# Patient Record
Sex: MALE | Race: WHITE | Employment: OTHER | URBAN - METROPOLITAN AREA
[De-identification: names, ages, dates, MRNs, and addresses within clinical notes are randomized per-mention and may not be internally consistent; named-entity substitution may affect disease eponyms.]

---

## 2017-02-08 PROBLEM — Z95.3 H/O HEART VALVE REPLACEMENT WITH BIOPROSTHETIC VALVE: Status: ACTIVE | Noted: 2017-02-08

## 2017-02-08 PROBLEM — I35.0 AORTIC STENOSIS: Status: ACTIVE | Noted: 2017-02-08

## 2017-02-08 PROBLEM — R60.9 EDEMA: Status: ACTIVE | Noted: 2017-02-08

## 2017-08-15 PROBLEM — Z01.810 PREOP CARDIOVASCULAR EXAM: Chronic | Status: ACTIVE | Noted: 2017-08-15

## 2018-02-13 ENCOUNTER — ANESTHESIA EVENT (OUTPATIENT)
Dept: ENDOSCOPY | Age: 58
End: 2018-02-13
Payer: COMMERCIAL

## 2018-02-13 RX ORDER — SODIUM CHLORIDE, SODIUM LACTATE, POTASSIUM CHLORIDE, CALCIUM CHLORIDE 600; 310; 30; 20 MG/100ML; MG/100ML; MG/100ML; MG/100ML
100 INJECTION, SOLUTION INTRAVENOUS CONTINUOUS
Status: CANCELLED | OUTPATIENT
Start: 2018-02-13

## 2018-02-13 RX ORDER — SODIUM CHLORIDE 0.9 % (FLUSH) 0.9 %
5-10 SYRINGE (ML) INJECTION AS NEEDED
Status: CANCELLED | OUTPATIENT
Start: 2018-02-13

## 2018-02-14 ENCOUNTER — HOSPITAL ENCOUNTER (OUTPATIENT)
Age: 58
Setting detail: OUTPATIENT SURGERY
Discharge: HOME | End: 2018-02-14
Attending: INTERNAL MEDICINE | Admitting: INTERNAL MEDICINE
Payer: COMMERCIAL

## 2018-02-14 ENCOUNTER — ANESTHESIA (OUTPATIENT)
Dept: ENDOSCOPY | Age: 58
End: 2018-02-14
Payer: COMMERCIAL

## 2018-02-14 VITALS
TEMPERATURE: 98.6 F | HEART RATE: 73 BPM | HEIGHT: 69 IN | DIASTOLIC BLOOD PRESSURE: 77 MMHG | SYSTOLIC BLOOD PRESSURE: 148 MMHG | RESPIRATION RATE: 15 BRPM | WEIGHT: 215 LBS | BODY MASS INDEX: 31.84 KG/M2 | OXYGEN SATURATION: 98 %

## 2018-02-14 LAB — GLUCOSE BLD STRIP.AUTO-MCNC: 128 MG/DL (ref 65–100)

## 2018-02-14 PROCEDURE — 76040000025: Performed by: INTERNAL MEDICINE

## 2018-02-14 PROCEDURE — 77030009426 HC FCPS BIOP ENDOSC BSC -B: Performed by: INTERNAL MEDICINE

## 2018-02-14 PROCEDURE — 82962 GLUCOSE BLOOD TEST: CPT

## 2018-02-14 PROCEDURE — 74011250636 HC RX REV CODE- 250/636

## 2018-02-14 PROCEDURE — 76060000032 HC ANESTHESIA 0.5 TO 1 HR: Performed by: INTERNAL MEDICINE

## 2018-02-14 PROCEDURE — 74011250636 HC RX REV CODE- 250/636: Performed by: ANESTHESIOLOGY

## 2018-02-14 PROCEDURE — 88305 TISSUE EXAM BY PATHOLOGIST: CPT | Performed by: INTERNAL MEDICINE

## 2018-02-14 PROCEDURE — 74011000250 HC RX REV CODE- 250: Performed by: ANESTHESIOLOGY

## 2018-02-14 PROCEDURE — 74011000250 HC RX REV CODE- 250

## 2018-02-14 RX ORDER — PROPOFOL 10 MG/ML
INJECTION, EMULSION INTRAVENOUS
Status: DISCONTINUED | OUTPATIENT
Start: 2018-02-14 | End: 2018-02-14 | Stop reason: HOSPADM

## 2018-02-14 RX ORDER — PROPOFOL 10 MG/ML
INJECTION, EMULSION INTRAVENOUS AS NEEDED
Status: DISCONTINUED | OUTPATIENT
Start: 2018-02-14 | End: 2018-02-14 | Stop reason: HOSPADM

## 2018-02-14 RX ORDER — SODIUM CHLORIDE, SODIUM LACTATE, POTASSIUM CHLORIDE, CALCIUM CHLORIDE 600; 310; 30; 20 MG/100ML; MG/100ML; MG/100ML; MG/100ML
100 INJECTION, SOLUTION INTRAVENOUS CONTINUOUS
Status: DISCONTINUED | OUTPATIENT
Start: 2018-02-14 | End: 2018-02-14 | Stop reason: HOSPADM

## 2018-02-14 RX ORDER — LIDOCAINE HYDROCHLORIDE 20 MG/ML
INJECTION, SOLUTION EPIDURAL; INFILTRATION; INTRACAUDAL; PERINEURAL AS NEEDED
Status: DISCONTINUED | OUTPATIENT
Start: 2018-02-14 | End: 2018-02-14 | Stop reason: HOSPADM

## 2018-02-14 RX ORDER — EPHEDRINE SULFATE 50 MG/ML
INJECTION, SOLUTION INTRAVENOUS AS NEEDED
Status: DISCONTINUED | OUTPATIENT
Start: 2018-02-14 | End: 2018-02-14 | Stop reason: HOSPADM

## 2018-02-14 RX ORDER — FAMOTIDINE 20 MG/1
20 TABLET, FILM COATED ORAL AS NEEDED
Status: DISCONTINUED | OUTPATIENT
Start: 2018-02-14 | End: 2018-02-14 | Stop reason: HOSPADM

## 2018-02-14 RX ADMIN — PROPOFOL 180 MCG/KG/MIN: 10 INJECTION, EMULSION INTRAVENOUS at 10:19

## 2018-02-14 RX ADMIN — SODIUM CHLORIDE, SODIUM LACTATE, POTASSIUM CHLORIDE, AND CALCIUM CHLORIDE 100 ML/HR: 600; 310; 30; 20 INJECTION, SOLUTION INTRAVENOUS at 09:05

## 2018-02-14 RX ADMIN — EPHEDRINE SULFATE 10 MG: 50 INJECTION, SOLUTION INTRAVENOUS at 10:43

## 2018-02-14 RX ADMIN — LIDOCAINE HYDROCHLORIDE 25 MG: 20 INJECTION, SOLUTION EPIDURAL; INFILTRATION; INTRACAUDAL; PERINEURAL at 10:19

## 2018-02-14 RX ADMIN — PROPOFOL 100 MG: 10 INJECTION, EMULSION INTRAVENOUS at 10:19

## 2018-02-14 RX ADMIN — EPHEDRINE SULFATE 5 MG: 50 INJECTION, SOLUTION INTRAVENOUS at 10:47

## 2018-02-14 RX ADMIN — FAMOTIDINE 20 MG: 10 INJECTION, SOLUTION INTRAVENOUS at 08:00

## 2018-02-14 NOTE — ANESTHESIA POSTPROCEDURE EVALUATION
Post-Anesthesia Evaluation and Assessment    Patient: Mendy Mayberry MRN: [id number]  SSN: [id number]    YOB: 1960  Age: 56 y.o. Sex: male       Cardiovascular Function/Vital Signs  Visit Vitals    /62    Pulse 72    Temp 37 °C (98.6 °F)    Resp 16    Ht 5' 9\" (1.753 m)    Wt 97.5 kg (215 lb)    SpO2 97%    BMI 31.75 kg/m2       Patient is status post total IV anesthesia anesthesia for Procedure(s):  COLONOSCOPY  BMI 37  ENDOSCOPIC POLYPECTOMY  COLON BIOPSY. Nausea/Vomiting: None    Postoperative hydration reviewed and adequate. Pain:  Pain Scale 1: Visual (02/14/18 1112)  Pain Intensity 1: 0 (02/14/18 1112)   Managed    Neurological Status: At baseline    Mental Status and Level of Consciousness: Arousable    Pulmonary Status:   O2 Device: Room air (02/14/18 1112)   Adequate oxygenation and airway patent    Complications related to anesthesia: None    Post-anesthesia assessment completed.  No concerns    Signed By: Nadira Bazzi MD     February 14, 2018

## 2018-02-14 NOTE — DISCHARGE INSTRUCTIONS
Gastrointestinal Colonoscopy/Flexible Sigmoidoscopy - Lower Exam Discharge Instructions  1. Call Dr. Stacey Cui at [phone] for any problems or questions. 2. Contact the doctors office for follow up appointment as directed  3. Medication may cause drowsiness for several hours, therefore, do not drive or operate machinery for remainder of the day. 4. No alcohol today. 5. Ordinarily, you may resume regular diet and activity after exam unless otherwise specified by your physician. 6. Because of air put into your colon during exam, you may experience some abdominal distension, relieved by the passage of gas, for several hours. 7. Contact your physician if you have any of the following:  a. Excessive amount of bleeding - large amount when having a bowel movement. Occasional specks of blood with bowel movement would not be unusual.  b. Severe abdominal pain  c. Fever or Chills  8. Polyp Removal - follow these additional instructions  a. Clear liquid diet for the next meal (jell-o, broth, clear drinks)  b. Soft diet for 24 hours, then resume regular diet   c. Take Metamucil - 1 tablespoon in juice every morning for 3 days  d. No Aspirin, Advil, Aleve, Nuprin, Ibuprofen, or medications that contain these drugs for 2 weeks. Any additional instructions: Follow up on biopsy in 1 week, repeat colonoscopy in 5-10 years      Instructions given to Candace Sampson and other family members.   Instructions given by:
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
## 2018-02-14 NOTE — H&P
Gastrointestinal Progress Note    2/14/2018    Admit Date: 2/14/2018    Subjective:     Patient is NPO for a colonoscopy    Pain: Patient complains of no abdominal pain. Bowel Movements: Normal    Bleeding:  None    Current Facility-Administered Medications   Medication Dose Route Frequency    lactated Ringers infusion  100 mL/hr IntraVENous CONTINUOUS    famotidine (PEPCID) tablet 20 mg  20 mg Oral PRN        Objective:     Blood pressure 109/68, pulse 68, temperature 98.6 °F (37 °C), resp. rate 16, height 5' 9\" (1.753 m), weight 97.5 kg (215 lb), SpO2 95 %. EXAM:  HEART: regular rate and rhythm, S1, S2 normal, 2/6 systolic murmur, click, rub or gallop, LUNGS: chest clear, no wheezing, rales, normal symmetric air entry, Heart exam - S1, S2 NGJIMY,6/4 systolic murmur, no gallop, rate regular and ABDOMEN:  Bowel sounds are normal, liver is not enlarged, spleen is not enlarged    Data Review    Recent Results (from the past 24 hour(s))   GLUCOSE, POC    Collection Time: 02/14/18  9:04 AM   Result Value Ref Range    Glucose (POC) 128 (H) 65 - 100 mg/dL       Assessment:     Active Problems:  Colon screening  S/P CABG/AVR replacment  PPM placement  DM      Plan:     Colonoscopy--risks (bleed, perforation, infection, untoward CV or resp.event, mortality, etc.), benefits and alternatives were discussed with the patient who agrees to proceed.   Dell Nichols MD

## 2018-02-14 NOTE — ANESTHESIA PREPROCEDURE EVALUATION
Anesthetic History   No history of anesthetic complications            Review of Systems / Medical History  Patient summary reviewed and pertinent labs reviewed    Pulmonary        Sleep apnea           Neuro/Psych              Cardiovascular    Hypertension: well controlled  Valvular problems/murmurs (AVR)    CHF    Pacemaker, CAD and CABG (2011)    Exercise tolerance: >4 METS  Comments: H/O AVR (bioprosthetic) and CABG in 2011    Extensive PVD, s/p left femoral to above-knee pop bypass in 3/2015     GI/Hepatic/Renal         Renal disease: CRI       Endo/Other    Diabetes: well controlled, type 2    Obesity     Other Findings              Physical Exam    Airway  Mallampati: II  TM Distance: 4 - 6 cm  Neck ROM: normal range of motion   Mouth opening: Normal     Cardiovascular    Rhythm: regular      Murmur: Grade 3 and 1, Aortic area     Dental  No notable dental hx       Pulmonary  Breath sounds clear to auscultation               Abdominal         Other Findings            Anesthetic Plan    ASA: 3  Anesthesia type: total IV anesthesia          Induction: Intravenous  Anesthetic plan and risks discussed with: Patient

## 2018-04-24 ENCOUNTER — HOSPITAL ENCOUNTER (EMERGENCY)
Age: 58
Discharge: HOME | End: 2018-04-25
Attending: EMERGENCY MEDICINE
Payer: COMMERCIAL

## 2018-04-24 ENCOUNTER — APPOINTMENT (OUTPATIENT)
Dept: GENERAL RADIOLOGY | Age: 58
End: 2018-04-24
Attending: EMERGENCY MEDICINE
Payer: COMMERCIAL

## 2018-04-24 VITALS
HEIGHT: 68 IN | DIASTOLIC BLOOD PRESSURE: 87 MMHG | TEMPERATURE: 98.5 F | RESPIRATION RATE: 16 BRPM | OXYGEN SATURATION: 94 % | BODY MASS INDEX: 30.31 KG/M2 | SYSTOLIC BLOOD PRESSURE: 123 MMHG | WEIGHT: 200 LBS | HEART RATE: 88 BPM

## 2018-04-24 DIAGNOSIS — R45.851 SUICIDAL IDEATION: Primary | ICD-10-CM

## 2018-04-24 DIAGNOSIS — F32.A DEPRESSION, UNSPECIFIED DEPRESSION TYPE: ICD-10-CM

## 2018-04-24 LAB
AMPHET UR QL SCN: NEGATIVE
ANION GAP SERPL CALC-SCNC: 15 MMOL/L (ref 7–16)
APAP SERPL-MCNC: <2 UG/ML (ref 10–30)
ATRIAL RATE: 105 BPM
BARBITURATES UR QL SCN: NEGATIVE
BASOPHILS # BLD: 0.2 K/UL (ref 0–0.2)
BASOPHILS NFR BLD MANUAL: 2 % (ref 0–2)
BENZODIAZ UR QL: NEGATIVE
BUN SERPL-MCNC: 20 MG/DL (ref 6–23)
CALCIUM SERPL-MCNC: 8.7 MG/DL (ref 8.3–10.4)
CALCULATED P AXIS, ECG09: 50 DEGREES
CALCULATED R AXIS, ECG10: -12 DEGREES
CALCULATED T AXIS, ECG11: 36 DEGREES
CANNABINOIDS UR QL SCN: NEGATIVE
CHLORIDE SERPL-SCNC: 97 MMOL/L (ref 98–107)
CO2 SERPL-SCNC: 22 MMOL/L (ref 21–32)
COCAINE UR QL SCN: NEGATIVE
CREAT SERPL-MCNC: 1.58 MG/DL (ref 0.8–1.5)
DIAGNOSIS, 93000: NORMAL
DIFFERENTIAL METHOD BLD: NORMAL
ERYTHROCYTE [DISTWIDTH] IN BLOOD BY AUTOMATED COUNT: 14.1 % (ref 11.9–14.6)
GLUCOSE BLD STRIP.AUTO-MCNC: 281 MG/DL (ref 65–100)
GLUCOSE BLD STRIP.AUTO-MCNC: 284 MG/DL (ref 65–100)
GLUCOSE BLD STRIP.AUTO-MCNC: 318 MG/DL (ref 65–100)
GLUCOSE SERPL-MCNC: 288 MG/DL (ref 65–100)
HCT VFR BLD AUTO: 43.3 % (ref 41.1–50.3)
HGB BLD-MCNC: 15.1 G/DL (ref 13.6–17.2)
LYMPHOCYTES # BLD: 2.4 K/UL (ref 0.5–4.6)
LYMPHOCYTES NFR BLD MANUAL: 29 % (ref 16–44)
MCH RBC QN AUTO: 29.2 PG (ref 26.1–32.9)
MCHC RBC AUTO-ENTMCNC: 34.9 G/DL (ref 31.4–35)
MCV RBC AUTO: 83.8 FL (ref 79.6–97.8)
METHADONE UR QL: NEGATIVE
MONOCYTES # BLD: 0.4 K/UL (ref 0.1–1.3)
MONOCYTES NFR BLD MANUAL: 5 % (ref 3–9)
NEUTS SEG # BLD: 5.3 K/UL (ref 1.7–8.2)
NEUTS SEG NFR BLD MANUAL: 64 % (ref 47–75)
OPIATES UR QL: NEGATIVE
P-R INTERVAL, ECG05: 164 MS
PCP UR QL: NEGATIVE
PLATELET # BLD AUTO: 162 K/UL (ref 150–450)
PLATELET COMMENTS,PCOM: ADEQUATE
PMV BLD AUTO: 12.9 FL (ref 10.8–14.1)
POTASSIUM SERPL-SCNC: 4.2 MMOL/L (ref 3.5–5.1)
Q-T INTERVAL, ECG07: 350 MS
QRS DURATION, ECG06: 102 MS
QTC CALCULATION (BEZET), ECG08: 462 MS
RBC # BLD AUTO: 5.17 M/UL (ref 4.23–5.67)
RBC MORPH BLD: NORMAL
SALICYLATES SERPL-MCNC: 1.7 MG/DL (ref 2.8–20)
SODIUM SERPL-SCNC: 134 MMOL/L (ref 136–145)
TROPONIN I SERPL-MCNC: <0.02 NG/ML (ref 0.02–0.05)
VENTRICULAR RATE, ECG03: 105 BPM
WBC # BLD AUTO: 8.3 K/UL (ref 4.3–11.1)
WBC MORPH BLD: NORMAL

## 2018-04-24 PROCEDURE — 80307 DRUG TEST PRSMV CHEM ANLYZR: CPT | Performed by: EMERGENCY MEDICINE

## 2018-04-24 PROCEDURE — 96372 THER/PROPH/DIAG INJ SC/IM: CPT | Performed by: EMERGENCY MEDICINE

## 2018-04-24 PROCEDURE — 71045 X-RAY EXAM CHEST 1 VIEW: CPT

## 2018-04-24 PROCEDURE — 82962 GLUCOSE BLOOD TEST: CPT

## 2018-04-24 PROCEDURE — 80048 BASIC METABOLIC PNL TOTAL CA: CPT | Performed by: EMERGENCY MEDICINE

## 2018-04-24 PROCEDURE — 85025 COMPLETE CBC W/AUTO DIFF WBC: CPT | Performed by: EMERGENCY MEDICINE

## 2018-04-24 PROCEDURE — 74011250637 HC RX REV CODE- 250/637: Performed by: EMERGENCY MEDICINE

## 2018-04-24 PROCEDURE — 99285 EMERGENCY DEPT VISIT HI MDM: CPT | Performed by: EMERGENCY MEDICINE

## 2018-04-24 PROCEDURE — 93005 ELECTROCARDIOGRAM TRACING: CPT | Performed by: EMERGENCY MEDICINE

## 2018-04-24 PROCEDURE — 74011636637 HC RX REV CODE- 636/637: Performed by: EMERGENCY MEDICINE

## 2018-04-24 PROCEDURE — 84484 ASSAY OF TROPONIN QUANT: CPT | Performed by: EMERGENCY MEDICINE

## 2018-04-24 PROCEDURE — 81003 URINALYSIS AUTO W/O SCOPE: CPT | Performed by: EMERGENCY MEDICINE

## 2018-04-24 RX ORDER — VALSARTAN 160 MG/1
160 TABLET ORAL DAILY
Status: DISCONTINUED | OUTPATIENT
Start: 2018-04-25 | End: 2018-04-25 | Stop reason: HOSPADM

## 2018-04-24 RX ORDER — GUAIFENESIN 100 MG/5ML
81 LIQUID (ML) ORAL DAILY
Status: DISCONTINUED | OUTPATIENT
Start: 2018-04-25 | End: 2018-04-25 | Stop reason: HOSPADM

## 2018-04-24 RX ORDER — METOPROLOL TARTRATE 50 MG/1
50 TABLET ORAL
Status: COMPLETED | OUTPATIENT
Start: 2018-04-24 | End: 2018-04-24

## 2018-04-24 RX ORDER — AMLODIPINE BESYLATE 10 MG/1
10 TABLET ORAL DAILY
Status: DISCONTINUED | OUTPATIENT
Start: 2018-04-25 | End: 2018-04-25 | Stop reason: HOSPADM

## 2018-04-24 RX ORDER — INSULIN GLARGINE 100 [IU]/ML
50 INJECTION, SOLUTION SUBCUTANEOUS
Status: DISCONTINUED | OUTPATIENT
Start: 2018-04-24 | End: 2018-04-25 | Stop reason: HOSPADM

## 2018-04-24 RX ORDER — PRAVASTATIN SODIUM 20 MG/1
80 TABLET ORAL
Status: DISCONTINUED | OUTPATIENT
Start: 2018-04-24 | End: 2018-04-25 | Stop reason: HOSPADM

## 2018-04-24 RX ORDER — DULOXETIN HYDROCHLORIDE 30 MG/1
30 CAPSULE, DELAYED RELEASE ORAL DAILY
Status: DISCONTINUED | OUTPATIENT
Start: 2018-04-25 | End: 2018-04-25 | Stop reason: HOSPADM

## 2018-04-24 RX ORDER — METOPROLOL TARTRATE 50 MG/1
50 TABLET ORAL 2 TIMES DAILY
Status: DISCONTINUED | OUTPATIENT
Start: 2018-04-25 | End: 2018-04-25 | Stop reason: HOSPADM

## 2018-04-24 RX ADMIN — PRAVASTATIN SODIUM 80 MG: 20 TABLET ORAL at 22:00

## 2018-04-24 RX ADMIN — INSULIN GLARGINE 50 UNITS: 100 INJECTION, SOLUTION SUBCUTANEOUS at 22:00

## 2018-04-24 RX ADMIN — METOPROLOL TARTRATE 50 MG: 50 TABLET ORAL at 18:31

## 2018-04-24 NOTE — ED TRIAGE NOTES
Pt to ed via ems/wc with c.o suicidal  ideations and elevated bp - pt reports that he would overdose himself on insulin and bp pills - pt reports that he has been having these thoughts for awhile now - pt agrees to safety contract with this rn - pt reports history of anxiety and have some chest tightness as well

## 2018-04-24 NOTE — ED NOTES
Pt resting in bed, pt denies any additional needs at this time. Pt respirations even and unlabored. Sitter and family at bedside.

## 2018-04-24 NOTE — PROGRESS NOTES
Met with patient and his wife. Patient states that he is suicidal and depressed. Patient's wife states they cannot afford their electric bill and they have no electricity at this time. She states their financial state at this time has caused him to become more depressed and suicidal. Patient's wife states he had a plan to overdose on his insulin. The patient's wife is asking for resources that will help with their electricty bill. SW has provided information on [employer] and [employer], both community resources that assist with upaid bills in crisis events. Discussed with primary nurse.

## 2018-04-24 NOTE — ED PROVIDER NOTES
HPI Comments: Patient is a 29-year-old male with a history of hypertension, diabetes, heart disease, depression, anxiety who is sent to the emergency department today from his primary care physician's office for evaluation of medical noncompliance and suicidal thoughts. He admits to increased depression and recent months. He states for a while he has been thinking of taking his own life. He had the plan of saving his medications and overdosing on insulin. Wife finally convinced him to go to his doctor's office today where his blood pressure was markedly elevated and he expresses suicidal thoughts. He states he has not been taking his medications correctly for weeks. Patient is a 56 y.o. male presenting with mental health disorder. The history is provided by the patient and the spouse. Mental Health Problem    This is a chronic problem. The current episode started more than 1 week ago. The problem has been gradually worsening. Pertinent negatives include no hallucinations. Mental status baseline is normal.  His past medical history is significant for diabetes, hypertension and heart disease. Past Medical History:   Diagnosis Date    Acute on chronic diastolic heart failure ([facility] [address] 2/28/2011    followed by dr Bettyann Kayser 2011    pacemaker Medtr[employer]-- followed by [facility]    Atherosclerotic PVD with ulceration ([facility] [address] 2/2/2015    CAD (coronary artery disease) 02/28/2011     3 vessel CABG; aortic valve replacement/Bovine-- both at same time-- followed by dr Aide Aldrich Chronic kidney disease     chronic renal insuff. --- dos not see a nephrologist     Chronic pain     left foot    Depression 2/28/2011    Diabetes mellitus ([facility] [address] dx age 31    type2--- sqbs avg am--100--- hypo at 52's    H/O aortic valve replacement 2/2011    Bovine valve    Heart failure ([address]     Hypercholesterolemia     Hypertension     controlled with med    Murmur, cardiac 02/2011    aortic valve replaced with CABG, 2011------- 2/6 SALVADOR RUSB apex, 2/6 TR murmur-- ECHO 2/12/15 LVEF 55% --- followed by dr Cyndi Ramirez PVD (peripheral vascular disease) ([facility] [address]     Shingles 6/2015    Warfarin anticoagulation        Past Surgical History:   Procedure Laterality Date    CARDIAC SURG PROCEDURE UNLIST  2011    Dr. Kirk Merino  3 vessel cabg and aortic valve replacement    COLONOSCOPY N/A 2/14/2018    COLONOSCOPY  BMI 37 performed [facility] ENDOSCOPY    HX FREE SKIN GRAFT Left 8/11/15    thigh to foot graft    HX HEART CATHETERIZATION  03/07/2011    HX PACEMAKER  2011    medtronic per pt (phone assessment)    HX UROLOGICAL  2005    Penile Impant    VASCULAR SURGERY PROCEDURE UNLIST  2/17/15    LE arteriogram    VASCULAR SURGERY PROCEDURE UNLIST Left 3-16-15    fem-tibial by-pass    VASCULAR SURGERY PROCEDURE UNLIST Left 6/2/15    5th toe amp    VASCULAR SURGERY PROCEDURE UNLIST Left 6/5/15    wound debridement    VASCULAR SURGERY PROCEDURE UNLIST Left 8/24/15    great toe amp         Family History:   Problem Relation Age of Onset    Diabetes Mother     Heart Disease Mother     Heart Surgery Mother 54     valve replacement    Other Mother      Shrogens    Diabetes Father     Heart Disease Father     Heart Surgery Father 61     cabg    Lung Disease Father     Parkinsonism Father     Other Father      polio as a child-affected lungs    Diabetes Brother     Diabetes Paternal Uncle      x2    Thyroid Disease Brother     Cancer Paternal Grandfather        Social History     Social History    Marital status:      Spouse name: N/A    Number of children: N/A    Years of education: N/A     Occupational History    works in the lab      F     Social History Main Topics    Smoking status: Never Smoker    Smokeless tobacco: Never Used    Alcohol use No    Drug use: No    Sexual activity: Not on file     Other Topics Concern    Not on file     Social History Narrative ALLERGIES: Review of patient's allergies indicates no known allergies. Review of Systems   Constitutional: Negative. HENT: Negative. Eyes: Negative. Respiratory: Positive for cough. Cardiovascular: Negative. Gastrointestinal: Negative. Endocrine: Negative. Genitourinary: Negative. Musculoskeletal: Negative. Skin: Negative. Neurological: Negative. Psychiatric/Behavioral: Positive for dysphoric mood and suicidal ideas. Negative for hallucinations. Vitals:    04/24/18 1623   BP: (!) 170/97   Pulse: (!) 105   Resp: 20   Temp: 98.5 °F (36.9 °C)   SpO2: 97%   Weight: 90.7 kg (200 lb)   Height: 5' 8\" (1.727 m)            Physical Exam   Constitutional: He is oriented to person, place, and time. He appears well-developed and well-nourished. HENT:   Head: Normocephalic and atraumatic. Cardiovascular: Normal rate and regular rhythm. Pulmonary/Chest: Effort normal and breath sounds normal. He exhibits no tenderness. Pacemaker in left upper chest   Abdominal: Soft. There is no tenderness. There is no rebound and no guarding. Musculoskeletal: He exhibits no edema or tenderness. Neurological: He is alert and oriented to person, place, and time. He has normal strength. No cranial nerve deficit or sensory deficit. GCS eye subscore is 4. GCS verbal subscore is 5. GCS motor subscore is 6. Skin: Skin is warm and dry. No rash noted. There is pallor. Psychiatric: His speech is normal and behavior is normal. Cognition and memory are normal. He does not express inappropriate judgment. He exhibits a depressed mood. He expresses suicidal ideation. He expresses suicidal plans. Nursing note and vitals reviewed.        MDM  Number of Diagnoses or Management Options  Depression, unspecified depression type:   Suicidal ideation:   Diagnosis management comments: Differential diagnosis includes hypertension, hyperglycemia, DKA, medical noncompliance, depression, suicidal ideation    EKG shows sinus tachycardia rate of 105 with an incomplete right bundle branch block       Amount and/or Complexity of Data Reviewed  Clinical lab tests: ordered and reviewed  Tests in the radiology section of CPT®: ordered and reviewed  Review and summarize past medical records: yes  Independent visualization of images, tracings, or specimens: yes    Risk of Complications, Morbidity, and/or Mortality  Presenting problems: moderate  Diagnostic procedures: moderate  Management options: moderate    Patient Progress  Patient progress: stable        ED Course   5:53 PM  chest x-ray is negative, blood work has a slightly elevated glucose and some chronic renal insufficiency but he is not acidotic. There is no sign of DKA. There is no indication for medical admission at this time. We will obtain a telemetry psychiatry consultation. 8:26 PM  She has been seen by tele-psychiatry. They recommend commitment for psychiatric hospitalization due to his suicidal thoughts. They recommend we start Cymbalta 30 mg daily because he has not been taking his antidepressants for several months. Voice dictation software was used during the making of this note. This software is not perfect and grammatical and other typographical errors may be present. This note has been proofread, but may still contain errors.   Juancarlos Norton MD; 4/24/2018 @8:26 PM   ===================================================================    11:25 PM  Patient has been accepted at [facility] for behavioral health by Dr. Do Sampson            Procedures

## 2018-04-24 NOTE — ED TRIAGE NOTES
Arrived via ems for depression and SI thoughts. Ems states the pt has not been taking his meds in months and was going to use his insulin to harm himself.

## 2018-04-24 NOTE — ED NOTES
Report from Ajay, [facility] for continuation of care.

## 2018-04-24 NOTE — ED NOTES
Spoke with , telepsych camera in room, states they called to confirm about 5 minutes ago so should be coming on camera soon.

## 2018-04-25 NOTE — ED NOTES
Pt resting in bed, pt denies any additional needs at this time. Pt respirations even and unlabored. Sitter at bedside. Pt provided pillow and 2 blankets for comfort.

## 2018-04-25 NOTE — ED NOTES
Pt resting in bed, pt denies any additional needs at this time. Pt respirations even and unlabored. Sitter and family at bedside. Pt spoke with Tiffany Gomez states intent to commit patient.

## 2018-04-25 NOTE — ED NOTES
Reviewed pt medications, updated list given to [facility], MD. Pt has not taken meds for 2 months. Pt resting in bed, respirations even and unlabored. Sitter at bedside.

## 2018-04-25 NOTE — ED NOTES
Pt resting in bed, pt denies any additional needs at this time. Pt respirations even and unlabored. Sitter at bedside. Pt and family review rules about 15 minutes of visiting, he may have no items etc. Pt states he understands.

## 2018-08-20 PROBLEM — E66.01 CLASS 2 SEVERE OBESITY DUE TO EXCESS CALORIES WITH SERIOUS COMORBIDITY AND BODY MASS INDEX (BMI) OF 35.0 TO 35.9 IN ADULT (HCC): Status: ACTIVE | Noted: 2018-08-20

## 2018-08-29 ENCOUNTER — HOSPITAL ENCOUNTER (OUTPATIENT)
Dept: WOUND CARE | Age: 58
Discharge: HOME | End: 2018-08-29
Attending: PODIATRIST
Payer: COMMERCIAL

## 2018-08-29 PROCEDURE — 97597 DBRDMT OPN WND 1ST 20 CM/<: CPT

## 2018-08-29 PROCEDURE — 77030022298 HC DRSG ANTIMIC ACTICT S&N -A

## 2018-08-29 PROCEDURE — 99213 OFFICE O/P EST LOW 20 MIN: CPT

## 2018-08-29 NOTE — WOUND CARE
[address]  [address], [address]  Phone: [phone]  Fax: [phone]    Patient: Juan Daniel Lee MRN: [id number]  SSN: [id number]    YOB: 1960  Age: 56 y.o. Sex: male       Return Appointment: 1 week with Ngozi Jackson DPM    Instructions: Cleanse with Wound Cleanser. Apply Acticoat to Wound, Cover with Dry Gauze, Secure with Coversite or Rolled Gauze. Change Every Other Day. Follow Up Appointment in 1 Week With Dr Zaida Walters. Keep Appointment on September 10 with Dr Milka Fuentes      Should you experience increased redness, swelling, pain, foul odor, size of wound(s), or have a temperature over 101 degrees please contact the [address] [place] [address] Road at [phone] or if after hours contact your primary care physician or go to the hospital emergency department.     Signed By: Herminia Rodriguez RN     August 29, 2018

## 2018-08-29 NOTE — PROGRESS NOTES
Wound Center Progress Note    Patient: Rudolph Burch MRN: [id number]  SSN: [id number]    YOB: 1960  Age: 56 y.o. Sex: male      Subjective:     Chief Complaint:  Rudolph Burch is a 56 y.o. WHITE OR  male who presents with a wound to the left lower extremity at the sub 3rd metatarsal head. This began 6 weeks ago. The patient denies current erythema, edema, drainage and odor. Pain is rated 0/10. He has an extensive history to the left foot with resultant hallux and 5th ray resection and a fem-pop bypass. His PCP noted callusing tot he left heel and plantar forefoot and referred him here for evaluation. He is not providing any local care and is ambulating in street shoes with diabetic insoles over a year old. He was also referred to Dr. Mayra Jimenez for podiatry care and has an appointment on 9/10/18. History of Present Illness:     Nature: Painless  Location: as above  Duration: 6 weeks  Onset: Patient states it started as a callus  Course:Improving  Aggravating/Alleviating: Worsened with pressure and dependency, improved with compression and rest  Treatment: none    Past Medical History:   Diagnosis Date    Acute on chronic diastolic heart failure ([facility] [address] 2/28/2011    followed by dr Abhay Goins 2011    pacemaker Medtronic-- followed by [facility] cardio    Atherosclerotic PVD with ulceration ([facility] [address] 2/2/2015    CAD (coronary artery disease) 02/28/2011     3 vessel CABG; aortic valve replacement/Bovine-- both at same time-- followed by dr Jim Aguilar Chronic kidney disease     chronic renal insuff. --- dos not see a nephrologist     Chronic pain     left foot    Depression 2/28/2011    Diabetes mellitus ([facility] [address] dx age 31    type2--- sqbs avg am--100--- hypo at 52's    H/O aortic valve replacement 2/2011    Bovine valve    Heart failure ([facility] [address]     Hypercholesterolemia     Hypertension     controlled with med    Murmur, cardiac 02/2011    aortic valve replaced with CABG, 2011------- 2/6 SALVADOR RUSB apex, 2/6 TR murmur-- ECHO 2/12/15 LVEF 55% --- followed by dr Nabila Estrada PVD (peripheral vascular disease) ([facility] [address]     Shingles 6/2015    Warfarin anticoagulation       Past Surgical History:   Procedure Laterality Date    CARDIAC SURG PROCEDURE UNLIST  2011    Dr. Angelique Salcedo  3 vessel cabg and aortic valve replacement    COLONOSCOPY N/A 2/14/2018    COLONOSCOPY  BMI 37 performed by Raul Fagan [facility] at [facility] ENDOSCOPY    HX FREE SKIN GRAFT Left 8/11/15    thigh to foot graft    HX HEART CATHETERIZATION  03/07/2011    HX PACEMAKER  2011    medtronic per pt (phone assessment)    HX UROLOGICAL  2005    Penile Impant    VASCULAR SURGERY PROCEDURE UNLIST  2/17/15    LE arteriogram    VASCULAR SURGERY PROCEDURE UNLIST Left 3-16-15    fem-tibial by-pass    VASCULAR SURGERY PROCEDURE UNLIST Left 6/2/15    5th toe amp    VASCULAR SURGERY PROCEDURE UNLIST Left 6/5/15    wound debridement    VASCULAR SURGERY PROCEDURE UNLIST Left 8/24/15    great toe amp     Family History   Problem Relation Age of Onset    Diabetes Mother     Heart Disease Mother     Heart Surgery Mother 54     valve replacement    Other Mother      Shrogens    Diabetes Father     Heart Disease Father     Heart Surgery Father 61     cabg    Lung Disease Father     Parkinsonism Father     Other Father      polio as a child-affected lungs    Diabetes Brother     Diabetes Paternal Uncle      x2    Thyroid Disease Brother     Stroke Brother     Cancer Paternal Grandfather       Social History   Substance Use Topics    Smoking status: Never Smoker    Smokeless tobacco: Never Used    Alcohol use No       Prior to Admission medications    Medication Sig Start Date End Date Taking? Authorizing Provider   irbesartan (AVAPRO) 150 mg tablet Take 1 Tab by mouth nightly. 8/20/18   Francis Erickson NP   insulin glargine (LANTUS SOLOSTAR U-100 INSULIN) 100 unit/mL (3 mL) inpn 50 Units by SubCUTAneous route nightly. 8/20/18   Angela Suárez NP   insulin aspart U-100 (NOVOLOG FLEXPEN U-100 INSULIN) 100 unit/mL inpn 15 Units by SubCUTAneous route Before breakfast, lunch, and dinner. 8/20/18   Angela Suárez NP   pravastatin (PRAVACHOL) 80 mg tablet Take 1 Tab by mouth nightly. Indications: myocardial infarction prevention 5/18/18   Angela Suárez NP   amLODIPine (NORVASC) 10 mg tablet Take 1 Tab by mouth every morning. Indications: hypertension 5/18/18   Angela Suárez NP   metoprolol succinate (TOPROL-XL) 50 mg XL tablet Take 1 Tab by mouth every morning. 5/18/18   Angela Suárez NP   DULoxetine (CYMBALTA) 60 mg capsule Take 1 Cap by mouth daily. 5/18/18   Angela Suárez NP   eszopiclone (LUNESTA) 2 mg tablet Take 1 Tab by mouth nightly. Max Daily Amount: 2 mg. Indications: Insomnia 5/18/18   Angela Suárez NP   fluticasone (FLONASE) 50 mcg/actuation nasal spray 2 Sprays by Both Nostrils route daily as needed. 2/17/16   Historical Provider   aspirin 81 mg chewable tablet Take 81 mg by mouth daily. Historical Provider     No Known Allergies     Review of Systems:  The patient has no difficulty with chest pain or shortness of breath. No fever or chills. No Nausea, vomiting or diarrhea. Comprehensive review of systems was otherwise unremarkable except as noted above. Lab Results   Component Value Date/Time    Hemoglobin A1c 7.0 (H) 08/20/2018 03:21 PM        Immunization History   Administered Date(s) Administered    Influenza Vaccine 10/15/2014    Influenza Vaccine (Quad) 12/21/2016    Influenza Vaccine (Quad) Mdck Pf 09/05/2017    Influenza Vaccine Split 03/13/2011    Pneumococcal Vaccine (Unspecified Type) 01/17/2013    Tdap 03/08/2017       There is no height or weight on file to calculate BMI. Counseling regarding nutrition done: Yes. Recommend Joni nutritional supplement for wound healing.      Current medications:  Current Outpatient Prescriptions   Medication Sig Dispense Refill    irbesartan (AVAPRO) 150 mg tablet Take 1 Tab by mouth nightly. 30 Tab 5    insulin glargine (LANTUS SOLOSTAR U-100 INSULIN) 100 unit/mL (3 mL) inpn 50 Units by SubCUTAneous route nightly. 6 Pen 2    insulin aspart U-100 (NOVOLOG FLEXPEN U-100 INSULIN) 100 unit/mL inpn 15 Units by SubCUTAneous route Before breakfast, lunch, and dinner. 6 Pen 2    pravastatin (PRAVACHOL) 80 mg tablet Take 1 Tab by mouth nightly. Indications: myocardial infarction prevention 30 Tab 5    amLODIPine (NORVASC) 10 mg tablet Take 1 Tab by mouth every morning. Indications: hypertension 30 Tab 5    metoprolol succinate (TOPROL-XL) 50 mg XL tablet Take 1 Tab by mouth every morning. 30 Tab 5    DULoxetine (CYMBALTA) 60 mg capsule Take 1 Cap by mouth daily. 30 Cap 5    eszopiclone (LUNESTA) 2 mg tablet Take 1 Tab by mouth nightly. Max Daily Amount: 2 mg. Indications: Insomnia 30 Tab 5    fluticasone (FLONASE) 50 mcg/actuation nasal spray 2 Sprays by Both Nostrils route daily as needed. 11    aspirin 81 mg chewable tablet Take 81 mg by mouth daily. Objective:     Physical Exam:     There were no vitals taken for this visit. General: well developed, well nourished, pleasant , NAD. Hygiene good  Psych: cooperative. Pleasant. No anxiety or depression. Normal mood and affect. HEENT: Normocephalic, atraumatic. EOMI. Conjunctiva clear. No scleral icterus. Neck: Normal range of motion. No masses. Chest: Good air entry bilaterally. Respirations nonlabored  Cardio: Normal heart sounds,no rubs, murmurs or gallops  Abdomen: Soft, nontender, nondistended, normoactive bowel sounds    Vascular: DP and PT pulses are nonpalpable at 0/4 bilateral. Skin temperature is uniform from proximal to distal bilateral. Hair growth is absent bilateral. No erythema, edema, heat is appreciated bilateral. No evidence of cellulitis. Derm: Nails 1-5 right and 2,3,4 left are thickened, discolored and with subungual debris.  No paronychial infections are noted. Skin is atrophic and xerotic. No subcutaneous masses or hyperpigmented lesions are present. Neuro: Epicritic sensation is absent bilateral. Protective sensation is absent with 5.07 SWMF testing to all 10 sites bilateral. Muscle tone and bulk is symmetric bilateral.  Msk: Muscle strength is 5/5 for all prime movers of the foot bilateral. ROM of all joints is full and fluid without pain. No effusions are palpable. Foot deformities are: left foot absent hallux and 5th ray. Central digits with rigid contracture at the PIPJ, DIPJ and the MPJ level. Plantar prominent metatarsal heads. Hyperkeratosis to the plantar left heel without open wound. Ulceration present to the sub 3rd metatarsal head with hyperkeratotic rim and granular base. Maceration is present. No erythema, edema or odor.      Diabetic Foot Ulcer/Neuropathic   Is Wound Greater than 1.0 sq cm ? : No  Re-Current Wound with Skin Substitue within Last Year : No  X-Ray in Last 3 Months: No  WATSON In Last 6 Months : No  Smoking Status: Non- Smoker  Wound Free from Infection : No Culture Done  Is Wound Free of Eschar, Slough , and / or Bio-Drain: Yes  Malignant Process in Wound : No Biopsy Done  Hemoglobin A1Cin Last 3 Months: Yes (7.0)  Type of off Loading: Orthotics which off loading wound     Ulcer Description:   Wound Foot Right;Left (Active)   Number of days:1185       Wound Foot Plantar;Lateral (Active)   Number of days:1185       Wound Foot Left (Active)   Number of days:1184       Wound Foot Left (Active)   Number of days:1181       Wound Thigh Left;Upper (Active)   Number of days:1114       Wound Foot Left (Active)   Number of days:1114       Wound Other (comment) Left (Active)   Number of days:1101       Wound Foot Right (Active)   Number of days:799       Wound Foot Left;Plantar (Active)   Wound Length (cm) 0.2 cm 8/29/2018  2:58 PM   Wound Width (cm) 0.5 cm 8/29/2018  2:58 PM   Wound Depth (cm) 0.2 8/29/2018  2:58 PM   Wound Surface area (cm^2) 0.1 cm^2 8/29/2018  2:58 PM   Condition of Base [place] 8/29/2018  2:58 PM   Tissue Type Percent Pink 100 8/29/2018  2:58 PM   Drainage Amount  None 8/29/2018  2:58 PM   Wound Odor None 8/29/2018  2:58 PM   Periwound Skin Condition Calloused 8/29/2018  2:58 PM   Cleansing and Cleansing Agents  Normal saline 8/29/2018  2:58 PM   Number of days:0             Data Review:   No results found for this or any previous visit (from the past 24 hour(s)). I independently reviewed recent labs, pathology reports, microbiology reports and radiology studies as noted above. Assessment:     56 y.o. male with diabetic ulceration to the sub 3rd metatarsal head left foot top fat layer s/p amputation digits 1 and 5    Plan:     Patient was examined and evaluated today. They were educated on the barriers to healing. Acticoat to the wound bed every other day. Discuss with Dr. Ariane Aguirre at his appointment new diabetic shoes and insoles. Return in 1 week for serial debridement. Any procedures done today in the wound center are documented in a seperate note in [facility] care made part of this record by reference. Patient instructed on the following: Follow all wound dressing instructions. Do not get dressing or wound wet. May shower if wound can be effectively kept dry. Cast covers may be purchased at [facility] and [facility]. Should you experience increased redness, swelling, pain, foul odor, size of wound(s), or have a temperature over 101 degrees please contact the [address] [place] [address] Road at [phone] or if after hours contact your primary care physician or go to the hospital emergency department. Active Orders   Nursing    WOUND CARE, DRESSING CHANGE     Frequency: ONE TIME     Number of Occurrences:  1 Occurrences     Order Comments: Cleanse with Wound Cleanser. Apply Acticoat to Wound, Cover with Dry Gauze, Secure with Coversite or Rolled Gauze. Change Every Other Day.   Follow Up Appointment in 1 Week With  Ayse.   Keep Appointment on September 10 with Dr Yash De La Rosa up With Details Comments Contact Info    Eleazar RICO [address]. In 1 week  [facility] Dr Teodoro Fuller [address] [place] [address]  [phone]                 Signed By: Lucas Tripathi DPM     August 29, 2018

## 2018-08-29 NOTE — WOUND CARE
Wound Center Debridement    Patient: Rudolph Burch MRN: [id number]  SSN: [id number]    YOB: 1960  Age: 56 y.o.   Sex: male      August 29, 2018     Procedure Performed By: Mee Esposito DPM    Wound: 1 Left  Non Pressure  Foot To Fat Layer    Type of Debridement:  Selective      Time Out Taken: Yes    Pain Control: Insensate      Type of Tissue Removed: Epidermis and Slough    Frequency of Debridements: PRN    Consent in chart     Instrument: Blade     Bleeding: Minimal     Hemostasis: Pressure     Procedural Pain: Insensate    Post-Procedural Pain: Insensate    Pre-Debridement Measurements: 0.2 x 0.5 x 0.2 cm    Post-Debridement Measurements: 0.2x 0.5 x 0.2 cm    Surface Area Debrided: 0.2 sq. cm    Response to Procedure: tolerated the procedure well with no complications

## 2018-08-29 NOTE — WOUND CARE
08/29/18 1458   Wound Foot Left;Plantar   Date First Assessed: 08/29/18   POA: Yes  Wound Type: Diabetic  Location: Foot  Wound Description (Optional): #1-  Orientation: Left;Plantar   DRESSING TYPE (None)   Wound Length (cm) 0.2 cm   Wound Width (cm) 0.5 cm   Wound Depth (cm) 0.2   Wound Surface area (cm^2) 0.1 cm^2   Condition of Base Pink   Tissue Type Percent Pink 100   Drainage Amount  None   Wound Odor None   Periwound Skin Condition Calloused   Cleansing and Cleansing Agents  Normal saline

## 2018-09-05 ENCOUNTER — HOSPITAL ENCOUNTER (OUTPATIENT)
Dept: WOUND CARE | Age: 58
Discharge: HOME | End: 2018-09-05
Attending: PODIATRIST
Payer: COMMERCIAL

## 2018-09-05 VITALS
OXYGEN SATURATION: 98 % | RESPIRATION RATE: 18 BRPM | BODY MASS INDEX: 31.16 KG/M2 | WEIGHT: 210.4 LBS | HEART RATE: 80 BPM | SYSTOLIC BLOOD PRESSURE: 102 MMHG | TEMPERATURE: 98.7 F | DIASTOLIC BLOOD PRESSURE: 69 MMHG | HEIGHT: 69 IN

## 2018-09-05 PROCEDURE — 97597 DBRDMT OPN WND 1ST 20 CM/<: CPT

## 2018-09-05 NOTE — PROCEDURES
Wound Center Debridement    Patient: Alden Simon MRN: [id number]  SSN: [id number]    YOB: 1960  Age: 56 y.o.   Sex: male      September 5, 2018     Procedure Performed By: Leyla Becker DPM    Wound: 1 Left  Pressure Foot Breakdown of Skin     Type of Debridement:  Selective      Time Out Taken: Yes    Pain Control: N/A      Type of Tissue Removed: Callus, Dermis and Epidermis    Frequency of Debridements: PRN    Consent in chart     Instrument: Blade     Bleeding: Minimal     Hemostasis: Pressure     Procedural Pain: 0    Post-Procedural Pain: 0    Pre-Debridement Measurements: 0.1 x 0.1 x 0.1 cm    Post-Debridement Measurements: 0.1 x 0.3 x 0.1 cm    Surface Area Debrided: 0.01 sq. cm    Response to Procedure: tolerated the procedure well with no complications

## 2018-09-05 NOTE — WOUND CARE
09/05/18 1446 Wound Foot Left;Plantar Date First Assessed: 08/29/18   POA: Yes  Wound Type: Diabetic  Location: Foot  Wound Description (Optional): #1-  Orientation: Left;Plantar DRESSING STATUS Clean, dry, and intact DRESSING TYPE (acticoat, gauze, coban) Wound Length (cm) 0.1 cm Wound Width (cm) 0.1 cm Wound Depth (cm) 0.1 Wound Surface area (cm^2) 0.01 cm^2 Epithelialization (%) 100 Wound Odor None Periwound Skin Condition Calloused Cleansing and Cleansing Agents  Normal saline [place]illy Grading Scale Oreilly Grading Scale 0-5  Grade 2

## 2018-09-05 NOTE — WOUND CARE
[address] [place] [address], [address] [address] Phone: [phone] Fax: [phone] Patient: Loree Robin MRN: [id number]  SSN: [id number] YOB: 1960  Age: 56 y.o. Sex: male Return Appointment: 1 week with Martha Gaona DPM 
 
Instructions: Cleanse wound and periwound with wound cleanser or normal saline. Acticoat Flex 3: cut top approximate size of wound, apply to wound bed. Secure with gauze and Covrsite. Change every other day. Offload with orthotics. Should you experience increased redness, swelling, pain, foul odor, size of wound(s), or have a temperature over 101 degrees please contact the [address] [place] [address] Road at [phone] or if after hours contact your primary care physician or go to the hospital emergency department. Signed By: Lee Forbes September 5, 2018

## 2018-09-05 NOTE — PROGRESS NOTES
Wound Center Progress Note Patient: Lova Dancer MRN: [id number]  SSN: [id number] YOB: 1960  Age: 56 y.o. Sex: male Subjective: Chief Complaint: 
Lova Dancer is a 56 y.o. WHITE OR  male who presents with a wound to the left lower extremity at the sub 3rd metatarsal head. The patient denies current erythema, edema, drainage and odor. Pain is rated 0/10. He has an extensive history to the left foot with resultant hallux and 5th ray resection and a fem-pop bypass. His appointment with Dr. Alexei Miller for podiatry care and has an appointment on 9/10/18. History of Present Illness:    
Nature: Painless Location: as above Duration: 6 weeks Onset: Patient states it started as a callus Course:Improving Aggravating/Alleviating: Worsened with pressure and dependency, improved with compression and rest 
Treatment: acticoat Past Medical History:  
Diagnosis Date  Acute on chronic diastolic heart failure ([facility] 2/28/2011  
 followed by dr Dar Ordonez  Arrhythmia 2011  
 pacemaker Medtronic-- [facility]  Atherosclerotic PVD with ulceration ([facility] 2/2/2015  CAD (coronary artery disease) 02/28/2011  
  3 vessel CABG; aortic valve replacement/Bovine-- both at same time-- followed by dr Dar Ordonez  Chronic kidney disease   
 chronic renal insuff. --- dos not see a nephrologist   
 Chronic pain   
 left foot  Depression 2/28/2011  Diabetes mellitus ([facility] dx age 31  
 type2--- sqbs avg am--100--- hypo at 52's  H/O aortic valve replacement 2/2011 Bovine valve  Heart failure ([facility]  Hypercholesterolemia  Hypertension   
 controlled with med  Murmur, cardiac 02/2011  
 aortic valve replaced with CABG, 2011------- 2/6 SALVADOR RUSB apex, 2/6 TR murmur-- ECHO 2/12/15 LVEF 55% --- followed by dr Dar Ordonez  PVD (peripheral vascular disease) ([facility]  Shingles 6/2015  Warfarin anticoagulation Past Surgical History: Procedure Laterality Date  CARDIAC SURG PROCEDURE UNLIST  2011 Dr. Catina Miranda  3 vessel cabg and aortic valve replacement  COLONOSCOPY N/A 2/14/2018 COLONOSCOPY  BMI 37 performed by Jacque Ferris MD at [facility] ENDOSCOPY  
 HX FREE SKIN GRAFT Left 8/11/15  
 thigh to foot graft  HX HEART CATHETERIZATION  03/07/2011  HX PACEMAKER  2011  
 medtronic per pt (phone assessment)  HX UROLOGICAL  2005 Penile Impant  VASCULAR SURGERY PROCEDURE UNLIST  2/17/15 LE arteriogram  
 VASCULAR SURGERY PROCEDURE UNLIST Left 3-16-15  
 fem-tibial by-pass  VASCULAR SURGERY PROCEDURE UNLIST Left 6/2/15  
 5th toe amp  VASCULAR SURGERY PROCEDURE UNLIST Left 6/5/15  
 wound debridement  VASCULAR SURGERY PROCEDURE UNLIST Left 8/24/15  
 great toe amp Family History Problem Relation Age of Onset  Diabetes Mother  Heart Disease Mother  Heart Surgery Mother 54  
  valve replacement [facility] Other Mother Shrogens  Diabetes Father  Heart Disease Father  Heart Surgery Father 61  
  cabg  Lung Disease Father  Parkinsonism Father  Other Father   
  polio as a child-affected lungs  Diabetes Brother  Diabetes Paternal Uncle x2  Thyroid Disease Brother  Stroke Brother  Cancer Paternal Grandfather Social History Substance Use Topics  Smoking status: Never Smoker  Smokeless tobacco: Never Used  Alcohol use No  
   
Prior to Admission medications Medication Sig Start Date End Date Taking? Authorizing Provider  
irbesartan (AVAPRO) 150 mg tablet Take 1 Tab by mouth nightly. 8/20/18   Shikha Corley NP  
insulin glargine (LANTUS SOLOSTAR U-100 INSULIN) 100 unit/mL (3 mL) inpn 50 Units by SubCUTAneous route nightly. 8/20/18   Shikha Corley NP  
insulin aspart U-100 (NOVOLOG FLEXPEN U-100 INSULIN) 100 unit/mL inpn 15 Units by SubCUTAneous route Before breakfast, lunch, and dinner.  8/20/18   Shikha Corley NP  
 pravastatin (PRAVACHOL) 80 mg tablet Take 1 Tab by mouth nightly. Indications: myocardial infarction prevention 5/18/18   Sharon Collado NP  
amLODIPine (NORVASC) 10 mg tablet Take 1 Tab by mouth every morning. Indications: hypertension 5/18/18   Sharon Collado NP  
metoprolol succinate (TOPROL-XL) 50 mg XL tablet Take 1 Tab by mouth every morning. 5/18/18   Sharon Collado NP  
DULoxetine (CYMBALTA) 60 mg capsule Take 1 Cap by mouth daily. 5/18/18   Sharon Collado NP  
eszopiclone (LUNESTA) 2 mg tablet Take 1 Tab by mouth nightly. Max Daily Amount: 2 mg. Indications: Insomnia 5/18/18   Sharon Collado NP  
fluticasone (FLONASE) 50 mcg/actuation nasal spray 2 Sprays by Both Nostrils route daily as needed. 2/17/16   Historical Provider  
aspirin 81 mg chewable tablet Take 81 mg by mouth daily. Historical Provider No Known Allergies Review of Systems: 
The patient has no difficulty with chest pain or shortness of breath. No fever or chills. No Nausea, vomiting or diarrhea. Comprehensive review of systems was otherwise unremarkable except as noted above. Lab Results Component Value Date/Time Hemoglobin A1c 7.0 (H) 08/20/2018 03:21 PM  
  
 
Immunization History Administered Date(s) Administered  Influenza Vaccine 10/15/2014  Influenza Vaccine Cindy Kwon) 12/21/2016  Influenza Vaccine (Quad) Mdck Pf 09/05/2017  Influenza Vaccine Split 03/13/2011  Pneumococcal Vaccine (Unspecified Type) 01/17/2013  Tdap 03/08/2017 There is no height or weight on file to calculate BMI. Counseling regarding nutrition done: Yes. Recommend Joni nutritional supplement for wound healing. Current medications: 
Current Outpatient Prescriptions Medication Sig Dispense Refill  irbesartan (AVAPRO) 150 mg tablet Take 1 Tab by mouth nightly. 30 Tab 5  
 insulin glargine (LANTUS SOLOSTAR U-100 INSULIN) 100 unit/mL (3 mL) inpn 50 Units by SubCUTAneous route nightly.  6 Pen 2  
  insulin aspart U-100 (NOVOLOG FLEXPEN U-100 INSULIN) 100 unit/mL inpn 15 Units by SubCUTAneous route Before breakfast, lunch, and dinner. 6 Pen 2  
 pravastatin (PRAVACHOL) 80 mg tablet Take 1 Tab by mouth nightly. Indications: myocardial infarction prevention 30 Tab 5  
 amLODIPine (NORVASC) 10 mg tablet Take 1 Tab by mouth every morning. Indications: hypertension 30 Tab 5  
 metoprolol succinate (TOPROL-XL) 50 mg XL tablet Take 1 Tab by mouth every morning. 30 Tab 5  DULoxetine (CYMBALTA) 60 mg capsule Take 1 Cap by mouth daily. 30 Cap 5  
 eszopiclone (LUNESTA) 2 mg tablet Take 1 Tab by mouth nightly. Max Daily Amount: 2 mg. Indications: Insomnia 30 Tab 5  
 fluticasone (FLONASE) 50 mcg/actuation nasal spray 2 Sprays by Both Nostrils route daily as needed. 11  
 aspirin 81 mg chewable tablet Take 81 mg by mouth daily. Objective:  
 
Physical Exam:  
 
There were no vitals taken for this visit. General: well developed, well nourished, pleasant , NAD. Hygiene good Psych: cooperative. Pleasant. No anxiety or depression. Normal mood and affect. HEENT: Normocephalic, atraumatic. EOMI. Conjunctiva clear. No scleral icterus. Neck: Normal range of motion. No masses. Chest: Good air entry bilaterally. Respirations nonlabored Cardio: Normal heart sounds,no rubs, murmurs or gallops Abdomen: Soft, nontender, nondistended, normoactive bowel sounds Vascular: DP and PT pulses are nonpalpable at 0/4 bilateral. Skin temperature is uniform from proximal to distal bilateral. Hair growth is absent bilateral. No erythema, edema, heat is appreciated bilateral. No evidence of cellulitis. Derm: Nails 1-5 right and 2,3,4 left are thickened, discolored and with subungual debris. No paronychial infections are noted. Skin is atrophic and xerotic. No subcutaneous masses or hyperpigmented lesions are present.   
Neuro: Epicritic sensation is absent bilateral. Protective sensation is absent with 5.07 SWMF testing to all 10 sites bilateral. Muscle tone and bulk is symmetric bilateral. 
Msk: Muscle strength is 5/5 for all prime movers of the foot bilateral. ROM of all joints is full and fluid without pain. No effusions are palpable. Foot deformities are: left foot absent hallux and 5th ray. Central digits with rigid contracture at the PIPJ, DIPJ and the MPJ level. Plantar prominent metatarsal heads. Ulceration present to the sub 3rd metatarsal head with hyperkeratotic rim and granular base. No maceration remains. No erythema, edema or odor. Smaller in size. Diabetic Foot Ulcer/Neuropathic Is Wound Greater than 1.0 sq cm ? : No 
Re-Current Wound with Skin Substitue within Last Year : No 
X-Ray in Last 3 Months: No 
WATSON In Last 6 Months : No 
Smoking Status: Non- Smoker Wound Free from Infection : No Culture Done Is Wound Free of [place] , and / or Bio-[place]: Yes Malignant Process in Wound : No Biopsy Done Hemoglobin A1Cin Last 3 Months: Yes Type of off Loading: Orthotics which off loading wound Ulcer Description: Wound Foot Right;Left (Active) Number of [id number]:[id number] Wound Foot Plantar;Lateral (Active) Number of MQRZ:4849 Wound Foot Left (Active) Number of JOPK:3141 Wound Foot Left (Active) Number of days:1188 Wound Thigh Left;Upper (Active) Number of VDCB:6661 Wound Foot Left (Active) Number of GVWF:5260 Wound Other (comment) Left (Active) Number of TYZY:9060 Wound Foot Right (Active) Number of days:806 Wound Foot Left;Plantar (Active) DRESSING STATUS Clean, dry, and intact 9/5/2018  2:46 PM  
Wound Length (cm) 0.1 cm 9/5/2018  2:46 PM  
Wound Width (cm) 0.1 cm 9/5/2018  2:46 PM  
Wound Depth (cm) 0.1 9/5/2018  2:46 PM  
Wound Surface area (cm^2) 0.01 cm^2 9/5/2018  2:46 PM  
Condition of Base [place] 8/29/2018  2:58 PM  
Epithelialization (%) 100 9/5/2018  2:46 PM  
 Tissue Type Percent Pink 100 8/29/2018  2:58 PM  
Drainage Amount  None 8/29/2018  2:58 PM  
Wound Odor None 9/5/2018  2:46 PM  
Periwound Skin Condition Calloused 9/5/2018  2:46 PM  
Cleansing and Cleansing Agents  Normal saline 9/5/2018  2:46 PM  
Number of days:7 Data Review: No results found for this or any previous visit (from the past 24 hour(s)). Assessment:  
 
56 y.o. male with diabetic ulceration to the sub 3rd metatarsal head left foot top fat layer s/p amputation digits 1 and 5. Plan:  
 
Patient was examined and evaluated today. They were educated on the barriers to healing. Acticoat to the wound bed every other day. Discuss with Dr. Nirav Bronson at his appointment new diabetic shoes and insoles. Return in 1 week for serial debridement. Any procedures done today in the wound center are documented in a seperate note in connect care made part of this record by reference. Patient instructed on the following: Follow all wound dressing instructions. Do not get dressing or wound wet. May shower if wound can be effectively kept dry. Cast covers may be purchased at [facility] and [facility]. Should you experience increased redness, swelling, pain, foul odor, size of wound(s), or have a temperature over 101 degrees please contact the [address] [place] [address] Road at [phone] or if after hours contact your primary care physician or go to the hospital emergency department. Active Orders Nursing WOUND CARE, DRESSING CHANGE Frequency: ONE TIME Number of Occurrences:  1 Occurrences Order Comments: Left 3rd metatarsal 
Cleanse wound and periwound with wound cleanser or normal saline. Acticoat Flex 3: cut top approximate size of wound, apply to wound bed. Secure with gauze and Covrsite. Change every other day. Offload with orthotics. Follow-up Information Follow up With Details Comments Contact Info [facility] WOUND CARE In 1 week  [address] [place] [facility] [phone] Signed By: Tristin Cross, DPNAOMIE   
 September 5, 2018

## 2018-09-12 ENCOUNTER — HOSPITAL ENCOUNTER (OUTPATIENT)
Dept: WOUND CARE | Age: 58
Discharge: HOME | End: 2018-09-12
Attending: PODIATRIST
Payer: COMMERCIAL

## 2018-09-12 VITALS
HEIGHT: 69 IN | WEIGHT: 209.6 LBS | RESPIRATION RATE: 18 BRPM | TEMPERATURE: 98.2 F | DIASTOLIC BLOOD PRESSURE: 84 MMHG | BODY MASS INDEX: 31.04 KG/M2 | SYSTOLIC BLOOD PRESSURE: 133 MMHG | HEART RATE: 93 BPM | OXYGEN SATURATION: 99 %

## 2018-09-12 PROCEDURE — 99213 OFFICE O/P EST LOW 20 MIN: CPT

## 2018-09-12 NOTE — WOUND CARE
09/12/18 1048 Wound Foot Left;Plantar Date First Assessed: 08/29/18   POA: Yes  Wound Type: Diabetic  Location: Foot  Wound Description (Optional): #1-  Orientation: Left;Plantar DRESSING STATUS Clean, dry, and intact DRESSING TYPE (no dressing) Wound Length (cm) 0 cm Wound Width (cm) 0 cm Wound Depth (cm) 0 Wound Surface area (cm^2) 0 cm^2 Epithelialization (%) 100 Wound Odor None Periwound Skin Condition Calloused Cleansing and Cleansing Agents  Normal saline

## 2018-09-12 NOTE — PROGRESS NOTES
Wound Center Progress Note Patient: Madiha Muse MRN: [id number]  SSN: [id number] YOB: 1960  Age: 56 y.o. Sex: male Subjective: Chief Complaint: 
Madiha Muse is a 56 y.o. WHITE OR  male who presents with a wound to the left lower extremity at the sub 3rd metatarsal head. The patient denies current erythema, edema, drainage and odor. Pain is rated 0/10. He has an extensive history to the left foot with resultant hallux and 5th ray resection and a fem-pop bypass. He was prescribed diabetic shoes by Dr. Jose Nice. History of Present Illness:    
Nature: Painless Location: as above Duration: 6 weeks Onset: Patient states it started as a callus Course:Improving Aggravating/Alleviating: Worsened with pressure and dependency, improved with compression and rest 
Treatment: acticoat Past Medical History:  
Diagnosis Date  Acute on chronic diastolic heart failure ([facility] 2/28/2011  
 followed by dr Darshan Arroyo  Arrhythmia 2011  
 pacemaker Medtronic-- followed by [facility]  Atherosclerotic PVD with ulceration ([facility] 2/2/2015  CAD (coronary artery disease) 02/28/2011  
  3 vessel CABG; aortic valve replacement/Bovine-- both at same time-- followed by dr Darshan Arroyo  Chronic kidney disease   
 chronic renal insuff. --- dos not see a nephrologist   
 Chronic pain   
 left foot  Depression 2/28/2011  Diabetes mellitus ([facility] dx age 31  
 type2--- sqbs avg am--100--- hypo at 52's  H/O aortic valve replacement 2/2011 Bovine valve  Heart failure ([facility]  Hypercholesterolemia  Hypertension   
 controlled with med  Murmur, cardiac 02/2011  
 aortic valve replaced with CABG, 2011------- 2/6 SALVADOR RUSB apex, 2/6 TR murmur-- ECHO 2/12/15 LVEF 55% --- followed by dr Darshan Arroyo  PVD (peripheral vascular disease) ([facility]  Shingles 6/2015  Warfarin anticoagulation Past Surgical History:  
Procedure Laterality Date  CARDIAC SURG PROCEDURE UNLIST  2011 Dr. Sarah Santoro  3 vessel cabg and aortic valve replacement  COLONOSCOPY N/A 2/14/2018 COLONOSCOPY  BMI 37 performed by Carolyn Schultz MD at [facility] ENDOSCOPY  
 HX FREE SKIN GRAFT Left 8/11/15  
 thigh to foot graft  HX HEART CATHETERIZATION  03/07/2011  HX PACEMAKER  2011  
 medtronic per pt (phone assessment)  HX UROLOGICAL  2005 Penile Impant  VASCULAR SURGERY PROCEDURE UNLIST  2/17/15 LE arteriogram  
 VASCULAR SURGERY PROCEDURE UNLIST Left 3-16-15  
 fem-tibial by-pass  VASCULAR SURGERY PROCEDURE UNLIST Left 6/2/15  
 5th toe amp  VASCULAR SURGERY PROCEDURE UNLIST Left 6/5/15  
 wound debridement  VASCULAR SURGERY PROCEDURE UNLIST Left 8/24/15  
 great toe amp Family History Problem Relation Age of Onset  Diabetes Mother  Heart Disease Mother  Heart Surgery Mother 54  
  valve replacement Ulises Morrison Other Mother Shrogens  Diabetes Father  Heart Disease Father  Heart Surgery Father 61  
  cabg  Lung Disease Father  Parkinsonism Father  Other Father   
  polio as a child-affected lungs  Diabetes Brother  Diabetes Paternal Uncle x2  Thyroid Disease Brother  Stroke Brother  Cancer Paternal Grandfather Social History Substance Use Topics  Smoking status: Never Smoker  Smokeless tobacco: Never Used  Alcohol use No  
   
Prior to Admission medications Medication Sig Start Date End Date Taking? Authorizing Provider  
irbesartan (AVAPRO) 150 mg tablet Take 1 Tab by mouth nightly. 8/20/18   Fracisco Mckeon NP  
insulin glargine (LANTUS SOLOSTAR U-100 INSULIN) 100 unit/mL (3 mL) inpn 50 Units by SubCUTAneous route nightly. 8/20/18   Fracisco Mckeon NP  
insulin aspart U-100 (NOVOLOG FLEXPEN U-100 INSULIN) 100 unit/mL inpn 15 Units by SubCUTAneous route Before breakfast, lunch, and dinner.  8/20/18   Fracisco Mckeon NP  
 pravastatin (PRAVACHOL) 80 mg tablet Take 1 Tab by mouth nightly. Indications: myocardial infarction prevention 5/18/18   Branson Severin, NP  
amLODIPine (NORVASC) 10 mg tablet Take 1 Tab by mouth every morning. Indications: hypertension 5/18/18   Branson Severin, NP  
metoprolol succinate (TOPROL-XL) 50 mg XL tablet Take 1 Tab by mouth every morning. 5/18/18   Branson Severin, NP  
DULoxetine (CYMBALTA) 60 mg capsule Take 1 Cap by mouth daily. 5/18/18   Branson Severin, NP  
eszopiclone (LUNESTA) 2 mg tablet Take 1 Tab by mouth nightly. Max Daily Amount: 2 mg. Indications: Insomnia 5/18/18   Branson Severin, NP  
fluticasone (FLONASE) 50 mcg/actuation nasal spray 2 Sprays by Both Nostrils route daily as needed. 2/17/16   Historical Provider  
aspirin 81 mg chewable tablet Take 81 mg by mouth daily. Historical Provider No Known Allergies Review of Systems: 
The patient has no difficulty with chest pain or shortness of breath. No fever or chills. No Nausea, vomiting or diarrhea. Comprehensive review of systems was otherwise unremarkable except as noted above. Lab Results Component Value Date/Time Hemoglobin A1c 7.0 (H) 08/20/2018 03:21 PM  
  
 
Immunization History Administered Date(s) Administered  Influenza Vaccine 10/15/2014  Influenza Vaccine Mariana Buerger) 12/21/2016  Influenza Vaccine (Quad) Mdck Pf 09/05/2017  Influenza Vaccine Split 03/13/2011  Pneumococcal Vaccine (Unspecified Type) 01/17/2013  Tdap 03/08/2017 Body mass index is 30.95 kg/(m^2). Counseling regarding nutrition done: Yes. Recommend Joni nutritional supplement for wound healing. Current medications: 
Current Outpatient Prescriptions Medication Sig Dispense Refill  irbesartan (AVAPRO) 150 mg tablet Take 1 Tab by mouth nightly. 30 Tab 5  
 insulin glargine (LANTUS SOLOSTAR U-100 INSULIN) 100 unit/mL (3 mL) inpn 50 Units by SubCUTAneous route nightly.  6 Pen 2  
  insulin aspart U-100 (NOVOLOG FLEXPEN U-100 INSULIN) 100 unit/mL inpn 15 Units by SubCUTAneous route Before breakfast, lunch, and dinner. 6 Pen 2  
 pravastatin (PRAVACHOL) 80 mg tablet Take 1 Tab by mouth nightly. Indications: myocardial infarction prevention 30 Tab 5  
 amLODIPine (NORVASC) 10 mg tablet Take 1 Tab by mouth every morning. Indications: hypertension 30 Tab 5  
 metoprolol succinate (TOPROL-XL) 50 mg XL tablet Take 1 Tab by mouth every morning. 30 Tab 5  DULoxetine (CYMBALTA) 60 mg capsule Take 1 Cap by mouth daily. 30 Cap 5  
 eszopiclone (LUNESTA) 2 mg tablet Take 1 Tab by mouth nightly. Max Daily Amount: 2 mg. Indications: Insomnia 30 Tab 5  
 fluticasone (FLONASE) 50 mcg/actuation nasal spray 2 Sprays by Both Nostrils route daily as needed. 11  
 aspirin 81 mg chewable tablet Take 81 mg by mouth daily. Objective:  
 
Physical Exam:  
 
Visit Vitals  /84 (BP 1 Location: Right arm)  Pulse 93  Temp 98.2 °F (36.8 °C)  Resp 18  Ht 5' 9\" (1.753 m)  Wt 95.1 kg (209 lb 9.6 oz)  SpO2 99%  BMI 30.95 kg/m2 General: well developed, well nourished, pleasant , NAD. Hygiene good Psych: cooperative. Pleasant. No anxiety or depression. Normal mood and affect. HEENT: Normocephalic, atraumatic. EOMI. Conjunctiva clear. No scleral icterus. Neck: Normal range of motion. No masses. Chest: Good air entry bilaterally. Respirations nonlabored Cardio: Normal heart sounds,no rubs, murmurs or gallops Abdomen: Soft, nontender, nondistended, normoactive bowel sounds Vascular: DP and PT pulses are nonpalpable at 0/4 bilateral. Skin temperature is uniform from proximal to distal bilateral. Hair growth is absent bilateral. No erythema, edema, heat is appreciated bilateral. No evidence of cellulitis. Derm: Nails 1-5 right and 2,3,4 left are thickened, discolored and with subungual debris. No paronychial infections are noted.  Skin is atrophic and xerotic. No subcutaneous masses or hyperpigmented lesions are present. Neuro: Epicritic sensation is absent bilateral. Protective sensation is absent with 5.07 SWMF testing to all 10 sites bilateral. Muscle tone and bulk is symmetric bilateral. 
Msk: Muscle strength is 5/5 for all prime movers of the foot bilateral. ROM of all joints is full and fluid without pain. No effusions are palpable. Foot deformities are: left foot absent hallux and 5th ray. Central digits with rigid contracture at the PIPJ, DIPJ and the MPJ level. Plantar prominent metatarsal heads. Ulceration present to the sub 3rd metatarsal head resolved with epithelium cover. Thin hyperkeratosis remains. Diabetic Foot Ulcer/Neuropathic Is Wound Greater than 1.0 sq cm ? : No 
Re-Current Wound with Skin Substitue within Last Year : No 
X-Ray in Last 3 Months: No 
WATSON In Last 6 Months : No 
Smoking Status: Non- Smoker Wound Free from Infection : No Culture Done Is Wound Free of [place] , and / or Bio-[place]: Yes Malignant Process in Wound : No Biopsy Done Hemoglobin A1Cin Last 3 Months: Yes Type of off Loading: Orthotics which off loading wound Ulcer Description: Wound Foot Right;Left (Active) Number of AUUN:4202 Wound Foot Plantar;Lateral (Active) Number of JRUJ:6136 Wound Foot Left (Active) Number of SDZQ:4252 Wound Foot Left (Active) Number of days:1195 Wound Thigh Left;Upper (Active) Number of days:1128 Wound Foot Left (Active) Number of days:1128 Wound Other (comment) Left (Active) Number of days:1115 Wound Foot Right (Active) Number of days:813  
   
[REMOVED] Wound Foot Left;Plantar (Removed) Removed 09/12/18 1105 DRESSING STATUS Clean, dry, and intact 9/12/2018 10:48 AM  
Wound Length (cm) 0 cm 9/12/2018 10:48 AM  
Wound Width (cm) 0 cm 9/12/2018 10:48 AM  
Wound Depth (cm) 0 9/12/2018 10:48 AM  
Wound Surface area (cm^2) 0 cm^2 9/12/2018 10:48 AM  
 Condition of [place] 8/29/2018  2:58 PM  
Epithelialization (%) 100 9/12/2018 10:48 AM  
Tissue Type Percent Pink 100 8/29/2018  2:58 PM  
Drainage Amount  None 8/29/2018  2:58 PM  
Wound Odor None 9/12/2018 10:48 AM  
Periwound Skin Condition Calloused 9/12/2018 10:48 AM  
Cleansing and Cleansing Agents  Normal saline 9/12/2018 10:48 AM  
Dressing Type Applied Silver products 9/12/2018 10:48 AM  
Procedure Tolerated Well 9/12/2018 10:48 AM  
Number of days:14 Data Review: No results found for this or any previous visit (from the past 24 hour(s)). Assessment:  
 
56 y.o. male with diabetic ulceration to the sub 3rd metatarsal head left foot top fat layer s/p amputation digits 1 and 5. Plan:  
 
Patient was examined and evaluated today. They were educated on the barriers to healing. He may return to his normal shoe gear and hygiene. Follow with Dr. Haley Estevez for foot care needs. Return here as needed. Patient instructed on the following: 
  
Should you experience increased redness, swelling, pain, foul odor, size of wound(s), or have a temperature over 101 degrees please contact the [address] [place] [address] Road at [phone] or if after hours contact your primary care physician or go to the hospital emergency department. Active Orders Nursing WOUND CARE, DRESSING CHANGE Frequency: ONE TIME Number of Occurrences:  1 Occurrences Order Comments: Acticoat Flex 3: cut top approximate size of wound, apply to wound bed. Cover with Coversite dressing. Change daily with showering until Monday, then may stop. Patient is discharged from outpatient wound center service Follow-up Information Follow up With Details Comments Contact Info [facility]  Discharge from outpatient wound center service [address] [phone] Signed By: Chris Gaytan DPM   
 September 12, 2018

## 2018-09-12 NOTE — WOUND CARE
[address] [place] [address] [address] Phone: [phone] Fax: [phone] Patient: Faye Atkins MRN: [id number]  SSN: [id number] YOB: 1960  Age: 56 y.o. Sex: male Return Appointment: Discharge from outpatient wound center service Instructions: Acticoat Flex 3: cut top approximate size of wound, apply to wound bed. Cover with Coversite dressing. Change daily with showering until Monday, then may stop. Patient is discharged from outpatient wound center service Should you experience increased redness, swelling, pain, foul odor, size of wound(s), or have a temperature over 101 degrees please contact the [address] [place] [address] Road at [phone] or if after hours contact your primary care physician or go to the hospital emergency department. Signed By: Koffi Sanchez RN September 12, 2018

## 2018-11-16 ENCOUNTER — PATIENT OUTREACH (OUTPATIENT)
Dept: CASE MANAGEMENT | Age: 58
End: 2018-11-16

## 2018-11-16 NOTE — Clinical Note
Follow up,I just wanted to let you know I reached out to this patient today and got his wife. She stated she was at work and was unable to talk with me currently about their living situation. She asked me if I would call back on Monday morning to talk more. I will update you with any new information I\"m able to gather. Please let me know if there is anything else you feel like I can be doing to help. ISAURA Zapata CM

## 2018-11-16 NOTE — PROGRESS NOTES
ISAURA CASTRO called pt to talk with him about not having power in his home. ISAURA CASTRO got pt's wife Kev Thomas who said that she was currently at work and was unable to talk right now. She did confirm that they didn't have any power in their house but she said again she couldn't talk while she was at work. Kev William asked ISAURA MATTHEW to call her back and either talk with her or her  on Monday any time before 12. PLAN: 
ISAURA CASTRO will call pt back on Monday 11/19 to talk about community resources This note will not be viewable in [address]

## 2018-11-19 ENCOUNTER — PATIENT OUTREACH (OUTPATIENT)
Dept: CASE MANAGEMENT | Age: 58
End: 2018-11-19

## 2018-11-19 NOTE — PROGRESS NOTES
Ambulatory Care Coordination  Social Work Assessment Referral from: [facility] Previously referred? If so, reason and brief outcome No  
Reason for current referral: Utility assistance/community resources Income information (if needed): Pt said he and his wife together make 2400 Sources of Support: Family ACP set up? Needs information? Doesnt have and doesnt want at this time Medication Cost assistance needed? NA Referral to CLTC/Medicaid needed? NA Referral to Medicare Extra Help/LIS program needed? NA Small home repair needed? NA  
MOW referral? NA Any other concerns/questions? NA Next steps: ISAURA CASTRO will mail out [facility] FA application per pts request along with utility/rent assistance programs ISAURA CASTRO will follow up with pt in a week to make sure hes gotten information Pt declined wanting ISAURA CASTRO to come out and do a home visit to help review information being sent by mail. He stated that his wife has tried a few assistance programs but they have never been able to qualify for any help. This note will not be viewable in [address]

## 2018-11-26 ENCOUNTER — PATIENT OUTREACH (OUTPATIENT)
Dept: CASE MANAGEMENT | Age: 58
End: 2018-11-26

## 2018-11-26 NOTE — PROGRESS NOTES
ISAURA CASTRO called pt to see if he got the information that ISAURA CASTRO mailed out. Pt state that he had gotten the information and that he and his wife are going through it to see what, if any, programs will be able to assist him. ISAURA CASTRO offered to come out and do a home visit with pt again but he said that he didn't feel like he needed that right now that he and his wife would go through the information ISAURA CASTRO mailed out. PLAN: 
ISAURA CASTRO will follow up with pt in two weeks to see if he has any further questions about the information ISAURA CASTRO mailed him. This note will not be viewable in [address]

## 2018-12-03 ENCOUNTER — APPOINTMENT (OUTPATIENT)
Dept: GENERAL RADIOLOGY | Age: 58
DRG: 638 | End: 2018-12-03
Attending: EMERGENCY MEDICINE
Payer: COMMERCIAL

## 2018-12-03 ENCOUNTER — HOSPITAL ENCOUNTER (INPATIENT)
Age: 58
LOS: 1 days | Discharge: HOME HEALTH | DRG: 638 | End: 2018-12-04
Attending: EMERGENCY MEDICINE | Admitting: FAMILY MEDICINE
Payer: COMMERCIAL

## 2018-12-03 ENCOUNTER — APPOINTMENT (OUTPATIENT)
Dept: ULTRASOUND IMAGING | Age: 58
DRG: 638 | End: 2018-12-03
Attending: PHYSICIAN ASSISTANT
Payer: COMMERCIAL

## 2018-12-03 DIAGNOSIS — L08.9 DIABETIC FOOT INFECTION (HCC): Primary | ICD-10-CM

## 2018-12-03 DIAGNOSIS — E11.628 DIABETIC FOOT INFECTION (HCC): Primary | ICD-10-CM

## 2018-12-03 PROBLEM — L97.429 DIABETIC ULCER OF LEFT HEEL (HCC): Status: ACTIVE | Noted: 2018-12-03

## 2018-12-03 PROBLEM — E11.621 DIABETIC ULCER OF LEFT HEEL (HCC): Status: ACTIVE | Noted: 2018-12-03

## 2018-12-03 PROBLEM — N17.9 ACUTE KIDNEY INJURY SUPERIMPOSED ON CKD (HCC): Status: ACTIVE | Noted: 2018-12-03

## 2018-12-03 PROBLEM — N18.9 ACUTE KIDNEY INJURY SUPERIMPOSED ON CKD (HCC): Status: ACTIVE | Noted: 2018-12-03

## 2018-12-03 LAB
ANION GAP SERPL CALC-SCNC: 11 MMOL/L (ref 7–16)
BUN SERPL-MCNC: 26 MG/DL (ref 6–23)
CALCIUM SERPL-MCNC: 9 MG/DL (ref 8.3–10.4)
CHLORIDE SERPL-SCNC: 98 MMOL/L (ref 98–107)
CO2 SERPL-SCNC: 24 MMOL/L (ref 21–32)
CREAT SERPL-MCNC: 1.99 MG/DL (ref 0.8–1.5)
ERYTHROCYTE [DISTWIDTH] IN BLOOD BY AUTOMATED COUNT: 13.3 % (ref 11.9–14.6)
EST. AVERAGE GLUCOSE BLD GHB EST-MCNC: 203 MG/DL
GLUCOSE BLD STRIP.AUTO-MCNC: 104 MG/DL (ref 65–100)
GLUCOSE BLD STRIP.AUTO-MCNC: 169 MG/DL (ref 65–100)
GLUCOSE SERPL-MCNC: 379 MG/DL (ref 65–100)
HBA1C MFR BLD: 8.7 % (ref 4.8–6)
HCT VFR BLD AUTO: 41.4 % (ref 41.1–50.3)
HGB BLD-MCNC: 14.1 G/DL (ref 13.6–17.2)
MCH RBC QN AUTO: 30.2 PG (ref 26.1–32.9)
MCHC RBC AUTO-ENTMCNC: 34.1 G/DL (ref 31.4–35)
MCV RBC AUTO: 88.7 FL (ref 79.6–97.8)
NRBC # BLD: 0 K/UL (ref 0–0.2)
PLATELET # BLD AUTO: 200 K/UL (ref 150–450)
PMV BLD AUTO: 12.4 FL (ref 9.4–12.3)
POTASSIUM SERPL-SCNC: 4.6 MMOL/L (ref 3.5–5.1)
RBC # BLD AUTO: 4.67 M/UL (ref 4.23–5.6)
SODIUM SERPL-SCNC: 133 MMOL/L (ref 136–145)
WBC # BLD AUTO: 11.9 K/UL (ref 4.3–11.1)

## 2018-12-03 PROCEDURE — 77030031642 HC BOOT FT ROOKE HFS OSBM -B

## 2018-12-03 PROCEDURE — 87040 BLOOD CULTURE FOR BACTERIA: CPT

## 2018-12-03 PROCEDURE — 77030020263 HC SOL INJ SOD CL0.9% LFCR 1000ML

## 2018-12-03 PROCEDURE — 74011250637 HC RX REV CODE- 250/637: Performed by: EMERGENCY MEDICINE

## 2018-12-03 PROCEDURE — 99283 EMERGENCY DEPT VISIT LOW MDM: CPT | Performed by: EMERGENCY MEDICINE

## 2018-12-03 PROCEDURE — 74011000258 HC RX REV CODE- 258: Performed by: FAMILY MEDICINE

## 2018-12-03 PROCEDURE — 85027 COMPLETE CBC AUTOMATED: CPT

## 2018-12-03 PROCEDURE — 65270000029 HC RM PRIVATE

## 2018-12-03 PROCEDURE — 80048 BASIC METABOLIC PNL TOTAL CA: CPT

## 2018-12-03 PROCEDURE — 93922 UPR/L XTREMITY ART 2 LEVELS: CPT

## 2018-12-03 PROCEDURE — 73630 X-RAY EXAM OF FOOT: CPT

## 2018-12-03 PROCEDURE — 83036 HEMOGLOBIN GLYCOSYLATED A1C: CPT

## 2018-12-03 PROCEDURE — 82962 GLUCOSE BLOOD TEST: CPT

## 2018-12-03 PROCEDURE — 87641 MR-STAPH DNA AMP PROBE: CPT

## 2018-12-03 PROCEDURE — 74011250636 HC RX REV CODE- 250/636: Performed by: FAMILY MEDICINE

## 2018-12-03 PROCEDURE — 74011636637 HC RX REV CODE- 636/637: Performed by: FAMILY MEDICINE

## 2018-12-03 PROCEDURE — 74011250636 HC RX REV CODE- 250/636: Performed by: EMERGENCY MEDICINE

## 2018-12-03 PROCEDURE — 74011250637 HC RX REV CODE- 250/637: Performed by: FAMILY MEDICINE

## 2018-12-03 RX ORDER — DULOXETIN HYDROCHLORIDE 60 MG/1
60 CAPSULE, DELAYED RELEASE ORAL DAILY
Status: DISCONTINUED | OUTPATIENT
Start: 2018-12-04 | End: 2018-12-04 | Stop reason: HOSPADM

## 2018-12-03 RX ORDER — INSULIN LISPRO 100 [IU]/ML
INJECTION, SOLUTION INTRAVENOUS; SUBCUTANEOUS
Status: DISCONTINUED | OUTPATIENT
Start: 2018-12-03 | End: 2018-12-04 | Stop reason: HOSPADM

## 2018-12-03 RX ORDER — SODIUM CHLORIDE 0.9 % (FLUSH) 0.9 %
5-10 SYRINGE (ML) INJECTION EVERY 8 HOURS
Status: DISCONTINUED | OUTPATIENT
Start: 2018-12-03 | End: 2018-12-04 | Stop reason: HOSPADM

## 2018-12-03 RX ORDER — SODIUM CHLORIDE 0.9 % (FLUSH) 0.9 %
5-10 SYRINGE (ML) INJECTION AS NEEDED
Status: DISCONTINUED | OUTPATIENT
Start: 2018-12-03 | End: 2018-12-04 | Stop reason: SDUPTHER

## 2018-12-03 RX ORDER — MORPHINE SULFATE 2 MG/ML
1 INJECTION, SOLUTION INTRAMUSCULAR; INTRAVENOUS
Status: DISCONTINUED | OUTPATIENT
Start: 2018-12-03 | End: 2018-12-04 | Stop reason: HOSPADM

## 2018-12-03 RX ORDER — VALSARTAN 80 MG/1
80 TABLET ORAL DAILY
Status: DISCONTINUED | OUTPATIENT
Start: 2018-12-04 | End: 2018-12-04 | Stop reason: HOSPADM

## 2018-12-03 RX ORDER — SODIUM CHLORIDE 0.9 % (FLUSH) 0.9 %
5-10 SYRINGE (ML) INJECTION EVERY 8 HOURS
Status: DISCONTINUED | OUTPATIENT
Start: 2018-12-03 | End: 2018-12-04 | Stop reason: SDUPTHER

## 2018-12-03 RX ORDER — HEPARIN SODIUM 5000 [USP'U]/ML
5000 INJECTION, SOLUTION INTRAVENOUS; SUBCUTANEOUS EVERY 8 HOURS
Status: DISCONTINUED | OUTPATIENT
Start: 2018-12-03 | End: 2018-12-04 | Stop reason: HOSPADM

## 2018-12-03 RX ORDER — ONDANSETRON 2 MG/ML
4 INJECTION INTRAMUSCULAR; INTRAVENOUS
Status: DISCONTINUED | OUTPATIENT
Start: 2018-12-03 | End: 2018-12-04 | Stop reason: HOSPADM

## 2018-12-03 RX ORDER — NALOXONE HYDROCHLORIDE 0.4 MG/ML
0.4 INJECTION, SOLUTION INTRAMUSCULAR; INTRAVENOUS; SUBCUTANEOUS AS NEEDED
Status: DISCONTINUED | OUTPATIENT
Start: 2018-12-03 | End: 2018-12-04 | Stop reason: HOSPADM

## 2018-12-03 RX ORDER — ACETAMINOPHEN 325 MG/1
650 TABLET ORAL
Status: DISCONTINUED | OUTPATIENT
Start: 2018-12-03 | End: 2018-12-04 | Stop reason: HOSPADM

## 2018-12-03 RX ORDER — GUAIFENESIN 100 MG/5ML
81 LIQUID (ML) ORAL DAILY
Status: DISCONTINUED | OUTPATIENT
Start: 2018-12-04 | End: 2018-12-04 | Stop reason: HOSPADM

## 2018-12-03 RX ORDER — INSULIN LISPRO 100 [IU]/ML
5 INJECTION, SOLUTION INTRAVENOUS; SUBCUTANEOUS
Status: DISCONTINUED | OUTPATIENT
Start: 2018-12-03 | End: 2018-12-03

## 2018-12-03 RX ORDER — INSULIN GLARGINE 100 [IU]/ML
50 INJECTION, SOLUTION SUBCUTANEOUS
Status: DISCONTINUED | OUTPATIENT
Start: 2018-12-03 | End: 2018-12-04 | Stop reason: HOSPADM

## 2018-12-03 RX ORDER — PRAVASTATIN SODIUM 80 MG/1
80 TABLET ORAL
Status: DISCONTINUED | OUTPATIENT
Start: 2018-12-03 | End: 2018-12-04 | Stop reason: HOSPADM

## 2018-12-03 RX ORDER — HYDROCODONE BITARTRATE AND ACETAMINOPHEN 5; 325 MG/1; MG/1
1 TABLET ORAL
Status: DISCONTINUED | OUTPATIENT
Start: 2018-12-03 | End: 2018-12-04 | Stop reason: HOSPADM

## 2018-12-03 RX ORDER — SODIUM CHLORIDE 9 MG/ML
100 INJECTION, SOLUTION INTRAVENOUS CONTINUOUS
Status: DISCONTINUED | OUTPATIENT
Start: 2018-12-03 | End: 2018-12-04 | Stop reason: HOSPADM

## 2018-12-03 RX ORDER — OXYCODONE HYDROCHLORIDE 5 MG/1
5 TABLET ORAL
Status: COMPLETED | OUTPATIENT
Start: 2018-12-03 | End: 2018-12-03

## 2018-12-03 RX ORDER — SODIUM CHLORIDE 0.9 % (FLUSH) 0.9 %
5-10 SYRINGE (ML) INJECTION AS NEEDED
Status: DISCONTINUED | OUTPATIENT
Start: 2018-12-03 | End: 2018-12-04 | Stop reason: HOSPADM

## 2018-12-03 RX ORDER — METOPROLOL SUCCINATE 50 MG/1
50 TABLET, EXTENDED RELEASE ORAL
Status: DISCONTINUED | OUTPATIENT
Start: 2018-12-04 | End: 2018-12-04 | Stop reason: HOSPADM

## 2018-12-03 RX ORDER — AMLODIPINE BESYLATE 10 MG/1
10 TABLET ORAL
Status: DISCONTINUED | OUTPATIENT
Start: 2018-12-04 | End: 2018-12-04 | Stop reason: HOSPADM

## 2018-12-03 RX ADMIN — OXYCODONE HYDROCHLORIDE 5 MG: 5 TABLET ORAL at 16:10

## 2018-12-03 RX ADMIN — HEPARIN SODIUM 5000 UNITS: 5000 INJECTION INTRAVENOUS; SUBCUTANEOUS at 20:50

## 2018-12-03 RX ADMIN — HYDROCODONE BITARTRATE AND ACETAMINOPHEN 1 TABLET: 5; 325 TABLET ORAL at 20:50

## 2018-12-03 RX ADMIN — Medication 1 AMPULE: at 20:50

## 2018-12-03 RX ADMIN — PIPERACILLIN SODIUM,TAZOBACTAM SODIUM 3.38 G: 3; .375 INJECTION, POWDER, FOR SOLUTION INTRAVENOUS at 19:36

## 2018-12-03 RX ADMIN — Medication 5 ML: at 20:51

## 2018-12-03 RX ADMIN — Medication 5 ML: at 18:00

## 2018-12-03 RX ADMIN — VANCOMYCIN HYDROCHLORIDE 2500 MG: 10 INJECTION, POWDER, LYOPHILIZED, FOR SOLUTION INTRAVENOUS at 15:46

## 2018-12-03 RX ADMIN — PRAVASTATIN SODIUM 80 MG: 80 TABLET ORAL at 20:50

## 2018-12-03 RX ADMIN — SODIUM CHLORIDE 100 ML/HR: 900 INJECTION, SOLUTION INTRAVENOUS at 17:48

## 2018-12-03 RX ADMIN — INSULIN GLARGINE 50 UNITS: 100 INJECTION, SOLUTION SUBCUTANEOUS at 20:51

## 2018-12-03 NOTE — PROGRESS NOTES
Patient arrived to room 811 via transport. Patient is alert and orientated with no distress noted. IV Intact and patent with no s/s of infection noted. Respirations even and unlabored with heart rate regular. Duel skin assessment completed with PEG Love. Patient has a black surrounding gray/white area on right heel along with a healing area about dime size on forefront of foot and some amputation of that foot and toes. Right second toe red and warm with small scab. Wound is consulted. Patient accompanied by wife. Patient orientated to room and surroundings. Patient able to ambulate with assistance r/t wound and rooke boots. Call light within reach and patient instructed to call if assistance is needed. Will continue to monitor.

## 2018-12-03 NOTE — CONSULTS
[address] [facility]  [phone]     History and Physical / Surgical Consultation   Chana Acosta    Admit date: 12/3/2018    MRN: [id number]     : 1960     Age: 66 y.o.          12/3/2018 3:39 PM    Subjective/HPI:   This patient is a 66 y.o. white male seen at the request of Mari Dupree MD and evaluated for painful left heel. Patient is well-known to vascular surgeon on call, Sebastien Leavitt MD, who performed left SFA endarterectomy and left common femoral-to-above-knee-popliteal bypass with PTFE graft (3/2015), as well as left 5th toe amputation / foot debridement (2015) and open left 1st toe amputation (). He was last seen in our office by Dr. Francia Mohamud 2015. PMH with CAD s/p CABG and AVR, HTN, CHF, DM (HgbA1c 8.5, 2018), PAD and CKD, he presented to the ED today with painful left heel x3-4d. He denies fever or chills. He admits to mild nausea but denies vomiting. He denies chest pain or dyspnea. He denies regular tobacco use but admits to rare cigar smoking. He is a community ambulator. Today WBC 11.9, blood glucose 379, Cr 1.99, GFR 37. Left foot plain films in ED today with soft tissue swelling but no indication of osteomyelitis. Patient's past medical, surgical, family and social histories were reviewed as noted here and below. Review of Systems  A comprehensive review of systems was negative except for that written in the HPI. Past Medical History:   Diagnosis Date    Acute on chronic diastolic heart failure ([address] 2011    followed by dr Mila Kagn     pacemaker Medtronic-- followed by [facility] cardio    Atherosclerotic PVD with ulceration [facility] 2015    CAD (coronary artery disease) 2011     3 vessel CABG; aortic valve replacement/Bovine-- both at same time-- followed by dr Maggie Colón Chronic kidney disease     chronic renal insuff. --- dos not see a nephrologist     Chronic pain left foot    Depression 2/28/2011    Diabetes mellitus ([facility] dx age 31    type2--- sqbs avg am--100--- hypo at 52's    H/O aortic valve replacement 2/2011    Bovine valve    Heart failure ([facility]     Hypercholesterolemia     Hypertension     controlled with med    Murmur, cardiac 02/2011    aortic valve replaced with CABG, 2011------- 2/6 SALVADOR RUSB apex, 2/6 TR murmur-- ECHO 2/12/15 LVEF 55% --- followed by dr Tamiko Villanueva PVD (peripheral vascular disease) ([facility]     Shingles 6/2015    Warfarin anticoagulation       Past Surgical History:   Procedure Laterality Date    CARDIAC SURG PROCEDURE UNLIST  2011    Dr. Archer Dies  3 vessel cabg and aortic valve replacement    COLONOSCOPY N/A 2/14/2018    COLONOSCOPY  BMI 37 performed by Heather Moffett MD at [facility] ENDOSCOPY    HX FREE SKIN GRAFT Left 8/11/15    thigh to foot graft    HX HEART CATHETERIZATION  03/07/2011    HX PACEMAKER  2011    medtronic per pt (phone assessment)    HX UROLOGICAL  2005    Penile Impant    VASCULAR SURGERY PROCEDURE UNLIST  2/17/15    LE arteriogram    VASCULAR SURGERY PROCEDURE UNLIST Left 3-16-15    fem-tibial by-pass    VASCULAR SURGERY PROCEDURE UNLIST Left 6/2/15    5th toe amp    VASCULAR SURGERY PROCEDURE UNLIST Left 6/5/15    wound debridement    VASCULAR SURGERY PROCEDURE UNLIST Left 8/24/15    great toe amp      No Known Allergies   Social History     Tobacco Use    Smoking status: Never Smoker    Smokeless tobacco: Never Used   Substance Use Topics    Alcohol use: No      Social History     Social History Narrative    Not on file     Family History   Problem Relation Age of Onset    Diabetes Mother     Heart Disease Mother     Heart Surgery Mother 54        valve replacement    Other Mother         Shrogens    Diabetes Father     Heart Disease Father     Heart Surgery Father 61        cabg    Lung Disease Father     Parkinsonism Father     Other Father         polio as a child-affected lungs    Diabetes Brother     Diabetes Paternal Uncle         x2    Thyroid Disease Brother     Stroke Brother     Cancer Paternal Grandfather       Prior to Admission Medications   Prescriptions Last Dose Informant Patient Reported? Taking? DULoxetine (CYMBALTA) 60 mg capsule   No No   Sig: Take 1 Cap by mouth daily. amLODIPine (NORVASC) 10 mg tablet   No No   Sig: Take 1 Tab by mouth every morning. aspirin 81 mg chewable tablet   Yes No   Sig: Take 81 mg by mouth daily. eszopiclone (LUNESTA) 2 mg tablet   No No   Sig: Take 1 Tab by mouth nightly. Max Daily Amount: 2 mg. insulin aspart U-100 (NOVOLOG FLEXPEN U-100 INSULIN) 100 unit/mL inpn   No No   Sig: 15 Units by SubCUTAneous route Before breakfast, lunch, and dinner. insulin glargine U-300 conc (TOUJEO SOLOSTAR U-300 INSULIN) 300 unit/mL (1.5 mL) inpn   No No   Si units daily   irbesartan (AVAPRO) 150 mg tablet   No No   Sig: Take 1 Tab by mouth nightly.   ketorolac (ACULAR) 0.5 % ophthalmic solution   Yes No   Sig: Administer 1 Drop to both eyes four (4) times daily. metoprolol succinate (TOPROL-XL) 50 mg XL tablet   No No   Sig: Take 1 Tab by mouth every morning. moxifloxacin (VIGAMOX) 0.5 % ophthalmic solution   Yes No   Sig: Administer 1 Drop to both eyes three (3) times daily. pravastatin (PRAVACHOL) 80 mg tablet   No No   Sig: Take 1 Tab by mouth nightly. prednisoLONE acetate (PRED FORTE) 1 % ophthalmic suspension   Yes No   Sig: Administer 1 Drop to both eyes three (3) times daily.       Facility-Administered Medications: None     Current Facility-Administered Medications   Medication Dose Route Frequency    sodium chloride (NS) flush 5-10 mL  5-10 mL IntraVENous Q8H    sodium chloride (NS) flush 5-10 mL  5-10 mL IntraVENous PRN    vancomycin (VANCOCIN) 2500 mg in  mL infusion  2,500 mg IntraVENous ONCE    piperacillin-tazobactam (ZOSYN) 4.5 g in 0.9% sodium chloride (MBP/ADV) 100 mL  4.5 g IntraVENous NOW     Current Outpatient Medications   Medication Sig    insulin glargine U-300 conc (TOUJEO SOLOSTAR U-300 INSULIN) 300 unit/mL (1.5 mL) inpn 50 units daily    pravastatin (PRAVACHOL) 80 mg tablet Take 1 Tab by mouth nightly.  amLODIPine (NORVASC) 10 mg tablet Take 1 Tab by mouth every morning.  metoprolol succinate (TOPROL-XL) 50 mg XL tablet Take 1 Tab by mouth every morning.  eszopiclone (LUNESTA) 2 mg tablet Take 1 Tab by mouth nightly. Max Daily Amount: 2 mg.  DULoxetine (CYMBALTA) 60 mg capsule Take 1 Cap by mouth daily.  insulin aspart U-100 (NOVOLOG FLEXPEN U-100 INSULIN) 100 unit/mL inpn 15 Units by SubCUTAneous route Before breakfast, lunch, and dinner.  irbesartan (AVAPRO) 150 mg tablet Take 1 Tab by mouth nightly.  moxifloxacin (VIGAMOX) 0.5 % ophthalmic solution Administer 1 Drop to both eyes three (3) times daily.  ketorolac (ACULAR) 0.5 % ophthalmic solution Administer 1 Drop to both eyes four (4) times daily.  prednisoLONE acetate (PRED FORTE) 1 % ophthalmic suspension Administer 1 Drop to both eyes three (3) times daily.  aspirin 81 mg chewable tablet Take 81 mg by mouth daily.      Objective:     Vitals:    12/03/18 1250 12/03/18 1313 12/03/18 1454   BP: (!) 142/91 101/65 134/75   Pulse: 91     Resp: 18     Temp: 97.8 °F (36.6 °C)     SpO2: 100% 95% 96%   Weight: 200 lb (90.7 kg)     Height: 5' 8\" (1.727 m)         Physical Exam:   General well-developed, overweight, in no acute distress  Skin  (see below for LE) warm and moist with good texture, no jaundice or rashes  HEENT normocephalic, extraocular muscles intact, mouth with adequate dentition, oral mucosa moist  Neck  supple without JVD, trachea midline  Lungs  respirations unlabored, lungs clear to auscultation bilaterally  Heart  regular rate and rhythm, no appreciable murmurs  Abdomen soft, protuberant but non-distended, non-tender; bowel sounds normoactive  Extremities warm without cyanosis or erythema; no pedal edema; left heel with ~4-5cm tender purplish-black ovoid lesion, cracked but without discharge / drainage  Pulses  2+ palpable at radial, brachial bilaterally; palpable left DP and PT  Neurological alert and oriented; no gross sensorimotor deficits     Data Review   Recent Labs     12/03/18  1253   WBC 11.9*   HGB 14.1   HCT 41.4        Recent Labs     12/03/18  1253   *   K 4.6   CL 98   CO2 24   *   BUN 26*   CREA 1.99*       Imaging  Left foot series, 3 views of the left foot were obtained  HISTORY: Diabetic wound  Comparison is made to the prior exam 06/21/2016.     FINDINGS: There are amputations of the first and fifth digits. There are  hammertoe deformities of the second through fourth digits. There is soft tissue  edema along the posterior/inferior margin of the calcaneus without bony  destruction. Vascular calcifications are present.     IMPRESSION:  1. Soft tissue swelling adjacent to the calcaneus without radiographic features  to suggest osteomyelitis. 2. Amputation of the first and fifth digits. Assessment / Plan: Active Hospital Problems    Diagnosis Date Noted    DM (diabetes mellitus), type 2, uncontrolled ([facility] 12/03/2018    Acute kidney injury superimposed on CKD ([facility] 12/03/2018    Diabetic ulcer of left heel ([facility] 12/03/2018    PAD (peripheral artery disease) ([facility] 06/01/2015       Sammy Christianson is a 66 y.o. diabetic male with known PAD and new painful left heel wound. ED physician has asked Hospitalist service to admit the patient. - bilateral lower extremity arterial duplex with ABIs  - Rooke boots  - consult wound care  Patient was seen in the ED in conjunction with Dr. Jennifer Kaufman, who will review imaging and develop further plan. Thank you very much for this referral. We appreciate the opportunity to participate in this patient's care. We will follow along with above stated plan.       Itz Ace PA-C  Physician Assistant with [facility] Vascular Surgery  Jovita Carlson. Javier Olson MD / Darren Calixto MD

## 2018-12-03 NOTE — H&P
Hospitalist H&P Note Admit Date:  12/3/2018 12:50 PM  
Name:  Rocío Body Age:  66 y.o. 
:  1960 MRN:  [id number] PCP:  Paris Chaidez NP Treatment Team: Attending Provider: Sage Ramos MD; Consulting Provider: Santo Lowery MD; Consulting Provider: SHERON Tran 
C/o left heel pain HPI:  
66 yr old  male pt with known h/o PAD,CAD with CABG ,Aortic valve replacement, ? Fempop bypass, dm type 2, htn, hyperlipidemia,s/p pacemaker,ckd 3 and diastolic chf. P t does have chronic small wound on the left heel, since past 3 days ,noted increasing in pain and redness. c/o mild nausea but no vomiting. Pt didn't c/o fever or any discharge from left heel or headache or dizziness. Wbc 11.9,glucose 379,creat 1.99,gfr 37. Left heel xray- no osteomyelitis Vascular was consulted with heel wound and PAD. Pt will be admitted for mild acute on ckd, uncontrolled dm type and left heel wound. 10 systems reviewed and negative except as noted in HPI. Past Medical History:  
Diagnosis Date  Acute on chronic diastolic heart failure ([address] 2011  
 followed by dr Nadiya Charlton  Arrhythmia   
 pacemaker Medtronic-- followed by [facility] cardio  Atherosclerotic PVD with ulceration ([address] 2015  CAD (coronary artery disease) 2011  
  3 vessel CABG; aortic valve replacement/Bovine-- both at same time-- followed by dr Nadiya Charlton  Chronic kidney disease   
 chronic renal insuff. --- dos not see a nephrologist   
 Chronic pain   
 left foot  Depression 2011  Diabetes mellitus ([address] dx age 31  
 type2--- sqbs avg am--100--- hypo at 52's  H/O aortic valve replacement 2011 Bovine valve  Heart failure ([address]  Hypercholesterolemia  Hypertension   
 controlled with med  Murmur, cardiac 2011  
 aortic valve replaced with CABG, ------- 2 SALVADOR RUSB apex,  TR murmur-- ECHO 2/12/15 LVEF 55% --- followed by dr Nadiya Charlton  PVD (peripheral vascular disease) ([facility]  Shingles 6/2015  Warfarin anticoagulation Past Surgical History:  
Procedure Laterality Date  CARDIAC SURG PROCEDURE UNLIST  2011 Dr. Filomena Gordon  3 vessel cabg and aortic valve replacement  COLONOSCOPY N/A 2/14/2018 COLONOSCOPY  BMI 37 performed by Aakash Ochoa MD at [facility] ENDOSCOPY  
 HX FREE SKIN GRAFT Left 8/11/15  
 thigh to foot graft  HX HEART CATHETERIZATION  03/07/2011  HX PACEMAKER  2011  
 medtronic per pt (phone assessment)  HX UROLOGICAL  2005 Penile Impant  VASCULAR SURGERY PROCEDURE UNLIST  2/17/15 LE arteriogram  
 VASCULAR SURGERY PROCEDURE UNLIST Left 3-16-15  
 fem-tibial by-pass  VASCULAR SURGERY PROCEDURE UNLIST Left 6/2/15  
 5th toe amp  VASCULAR SURGERY PROCEDURE UNLIST Left 6/5/15  
 wound debridement  VASCULAR SURGERY PROCEDURE UNLIST Left 8/24/15  
 great toe amp No Known Allergies Social History Tobacco Use  Smoking status: Never Smoker  Smokeless tobacco: Never Used Substance Use Topics  Alcohol use: No  
  
Family History Problem Relation Age of Onset  Diabetes Mother  Heart Disease Mother  Heart Surgery Mother 54  
     valve replacement Chaya Miranda Other Mother Shrogens  Diabetes Father  Heart Disease Father  Heart Surgery Father 61  
     cabg  Lung Disease Father  Parkinsonism Father  Other Father   
     polio as a child-affected lungs  Diabetes Brother  Diabetes Paternal Uncle x2  Thyroid Disease Brother  Stroke Brother  Cancer Paternal Grandfather Immunization History Administered Date(s) Administered  Influenza Vaccine 10/15/2014  Influenza Vaccine Sailaja Koyanagi) 12/21/2016  Influenza Vaccine (Quad) Mdck Pf 09/05/2017  Influenza Vaccine (Quad) PF 11/14/2018  Influenza Vaccine Split 03/13/2011  Pneumococcal Vaccine (Unspecified Type) 01/17/2013  Tdap 03/08/2017 PTA Medications: Prior to Admission Medications Prescriptions Last Dose Informant Patient Reported? Taking? DULoxetine (CYMBALTA) 60 mg capsule   No No  
Sig: Take 1 Cap by mouth daily. amLODIPine (NORVASC) 10 mg tablet   No No  
Sig: Take 1 Tab by mouth every morning. aspirin 81 mg chewable tablet   Yes No  
Sig: Take 81 mg by mouth daily. eszopiclone (LUNESTA) 2 mg tablet   No No  
Sig: Take 1 Tab by mouth nightly. Max Daily Amount: 2 mg. insulin aspart U-100 (NOVOLOG FLEXPEN U-100 INSULIN) 100 unit/mL inpn   No No  
Sig: 15 Units by SubCUTAneous route Before breakfast, lunch, and dinner. insulin glargine U-300 conc (TOUJEO SOLOSTAR U-300 INSULIN) 300 unit/mL (1.5 mL) inpn   No No  
Si units daily  
irbesartan (AVAPRO) 150 mg tablet   No No  
Sig: Take 1 Tab by mouth nightly.  
ketorolac (ACULAR) 0.5 % ophthalmic solution   Yes No  
Sig: Administer 1 Drop to both eyes four (4) times daily. metoprolol succinate (TOPROL-XL) 50 mg XL tablet   No No  
Sig: Take 1 Tab by mouth every morning. moxifloxacin (VIGAMOX) 0.5 % ophthalmic solution   Yes No  
Sig: Administer 1 Drop to both eyes three (3) times daily. pravastatin (PRAVACHOL) 80 mg tablet   No No  
Sig: Take 1 Tab by mouth nightly. prednisoLONE acetate (PRED FORTE) 1 % ophthalmic suspension   Yes No  
Sig: Administer 1 Drop to both eyes three (3) times daily. Facility-Administered Medications: None Objective:  
 
Patient Vitals for the past 24 hrs: 
 Temp Pulse Resp BP SpO2  
18 1625    119/77 96 % 18 1554    139/85 96 % 18 1454    134/75 96 % 18 1313    101/65 95 % 18 1250 97.8 °F (36.6 °C) 91 18 (!) 142/91 100 % Oxygen Therapy O2 Sat (%): 96 % (18 1625) Pulse via Oximetry: 77 beats per minute (18 1625) O2 Device: Room air (18 1250) No intake or output data in the 24 hours ending 18 8585 Physical Exam: 
General:    Well nourished. Alert. Eyes:   Normal sclera. Extraocular movements intact. ENT:  Normocephalic, atraumatic. Moist mucous membranes CV:   RRR. No murmur, rub, or gallop. Lungs:  CTAB. No wheezing, rhonchi, or rales. Abdomen: Soft, nontender, nondistended. Bowel sounds normal.  
Extremities: Warm and dry. No cyanosis or edema. left 1st and 5th toe amputation. Neurologic: CN II-XII grossly intact. Sensation intact. Skin:     No jaundice. Left heel erythematous, blister, eschar - tender to palpation Psych:  Normal mood and affect. I reviewed the labs, imaging, EKGs, telemetry, and other studies done this admission. Data Review:  
Recent Results (from the past 24 hour(s)) CBC W/O DIFF Collection Time: 12/03/18 12:53 PM  
Result Value Ref Range WBC 11.9 (H) 4.3 - 11.1 K/uL  
 RBC 4.67 4.23 - 5.6 M/uL  
 HGB 14.1 13.6 - 17.2 g/dL HCT 41.4 41.1 - 50.3 % MCV 88.7 79.6 - 97.8 FL  
 MCH 30.2 26.1 - 32.9 PG  
 MCHC 34.1 31.4 - 35.0 g/dL  
 RDW 13.3 11.9 - 14.6 % PLATELET 340 945 - 287 K/uL MPV 12.4 (H) 9.4 - 12.3 FL ABSOLUTE NRBC 0.00 0.0 - 0.2 K/uL METABOLIC PANEL, BASIC Collection Time: 12/03/18 12:53 PM  
Result Value Ref Range Sodium 133 (L) 136 - 145 mmol/L Potassium 4.6 3.5 - 5.1 mmol/L Chloride 98 98 - 107 mmol/L  
 CO2 24 21 - 32 mmol/L Anion gap 11 7 - 16 mmol/L Glucose 379 (H) 65 - 100 mg/dL BUN 26 (H) 6 - 23 MG/DL Creatinine 1.99 (H) 0.8 - 1.5 MG/DL  
 GFR est AA 45 (L) >60 ml/min/1.73m2 GFR est non-AA 37 (L) >60 ml/min/1.73m2 Calcium 9.0 8.3 - 10.4 MG/DL  
CULTURE, BLOOD Collection Time: 12/03/18  3:37 PM  
Result Value Ref Range Special Requests: RIGHT 
HAND Culture result: PENDING   
CULTURE, BLOOD Collection Time: 12/03/18  3:37 PM  
Result Value Ref Range Special Requests: RIGHT 
HAND Culture result: PENDING All Micro Results Procedure Component Value Units Date/Time CULTURE, BLOOD [[id number]] Collected:  12/03/18 1537 Order Status:  Completed Specimen:  Blood Updated:  12/03/18 1548 Special Requests: --     
  RIGHT 
HAND Culture result: PENDING  
 CULTURE, BLOOD [[id number]] Collected:  12/03/18 1537 Order Status:  Completed Specimen:  Blood Updated:  12/03/18 1548 Special Requests: --     
  RIGHT 
HAND Culture result: PENDING Other Studies: 
Xr Foot Lt Min 3 V Result Date: 12/3/2018 Left foot series HISTORY: Diabetic wound. 3 views of the left foot were obtained. Comparison is made to the prior exam 06/21/2016. FINDINGS: There are amputations of the first and fifth digits. There are hammertoe deformities of the second through fourth digits. There is soft tissue edema along the posterior/inferior margin of the calcaneus without bony destruction. Vascular calcifications are present. IMPRESSION: 1. Soft tissue swelling adjacent to the calcaneus without radiographic features to suggest osteomyelitis. 2. Amputation of the first and fifth digits. Assessment and Plan:  
 
Hospital Problems as of 12/3/2018 Date Reviewed: 5/18/2018 Codes Class Noted - Resolved POA  
 DM (diabetes mellitus), type 2, uncontrolled ([facility] ICD-10-CM: E11.65 ICD-9-CM: 250.02  12/3/2018 - Present Yes Acute kidney injury superimposed on CKD ([facility] ICD-10-CM: N17.9, N18.9 ICD-9-CM: 866.00, 585.9  12/3/2018 - Present Yes Diabetic ulcer of left heel ([facility] ICD-10-CM: E11.621, R09.005 ICD-9-CM: 250.80, 707.14  12/3/2018 - Present Yes  
   
 H/O heart valve replacement with bioprosthetic valve ICD-10-CM: Z95.3 ICD-9-CM: V42.2  2/8/2017 - Present Yes Overview Signed 8/15/2017 10:50 AM by David Kate MD  
  Aortic Hypertension (Chronic) ICD-10-CM: I10 
ICD-9-CM: 401.9  6/1/2015 - Present Yes PAD (peripheral artery disease) (HCC) ICD-10-CM: I73.9 ICD-9-CM: 443.9  6/1/2015 - Present Yes CAD (coronary artery disease) (Chronic) ICD-10-CM: I25.10 ICD-9-CM: 414.00  2/28/2011 - Present Yes Dyslipidemia (Chronic) ICD-10-CM: Y25.8 ICD-9-CM: 272.4  2/28/2011 - Present Yes PLAN: 
Left heel wound- spoke to pt about vancomycin- its side effects- ok to continue vancomycin. Will also add zosyn, pharmacy to dose.h/o pad- vascular evaluated pt in er. Uncontrolled dm type 2- cont lantus and  Sliding scale. Mild acute on ckd- cont on ivf Cad with cabg and aortic valve replacement PAD 
HTN 
 
DVT ppx:  HEPARIN. Anticipated DC needs:   
Code status:  Full Estimated LOS:  Greater than 2 midnights Risk:  high Signed: 
Ángel Suarez MD

## 2018-12-03 NOTE — PROGRESS NOTES
TRANSFER - IN REPORT: 
 
Verbal report received from April, RN on Elvan Pill  being received from ED for routine progression of care Report consisted of patients Situation, Background, Assessment and  
Recommendations(SBAR). Information from the following report(s) SBAR was reviewed with the receiving nurse. Opportunity for questions and clarification was provided. Assessment completed upon patients arrival to unit and care assumed.

## 2018-12-03 NOTE — PROGRESS NOTES
Patient stable with no complaints at this time. Patient is resting in bed at this time with wife at bedside for support. Patient denies any pain and appears comfortable. Call light within reach and patient instructed to call if assistance is needed. Report to be given to oncoming RN 7p-7a

## 2018-12-03 NOTE — ED NOTES
TRANSFER - OUT REPORT: 
 
Verbal report given to Sulma Chau RN on [facility]  being transferred to 8th floor for routine progression of care Report consisted of patients Situation, Background, Assessment and  
Recommendations(SBAR). Information from the following report(s) ED Summary was reviewed with the receiving nurse. Lines:  
Peripheral IV 12/03/18 Left Wrist (Active) Site Assessment Clean, dry, & intact 12/3/2018  1:12 PM  
Phlebitis Assessment 0 12/3/2018  1:12 PM  
Infiltration Assessment 0 12/3/2018  1:12 PM  
Dressing Status Clean, dry, & intact 12/3/2018  1:12 PM  
Dressing Type Transparent 12/3/2018  1:12 PM  
Hub Color/Line Status Green 12/3/2018  1:12 PM  
  
 
Opportunity for questions and clarification was provided. Patient transported with: 
 Patient-specific medications from Pharmacy

## 2018-12-03 NOTE — PROGRESS NOTES
Pharmacokinetic Consult to Pharmacist 
 
Verner Beltran is a 66 y.o. male being treated with vancomycin and zosyn. Height: 5' 8\" (172.7 cm)  Weight: 90.7 kg (200 lb) Lab Results Component Value Date/Time BUN 26 (H) 12/03/2018 12:53 PM  
 Creatinine 1.99 (H) 12/03/2018 12:53 PM  
 WBC 11.9 (H) 12/03/2018 12:53 PM  
 Procalcitonin 0.1 06/01/2015 06:30 PM  
 Lactic acid 1.3 06/01/2015 06:30 PM  
  
Estimated Creatinine Clearance: 45.3 mL/min (A) (based on SCr of 1.99 mg/dL (H)). CULTURES: 
pending Day 1 of vancomycin. Goal trough is 15-20. Vancomycin dose initiated at 2500 mg x 1, then 1000 mg q 18 hours. Will continue to follow patient. Thank you, Priscilla Yuan, PharmD Clinical Pharmacist 
[phone]

## 2018-12-03 NOTE — ED PROVIDER NOTES
65-year-old white male with history of peripheral vascular disease and diabetes status post previous  Toe amputations presents with left heel pain for a couple of days. She noted swelling and redness today. Some subjective fever reported but no chills. Patient denies any nausea or vomiting. Exam reveals a regular rate and rhythm without murmurs gallops or rubs. Lungs are clear to auscultation bilaterally anteriorly. Carotid and radial pulses are 2+. Left heel has a approximately 3 cm diameter shallow ulceration that is covered with blistered skin. There does appear to be some black central necrosis or eschar. There is surrounding injection/erythema that extends about half centimeter to 1 cm around this area. There is perhaps some mild warmth. The area is tender to palpation. One plus dorsalis pedis pulses present. Unable to obtain posterior tibial pulse in triage. Foot does not feel cold or ischemic. X-ray to evaluate for osteo-or gas. Basic labs and IV ordered. Antibiotics at the discretion of the emergency department physician and the main portion of the ED who will assume care. Physician in triage Kwabena Peraza M.D. Review provider and triage note. Agree with his findings Foot Pain Past Medical History:  
Diagnosis Date  Acute on chronic diastolic heart failure ([address] 2/28/2011  
 followed by dr Deanna Lynne  Arrhythmia 2011  
 pacemaker Medtronic-- followed by [facility] cardio  Atherosclerotic PVD with ulceration ([facility] 2/2/2015  CAD (coronary artery disease) 02/28/2011  
  3 vessel CABG; aortic valve replacement/Bovine-- both at same time-- followed by dr Deanna Lynne  Chronic kidney disease   
 chronic renal insuff. --- dos not see a nephrologist   
 Chronic pain   
 left foot  Depression 2/28/2011  Diabetes mellitus ([address] dx age 31  
 type2--- sqbs avg am--100--- hypo at 52's  H/O aortic valve replacement 2/2011 Bovine valve  Heart failure ([address]  Hypercholesterolemia  Hypertension   
 controlled with med  Murmur, cardiac 02/2011  
 aortic valve replaced with CABG, 2011------- 2/6 SALVADOR RUSB apex, 2/6 TR murmur-- ECHO 2/12/15 LVEF 55% --- followed by dr Pablito Kaufman  PVD (peripheral vascular disease) ([facility]  Shingles 6/2015  Warfarin anticoagulation Past Surgical History:  
Procedure Laterality Date  CARDIAC SURG PROCEDURE UNLIST  2011 Dr. Altaf Linder  3 vessel cabg and aortic valve replacement  COLONOSCOPY N/A 2/14/2018 COLONOSCOPY  BMI 37 performed by Giovanni Mcgovern MD at [facility] ENDOSCOPY  
 HX FREE SKIN GRAFT Left 8/11/15  
 thigh to foot graft  HX HEART CATHETERIZATION  03/07/2011  HX PACEMAKER  2011  
 medtronic per pt (phone assessment)  HX UROLOGICAL  2005 Penile Impant  VASCULAR SURGERY PROCEDURE UNLIST  2/17/15 LE arteriogram  
 VASCULAR SURGERY PROCEDURE UNLIST Left 3-16-15  
 fem-tibial by-pass  VASCULAR SURGERY PROCEDURE UNLIST Left 6/2/15  
 5th toe amp  VASCULAR SURGERY PROCEDURE UNLIST Left 6/5/15  
 wound debridement  VASCULAR SURGERY PROCEDURE UNLIST Left 8/24/15  
 great toe amp Family History:  
Problem Relation Age of Onset  Diabetes Mother  Heart Disease Mother  Heart Surgery Mother 54  
     valve replacement [facility] Other Mother Shrogens  Diabetes Father  Heart Disease Father  Heart Surgery Father 61  
     cabg  Lung Disease Father  Parkinsonism Father  Other Father   
     polio as a child-affected lungs  Diabetes Brother  Diabetes Paternal Uncle x2  Thyroid Disease Brother  Stroke Brother  Cancer Paternal Grandfather Social History Socioeconomic History  Marital status:  Spouse name: Not on file  Number of children: Not on file  Years of education: Not on file  Highest education level: Not on file Social Needs  Financial resource strain: Not on file  Food insecurity - worry: Not on file  Food insecurity - inability: Not on file  Transportation needs - medical: Not on file  Transportation needs - non-medical: Not on file Occupational History  Occupation: works in the lab Comment: [employer] Tobacco Use  Smoking status: Never Smoker  Smokeless tobacco: Never Used Substance and Sexual Activity  Alcohol use: No  
 Drug use: Yes Types: Prescription  Sexual activity: Not on file Other Topics Concern  Not on file Social History Narrative  Not on file ALLERGIES: Patient has no known allergies. Review of Systems Constitutional: Negative for activity change and appetite change. HENT: Negative. Respiratory: Negative. Cardiovascular: Negative. Gastrointestinal: Negative. Genitourinary: Negative. Skin: Positive for color change and wound. Neurological: Negative. Vitals:  
 12/03/18 1250 BP: (!) 142/91 Pulse: 91  
Resp: 18 Temp: 97.8 °F (36.6 °C) SpO2: 100% Weight: 90.7 kg (200 lb) Height: 5' 8\" (1.727 m) Physical Exam  
Constitutional: He is oriented to person, place, and time. He appears well-developed and well-nourished. HENT:  
Head: Normocephalic and atraumatic. Cardiovascular: Normal rate and regular rhythm. Pulmonary/Chest: Breath sounds normal.  
Musculoskeletal: Normal range of motion. Feet: 
 
Neurological: He is alert and oriented to person, place, and time. Nursing note and vitals reviewed. MDM Number of Diagnoses or Management Options Diagnosis management comments: 46-year-old male with diabetic foot ulcer. Had femoropopliteal bypass on that side as well. Has lost the fifth toe and great toe Now with heel ulcer. Been there for several weeks. Recently much worse. No alleviating Some chills no fever. White count is elevated. Amount and/or Complexity of Data Reviewed Clinical lab tests: reviewed Tests in the radiology section of CPT®: reviewed 
 
 
_____________________________________________________________________ 
ED Evaluation Labs:   
Recent Results (from the past 24 hour(s)) CBC W/O DIFF Collection Time: 12/03/18 12:53 PM  
Result Value Ref Range WBC 11.9 (H) 4.3 - 11.1 K/uL  
 RBC 4.67 4.23 - 5.6 M/uL  
 HGB 14.1 13.6 - 17.2 g/dL HCT 41.4 41.1 - 50.3 % MCV 88.7 79.6 - 97.8 FL  
 MCH 30.2 26.1 - 32.9 PG  
 MCHC 34.1 31.4 - 35.0 g/dL  
 RDW 13.3 11.9 - 14.6 % PLATELET 231 442 - 980 K/uL MPV 12.4 (H) 9.4 - 12.3 FL ABSOLUTE NRBC 0.00 0.0 - 0.2 K/uL METABOLIC PANEL, BASIC Collection Time: 12/03/18 12:53 PM  
Result Value Ref Range Sodium 133 (L) 136 - 145 mmol/L Potassium 4.6 3.5 - 5.1 mmol/L Chloride 98 98 - 107 mmol/L  
 CO2 24 21 - 32 mmol/L Anion gap 11 7 - 16 mmol/L Glucose 379 (H) 65 - 100 mg/dL BUN 26 (H) 6 - 23 MG/DL Creatinine 1.99 (H) 0.8 - 1.5 MG/DL  
 GFR est AA 45 (L) >60 ml/min/1.73m2 GFR est non-AA 37 (L) >60 ml/min/1.73m2 Calcium 9.0 8.3 - 10.4 MG/DL  
 reviewed and interpreted by me Radiology studies performed: XR FOOT LT MIN 3 V Final Result IMPRESSION:  
1. Soft tissue swelling adjacent to the calcaneus without radiographic features  
to suggest osteomyelitis. 2. Amputation of the first and fifth digits. review time he 
 
Orders Placed This Encounter  CULTURE, BLOOD  CULTURE, BLOOD  XR FOOT LT MIN 3 V  
 CBC W/O DIFF  
 METABOLIC PANEL, BASIC  
 SALINE LOCK IV ONE TIME Routine  sodium chloride (NS) flush 5-10 mL  sodium chloride (NS) flush 5-10 mL  vancomycin (VANCOCIN) 2500 mg in  mL infusion  piperacillin-tazobactam (ZOSYN) 4.5 g in 0.9% sodium chloride (MBP/ADV) 100 mL  IP CONSULT TO PHARMACY - VANCOMYCIN DOSING Medications  
sodium chloride (NS) flush 5-10 mL (not administered)  
sodium chloride (NS) flush 5-10 mL (not administered) vancomycin (VANCOCIN) 2500 mg in  mL infusion (not administered)  
piperacillin-tazobactam (ZOSYN) 4.5 g in 0.9% sodium chloride (MBP/ADV) 100 mL (not administered) Condition:  fair Disposition:  admit Diagnosis:  Diabetic foot infection Lalito Mcmahon MD; 12/3/2018 @2:51 PM=========================================== 
 
 
ED Course as of Dec 03 1448 Mon Dec 03, 2018 1347 WBC: (!) 11.9 [GH] ED Course User Index Sabiha Barreto MD  
 
 
 
Procedures

## 2018-12-04 ENCOUNTER — HOME HEALTH ADMISSION (OUTPATIENT)
Dept: HOME HEALTH SERVICES | Facility: HOME HEALTH | Age: 58
End: 2018-12-04
Payer: COMMERCIAL

## 2018-12-04 VITALS
WEIGHT: 200 LBS | HEIGHT: 68 IN | DIASTOLIC BLOOD PRESSURE: 80 MMHG | SYSTOLIC BLOOD PRESSURE: 129 MMHG | HEART RATE: 79 BPM | BODY MASS INDEX: 30.31 KG/M2 | TEMPERATURE: 98.1 F | RESPIRATION RATE: 19 BRPM | OXYGEN SATURATION: 99 %

## 2018-12-04 LAB
ANION GAP SERPL CALC-SCNC: 10 MMOL/L (ref 7–16)
BACTERIA SPEC CULT: NORMAL
BASOPHILS # BLD: 0.1 K/UL (ref 0–0.2)
BASOPHILS NFR BLD: 1 % (ref 0–2)
BUN SERPL-MCNC: 21 MG/DL (ref 6–23)
CALCIUM SERPL-MCNC: 8.3 MG/DL (ref 8.3–10.4)
CHLORIDE SERPL-SCNC: 106 MMOL/L (ref 98–107)
CO2 SERPL-SCNC: 22 MMOL/L (ref 21–32)
CREAT SERPL-MCNC: 1.55 MG/DL (ref 0.8–1.5)
DIFFERENTIAL METHOD BLD: ABNORMAL
EOSINOPHIL # BLD: 0.5 K/UL (ref 0–0.8)
EOSINOPHIL NFR BLD: 5 % (ref 0.5–7.8)
ERYTHROCYTE [DISTWIDTH] IN BLOOD BY AUTOMATED COUNT: 13.6 % (ref 11.9–14.6)
GLUCOSE BLD STRIP.AUTO-MCNC: 128 MG/DL (ref 65–100)
GLUCOSE BLD STRIP.AUTO-MCNC: 160 MG/DL (ref 65–100)
GLUCOSE BLD STRIP.AUTO-MCNC: 56 MG/DL (ref 65–100)
GLUCOSE SERPL-MCNC: 152 MG/DL (ref 65–100)
HCT VFR BLD AUTO: 35.6 % (ref 41.1–50.3)
HGB BLD-MCNC: 12 G/DL (ref 13.6–17.2)
IMM GRANULOCYTES # BLD: 0 K/UL (ref 0–0.5)
IMM GRANULOCYTES NFR BLD AUTO: 0 % (ref 0–5)
LYMPHOCYTES # BLD: 0.9 K/UL (ref 0.5–4.6)
LYMPHOCYTES NFR BLD: 9 % (ref 13–44)
MCH RBC QN AUTO: 29.8 PG (ref 26.1–32.9)
MCHC RBC AUTO-ENTMCNC: 33.7 G/DL (ref 31.4–35)
MCV RBC AUTO: 88.3 FL (ref 79.6–97.8)
MONOCYTES # BLD: 0.6 K/UL (ref 0.1–1.3)
MONOCYTES NFR BLD: 6 % (ref 4–12)
NEUTS SEG # BLD: 8 K/UL (ref 1.7–8.2)
NEUTS SEG NFR BLD: 80 % (ref 43–78)
NRBC # BLD: 0 K/UL (ref 0–0.2)
PLATELET # BLD AUTO: 156 K/UL (ref 150–450)
PMV BLD AUTO: 12.7 FL (ref 9.4–12.3)
POTASSIUM SERPL-SCNC: 3.8 MMOL/L (ref 3.5–5.1)
RBC # BLD AUTO: 4.03 M/UL (ref 4.23–5.6)
SERVICE CMNT-IMP: NORMAL
SODIUM SERPL-SCNC: 138 MMOL/L (ref 136–145)
WBC # BLD AUTO: 10 K/UL (ref 4.3–11.1)

## 2018-12-04 PROCEDURE — 74011636637 HC RX REV CODE- 636/637: Performed by: FAMILY MEDICINE

## 2018-12-04 PROCEDURE — 80048 BASIC METABOLIC PNL TOTAL CA: CPT

## 2018-12-04 PROCEDURE — 74011250636 HC RX REV CODE- 250/636: Performed by: FAMILY MEDICINE

## 2018-12-04 PROCEDURE — 85025 COMPLETE CBC W/AUTO DIFF WBC: CPT

## 2018-12-04 PROCEDURE — 36415 COLL VENOUS BLD VENIPUNCTURE: CPT

## 2018-12-04 PROCEDURE — 74011000258 HC RX REV CODE- 258: Performed by: FAMILY MEDICINE

## 2018-12-04 PROCEDURE — 74011250637 HC RX REV CODE- 250/637: Performed by: FAMILY MEDICINE

## 2018-12-04 PROCEDURE — 82962 GLUCOSE BLOOD TEST: CPT

## 2018-12-04 RX ORDER — DEXTROSE 50 % IN WATER (D50W) INTRAVENOUS SYRINGE
25-50 AS NEEDED
Status: DISCONTINUED | OUTPATIENT
Start: 2018-12-04 | End: 2018-12-04 | Stop reason: HOSPADM

## 2018-12-04 RX ORDER — HYDROCODONE BITARTRATE AND ACETAMINOPHEN 5; 325 MG/1; MG/1
1 TABLET ORAL
Qty: 15 TAB | Refills: 0 | Status: SHIPPED | OUTPATIENT
Start: 2018-12-04 | End: 2019-01-08 | Stop reason: ALTCHOICE

## 2018-12-04 RX ORDER — DEXTROSE 40 %
15 GEL (GRAM) ORAL AS NEEDED
Status: DISCONTINUED | OUTPATIENT
Start: 2018-12-04 | End: 2018-12-04 | Stop reason: HOSPADM

## 2018-12-04 RX ORDER — AMOXICILLIN AND CLAVULANATE POTASSIUM 875; 125 MG/1; MG/1
1 TABLET, FILM COATED ORAL EVERY 12 HOURS
Qty: 12 TAB | Refills: 0 | Status: ON HOLD | OUTPATIENT
Start: 2018-12-04 | End: 2018-12-12

## 2018-12-04 RX ORDER — DEXTROSE 40 %
GEL (GRAM) ORAL
Status: DISCONTINUED
Start: 2018-12-04 | End: 2018-12-04 | Stop reason: HOSPADM

## 2018-12-04 RX ADMIN — HYDROCODONE BITARTRATE AND ACETAMINOPHEN 1 TABLET: 5; 325 TABLET ORAL at 09:57

## 2018-12-04 RX ADMIN — METOPROLOL SUCCINATE 50 MG: 50 TABLET, EXTENDED RELEASE ORAL at 05:13

## 2018-12-04 RX ADMIN — Medication 1 AMPULE: at 09:56

## 2018-12-04 RX ADMIN — VANCOMYCIN HYDROCHLORIDE 1000 MG: 1 INJECTION, POWDER, LYOPHILIZED, FOR SOLUTION INTRAVENOUS at 09:57

## 2018-12-04 RX ADMIN — Medication 10 ML: at 05:13

## 2018-12-04 RX ADMIN — Medication 1 TUBE: at 05:10

## 2018-12-04 RX ADMIN — PIPERACILLIN SODIUM,TAZOBACTAM SODIUM 3.38 G: 3; .375 INJECTION, POWDER, FOR SOLUTION INTRAVENOUS at 01:11

## 2018-12-04 RX ADMIN — HYDROCODONE BITARTRATE AND ACETAMINOPHEN 1 TABLET: 5; 325 TABLET ORAL at 01:10

## 2018-12-04 RX ADMIN — ASPIRIN 81 MG 81 MG: 81 TABLET ORAL at 09:57

## 2018-12-04 RX ADMIN — PIPERACILLIN SODIUM,TAZOBACTAM SODIUM 3.38 G: 3; .375 INJECTION, POWDER, FOR SOLUTION INTRAVENOUS at 09:57

## 2018-12-04 RX ADMIN — VALSARTAN 80 MG: 80 TABLET ORAL at 09:56

## 2018-12-04 RX ADMIN — HEPARIN SODIUM 5000 UNITS: 5000 INJECTION INTRAVENOUS; SUBCUTANEOUS at 05:13

## 2018-12-04 RX ADMIN — INSULIN LISPRO 3 UNITS: 100 INJECTION, SOLUTION INTRAVENOUS; SUBCUTANEOUS at 12:27

## 2018-12-04 RX ADMIN — HYDROCODONE BITARTRATE AND ACETAMINOPHEN 1 TABLET: 5; 325 TABLET ORAL at 05:12

## 2018-12-04 RX ADMIN — AMLODIPINE BESYLATE 10 MG: 10 TABLET ORAL at 05:13

## 2018-12-04 RX ADMIN — ACETAMINOPHEN 650 MG: 325 TABLET, FILM COATED ORAL at 13:55

## 2018-12-04 RX ADMIN — DULOXETINE HYDROCHLORIDE 60 MG: 60 CAPSULE, DELAYED RELEASE ORAL at 09:56

## 2018-12-04 NOTE — PROGRESS NOTES
Patient seen and examined. Patient well-known to me status post left common femoral to above-knee popliteal artery bypass with PTFE back in 2015 with multiple toe amputations. Patient was lost to follow-up has not been seen in 3 years. Presented to the emergency department with pain in his left heel. Patient denies any claudication or rest pain symptoms. His toe surgical sites have healed. He has a small heel ulcer no surrounding cellulitis no purulent drainage. I reviewed his ultrasound images which showed occluded bypass graft with reconstitution of the popliteal artery with tibial vessels being patent. Patient clinically is not ischemic would not recommend any surgical debridement at this time for revascularization. Would recommend wound care with heel protection and antibiotics per primary team.  Patient follow-up in office in 1 month to recheck. She can be discharged from vascular standpoint. Chilango Morales

## 2018-12-04 NOTE — WOUND CARE
Patient seen for left heel vascular wound, it is a blood blister 3.5x6.5cm with surrounding redness. Rooke boots in place. Painted with betadine at this time. Recommend continued use of betadine daily to help dry out and decrease bacteria on skin surface to area. Noted Dr Bibiana Oleary note. Will continue to monitor and adjust wound treatment as needed.

## 2018-12-04 NOTE — PROGRESS NOTES
Pt discharged home via private car. He is alert and oriented. rr are even and unlabored he is on room air. Pt was given all discharge instructions and follow up appointments. Wife at bedside both verbalized understanding of all instructions. Pt has rooke boots on. Pt is leaving with all his belongings.

## 2018-12-04 NOTE — DISCHARGE SUMMARY
Hospitalist Discharge Summary     Patient ID:  Giovani Mckay  [id number]  76 y.o.  1960  Admit date: 12/3/2018 12:50 PM  Discharge date and time: 12/4/2018  Attending: Darline Shin MD  PCP:  Divya Vernon NP  Treatment Team: Attending Provider: Darline Shin MD; Consulting Provider: Zhanna Skinner MD; Consulting Provider: Lynette Mendoza    Principal Diagnosis <principal problem not specified>   Active Problems:    CAD (coronary artery disease) (2/28/2011)      Hypertension (6/1/2015)      Dyslipidemia (2/28/2011)      PAD (peripheral artery disease) ([facility] (6/1/2015)      H/O heart valve replacement with bioprosthetic valve (2/8/2017)      Overview: Aortic       DM (diabetes mellitus), type 2, uncontrolled ([address] (12/3/2018)      Acute kidney injury superimposed on CKD ([facility] [address] (12/3/2018)      Diabetic ulcer of left heel ([facility] [address] (12/3/2018)             Hospital Course:  Please refer to the admission H&P for details of presentation. In summary, the patient is 66 yr old  male pt with known h/o PAD,CAD with CABG ,Aortic valve replacement, ? Fempop bypass, dm type 2, htn, hyperlipidemia,s/p pacemaker,ckd 3 and diastolic chf presented with increased pain and redness of chronic left heel wound x 3 days. Also noted to have slight bump in Cr. Pt admitted for acute on CKD, uncontrolled DM and left heel wound. Patient resumed on home insulin with fair control inpatient. Started on IVF with improvement in Cr. Also seen by Dr Abe Knapp, vascular surgery. XR w/o evidence of osteomyelitis. Vascular did not recommend surgical debridement and did not feel foot was ischemic. Recommends antibiotics and follow-up in office in 1 month.        Significant Diagnostic Studies:   Final [99] 12/03/2018 16:45 12/03/2018 17:18   Study Result     TITLE: Lower extremity arterial ultrasound examination.     INDICATION: Distal lower extremity wound status post bypass.     TECHNIQUE: Grayscale, color, and Doppler interrogation performed.     COMPARISON: August 21, 2015.        WATSON:  Right = 1.45             Left = 0.88     RIGHT LOWER EXTREMITY: Proximal triphasic waveforms. Biphasic waveforms more  distally. The posterior tibial artery is occluded but reconstitutes.     LEFT LOWER EXTREMITY: There is an occluded femoral to popliteal bypass graft. The popliteal artery reconstitutes through collaterals with monophasic  waveforms. The posterior tibial artery is occluded.     ABDOMEN:  Not well seen. No obvious aneurysm.     IMPRESSION  IMPRESSION:    1. The left femoral to popliteal bypass graft is occluded. Probable two-vessel  runoff. 2. No obvious proximal right lower extremity stenosis. Probable 2 vessel runoff. Final [99] 12/03/2018 13:45 12/03/2018 13:48   Study Result     Left foot series     HISTORY: Diabetic wound.     3 views of the left foot were obtained. Comparison is made to the prior exam  06/21/2016.     FINDINGS: There are amputations of the first and fifth digits. There are  hammertoe deformities of the second through fourth digits. There is soft tissue  edema along the posterior/inferior margin of the calcaneus without bony  destruction. Vascular calcifications are present.     IMPRESSION  IMPRESSION:  1. Soft tissue swelling adjacent to the calcaneus without radiographic features  to suggest osteomyelitis. 2. Amputation of the first and fifth digits. Labs: Results:       Chemistry Recent Labs     12/04/18  0627 12/03/18  1253   * 379*    133*   K 3.8 4.6    98   CO2 22 24   BUN 21 26*   CREA 1.55* 1.99*   CA 8.3 9.0   AGAP 10 11      CBC w/Diff Recent Labs     12/04/18  0627 12/03/18  1253   WBC 10.0 11.9*   RBC 4.03* 4.67   HGB 12.0* 14.1   HCT 35.6* 41.4    200   GRANS 80*  --    LYMPH 9*  --    EOS 5  --       Cardiac Enzymes No results for input(s): CPK, CKND1, LUCERO in the last 72 hours.     No lab exists for component: CKRMB, TROIP   Coagulation No results for input(s): PTP, INR, APTT in the last 72 hours. No lab exists for component: INREXT    Lipid Panel Lab Results   Component Value Date/Time    Cholesterol, total 180 11/14/2018 04:35 PM    HDL Cholesterol 33 (L) 11/14/2018 04:35 PM    LDL, calculated 88 11/14/2018 04:35 PM    VLDL, calculated 59 (H) 11/14/2018 04:35 PM    Triglyceride 296 (H) 11/14/2018 04:35 PM      BNP No results for input(s): BNPP in the last 72 hours. Liver Enzymes No results for input(s): TP, ALB, TBIL, AP, SGOT, GPT in the last 72 hours. No lab exists for component: DBIL   Thyroid Studies Lab Results   Component Value Date/Time    T4, Total 9.5 02/08/2011 03:15 PM    TSH 1.160 08/22/2015 08:00 AM            Discharge Exam:  Visit Vitals  /63   Pulse 73   Temp 98 °F (36.7 °C)   Resp 19   Ht 5' 8\" (1.727 m)   Wt 90.7 kg (200 lb)   SpO2 96%   BMI 30.41 kg/m²     General appearance: alert, cooperative, no distress, appears stated age  Lungs: clear to auscultation bilaterally  Heart: regular rate and rhythm, S1, S2 normal, no murmur, click, rub or gallop  Abdomen: soft, non-tender. Bowel sounds normal. No masses,  no organomegaly  Extremities: left foot - half dollar sized heel ulceration with blister, TTP.  w/o surrounding erythema on today's exam.  Neurologic: Grossly normal    Disposition: home with [facility] RN  Discharge Condition: stable  Patient Instructions:   Current Discharge Medication List      START taking these medications    Details   amoxicillin-clavulanate (AUGMENTIN) 875-125 mg per tablet Take 1 Tab by mouth every twelve (12) hours for 6 days. Qty: 12 Tab, Refills: 0         CONTINUE these medications which have NOT CHANGED    Details   insulin glargine U-300 conc (TOUJEO SOLOSTAR U-300 INSULIN) 300 unit/mL (1.5 mL) inpn 50 units daily  Qty: 6 Pen, Refills: 5      amLODIPine (NORVASC) 10 mg tablet Take 1 Tab by mouth every morning.   Qty: 30 Tab, Refills: 5    Associated Diagnoses: Essential hypertension with goal blood pressure less than 140/90      eszopiclone (LUNESTA) 2 mg tablet Take 1 Tab by mouth nightly. Max Daily Amount: 2 mg. Qty: 30 Tab, Refills: 5    Associated Diagnoses: Insomnia, unspecified type      DULoxetine (CYMBALTA) 60 mg capsule Take 1 Cap by mouth daily. Qty: 30 Cap, Refills: 5    Associated Diagnoses: Moderate episode of recurrent major depressive disorder (HCC)      insulin aspart U-100 (NOVOLOG FLEXPEN U-100 INSULIN) 100 unit/mL inpn 15 Units by SubCUTAneous route Before breakfast, lunch, and dinner. Qty: 6 Pen, Refills: 5    Associated Diagnoses: Type 2 diabetes mellitus with hyperglycemia, with long-term current use of insulin ([facility])      irbesartan (AVAPRO) 150 mg tablet Take 1 Tab by mouth nightly. Qty: 30 Tab, Refills: 5    Associated Diagnoses: Type 2 diabetes mellitus with hyperglycemia, with long-term current use of insulin ([facility])      aspirin 81 mg chewable tablet Take 81 mg by mouth daily. pravastatin (PRAVACHOL) 80 mg tablet Take 1 Tab by mouth nightly. Qty: 30 Tab, Refills: 5    Associated Diagnoses: Coronary artery disease involving coronary bypass graft of native heart without angina pectoris      metoprolol succinate (TOPROL-XL) 50 mg XL tablet Take 1 Tab by mouth every morning.   Qty: 30 Tab, Refills: 5    Associated Diagnoses: IDDM (insulin dependent diabetes mellitus) ([facility]             Activity: as tolerated  Diet: diabetic      Follow-up  ·   PCP in 1 week, Vascular surgery in 1 month  Time spent to discharge patient 35 minutes  Signed:  Davidson Gonzales DO  12/4/2018  9:33 AM

## 2018-12-04 NOTE — PROGRESS NOTES
Received report from [place]. Patient awake in bed. Respirations present. On room air. No signs of distress at this time. Bed low and locked. Call light within reach. Will continue to monitor.

## 2018-12-04 NOTE — PROGRESS NOTES
Patient is discharging home today. He agreeable to home health RN follow-up through [facility] for wound care. Patient states he has no power right now, but does voice understanding of resources that he can call for assistance in restoring power. No other discharge planning needs. Case Management will remain available to assist as needed. Care Management Interventions PCP Verified by CM: Yes Transition of Care Consult (CM Consult): Discharge Planning Discharge Durable Medical Equipment: No 
Physical Therapy Consult: No 
Occupational Therapy Consult: No 
Speech Therapy Consult: No 
Current Support Network: Lives with Spouse, Own Home Confirm Follow Up Transport: Family Plan discussed with Pt/Family/Caregiver: Yes Freedom of Choice Offered: Yes Discharge Location Discharge Placement: Home with home health

## 2018-12-04 NOTE — PROGRESS NOTES
Patient request medication for pain in left foot r/t wound on left heel. Patient received Norco 5-325 mg tab @ 0110. Will continue to monitor.

## 2018-12-04 NOTE — PROGRESS NOTES
Pt is in room alert and oriented. rr are even and unlabored lung sounds are diminished no distress noted. He is on room air tolerates well. abd is soft bowel sounds are active. No increased bleeding or bruising noted. rooke boots in place left heel pressure area cleaned and open to air. Pt is stable safety measures in place will continue to monitor.

## 2018-12-04 NOTE — PROGRESS NOTES
Patient request medication for pain to left foot. Patient received Norco 5-325 mg tab @ [id number]. Will continue to monitor.

## 2018-12-04 NOTE — DISCHARGE INSTRUCTIONS
Acute Kidney Injury: Care Instructions  Your Care Instructions    Acute kidney injury (BASILIO) is a sudden decrease in kidney function. This can happen over a period of hours, days or, in some cases, weeks. BASILIO used to be called acute renal failure, but kidney failure doesn't always happen with BASILIO. Common causes of BASILIO are dehydration, blood loss, and medicines. When BASILIO happens, the kidneys have trouble removing waste and excess fluids from the body. The waste and fluids build up and become harmful. Kidney function may return to normal if the cause of BASILIO is treated quickly. Your chance of a full recovery depends on what caused the problem, how quickly the cause was treated, and what other medical problems you have. A machine may be used to help your kidneys remove waste and fluids for a short period of time. This is called dialysis. Follow-up care is a key part of your treatment and safety. Be sure to make and go to all appointments, and call your doctor if you are having problems. It's also a good idea to know your test results and keep a list of the medicines you take. How can you care for yourself at home? · Talk to your doctor about how much fluid you should drink. · Eat a balanced diet. Talk to your doctor or a dietitian about what type of diet may be best for you. You may need to limit sodium, potassium, and phosphorus. · If you need dialysis, follow the instructions and schedule for dialysis that your doctor gives you. · Do not smoke. Smoking can make your condition worse. If you need help quitting, talk to your doctor about stop-smoking programs and medicines. These can increase your chances of quitting for good. · Do not drink alcohol. · Review all of your medicines with your doctor. Do not take any medicines, including nonsteroidal anti-inflammatory drugs (NSAIDs) such as ibuprofen (Advil, Motrin) or naproxen (Aleve), unless your doctor says it is safe for you to do so.   · Make sure that anyone treating you for any health problem knows that you have had BASILIO. When should you call for help? Call 911 anytime you think you may need emergency care. For example, call if:    · You passed out (lost consciousness).    Call your doctor now or seek immediate medical care if:    · You have new or worse nausea and vomiting.     · You have much less urine than normal, or you have no urine.     · You are feeling confused or cannot think clearly.     · You have new or more blood in your urine.     · You have new swelling.     · You are dizzy or lightheaded, or feel like you may faint.    Watch closely for changes in your health, and be sure to contact your doctor if:    · You do not get better as expected. Where can you learn more? Go to http://dee-rolanda.info/. Enter T694 in the search box to learn more about \"Acute Kidney Injury: Care Instructions. \"  Current as of: March 15, 2018  Content Version: 11.8  © 4444-2215 [employer]. Care instructions adapted under license by [employer] (which disclaims liability or warranty for this information). If you have questions about a medical condition or this instruction, always ask your healthcare professional. [place] [address] any warranty or liability for your use of this information. Venous Skin Ulcer: Care Instructions  Your Care Instructions  A venous skin ulcer is a shallow wound that develops when the leg veins do not move blood back to the heart normally. Your veins have one-way valves that keep blood flowing toward the heart. When the valves are damaged, the blood can back up and pool in the vein. The blood may leak out of the vein into tissue around the vein. The tissue can break down and form an ulcer. The first sign of a venous skin ulcer is skin that turns dark red or purple over the area where the blood is leaking out of the vein. The skin also may become thick, dry, and itchy. Without treatment, an ulcer may form. The ulcer may be painful. Your leg also may swell and ache. If the ulcer becomes infected, the infection may cause an odor, and pus may drain from the ulcer. The area around the ulcer also may be more tender and red. Follow-up care is a key part of your treatment and safety. Be sure to make and go to all appointments, and call your doctor if you are having problems. It's also a good idea to know your test results and keep a list of the medicines you take. How can you care for yourself at home? · Follow your doctor's instructions on how to clean the ulcer and change the bandage. · If your doctor prescribed antibiotics, take them as directed. Do not stop taking them just because you feel better. You need to take the full course of antibiotics. · Lift your legs above the level of your heart as often as possible. For example, lie down and then prop up your legs with pillows. · Wear compression stockings or bandages. They help the blood circulate in your legs. And they help prevent blood from pooling in your legs. But there are different types of stockings, and they need to fit right. So your doctor will recommend what you need. · After your ulcer has healed, continue to wear compression stockings. Take them off only when you bathe and sleep. Compression helps your blood circulate and helps prevent other ulcers from forming. · Walk daily. Walking helps your blood circulation. When should you call for help? Call your doctor now or seek immediate medical care if:    · You have symptoms of infection, such as:  ? Increased pain, swelling, warmth, or redness. ? Red streaks leading from the ulcer. ? Pus draining from the ulcer. ? A fever.    Watch closely for changes in your health, and be sure to contact your doctor if:    · Your ulcer is not healing.     · You have new ulcers.     · The ulcer starts to bleed, and blood soaks through the bandage.  Oozing small amounts of a mix of blood and fluid is normal.     · You have new bleeding.     · You do not get better as expected. Where can you learn more? Go to http://dee-rolanda.info/. Enter A183 in the search box to learn more about \"Venous Skin Ulcer: Care Instructions. \"  Current as of: November 20, 2017  Content Version: 11.8  © 3893-9681 [employer]. Care instructions adapted under license by [employer] (which disclaims liability or warranty for this information). If you have questions about a medical condition or this instruction, always ask your healthcare professional. [place] [address] any warranty or liability for your use of this information. DISCHARGE SUMMARY from Nurse    PATIENT INSTRUCTIONS:    After general anesthesia or intravenous sedation, for 24 hours or while taking prescription Narcotics:  · Limit your activities  · Do not drive and operate hazardous machinery  · Do not make important personal or business decisions  · Do  not drink alcoholic beverages  · If you have not urinated within 8 hours after discharge, please contact your surgeon on call. Report the following to your surgeon:  · Excessive pain, swelling, redness or odor of or around the surgical area  · Temperature over 100.5  · Nausea and vomiting lasting longer than 4 hours or if unable to take medications  · Any signs of decreased circulation or nerve impairment to extremity: change in color, persistent  numbness, tingling, coldness or increase pain  · Any questions    What to do at Home:      *  Please give a list of your current medications to your Primary Care Provider. *  Please update this list whenever your medications are discontinued, doses are      changed, or new medications (including over-the-counter products) are added. *  Please carry medication information at all times in case of emergency situations.     These are general instructions for a healthy lifestyle:    No smoking/ No tobacco products/ Avoid exposure to second hand smoke  Surgeon General's Warning:  Quitting smoking now greatly reduces serious risk to your health. Obesity, smoking, and sedentary lifestyle greatly increases your risk for illness    A healthy diet, regular physical exercise & weight monitoring are important for maintaining a healthy lifestyle    You may be retaining fluid if you have a history of heart failure or if you experience any of the following symptoms:  Weight gain of 3 pounds or more overnight or 5 pounds in a week, increased swelling in our hands or feet or shortness of breath while lying flat in bed. Please call your doctor as soon as you notice any of these symptoms; do not wait until your next office visit. Recognize signs and symptoms of STROKE:    F-face looks uneven    A-arms unable to move or move unevenly    S-speech slurred or non-existent    T-time-call 911 as soon as signs and symptoms begin-DO NOT go       Back to bed or wait to see if you get better-TIME IS BRAIN. Warning Signs of HEART ATTACK     Call 911 if you have these symptoms:   Chest discomfort. Most heart attacks involve discomfort in the center of the chest that lasts more than a few minutes, or that goes away and comes back. It can feel like uncomfortable pressure, squeezing, fullness, or pain.  Discomfort in other areas of the upper body. Symptoms can include pain or discomfort in one or both arms, the back, neck, jaw, or stomach.  Shortness of breath with or without chest discomfort.  Other signs may include breaking out in a cold sweat, nausea, or lightheadedness. Don't wait more than five minutes to call 911 - MINUTES MATTER! Fast action can save your life. Calling 911 is almost always the fastest way to get lifesaving treatment. Emergency Medical Services staff can begin treatment when they arrive -- up to an hour sooner than if someone gets to the hospital by car.      The discharge information has been reviewed with the patient. The patient verbalized understanding. Discharge medications reviewed with the patient and appropriate educational materials and side effects teaching were provided.   ___________________________________________________________________________________________________________________________________

## 2018-12-04 NOTE — PROGRESS NOTES
Patient awake in bed. AxO x4. Respirations present. BS 56. No signs of acute distress. Patient received dextrose 40 % oral gel 1 tube @ 0510. Will continue to monitor patient and reassess BS.

## 2018-12-04 NOTE — PROGRESS NOTES
Patient awake in bed at this time. Respirations present. No signs of distress at this time. Bed low and locked. Call light within reach. Bedside report given to oncoming RN, April.

## 2018-12-04 NOTE — PROGRESS NOTES
Patient request medication for pain in left foot r/t wound. Patient received Norco 5-325 mg tab @ 2050. Will continue to monitor.

## 2018-12-04 NOTE — PROGRESS NOTES
Patient slept well throughout the night. Patient awake resting in bed at this time. Respirations present. No signs of distress at this time. Bed low and locked. Call light within reach. Will continue to monitor.

## 2018-12-06 ENCOUNTER — HOME CARE VISIT (OUTPATIENT)
Dept: SCHEDULING | Facility: HOME HEALTH | Age: 58
End: 2018-12-06
Payer: COMMERCIAL

## 2018-12-06 VITALS
SYSTOLIC BLOOD PRESSURE: 112 MMHG | OXYGEN SATURATION: 99 % | RESPIRATION RATE: 16 BRPM | HEART RATE: 93 BPM | TEMPERATURE: 97.4 F | DIASTOLIC BLOOD PRESSURE: 78 MMHG

## 2018-12-06 PROCEDURE — G0299 HHS/HOSPICE OF RN EA 15 MIN: HCPCS

## 2018-12-06 PROCEDURE — 400013 HH SOC

## 2018-12-07 ENCOUNTER — HOSPITAL ENCOUNTER (INPATIENT)
Age: 58
LOS: 5 days | Discharge: HOME HEALTH | DRG: 638 | End: 2018-12-12
Attending: INTERNAL MEDICINE | Admitting: INTERNAL MEDICINE
Payer: COMMERCIAL

## 2018-12-07 ENCOUNTER — APPOINTMENT (OUTPATIENT)
Dept: MRI IMAGING | Age: 58
DRG: 638 | End: 2018-12-07
Payer: COMMERCIAL

## 2018-12-07 ENCOUNTER — APPOINTMENT (OUTPATIENT)
Dept: NUCLEAR MEDICINE | Age: 58
DRG: 638 | End: 2018-12-07
Payer: COMMERCIAL

## 2018-12-07 PROBLEM — L03.116 CELLULITIS OF LEFT FOOT: Status: ACTIVE | Noted: 2018-12-07

## 2018-12-07 LAB
ALBUMIN SERPL-MCNC: 3.1 G/DL (ref 3.5–5)
ALBUMIN/GLOB SERPL: 0.8 {RATIO} (ref 1.2–3.5)
ALP SERPL-CCNC: 109 U/L (ref 50–136)
ALT SERPL-CCNC: 38 U/L (ref 12–65)
ANION GAP SERPL CALC-SCNC: 12 MMOL/L (ref 7–16)
AST SERPL-CCNC: 17 U/L (ref 15–37)
BASOPHILS # BLD: 0.1 K/UL (ref 0–0.2)
BASOPHILS NFR BLD: 1 % (ref 0–2)
BILIRUB SERPL-MCNC: 0.8 MG/DL (ref 0.2–1.1)
BUN SERPL-MCNC: 13 MG/DL (ref 6–23)
CALCIUM SERPL-MCNC: 9.2 MG/DL (ref 8.3–10.4)
CHLORIDE SERPL-SCNC: 103 MMOL/L (ref 98–107)
CO2 SERPL-SCNC: 23 MMOL/L (ref 21–32)
CREAT SERPL-MCNC: 1.47 MG/DL (ref 0.8–1.5)
CRP SERPL-MCNC: 14.6 MG/DL (ref 0–0.9)
DIFFERENTIAL METHOD BLD: ABNORMAL
EOSINOPHIL # BLD: 0.5 K/UL (ref 0–0.8)
EOSINOPHIL NFR BLD: 6 % (ref 0.5–7.8)
ERYTHROCYTE [DISTWIDTH] IN BLOOD BY AUTOMATED COUNT: 13.5 % (ref 11.9–14.6)
ERYTHROCYTE [SEDIMENTATION RATE] IN BLOOD: 61 MM/HR (ref 0–20)
EST. AVERAGE GLUCOSE BLD GHB EST-MCNC: 192 MG/DL
GLOBULIN SER CALC-MCNC: 4.1 G/DL (ref 2.3–3.5)
GLUCOSE BLD STRIP.AUTO-MCNC: 117 MG/DL (ref 65–100)
GLUCOSE BLD STRIP.AUTO-MCNC: 171 MG/DL (ref 65–100)
GLUCOSE BLD STRIP.AUTO-MCNC: 278 MG/DL (ref 65–100)
GLUCOSE SERPL-MCNC: 149 MG/DL (ref 65–100)
HBA1C MFR BLD: 8.3 % (ref 4.8–6)
HCT VFR BLD AUTO: 39.2 % (ref 41.1–50.3)
HGB BLD-MCNC: 13 G/DL (ref 13.6–17.2)
HIV1 P24 AG SERPL QL IA: NONREACTIVE
HIV1+2 AB SERPL QL IA: NONREACTIVE
IMM GRANULOCYTES # BLD: 0.1 K/UL (ref 0–0.5)
IMM GRANULOCYTES NFR BLD AUTO: 1 % (ref 0–5)
LYMPHOCYTES # BLD: 1.1 K/UL (ref 0.5–4.6)
LYMPHOCYTES NFR BLD: 11 % (ref 13–44)
MCH RBC QN AUTO: 29.9 PG (ref 26.1–32.9)
MCHC RBC AUTO-ENTMCNC: 33.2 G/DL (ref 31.4–35)
MCV RBC AUTO: 90.1 FL (ref 79.6–97.8)
MONOCYTES # BLD: 0.8 K/UL (ref 0.1–1.3)
MONOCYTES NFR BLD: 9 % (ref 4–12)
NEUTS SEG # BLD: 6.8 K/UL (ref 1.7–8.2)
NEUTS SEG NFR BLD: 73 % (ref 43–78)
NRBC # BLD: 0 K/UL (ref 0–0.2)
PLATELET # BLD AUTO: 200 K/UL (ref 150–450)
PMV BLD AUTO: 11.9 FL (ref 9.4–12.3)
POTASSIUM SERPL-SCNC: 4.6 MMOL/L (ref 3.5–5.1)
PROT SERPL-MCNC: 7.2 G/DL (ref 6.3–8.2)
RBC # BLD AUTO: 4.35 M/UL (ref 4.23–5.6)
SODIUM SERPL-SCNC: 138 MMOL/L (ref 136–145)
WBC # BLD AUTO: 9.4 K/UL (ref 4.3–11.1)

## 2018-12-07 PROCEDURE — 80053 COMPREHEN METABOLIC PANEL: CPT

## 2018-12-07 PROCEDURE — 82962 GLUCOSE BLOOD TEST: CPT

## 2018-12-07 PROCEDURE — 65270000029 HC RM PRIVATE

## 2018-12-07 PROCEDURE — 36415 COLL VENOUS BLD VENIPUNCTURE: CPT

## 2018-12-07 PROCEDURE — 74011000258 HC RX REV CODE- 258: Performed by: NURSE PRACTITIONER

## 2018-12-07 PROCEDURE — 74011000258 HC RX REV CODE- 258: Performed by: INTERNAL MEDICINE

## 2018-12-07 PROCEDURE — 74011250636 HC RX REV CODE- 250/636: Performed by: INTERNAL MEDICINE

## 2018-12-07 PROCEDURE — 83036 HEMOGLOBIN GLYCOSYLATED A1C: CPT

## 2018-12-07 PROCEDURE — 85025 COMPLETE CBC W/AUTO DIFF WBC: CPT

## 2018-12-07 PROCEDURE — 85652 RBC SED RATE AUTOMATED: CPT

## 2018-12-07 PROCEDURE — 77030031642 HC BOOT FT ROOKE HFS OSBM -B

## 2018-12-07 PROCEDURE — 74011250637 HC RX REV CODE- 250/637: Performed by: INTERNAL MEDICINE

## 2018-12-07 PROCEDURE — 86140 C-REACTIVE PROTEIN: CPT

## 2018-12-07 PROCEDURE — 87040 BLOOD CULTURE FOR BACTERIA: CPT

## 2018-12-07 PROCEDURE — 87389 HIV-1 AG W/HIV-1&-2 AB AG IA: CPT

## 2018-12-07 PROCEDURE — 74011250636 HC RX REV CODE- 250/636: Performed by: NURSE PRACTITIONER

## 2018-12-07 PROCEDURE — 74011636637 HC RX REV CODE- 636/637: Performed by: INTERNAL MEDICINE

## 2018-12-07 RX ORDER — SODIUM CHLORIDE 0.9 % (FLUSH) 0.9 %
5-10 SYRINGE (ML) INJECTION AS NEEDED
Status: DISCONTINUED | OUTPATIENT
Start: 2018-12-07 | End: 2018-12-12 | Stop reason: HOSPADM

## 2018-12-07 RX ORDER — VANCOMYCIN 2 GRAM/500 ML IN 0.9 % SODIUM CHLORIDE INTRAVENOUS
2000 ONCE
Status: DISCONTINUED | OUTPATIENT
Start: 2018-12-07 | End: 2018-12-07

## 2018-12-07 RX ORDER — SODIUM CHLORIDE 0.9 % (FLUSH) 0.9 %
5-10 SYRINGE (ML) INJECTION EVERY 8 HOURS
Status: DISCONTINUED | OUTPATIENT
Start: 2018-12-07 | End: 2018-12-12 | Stop reason: HOSPADM

## 2018-12-07 RX ORDER — AMLODIPINE BESYLATE 10 MG/1
10 TABLET ORAL
Status: DISCONTINUED | OUTPATIENT
Start: 2018-12-08 | End: 2018-12-12 | Stop reason: HOSPADM

## 2018-12-07 RX ORDER — ACETAMINOPHEN 325 MG/1
650 TABLET ORAL
Status: DISCONTINUED | OUTPATIENT
Start: 2018-12-07 | End: 2018-12-12 | Stop reason: HOSPADM

## 2018-12-07 RX ORDER — POVIDONE-IODINE 10 %
SOLUTION, NON-ORAL TOPICAL DAILY
Status: DISCONTINUED | OUTPATIENT
Start: 2018-12-08 | End: 2018-12-12 | Stop reason: HOSPADM

## 2018-12-07 RX ORDER — HEPARIN SODIUM 5000 [USP'U]/ML
5000 INJECTION, SOLUTION INTRAVENOUS; SUBCUTANEOUS EVERY 8 HOURS
Status: DISCONTINUED | OUTPATIENT
Start: 2018-12-07 | End: 2018-12-12 | Stop reason: HOSPADM

## 2018-12-07 RX ORDER — VALSARTAN 80 MG/1
80 TABLET ORAL
Status: DISCONTINUED | OUTPATIENT
Start: 2018-12-07 | End: 2018-12-12 | Stop reason: HOSPADM

## 2018-12-07 RX ORDER — GUAIFENESIN 100 MG/5ML
81 LIQUID (ML) ORAL DAILY
Status: DISCONTINUED | OUTPATIENT
Start: 2018-12-08 | End: 2018-12-12 | Stop reason: HOSPADM

## 2018-12-07 RX ORDER — INSULIN LISPRO 100 [IU]/ML
INJECTION, SOLUTION INTRAVENOUS; SUBCUTANEOUS
Status: DISCONTINUED | OUTPATIENT
Start: 2018-12-07 | End: 2018-12-12 | Stop reason: HOSPADM

## 2018-12-07 RX ORDER — INSULIN LISPRO 100 [IU]/ML
15 INJECTION, SOLUTION INTRAVENOUS; SUBCUTANEOUS
Status: DISCONTINUED | OUTPATIENT
Start: 2018-12-07 | End: 2018-12-09

## 2018-12-07 RX ORDER — METOPROLOL SUCCINATE 50 MG/1
50 TABLET, EXTENDED RELEASE ORAL
Status: DISCONTINUED | OUTPATIENT
Start: 2018-12-08 | End: 2018-12-12 | Stop reason: HOSPADM

## 2018-12-07 RX ORDER — PRAVASTATIN SODIUM 80 MG/1
80 TABLET ORAL
Status: DISCONTINUED | OUTPATIENT
Start: 2018-12-07 | End: 2018-12-12 | Stop reason: HOSPADM

## 2018-12-07 RX ORDER — INSULIN GLARGINE 100 [IU]/ML
50 INJECTION, SOLUTION SUBCUTANEOUS DAILY
Status: DISCONTINUED | OUTPATIENT
Start: 2018-12-07 | End: 2018-12-09

## 2018-12-07 RX ORDER — ESZOPICLONE 2 MG/1
2 TABLET, FILM COATED ORAL
Status: DISCONTINUED | OUTPATIENT
Start: 2018-12-07 | End: 2018-12-12 | Stop reason: HOSPADM

## 2018-12-07 RX ORDER — DULOXETIN HYDROCHLORIDE 60 MG/1
60 CAPSULE, DELAYED RELEASE ORAL DAILY
Status: DISCONTINUED | OUTPATIENT
Start: 2018-12-08 | End: 2018-12-12 | Stop reason: HOSPADM

## 2018-12-07 RX ADMIN — VANCOMYCIN HYDROCHLORIDE 2000 MG: 10 INJECTION, POWDER, LYOPHILIZED, FOR SOLUTION INTRAVENOUS at 11:43

## 2018-12-07 RX ADMIN — PIPERACILLIN SODIUM,TAZOBACTAM SODIUM 3.38 G: 3; .375 INJECTION, POWDER, FOR SOLUTION INTRAVENOUS at 11:43

## 2018-12-07 RX ADMIN — ACETAMINOPHEN 650 MG: 325 TABLET, FILM COATED ORAL at 17:36

## 2018-12-07 RX ADMIN — INSULIN LISPRO 15 UNITS: 100 INJECTION, SOLUTION INTRAVENOUS; SUBCUTANEOUS at 17:32

## 2018-12-07 RX ADMIN — Medication 10 ML: at 13:39

## 2018-12-07 RX ADMIN — INSULIN GLARGINE 50 UNITS: 100 INJECTION, SOLUTION SUBCUTANEOUS at 12:00

## 2018-12-07 RX ADMIN — PRAVASTATIN SODIUM 80 MG: 80 TABLET ORAL at 20:25

## 2018-12-07 RX ADMIN — INSULIN LISPRO 6 UNITS: 100 INJECTION, SOLUTION INTRAVENOUS; SUBCUTANEOUS at 17:33

## 2018-12-07 RX ADMIN — HEPARIN SODIUM 5000 UNITS: 5000 INJECTION INTRAVENOUS; SUBCUTANEOUS at 11:44

## 2018-12-07 RX ADMIN — HEPARIN SODIUM 5000 UNITS: 5000 INJECTION INTRAVENOUS; SUBCUTANEOUS at 20:23

## 2018-12-07 RX ADMIN — VALSARTAN 80 MG: 80 TABLET ORAL at 20:25

## 2018-12-07 RX ADMIN — Medication 5 ML: at 20:38

## 2018-12-07 RX ADMIN — CEFTAROLINE FOSAMIL 600 MG: 600 POWDER, FOR SOLUTION INTRAVENOUS at 12:31

## 2018-12-07 NOTE — H&P
Hospitalist H&P/Consult Note Admit Date:  2018  9:28 AM  
Name:  Nicholas Alexandra Age:  66 y.o. 
:  1960 MRN:  [id number] PCP:  Derick Miller NP Treatment Team: Attending Provider: Wisam Hickman MD 
 
HPI:  
66years old M with PMH PAD,CAD with CABG ,Aortic valve replacement bioprostetic, DM, HTN,s/p pacemaker,ckd 3 and diastolic chf presented to the hospital as direct admission for worsening LE infection. Patient reported he was recently discharged from the hospital on PO abxs on  for L foot infection. Patient noted since discharged he is having worsening redness, tenderness and swelling on his L heel. Patient reported he was taking antibiotics as prescribed. Today his PCP wanted to direct admit patient due to worsening of the symptoms, elevated WBC to 12 and patient does not have a heater at home. Patient denies SOB, fever, dysuria, chest pain, N/V/D or abdominal pain. 10 systems reviewed and negative except as noted in HPI. Past Medical History:  
Diagnosis Date  Acute on chronic diastolic heart failure ([address] 2011  
 followed by dr Jena Mix  Arrhythmia   
 pacemaker Medtronic-- followed by [facility] cardio  Atherosclerotic PVD with ulceration ([facility] 2015  CAD (coronary artery disease) 2011  
  3 vessel CABG; aortic valve replacement/Bovine-- both at same time-- followed by dr Jena Mix  Chronic kidney disease   
 chronic renal insuff. --- dos not see a nephrologist   
 Chronic pain   
 left foot  Depression 2011  Diabetes mellitus ([facility] [address] dx age 31  
 type2--- sqbs avg am--100--- hypo at 52's  H/O aortic valve replacement 2011 Bovine valve  Heart failure ([facility] [address]  Hypercholesterolemia  Hypertension   
 controlled with med  Murmur, cardiac 2011  
 aortic valve replaced with CABG, ------- 2/6 SALVADOR RUSB apex,  TR murmur-- ECHO 2/12/15 LVEF 55% --- followed by dr Jena Mix  PVD (peripheral vascular disease) ([facility] [address]  Shingles 6/2015  Warfarin anticoagulation Past Surgical History:  
Procedure Laterality Date  CARDIAC SURG PROCEDURE UNLIST  2011 Dr. Melissa Butler  3 vessel cabg and aortic valve replacement  COLONOSCOPY N/A 2/14/2018 COLONOSCOPY  BMI 37 performed by Samantha Finley MD at [facility] ENDOSCOPY  
 HX FREE SKIN GRAFT Left 8/11/15  
 thigh to foot graft  HX HEART CATHETERIZATION  03/07/2011  HX PACEMAKER  2011  
 medtronic per pt (phone assessment)  HX UROLOGICAL  2005 Penile Impant  VASCULAR SURGERY PROCEDURE UNLIST  2/17/15 LE arteriogram  
 VASCULAR SURGERY PROCEDURE UNLIST Left 3-16-15  
 fem-tibial by-pass  VASCULAR SURGERY PROCEDURE UNLIST Left 6/2/15  
 5th toe amp  VASCULAR SURGERY PROCEDURE UNLIST Left 6/5/15  
 wound debridement  VASCULAR SURGERY PROCEDURE UNLIST Left 8/24/15  
 great toe amp Prior to Admission Medications Prescriptions Last Dose Informant Patient Reported? Taking? DULoxetine (CYMBALTA) 60 mg capsule 12/7/2018 at Unknown time  No Yes Sig: Take 1 Cap by mouth daily. HYDROcodone-acetaminophen (NORCO) 5-325 mg per tablet 12/7/2018 at 0900  No Yes Sig: Take 1 Tab by mouth every four (4) hours as needed for Pain. Max Daily Amount: 6 Tabs. amLODIPine (NORVASC) 10 mg tablet 12/7/2018 at Unknown time  No Yes Sig: Take 1 Tab by mouth every morning. amoxicillin-clavulanate (AUGMENTIN) 875-125 mg per tablet 12/7/2018 at Unknown time  No Yes Sig: Take 1 Tab by mouth every twelve (12) hours for 6 days. aspirin 81 mg chewable tablet 12/7/2018 at Unknown time  Yes Yes Sig: Take 81 mg by mouth daily. eszopiclone (LUNESTA) 2 mg tablet 12/6/2018 at 2100  No No  
Sig: Take 1 Tab by mouth nightly. Max Daily Amount: 2 mg. insulin aspart U-100 (NOVOLOG FLEXPEN U-100 INSULIN) 100 unit/mL inpn 12/6/2018 at 1700  No Yes Sig: 15 Units by SubCUTAneous route Before breakfast, lunch, and dinner. insulin glargine U-300 conc (TOUJEO SOLOSTAR U-300 INSULIN) 300 unit/mL (1.5 mL) inpn 2018 at 2100  No Yes Si units daily  
irbesartan (AVAPRO) 150 mg tablet 2018 at Unknown time  No Yes Sig: Take 1 Tab by mouth nightly. metoprolol succinate (TOPROL-XL) 50 mg XL tablet 2018 at Unknown time  No Yes Sig: Take 1 Tab by mouth every morning. pravastatin (PRAVACHOL) 80 mg tablet 2018 at Unknown time  No Yes Sig: Take 1 Tab by mouth nightly. Facility-Administered Medications: None No Known Allergies Social History Tobacco Use  Smoking status: Never Smoker  Smokeless tobacco: Never Used Substance Use Topics  Alcohol use: No  
  
Family History Problem Relation Age of Onset  Diabetes Mother  Heart Disease Mother  Heart Surgery Mother 54  
     valve replacement Ann.Rosalva Other Mother Shrogens  Diabetes Father  Heart Disease Father  Heart Surgery Father 61  
     cabg  Lung Disease Father  Parkinsonism Father  Other Father   
     polio as a child-affected lungs  Diabetes Brother  Diabetes Paternal Uncle x2  Thyroid Disease Brother  Stroke Brother  Cancer Paternal Grandfather Immunization History Administered Date(s) Administered  Influenza Vaccine 10/15/2014  Influenza Vaccine Hakan Salas) 2016  Influenza Vaccine (Quad) Mdck Pf 2017  Influenza Vaccine (Quad) PF 2018  Influenza Vaccine Split 2011  Pneumococcal Vaccine (Unspecified Type) 2013  Tdap 2017 Objective:  
 
Patient Vitals for the past 24 hrs: 
 Temp Pulse Resp BP SpO2  
18 1012 98 °F (36.7 °C) 82 20 126/79 97 % Oxygen Therapy O2 Sat (%): 97 % (18 1012) No intake or output data in the 24 hours ending 18 1033 Physical Exam: 
General:    Well nourished. Alert. Eyes:   Normal sclera. Extraocular movements intact. ENT:  Normocephalic, atraumatic. Moist mucous membranes CV:   RRR. No murmur, rub, or gallop. Lungs:  CTAB. No wheezing, rhonchi, or rales. Abdomen: Soft, nontender, nondistended. Bowel sounds normal.  
Extremities: Warm and dry. No cyanosis or edema. Neurologic: No gross focal deficits Skin:     L heel: with superficial open wound surrounded by erythema. Psych:  Normal mood and affect. Data Review:  
Recent Results (from the past 24 hour(s)) METABOLIC PANEL, COMPREHENSIVE Collection Time: 12/06/18 12:01 PM  
Result Value Ref Range Glucose 110 (H) 65 - 99 mg/dL BUN 13 6 - 24 mg/dL Creatinine 1.53 (H) 0.76 - 1.27 mg/dL GFR est non-AA 50 (L) >59 mL/min/1.73 GFR est AA 58 (L) >59 mL/min/1.73  
 BUN/Creatinine ratio 8 (L) 9 - 20 Sodium 138 134 - 144 mmol/L Potassium 3.8 3.5 - 5.2 mmol/L Chloride 100 96 - 106 mmol/L  
 CO2 19 (L) 20 - 29 mmol/L Calcium 9.0 8.7 - 10.2 mg/dL Protein, total 6.2 6.0 - 8.5 g/dL Albumin 3.8 3.5 - 5.5 g/dL GLOBULIN, TOTAL 2.4 1.5 - 4.5 g/dL A-G Ratio 1.6 1.2 - 2.2 Bilirubin, total 0.6 0.0 - 1.2 mg/dL Alk. phosphatase 110 39 - 117 IU/L  
 AST (SGOT) 27 0 - 40 IU/L  
 ALT (SGPT) 39 0 - 44 IU/L HEMOGLOBIN A1C WITH EAG Collection Time: 12/06/18 12:01 PM  
Result Value Ref Range Hemoglobin A1c 8.9 (H) 4.8 - 5.6 % Estimated average glucose 209 mg/dL CBC WITH AUTOMATED DIFF Collection Time: 12/06/18 12:01 PM  
Result Value Ref Range WBC 12.0 (H) 3.4 - 10.8 x10E3/uL  
 RBC 4.39 4.14 - 5.80 x10E6/uL HGB 12.8 (L) 13.0 - 17.7 g/dL HCT 39.1 37.5 - 51.0 % MCV 89 79 - 97 fL  
 MCH 29.2 26.6 - 33.0 pg  
 MCHC 32.7 31.5 - 35.7 g/dL  
 RDW 14.8 12.3 - 15.4 % PLATELET 661 886 - 677 x10E3/uL NEUTROPHILS 70 Not Estab. % Lymphocytes 14 Not Estab. % MONOCYTES 9 Not Estab. % EOSINOPHILS 6 Not Estab. % BASOPHILS 1 Not Estab. %  
 ABS. NEUTROPHILS 8.6 (H) 1.4 - 7.0 x10E3/uL Abs Lymphocytes 1.6 0.7 - 3.1 x10E3/uL  
 ABS. MONOCYTES 1.1 (H) 0.1 - 0.9 x10E3/uL  
 ABS. EOSINOPHILS 0.7 (H) 0.0 - 0.4 x10E3/uL  
 ABS. BASOPHILS 0.1 0.0 - 0.2 x10E3/uL IMMATURE GRANULOCYTES 0 Not Estab. %  
 ABS. IMM. GRANS. 0.0 0.0 - 0.1 x10E3/uL Imaging Studies: CXR Results  (Last 48 hours) None CT Results  (Last 48 hours) None Assessment and Plan:  
 
Hospital Problems as of 12/7/2018 Date Reviewed: 12/6/2018 Codes Class Noted - Resolved POA * (Principal) Cellulitis of left foot ICD-10-CM: L03.116 ICD-9-CM: 682.7  12/7/2018 - Present Unknown DM (diabetes mellitus), type 2, uncontrolled ([facility] ICD-10-CM: E11.65 ICD-9-CM: 250.02  12/3/2018 - Present Diabetic ulcer of left heel ([facility] ICD-10-CM: E11.621, K78.463 ICD-9-CM: 250.80, 707.14  12/3/2018 - Present Yes Hypertension (Chronic) ICD-10-CM: I10 
ICD-9-CM: 401.9  6/1/2015 - Present Yes IDDM (insulin dependent diabetes mellitus) ([facility] ICD-10-CM: E11.9, Z79.4 ICD-9-CM: 250.00, V58.67  6/1/2015 - Present Yes  
   
 CKD (chronic kidney disease), stage III (HCC) ICD-10-CM: N18.3 ICD-9-CM: 585.3  6/1/2015 - Present Yes PVD (peripheral vascular disease) ([facility] ICD-10-CM: I73.9 ICD-9-CM: 443.9  3/16/2015 - Present Yes Overview Signed 3/18/2015  1:57 PM by Farris Nageotte, YANICK  
  3/16/15 (Dr Yoandy Salter) 1. Left superficial femoral artery endarterectomy. 2. Common femoral to above knee popliteal bypass with PTFE graft. 3. Left lower extremity arteriogram. 
  
  
   
 S/P AVR (aortic valve replacement) ICD-10-CM: P20.4 ICD-9-CM: V43.3  3/7/2011 - Present Yes Diabetes mellitus (HCC) (Chronic) ICD-10-CM: E11.9 ICD-9-CM: 250.00  2/28/2011 - Present Yes A/P: 
 
-L heel cellulitis Failed outpatient treatment [facility] evaluated patient on  12/04, no ischemic, recommended abxs/wound care X-ray with no OM  12/03 Plan Start patient on Vanco/zosyn Send cultures Wound care evaluation Will ask ID to evaluate tomorrow AM 
Get routine labs, CBC/CMP  evaluation 
 
-DM Restart home regimen and ISS 
 
-CKD Get CMP 
 
-HTN Controlled Restart amlodipine, metoprolol and acei 
 
-Chronic diastolic HF Appears compensated Cont home meds DVT ppx: heparin Code status:full Case discussed with patient and wife at bedside. Estimated LOS:  2-3 nights Signed: Camelia Santoyo MD

## 2018-12-07 NOTE — PROGRESS NOTES
Patient arrived to room 811 via wife pushing w/c. Patient is alert and orientated with no distress noted. Patient is accompanied by wife. Respirations even and unlabored with heart rate regular. Duel skin assessment completed with PEG Love. Patient has open wound on left heel with a healing scabbed area on the bottom center of left foot. Patient also has blister on right heel. Bilateral legs are dusky and cool at this time. Patient has rooke boots that he brought with him from his d/c a few days ago. Wife took dressing off left heel and stated wound care has been coming to the house to dress it. Admission nurse in with patient now. Patient denies any falls recently. Patient orientated to room and surrounds. Bed in low locked position with call light within reach. Patient instructed to call if assistance is needed. Will continue to monitor.

## 2018-12-07 NOTE — PROGRESS NOTES
Patient readmitted today after recent discharge on 12/4. He is well-known to Case Management. Patient discharged home with follow-up by [facility]. Patient states he still does not have power in his home although he had been given contacts for possible assistance with power needs. Case Management has no resources to help directly with his power needs within his home. Case Management will continue to follow for discharge planning. Care Management Interventions PCP Verified by CM: Yes Transition of Care Consult (CM Consult): Discharge Planning Discharge Durable Medical Equipment: No 
Physical Therapy Consult: Yes Occupational Therapy Consult: No 
Speech Therapy Consult: No 
Current Support Network: Lives with Spouse, Own Home Confirm Follow Up Transport: Family Plan discussed with Pt/Family/Caregiver: Yes Freedom of Choice Offered: Yes Discharge Location Discharge Placement: Home

## 2018-12-07 NOTE — PROGRESS NOTES
Received call from Alin Villa with nuclear medicine advising that they were unable to complete the bone scan due to patient's IV infiltrating. Patient currently has IV in right lower forearm. After receiving call, assessed patient's IV site. No redness, erythema, or tenderness. IV flushed without difficulty, no blood return noted. Transparent dressing remains clean, dry, and intact. Attempted to contact nuclear medicine 5 times via telephone to confirm they were not able to complete the bone scan. Patient states \"they did the test downstairs\". Unable to make contact with staff in nuclear medicine to confirm whether or not they were able to complete the bone scan. Will notify on call provider.

## 2018-12-07 NOTE — PROGRESS NOTES
Received bedside shift report from [address] [place], [place] [address]Jurgen. Patient resting quietly in bed at this time, awake, watching tv. Denies needs at this time. Call light within reach.

## 2018-12-07 NOTE — CONSULTS
Infectious Disease Consult    Today's Date: 12/7/2018   Admit Date: 12/7/2018    Impression:   · Left heel DFI/ulcer, with secondary infection. Wound most consistently with DTI, started as a blister and now with extension/worsening, possible ischemic  · PVD hx of left fem-pop bypass graft, now occluded   · DM, Hgb A1c 8.9  · CAD/CABG/PPM/bioprosthetic AV  · CKD 3    Plan:   · Change Vancomycin and Zosyn to Ceftaroline to reduce renal side effects and follow response. Low likelihood this will improve/resolve with antbx alone  · Cancel MRI Left foot, his PPM is not compatible; check bone scan  · May need re-evaluation from vascular sx    Anti-infectives:   · Teflaro 12/7-  · Vanc/zosyn 12/7    Subjective:   Date of Consultation:  December 7, 2018  Referring Physician: Dr Cathryn Morris     Patient is a 66 y.o. male with a hx significant for PAD,CAD with CABG, bioprosthetic aortic valve replacement, DM, HTN, s/p pacemaker, CKD3, left foot 4th/5th toe amputation sec to osteomyelitis and diastolic CHF, sent back to the hospital by his PCP for worsening left heel redness, pain, edema. He had no fever, chills, sweats, nausea or diarrhea and had been taking Augmentin given to him a few days ago when he was admitted with this issue. He was noted to occlusion of left fem-pop bypass grafting, and evaluated by vascular sx team and not felt to need intervention. He reports wound first developed as an ulcer about a week ago, and since has progressed to become larger and more painful. No new imagining has been completed since early this week when he was admitted; Xray at the time without bone involvement. Currently he is on Vanc and Zosyn, ID consulted to make further treatment recommendations.       Patient Active Problem List   Diagnosis Code    Acute on chronic diastolic heart failure (HCC) I50.33    CAD (coronary artery disease) I25.10    Hypertension I10    Diabetes mellitus ([facility] E11.9    Depression F32.9    Dyslipidemia E78.5  Acute on chronic renal failure (HCC) N17.9, N18.9    Postsurgical aortocoronary bypass status Z95.1    Bicuspid aortic valve Q23.1    S/P AVR (aortic valve replacement) Z95.2    Thrombocytopenia ([facility]) D69.6    Other and unspecified hyperlipidemia E78.5    Abscess or cellulitis of back [id number]    Atherosclerotic PVD with ulceration ([facility]) I70.209, L98.499    PVD (peripheral vascular disease) ([facility]) I73.9    Warfarin-induced coagulopathy ([facility]) D68.32, T45.515A    Diabetic foot ulcer ([facility] E11.621, L97.509    IDDM (insulin dependent diabetes mellitus) ([facility] E11.9, Z79.4    PAD (peripheral artery disease) ([facility]) I73.9    CKD (chronic kidney disease), stage III ([facility]) N18.3    Shingles B02.9    Ischemia of foot I99.8    Acute renal failure (ARF) ([facility]) N17.9    Diabetic ulcer of left great toe ([facility]) E11.621, L97.529    Long term current use of anticoagulant therapy Z79.01    Aortic stenosis I35.0    Edema R60.9    H/O heart valve replacement with bioprosthetic valve Z95.3    Preop cardiovascular exam Z01.810    Class 2 severe obesity due to excess calories with serious comorbidity and body mass index (BMI) of 35.0 to 35.9 in adult ([facility]) E66.01, Z68.35    DM (diabetes mellitus), type 2, uncontrolled ([facility]) E11.65    Acute kidney injury superimposed on CKD ([facility]) N17.9, N18.9    Diabetic ulcer of left heel ([facility]) E11.621, L97.429    Cellulitis of left foot L03.116     Past Medical History:   Diagnosis Date    Acute on chronic diastolic heart failure ([facility] 2/28/2011    followed by dr Braydon Aguilar 2011    pacemaker Medtronic-- followed by [facility]    Atherosclerotic PVD with ulceration ([facility] 2/2/2015    CAD (coronary artery disease) 02/28/2011     3 vessel CABG; aortic valve replacement/Bovine-- both at same time-- followed by dr Ariadna Monique Chronic kidney disease     chronic renal insuff. --- dos not see a nephrologist     Chronic pain     left foot    Depression 2/28/2011    Diabetes mellitus ([facility] dx age 31    type2--- sqbs avg am--100--- hypo at 52's    H/O aortic valve replacement 2/2011    Bovine valve    Heart failure ([facility]     Hypercholesterolemia     Hypertension     controlled with med    Murmur, cardiac 02/2011    aortic valve replaced with CABG, 2011------- 2/6 SALVADOR RUSB apex, 2/6 TR murmur-- ECHO 2/12/15 LVEF 55% --- followed by dr Carrie Kaufman PVD (peripheral vascular disease) ([facility]     Shingles 6/2015    Warfarin anticoagulation       Family History   Problem Relation Age of Onset    Diabetes Mother     Heart Disease Mother     Heart Surgery Mother 54        valve replacement    Other Mother         Shrogens    Diabetes Father     Heart Disease Father     Heart Surgery Father [address]        cabg    Lung Disease Father     Parkinsonism Father     Other Father         polio as a child-affected lungs    Diabetes Brother     Diabetes Paternal Uncle         x2    Thyroid Disease Brother     Stroke Brother     Cancer Paternal Grandfather       Social History     Tobacco Use    Smoking status: Never Smoker    Smokeless tobacco: Never Used   Substance Use Topics    Alcohol use: No     Past Surgical History:   Procedure Laterality Date    CARDIAC SURG PROCEDURE UNLIST  2011    Dr. Deion Pichardo  3 vessel cabg and aortic valve replacement    COLONOSCOPY N/A 2/14/2018    COLONOSCOPY  BMI 37 performed by Yoanna Collazo MD at [facility] ENDOSCOPY    HX FREE SKIN GRAFT Left 8/11/15    thigh to foot graft    HX HEART CATHETERIZATION  03/07/2011    HX PACEMAKER  2011    medtronic per pt (phone assessment)    HX UROLOGICAL  2005    Penile Impant    VASCULAR SURGERY PROCEDURE UNLIST  2/17/15    LE arteriogram    VASCULAR SURGERY PROCEDURE UNLIST Left 3-16-15    fem-tibial by-pass    VASCULAR SURGERY PROCEDURE UNLIST Left 6/2/15    5th toe amp    VASCULAR SURGERY PROCEDURE UNLIST Left 6/5/15    wound debridement    VASCULAR SURGERY PROCEDURE UNLIST Left 8/24/15    great toe amp      Prior to Admission medications    Medication Sig Start Date End Date Taking? Authorizing Provider   amoxicillin-clavulanate (AUGMENTIN) 875-125 mg per tablet Take 1 Tab by mouth every twelve (12) hours for 6 days. 12/4/18 12/10/18 Yes Allegra Jeter DO   HYDROcodone-acetaminophen (NORCO) 5-325 mg per tablet Take 1 Tab by mouth every four (4) hours as needed for Pain. Max Daily Amount: 6 Tabs. 18  Yes Didi Diehl DO   insulin glargine U-300 conc (TOUJEO SOLOSTAR U-300 INSULIN) 300 unit/mL (1.5 mL) inpn 50 units daily 18  Yes Vu Samano NP   pravastatin (PRAVACHOL) 80 mg tablet Take 1 Tab by mouth nightly. 18  Yes Vu Samano NP   amLODIPine (NORVASC) 10 mg tablet Take 1 Tab by mouth every morning. 18  Yes Vu Samano NP   metoprolol succinate (TOPROL-XL) 50 mg XL tablet Take 1 Tab by mouth every morning. 18  Yes Vu Samano NP   DULoxetine (CYMBALTA) 60 mg capsule Take 1 Cap by mouth daily. 18  Yes Vu Samano NP   insulin aspart U-100 (NOVOLOG FLEXPEN U-100 INSULIN) 100 unit/mL inpn 15 Units by SubCUTAneous route Before breakfast, lunch, and dinner. 18  Yes Vu Samano NP   irbesartan (AVAPRO) 150 mg tablet Take 1 Tab by mouth nightly. 18  Yes Vu Samano NP   aspirin 81 mg chewable tablet Take 81 mg by mouth daily. Yes Provider, Historical   eszopiclone (LUNESTA) 2 mg tablet Take 1 Tab by mouth nightly. Max Daily Amount: 2 mg. 18   Vu Samano NP       No Known Allergies     Review of Systems:  A comprehensive review of systems was negative except for that written in the History of Present Illness.     Objective:     Visit Vitals  /79   Pulse 82   Temp 98 °F (36.7 °C)   Resp 20   Wt 90.7 kg (199 lb 15.3 oz)   SpO2 97%   BMI 30.40 kg/m²     Temp (24hrs), Av °F (36.7 °C), Min:98 °F (36.7 °C), Max:98 °F (36.7 °C)       Lines:  Peripheral IV:       Physical Exam:    General: Alert, cooperative, well noursished, well developed, appears stated age   Eyes:  Sclera anicteric. Pupils equally round and reactive to light. Mouth/Throat: Mucous membranes normal, oral pharynx clear, dentition fair   Neck: Supple   Lungs:   Clear to auscultation bilaterally, good effort   CV:  Regular rate and rhythm, soft systolic murmur, no click, rub or gallop   Abdomen:   Soft, non-tender. bowel sounds normal. non-distended   Extremities: No cyanosis   Skin: Skin color, texture, turgor normal. no acute rash or lesions   Lymph nodes: Cervical and supraclavicular normal   Musculoskeletal: Left foot missing 4th/5th toes, amputation sites healed, small scabbed area at plantar aspect that appears benign, left heel area is edematous, erythematous and fluctuant in some spots, skin appears to be blistering off, very tender to palpation (see image below)   Lines/Devices:  Intact, no erythema, drainage or tenderness   Psych: Alert and oriented, normal mood affect given the setting           Data Review:     CBC:  Recent Labs     18  1059 18  1201   WBC 9.4 12.0*   GRANS 73 70   MONOS 9 9   EOS 6 6   ANEU 6.8 8.6*   ABL 1.1  --    HGB 13.0* 12.8*   HCT 39.2* 39.1    195       BMP:  Recent Labs     18  1059 18  1201   CREA 1.47 1.53*   BUN 13 13    138   K 4.6 3.8    100   CO2 23 19*   AGAP 12  --    * 110*       LFTS:  Recent Labs     18  1059 18  1201   TBILI 0.8 0.6   ALT 38 39   SGOT 17 27    110   TP 7.2 6.2   ALB 3.1* 3.8       Microbiology:     All Micro Results     Procedure Component Value Units Date/Time    CULTURE, BLOOD [[id number]] Collected:  18 1059    Order Status:  Completed Specimen:  Blood Updated:  18    CULTURE, BLOOD [[id number]] Collected:  18 110    Order Status:  Completed Specimen:  Blood Updated:  18 110          Imagin/3/18 Art duplex  IMPRESSION:    1.  The left femoral to popliteal bypass graft is occluded. Probable two-vessel  runoff. 2. No obvious proximal right lower extremity stenosis. Probable 2 vessel runoff. 12/3/18 Left foot Xray  IMPRESSION:  1. Soft tissue swelling adjacent to the calcaneus without radiographic features  to suggest osteomyelitis. 2. Amputation of the first and fifth digits.     Signed By: Loli Sabillon NP     December 7, 2018

## 2018-12-07 NOTE — PROGRESS NOTES
Administered tylenol as ordered PRN for mild pain. Patient reports pain 3/10 in left foot. Will reassess per protocol.

## 2018-12-07 NOTE — PROGRESS NOTES
Tylenol effective. No further complaints of pain. Patient resting quietly in bed at this time, awake, respirations even and unlabored. Call light within reach.

## 2018-12-07 NOTE — WOUND CARE
Right heel clear of wounds/ erythema, recommend to continue offloading shoes. Left heel 5x8cm popped blister with pink/ eschar mix, the roof of blister is beginning to unroof. Known PAD with revasc 2015 per chart history, known history of DM. Bilateral feet warm, pink with palpable DP pulses. Unable to palpate PT pulse. Recommend betadine paint and dry dressing daily. Recommend Vascular for left leg, OK outpatient. Continue rooke boots. Will monitor.

## 2018-12-07 NOTE — PROGRESS NOTES
Patient stable with no complaints at this time. Patient is resting in bed watching TV. Call light within reach and patient instructed to call if assistance is needed. Report given to Ray Kennedy.

## 2018-12-08 LAB
ALBUMIN SERPL-MCNC: 2.9 G/DL (ref 3.5–5)
ALBUMIN/GLOB SERPL: 0.7 {RATIO} (ref 1.2–3.5)
ALP SERPL-CCNC: 100 U/L (ref 50–136)
ALT SERPL-CCNC: 31 U/L (ref 12–65)
ANION GAP SERPL CALC-SCNC: 9 MMOL/L (ref 7–16)
AST SERPL-CCNC: 14 U/L (ref 15–37)
BACTERIA SPEC CULT: NORMAL
BACTERIA SPEC CULT: NORMAL
BASOPHILS # BLD: 0.1 K/UL (ref 0–0.2)
BASOPHILS NFR BLD: 1 % (ref 0–2)
BILIRUB SERPL-MCNC: 0.4 MG/DL (ref 0.2–1.1)
BUN SERPL-MCNC: 17 MG/DL (ref 6–23)
CALCIUM SERPL-MCNC: 8.9 MG/DL (ref 8.3–10.4)
CHLORIDE SERPL-SCNC: 104 MMOL/L (ref 98–107)
CO2 SERPL-SCNC: 25 MMOL/L (ref 21–32)
CREAT SERPL-MCNC: 1.66 MG/DL (ref 0.8–1.5)
DIFFERENTIAL METHOD BLD: ABNORMAL
EOSINOPHIL # BLD: 0.8 K/UL (ref 0–0.8)
EOSINOPHIL NFR BLD: 11 % (ref 0.5–7.8)
ERYTHROCYTE [DISTWIDTH] IN BLOOD BY AUTOMATED COUNT: 13.3 % (ref 11.9–14.6)
GLOBULIN SER CALC-MCNC: 3.9 G/DL (ref 2.3–3.5)
GLUCOSE BLD STRIP.AUTO-MCNC: 105 MG/DL (ref 65–100)
GLUCOSE BLD STRIP.AUTO-MCNC: 109 MG/DL (ref 65–100)
GLUCOSE BLD STRIP.AUTO-MCNC: 116 MG/DL (ref 65–100)
GLUCOSE BLD STRIP.AUTO-MCNC: 136 MG/DL (ref 65–100)
GLUCOSE SERPL-MCNC: 118 MG/DL (ref 65–100)
HCT VFR BLD AUTO: 36.2 % (ref 41.1–50.3)
HGB BLD-MCNC: 12.2 G/DL (ref 13.6–17.2)
IMM GRANULOCYTES # BLD: 0 K/UL (ref 0–0.5)
IMM GRANULOCYTES NFR BLD AUTO: 1 % (ref 0–5)
LYMPHOCYTES # BLD: 1.8 K/UL (ref 0.5–4.6)
LYMPHOCYTES NFR BLD: 26 % (ref 13–44)
MCH RBC QN AUTO: 30 PG (ref 26.1–32.9)
MCHC RBC AUTO-ENTMCNC: 33.7 G/DL (ref 31.4–35)
MCV RBC AUTO: 88.9 FL (ref 79.6–97.8)
MONOCYTES # BLD: 0.8 K/UL (ref 0.1–1.3)
MONOCYTES NFR BLD: 12 % (ref 4–12)
NEUTS SEG # BLD: 3.5 K/UL (ref 1.7–8.2)
NEUTS SEG NFR BLD: 50 % (ref 43–78)
NRBC # BLD: 0 K/UL (ref 0–0.2)
PLATELET # BLD AUTO: 203 K/UL (ref 150–450)
PMV BLD AUTO: 12 FL (ref 9.4–12.3)
POTASSIUM SERPL-SCNC: 4.1 MMOL/L (ref 3.5–5.1)
PROT SERPL-MCNC: 6.8 G/DL (ref 6.3–8.2)
RBC # BLD AUTO: 4.07 M/UL (ref 4.23–5.6)
SERVICE CMNT-IMP: NORMAL
SERVICE CMNT-IMP: NORMAL
SODIUM SERPL-SCNC: 138 MMOL/L (ref 136–145)
WBC # BLD AUTO: 7 K/UL (ref 4.3–11.1)

## 2018-12-08 PROCEDURE — 74011250636 HC RX REV CODE- 250/636: Performed by: NURSE PRACTITIONER

## 2018-12-08 PROCEDURE — 74011250637 HC RX REV CODE- 250/637: Performed by: INTERNAL MEDICINE

## 2018-12-08 PROCEDURE — 74011250636 HC RX REV CODE- 250/636

## 2018-12-08 PROCEDURE — 36415 COLL VENOUS BLD VENIPUNCTURE: CPT

## 2018-12-08 PROCEDURE — 85025 COMPLETE CBC W/AUTO DIFF WBC: CPT

## 2018-12-08 PROCEDURE — 97161 PT EVAL LOW COMPLEX 20 MIN: CPT

## 2018-12-08 PROCEDURE — 74011000250 HC RX REV CODE- 250: Performed by: INTERNAL MEDICINE

## 2018-12-08 PROCEDURE — 74011000258 HC RX REV CODE- 258: Performed by: NURSE PRACTITIONER

## 2018-12-08 PROCEDURE — 74011000258 HC RX REV CODE- 258

## 2018-12-08 PROCEDURE — 65270000029 HC RM PRIVATE

## 2018-12-08 PROCEDURE — 80053 COMPREHEN METABOLIC PANEL: CPT

## 2018-12-08 PROCEDURE — 82962 GLUCOSE BLOOD TEST: CPT

## 2018-12-08 PROCEDURE — 74011250636 HC RX REV CODE- 250/636: Performed by: INTERNAL MEDICINE

## 2018-12-08 PROCEDURE — 74011636637 HC RX REV CODE- 636/637: Performed by: INTERNAL MEDICINE

## 2018-12-08 RX ORDER — HYDROCODONE BITARTRATE AND ACETAMINOPHEN 5; 325 MG/1; MG/1
1 TABLET ORAL
Status: DISCONTINUED | OUTPATIENT
Start: 2018-12-08 | End: 2018-12-12 | Stop reason: HOSPADM

## 2018-12-08 RX ADMIN — VALSARTAN 80 MG: 80 TABLET ORAL at 20:51

## 2018-12-08 RX ADMIN — Medication 10 ML: at 20:52

## 2018-12-08 RX ADMIN — ASPIRIN 81 MG 81 MG: 81 TABLET ORAL at 08:01

## 2018-12-08 RX ADMIN — DULOXETINE 60 MG: 60 CAPSULE, DELAYED RELEASE ORAL at 08:01

## 2018-12-08 RX ADMIN — POVIDONE-IODINE: 10 SOLUTION TOPICAL at 08:03

## 2018-12-08 RX ADMIN — INSULIN LISPRO 15 UNITS: 100 INJECTION, SOLUTION INTRAVENOUS; SUBCUTANEOUS at 11:30

## 2018-12-08 RX ADMIN — INSULIN LISPRO 15 UNITS: 100 INJECTION, SOLUTION INTRAVENOUS; SUBCUTANEOUS at 05:40

## 2018-12-08 RX ADMIN — INSULIN LISPRO 15 UNITS: 100 INJECTION, SOLUTION INTRAVENOUS; SUBCUTANEOUS at 16:30

## 2018-12-08 RX ADMIN — CEFTAROLINE FOSAMIL 600 MG: 600 POWDER, FOR SOLUTION INTRAVENOUS at 00:35

## 2018-12-08 RX ADMIN — HYDROCODONE BITARTRATE AND ACETAMINOPHEN 1 TABLET: 5; 325 TABLET ORAL at 23:55

## 2018-12-08 RX ADMIN — PRAVASTATIN SODIUM 80 MG: 80 TABLET ORAL at 20:51

## 2018-12-08 RX ADMIN — HEPARIN SODIUM 5000 UNITS: 5000 INJECTION INTRAVENOUS; SUBCUTANEOUS at 18:07

## 2018-12-08 RX ADMIN — AMLODIPINE BESYLATE 10 MG: 10 TABLET ORAL at 06:15

## 2018-12-08 RX ADMIN — Medication 5 ML: at 16:31

## 2018-12-08 RX ADMIN — METOPROLOL SUCCINATE 50 MG: 50 TABLET, EXTENDED RELEASE ORAL at 05:39

## 2018-12-08 RX ADMIN — ACETAMINOPHEN 650 MG: 325 TABLET, FILM COATED ORAL at 10:29

## 2018-12-08 RX ADMIN — HYDROCODONE BITARTRATE AND ACETAMINOPHEN 1 TABLET: 5; 325 TABLET ORAL at 12:12

## 2018-12-08 RX ADMIN — ACETAMINOPHEN 650 MG: 325 TABLET, FILM COATED ORAL at 01:45

## 2018-12-08 RX ADMIN — HEPARIN SODIUM 5000 UNITS: 5000 INJECTION INTRAVENOUS; SUBCUTANEOUS at 11:50

## 2018-12-08 RX ADMIN — CEFTAROLINE FOSAMIL 600 MG: 600 POWDER, FOR SOLUTION INTRAVENOUS at 12:18

## 2018-12-08 RX ADMIN — Medication 5 ML: at 05:42

## 2018-12-08 RX ADMIN — HEPARIN SODIUM 5000 UNITS: 5000 INJECTION INTRAVENOUS; SUBCUTANEOUS at 05:37

## 2018-12-08 RX ADMIN — INSULIN GLARGINE 50 UNITS: 100 INJECTION, SOLUTION SUBCUTANEOUS at 09:00

## 2018-12-08 NOTE — PROGRESS NOTES
Patient was calm Sitting in chair Doing ok Madhavi Mix, staff [address], [phone]   /   [email or web address]

## 2018-12-08 NOTE — PROGRESS NOTES
Hospitalist Progress Note   
2018 Admit Date: 2018  9:28 AM  
NAME: Nino Scott :  1960 MRN:  [id number] Attending: Scarlett Zhao MD 
PCP:  Елена Camp, NP 
 
SUBJECTIVE:  
Kira Moe is a 67 yo M with history of L leg PAD s/p remote L fem-tib bypass in , who was admitted 18 for L heel ulcer/infection. He was discharged on  for similar issue, but foot started looking worse. He reports mild pain in the area. Seen by ID and started on Ceftaroline. Review of Systems negative with exception of pertinent positives noted above PHYSICAL EXAM  
 
Visit Vitals /74 Pulse 77 Temp 97.8 °F (36.6 °C) Resp 18 Wt 93.3 kg (205 lb 11.2 oz) SpO2 97% BMI 31.28 kg/m² Temp (24hrs), Av.1 °F (36.7 °C), Min:97.6 °F (36.4 °C), Max:99 °F (37.2 °C) Patient Vitals for the past 24 hrs: 
 Temp Pulse Resp BP SpO2  
18 1102 97.8 °F (36.6 °C) 77 18 123/74 97 % 18 0719 97.9 °F (36.6 °C) 71 18 114/76 96 % 18 0319 97.8 °F (36.6 °C) 76 18 122/66 90 % 18 2226 97.6 °F (36.4 °C) 75 16 130/75 95 % 18 1933 98.2 °F (36.8 °C) 85 18 125/71 93 % 18 1538 99 °F (37.2 °C) 68 20 102/58 91 % Oxygen Therapy O2 Sat (%): 97 % (18 1102) Intake/Output Summary (Last 24 hours) at 2018 1426 Last data filed at 2018 6645 Gross per 24 hour Intake 1391 ml Output 1090 ml Net 301 ml General: No acute distress, obese  
Lungs:  CTA Bilaterally. Heart:  Regular rate and rhythm,  No murmur, rub, or gallop Abdomen: Soft, Non distended, Non tender, Positive bowel sounds Extremities: No cyanosis, L foot with 6 x 3 wound with 3 cm ulcerated; L great toe and 5th toe amputated Neurologic:  No focal deficits Recent Results (from the past 24 hour(s)) GLUCOSE, POC Collection Time: 18  3:24 PM  
Result Value Ref Range Glucose (POC) 278 (H) 65 - 100 mg/dL GLUCOSE, POC  
 Collection Time: 12/07/18  8:04 PM  
Result Value Ref Range Glucose (POC) 117 (H) 65 - 100 mg/dL GLUCOSE, POC Collection Time: 12/08/18  5:05 AM  
Result Value Ref Range Glucose (POC) 116 (H) 65 - 100 mg/dL METABOLIC PANEL, COMPREHENSIVE Collection Time: 12/08/18  5:50 AM  
Result Value Ref Range Sodium 138 136 - 145 mmol/L Potassium 4.1 3.5 - 5.1 mmol/L Chloride 104 98 - 107 mmol/L  
 CO2 25 21 - 32 mmol/L Anion gap 9 7 - 16 mmol/L Glucose 118 (H) 65 - 100 mg/dL BUN 17 6 - 23 MG/DL Creatinine 1.66 (H) 0.8 - 1.5 MG/DL  
 GFR est AA 55 (L) >60 ml/min/1.73m2 GFR est non-AA 46 (L) >60 ml/min/1.73m2 Calcium 8.9 8.3 - 10.4 MG/DL Bilirubin, total 0.4 0.2 - 1.1 MG/DL  
 ALT (SGPT) 31 12 - 65 U/L  
 AST (SGOT) 14 (L) 15 - 37 U/L Alk. phosphatase 100 50 - 136 U/L Protein, total 6.8 6.3 - 8.2 g/dL Albumin 2.9 (L) 3.5 - 5.0 g/dL Globulin 3.9 (H) 2.3 - 3.5 g/dL A-G Ratio 0.7 (L) 1.2 - 3.5    
CBC WITH AUTOMATED DIFF Collection Time: 12/08/18  5:50 AM  
Result Value Ref Range WBC 7.0 4.3 - 11.1 K/uL  
 RBC 4.07 (L) 4.23 - 5.6 M/uL  
 HGB 12.2 (L) 13.6 - 17.2 g/dL HCT 36.2 (L) 41.1 - 50.3 % MCV 88.9 79.6 - 97.8 FL  
 MCH 30.0 26.1 - 32.9 PG  
 MCHC 33.7 31.4 - 35.0 g/dL  
 RDW 13.3 11.9 - 14.6 % PLATELET 652 458 - 304 K/uL MPV 12.0 9.4 - 12.3 FL ABSOLUTE NRBC 0.00 0.0 - 0.2 K/uL  
 DF AUTOMATED NEUTROPHILS 50 43 - 78 % LYMPHOCYTES 26 13 - 44 % MONOCYTES 12 4.0 - 12.0 % EOSINOPHILS 11 (H) 0.5 - 7.8 % BASOPHILS 1 0.0 - 2.0 % IMMATURE GRANULOCYTES 1 0.0 - 5.0 %  
 ABS. NEUTROPHILS 3.5 1.7 - 8.2 K/UL  
 ABS. LYMPHOCYTES 1.8 0.5 - 4.6 K/UL  
 ABS. MONOCYTES 0.8 0.1 - 1.3 K/UL  
 ABS. EOSINOPHILS 0.8 0.0 - 0.8 K/UL  
 ABS. BASOPHILS 0.1 0.0 - 0.2 K/UL  
 ABS. IMM. GRANS. 0.0 0.0 - 0.5 K/UL GLUCOSE, POC Collection Time: 12/08/18 11:23 AM  
Result Value Ref Range Glucose (POC) 136 (H) 65 - 100 mg/dL All Micro Results Procedure Component Value Units Date/Time CULTURE, BLOOD [[id number]] Collected:  12/07/18 1059 Order Status:  Completed Specimen:  Blood Updated:  12/08/18 0759 Special Requests: --     
  LEFT Antecubital 
  
  Culture result: NO GROWTH AFTER 20 HOURS     
 CULTURE, BLOOD [[id number]] Collected:  12/07/18 1101 Order Status:  Completed Specimen:  Blood Updated:  12/08/18 0759 Special Requests: --     
  RIGHT Antecubital 
  
  Culture result: NO GROWTH AFTER 20 HOURS     
  
 
 
 
ASSESSMENT Hospital Problems as of 12/8/2018 Date Reviewed: 12/6/2018 Codes Class Noted - Resolved POA * (Principal) Cellulitis of left foot ICD-10-CM: L03.116 ICD-9-CM: 682.7  12/7/2018 - Present Unknown DM (diabetes mellitus), type 2, uncontrolled ([facility] ICD-10-CM: E11.65 ICD-9-CM: 250.02  12/3/2018 - Present Diabetic ulcer of left heel ([facility] ICD-10-CM: E11.621, N66.255 ICD-9-CM: 250.80, 707.14  12/3/2018 - Present Yes Hypertension (Chronic) ICD-10-CM: I10 
ICD-9-CM: 401.9  6/1/2015 - Present Yes IDDM (insulin dependent diabetes mellitus) ([facility] ICD-10-CM: E11.9, Z79.4 ICD-9-CM: 250.00, V58.67  6/1/2015 - Present Yes  
   
 CKD (chronic kidney disease), stage III (HCC) ICD-10-CM: N18.3 ICD-9-CM: 585.3  6/1/2015 - Present Yes PVD (peripheral vascular disease) ([facility] ICD-10-CM: I73.9 ICD-9-CM: 443.9  3/16/2015 - Present Yes Overview Signed 3/18/2015  1:57 PM by Gino Cesar NP  
  3/16/15 (Dr Elva Bejarano) 1. Left superficial femoral artery endarterectomy. 2. Common femoral to above knee popliteal bypass with PTFE graft. 3. Left lower extremity arteriogram. 
  
  
   
 S/P AVR (aortic valve replacement) ICD-10-CM: R64.1 ICD-9-CM: V43.3  3/7/2011 - Present Yes Diabetes mellitus (HCC) (Chronic) ICD-10-CM: E11.9 ICD-9-CM: 250.00  2/28/2011 - Present Yes Plan: · L heel ulcer/cellulitis- continue abx Ceftaroline per ID recs. · Continue wound care. · Will need to re-establish with [address] [place] [address] on discharge. · T2DM- blood sugars controlled; continue current insulin regimen. · HTN- continue current treatment. · CAD- aspirin, statin. DVT Prophylaxis: Heparin Signed By: Renetta Mercado MD   
 December 8, 2018

## 2018-12-08 NOTE — PROGRESS NOTES
Completed bedside shift report with oncoming nurse, Aide Lopez. Patient resting quietly in bed at this time, awake, respirations present. Denies needs at this time. Call light within reach.

## 2018-12-08 NOTE — PROGRESS NOTES
Tylenol effective. Patient resting quietly in bed at this time, eyes closed, respirations even and unlabored. No further complaints of pain. Call light within reach.

## 2018-12-08 NOTE — PROGRESS NOTES
Wound to left heel cleansed and dressed as ordered by wound care nurse. Patient tolerated well. Denies pain at this time. Call light within reach.

## 2018-12-08 NOTE — PROGRESS NOTES
Problem: Mobility Impaired (Adult and Pediatric) Goal: *Acute Goals and Plan of Care (Insert Text) No goals. Mr. Raul Granados is at baseline. Desiree Pacheco, PT 
 
 
PHYSICAL THERAPY: Initial Assessment, Discharge 12/8/2018INPATIENT: Hospital Day: 2 Payor: BLUE CROSS / [place] BL[place] [facility] / Product Type: PPO /  
  
NAME/AGE/GENDER: Dossie Reasoner is a 66 y.o. male PRIMARY DIAGNOSIS: Diabetic ulcer of left foot Cellulitis of left foot Cellulitis of left foot Cellulitis of left foot ICD-10: Treatment Diagnosis:  
 · Generalized Muscle Weakness (M62.81) · Difficulty in walking, Not elsewhere classified (R26.2) Precaution/Allergies: 
Patient has no known allergies. ASSESSMENT:  
Mr. Raul Granados presents with roake boots on bilaterally. Order in the chair clearly states for in bed and chair only. Explained this to patient. Removed them and placed socks for gait in the room. There is no specific order in the chart but Mr. Raul Granados tells me he was told to limit weight and walking on his left foot. So for evaluation used a rolling walker and he was able perform gait non weight bearing. Mr. Raul Granados tells me he has a rolling walker and a w/c at home. Mr. Rual Granados is independent with all aspects of mobility and gait within his limits. There is no indication for skilled PT at this time. Will leave a walker in the room for Mr. Razo to use while he is here but he is to leave it when he goes home. Discussed findings with Sanket Schultz his RN and . This section established at most recent assessment PROBLEM LIST (Impairments causing functional limitations): 1. none INTERVENTIONS PLANNED: (Benefits and precautions of physical therapy have been discussed with the patient.) 1. none TREATMENT PLAN: Frequency/Duration:NA Rehabilitation Potential For Stated Goals: NA  
 
RECOMMENDED REHABILITATION/EQUIPMENT: (at time of discharge pending progress): Due to the probability of continued deficits (see above) this patient will not likely need continued skilled physical therapy after discharge. Equipment:  
? None at this time HISTORY:  
History of Present Injury/Illness (Reason for Referral): 
66years old M with PMH PAD,CAD with CABG ,Aortic valve replacement bioprostetic, DM, HTN,s/p pacemaker,ckd 3 and diastolic chf presented to the hospital as direct admission for worsening LE infection. Patient reported he was recently discharged from the hospital on PO abxs on 12/04 for L foot infection. Patient noted since discharged he is having worsening redness, tenderness and swelling on his L heel. Patient reported he was taking antibiotics as prescribed. Today his PCP wanted to direct admit patient due to worsening of the symptoms, elevated WBC to 12 and patient does not have a heater at home. Patient denies SOB, fever, dysuria, chest pain, N/V/D or abdominal pain. Past Medical History/Comorbidities: Mr. Manpreet Acosta  has a past medical history of Acute on chronic diastolic heart failure ([address] Arrhythmia, Atherosclerotic PVD with ulceration ([address] CAD (coronary artery disease), Chronic kidney disease, Chronic pain, Depression, Diabetes mellitus ([address] H/O aortic valve replacement, Heart failure ([address] Hypercholesterolemia, Hypertension, Murmur, cardiac, PVD (peripheral vascular disease) ([address] Shingles, and Warfarin anticoagulation.   Mr. Manpreet Acosta  has a past surgical history that includes hx urological (2005); hx free skin graft (Left, 8/11/15); vascular surgery procedure unlist (2/17/15); vascular surgery procedure unlist (Left, 3-16-15); vascular surgery procedure unlist (Left, 6/2/15); vascular surgery procedure unlist (Left, 6/5/15); vascular surgery procedure unlist (Left, 8/24/15); pr cardiac surg procedure unlist (2011); hx pacemaker (2011); hx heart catheterization (03/07/2011); COLONOSCOPY  BMI 37 (N/A, 2/14/2018); ENDOSCOPIC POLYPECTOMY (N/A, 2/14/2018); COLON BIOPSY (N/A, 2/14/2018); DRAINAGE AND DEBRIDEMENT OF OSTEOLMYELITIS/ LEFT FOOT/ CHOICE (Left, 6/21/2016); AMPUTATION LEFT FIRST TOE, POSSIBLE LEFT FOOT DEBRIDMENT (Left, 8/24/2015); SPLIT THICKNESS SKIN GRAFT FROM LEFT THIGH TO LEFT FOREFOOT AND POSS LEFT HEEL / POSS THERASKIN (Left, 8/11/2015); LEFT FOOT DEBRIDEMENT  / ROOM 236 (Left, 6/5/2015); FOOT DEBRIDEMENT/ CHOICE/ 236 (Left, 6/2/2015); LEFT FEMORAL-TIBIAL BYPASS/LEFT SFA ENDARTERECTOMY/LEFT FEMORAL POPLITEAL BYPASS (Left, 3/16/2015); ARTERIOGRAM (Left, 3/16/2015); CABG THREE VESSEL (N/A, 3/7/2011); VEIN HARVEST ENDOSCOPIC (Left, 3/7/2011); ESOPHAGEAL TRANS ECHOCARDIOGRAM (N/A, 3/7/2011); and PENILE PROSTHESIS INSERTION (N/A, 5/22/2009). Social History/Living Environment:  
Home Environment: Private residence # Steps to Enter: 4 One/Two Story Residence: One story Living Alone: No 
Support Systems: Family member(s) Patient Expects to be Discharged to[de-identified] Private residence Current DME Used/Available at Home: Reggy Hem, rollator, Deanna Felling, straight, Wheelchair Tub or Shower Type: Shower Prior Level of Function/Work/Activity: 
independent 
severe vascular disease. Number of Personal Factors/Comorbidities that affect the Plan of Care: 1-2: MODERATE COMPLEXITY EXAMINATION:  
Most Recent Physical Functioning:  
Gross Assessment: 
  
         
  
Posture: 
  
Balance: 
Sitting: Intact Standing: Intact Bed Mobility: 
Rolling: Modified independent Supine to Sit: Supervision Wheelchair Mobility: 
  
Transfers: 
Sit to Stand: Stand-by assistance Stand to Sit: Stand-by assistance Gait: 
  
Speed/Yoselin: Slow Step Length: Left shortened;Right shortened Distance (ft): 10 Feet (ft) Assistive Device: Walker, rolling Ambulation - Level of Assistance: Stand-by assistance Body Structures Involved: 1. None Body Functions Affected: 1. None Activities and Participation Affected: 1. None Number of elements that affect the Plan of Care: 1-2: LOW COMPLEXITY CLINICAL PRESENTATION:  
Presentation: Stable and uncomplicated: LOW COMPLEXITY CLINICAL DECISION MAKIN [facility] [address] [address] AM-PAC 6 Clicks Basic Mobility Inpatient Short Form How much difficulty does the patient currently have. .. Unable A Lot A Little None 1. Turning over in bed (including adjusting bedclothes, sheets and blankets)? [] 1   [] 2   [] 3   [x] 4  
2. Sitting down on and standing up from a chair with arms ( e.g., wheelchair, bedside commode, etc.)   [] 1   [] 2   [] 3   [x] 4  
3. Moving from lying on back to sitting on the side of the bed? [] 1   [] 2   [] 3   [x] 4 How much help from another person does the patient currently need. .. Total A Lot A Little None 4. Moving to and from a bed to a chair (including a wheelchair)? [] 1   [] 2   [x] 3   [] 4  
5. Need to walk in hospital room? [] 1   [] 2   [x] 3   [] 4  
6. Climbing 3-5 steps with a railing? [] 1   [] 2   [x] 3   [] 4  
© , Trustees of [address] [facility] [address] [address], under license to [facility]. All rights reserved Score:  Initial: 21 Most Recent: X (Date: -- ) Interpretation of Tool:  Represents activities that are increasingly more difficult (i.e. Bed mobility, Transfers, Gait). Score 24 23 22-20 19-15 14-10 9-7 6 Modifier CH CI CJ CK CL CM CN   
 
? Mobility - Walking and Moving Around:  
  - CURRENT STATUS: CJ - 20%-39% impaired, limited or restricted  - GOAL STATUS: CJ - 20%-39% impaired, limited or restricted  - D/C STATUS:  CJ - 20%-39% impaired, limited or restricted Payor: BLUE CROSS / [place] [facility] / Product Type: PPO /   
 
Medical Necessity:    
· NA. Reason for Services/Other Comments: 
· NA.   
Use of outcome tool(s) and clinical judgement create a POC that gives a: Clear prediction of patient's progress: LOW COMPLEXITY  
  
 
 
 
TREATMENT:  
 (In addition to Assessment/Re-Assessment sessions the following treatments were rendered) Pre-treatment Symptoms/Complaints:  none Pain: Initial:  
Pain Intensity 1: 0 Pain Location 1: Foot Pain Orientation 1: Left, Inner Pain Intervention(s) 1: Emotional support  Post Session:  none Assessment/Reassessment only, no treatment provided today Braces/Orthotics/Lines/Etc:  
· IV Treatment/Session Assessment:   
· Response to Treatment:  good · Interdisciplinary Collaboration:  
o Registered Nurse · After treatment position/precautions:  
o Up in chair 
o Call light within reach 
o RN notified · Compliance with Program/Exercises: NA 
· Recommendations/Intent for next treatment session:  DC PT Total Treatment Duration: PT Patient Time In/Time Out Time In: 1350 Time Out: 1410 Barbara Pacheco, PT

## 2018-12-08 NOTE — PROGRESS NOTES
Patient alert and oriented. Respirations even and unlabored on room air. No acute events overnight. Patient resting quietly in bed at this time, awake, respirations present. Denies needs at this time. Bed low and locked. Bedside table, personal belongings and call light within reach.

## 2018-12-08 NOTE — PROGRESS NOTES
Bedside and Verbal shift change report received from [address] [facility]. Report included the following information SBAR, Kardex, MAR and Recent Results. assumed care of patient. Patient in bed watching tv. Medicated per [facility] and shift assessment complete. Patient has no complaints of pain at this time and expresses no further needs. Bed is in low, locked position and call light is within reach. Will continue to monitor.

## 2018-12-09 LAB
ALBUMIN SERPL-MCNC: 2.8 G/DL (ref 3.5–5)
ALBUMIN/GLOB SERPL: 0.7 {RATIO} (ref 1.2–3.5)
ALP SERPL-CCNC: 90 U/L (ref 50–136)
ALT SERPL-CCNC: 28 U/L (ref 12–65)
ANION GAP SERPL CALC-SCNC: 9 MMOL/L (ref 7–16)
AST SERPL-CCNC: 14 U/L (ref 15–37)
BASOPHILS # BLD: 0.1 K/UL (ref 0–0.2)
BASOPHILS NFR BLD: 1 % (ref 0–2)
BILIRUB SERPL-MCNC: 0.3 MG/DL (ref 0.2–1.1)
BUN SERPL-MCNC: 13 MG/DL (ref 6–23)
CALCIUM SERPL-MCNC: 9.1 MG/DL (ref 8.3–10.4)
CHLORIDE SERPL-SCNC: 106 MMOL/L (ref 98–107)
CO2 SERPL-SCNC: 26 MMOL/L (ref 21–32)
CREAT SERPL-MCNC: 1.68 MG/DL (ref 0.8–1.5)
DIFFERENTIAL METHOD BLD: ABNORMAL
EOSINOPHIL # BLD: 0.6 K/UL (ref 0–0.8)
EOSINOPHIL NFR BLD: 9 % (ref 0.5–7.8)
ERYTHROCYTE [DISTWIDTH] IN BLOOD BY AUTOMATED COUNT: 13.6 % (ref 11.9–14.6)
GLOBULIN SER CALC-MCNC: 3.9 G/DL (ref 2.3–3.5)
GLUCOSE BLD STRIP.AUTO-MCNC: 125 MG/DL (ref 65–100)
GLUCOSE BLD STRIP.AUTO-MCNC: 196 MG/DL (ref 65–100)
GLUCOSE BLD STRIP.AUTO-MCNC: 215 MG/DL (ref 65–100)
GLUCOSE BLD STRIP.AUTO-MCNC: 274 MG/DL (ref 65–100)
GLUCOSE BLD STRIP.AUTO-MCNC: 65 MG/DL (ref 65–100)
GLUCOSE SERPL-MCNC: 83 MG/DL (ref 65–100)
HCT VFR BLD AUTO: 37.1 % (ref 41.1–50.3)
HGB BLD-MCNC: 12 G/DL (ref 13.6–17.2)
IMM GRANULOCYTES # BLD: 0.1 K/UL (ref 0–0.5)
IMM GRANULOCYTES NFR BLD AUTO: 1 % (ref 0–5)
LYMPHOCYTES # BLD: 2.2 K/UL (ref 0.5–4.6)
LYMPHOCYTES NFR BLD: 32 % (ref 13–44)
MCH RBC QN AUTO: 29.3 PG (ref 26.1–32.9)
MCHC RBC AUTO-ENTMCNC: 32.3 G/DL (ref 31.4–35)
MCV RBC AUTO: 90.5 FL (ref 79.6–97.8)
MONOCYTES # BLD: 1.1 K/UL (ref 0.1–1.3)
MONOCYTES NFR BLD: 16 % (ref 4–12)
NEUTS SEG # BLD: 2.7 K/UL (ref 1.7–8.2)
NEUTS SEG NFR BLD: 40 % (ref 43–78)
NRBC # BLD: 0 K/UL (ref 0–0.2)
PLATELET # BLD AUTO: 221 K/UL (ref 150–450)
PMV BLD AUTO: 11.9 FL (ref 9.4–12.3)
POTASSIUM SERPL-SCNC: 4.1 MMOL/L (ref 3.5–5.1)
PROT SERPL-MCNC: 6.7 G/DL (ref 6.3–8.2)
RBC # BLD AUTO: 4.1 M/UL (ref 4.23–5.6)
SODIUM SERPL-SCNC: 141 MMOL/L (ref 136–145)
WBC # BLD AUTO: 6.8 K/UL (ref 4.3–11.1)

## 2018-12-09 PROCEDURE — 82962 GLUCOSE BLOOD TEST: CPT

## 2018-12-09 PROCEDURE — 74011250637 HC RX REV CODE- 250/637: Performed by: INTERNAL MEDICINE

## 2018-12-09 PROCEDURE — 65270000029 HC RM PRIVATE

## 2018-12-09 PROCEDURE — 85025 COMPLETE CBC W/AUTO DIFF WBC: CPT

## 2018-12-09 PROCEDURE — 74011636637 HC RX REV CODE- 636/637: Performed by: INTERNAL MEDICINE

## 2018-12-09 PROCEDURE — 74011250636 HC RX REV CODE- 250/636: Performed by: NURSE PRACTITIONER

## 2018-12-09 PROCEDURE — 80053 COMPREHEN METABOLIC PANEL: CPT

## 2018-12-09 PROCEDURE — 36415 COLL VENOUS BLD VENIPUNCTURE: CPT

## 2018-12-09 PROCEDURE — 74011250636 HC RX REV CODE- 250/636: Performed by: INTERNAL MEDICINE

## 2018-12-09 PROCEDURE — 74011000258 HC RX REV CODE- 258: Performed by: NURSE PRACTITIONER

## 2018-12-09 RX ORDER — INSULIN GLARGINE 100 [IU]/ML
35 INJECTION, SOLUTION SUBCUTANEOUS DAILY
Status: DISCONTINUED | OUTPATIENT
Start: 2018-12-09 | End: 2018-12-12 | Stop reason: HOSPADM

## 2018-12-09 RX ADMIN — INSULIN LISPRO 2 UNITS: 100 INJECTION, SOLUTION INTRAVENOUS; SUBCUTANEOUS at 20:51

## 2018-12-09 RX ADMIN — INSULIN GLARGINE 35 UNITS: 100 INJECTION, SOLUTION SUBCUTANEOUS at 08:24

## 2018-12-09 RX ADMIN — VALSARTAN 80 MG: 80 TABLET ORAL at 20:40

## 2018-12-09 RX ADMIN — CEFTAROLINE FOSAMIL 600 MG: 600 POWDER, FOR SOLUTION INTRAVENOUS at 14:39

## 2018-12-09 RX ADMIN — DULOXETINE 60 MG: 60 CAPSULE, DELAYED RELEASE ORAL at 08:23

## 2018-12-09 RX ADMIN — HYDROCODONE BITARTRATE AND ACETAMINOPHEN 1 TABLET: 5; 325 TABLET ORAL at 11:09

## 2018-12-09 RX ADMIN — INSULIN LISPRO 6 UNITS: 100 INJECTION, SOLUTION INTRAVENOUS; SUBCUTANEOUS at 11:30

## 2018-12-09 RX ADMIN — POVIDONE-IODINE: 10 SOLUTION TOPICAL at 08:25

## 2018-12-09 RX ADMIN — HYDROCODONE BITARTRATE AND ACETAMINOPHEN 1 TABLET: 5; 325 TABLET ORAL at 20:44

## 2018-12-09 RX ADMIN — METOPROLOL SUCCINATE 50 MG: 50 TABLET, EXTENDED RELEASE ORAL at 05:43

## 2018-12-09 RX ADMIN — INSULIN LISPRO 4 UNITS: 100 INJECTION, SOLUTION INTRAVENOUS; SUBCUTANEOUS at 16:30

## 2018-12-09 RX ADMIN — ASPIRIN 81 MG 81 MG: 81 TABLET ORAL at 08:23

## 2018-12-09 RX ADMIN — HEPARIN SODIUM 5000 UNITS: 5000 INJECTION INTRAVENOUS; SUBCUTANEOUS at 11:21

## 2018-12-09 RX ADMIN — PRAVASTATIN SODIUM 80 MG: 80 TABLET ORAL at 20:41

## 2018-12-09 RX ADMIN — HEPARIN SODIUM 5000 UNITS: 5000 INJECTION INTRAVENOUS; SUBCUTANEOUS at 18:26

## 2018-12-09 RX ADMIN — AMLODIPINE BESYLATE 10 MG: 10 TABLET ORAL at 05:43

## 2018-12-09 RX ADMIN — Medication 5 ML: at 05:43

## 2018-12-09 RX ADMIN — Medication 10 ML: at 20:54

## 2018-12-09 RX ADMIN — CEFTAROLINE FOSAMIL 600 MG: 600 POWDER, FOR SOLUTION INTRAVENOUS at 01:15

## 2018-12-09 RX ADMIN — HEPARIN SODIUM 5000 UNITS: 5000 INJECTION INTRAVENOUS; SUBCUTANEOUS at 04:08

## 2018-12-09 RX ADMIN — Medication 10 ML: at 15:36

## 2018-12-09 NOTE — PROGRESS NOTES
Hospitalist Progress Note   
2018 Admit Date: 2018  9:28 AM  
NAME: Chris Vargas :  1960 MRN:  [id number] Attending: Rose West MD 
PCP:  Norville Sever, NP 
 
SUBJECTIVE:  
Precious Roy is a 67 yo M with history of L leg PAD s/p remote L fem-tib bypass in , who was admitted 18 for L heel ulcer/infection. He was discharged on  for similar issue, but foot started looking worse. He reports mild pain in the area. Seen by ID and started on Ceftaroline. - denies new complaints. Still with some pain around L heel. Review of Systems negative with exception of pertinent positives noted above PHYSICAL EXAM  
 
Visit Vitals /77 Pulse 61 Temp 98.3 °F (36.8 °C) Resp 18 Wt 93.3 kg (205 lb 11.2 oz) SpO2 90% BMI 31.28 kg/m² Temp (24hrs), Av °F (36.7 °C), Min:97.8 °F (36.6 °C), Max:98.3 °F (36.8 °C) Patient Vitals for the past 24 hrs: 
 Temp Pulse Resp BP SpO2  
18 0723 98.3 °F (36.8 °C) 61 18 118/77 90 % 18 0248 97.9 °F (36.6 °C) 77 17 123/66 95 % 18 2323 98.1 °F (36.7 °C) 76 17 109/68 97 % 18 1937 98 °F (36.7 °C) 75 17 113/72 97 % 18 1501 97.9 °F (36.6 °C) 80 18 109/72 97 % 18 1102 97.8 °F (36.6 °C) 77 18 123/74 97 % Oxygen Therapy O2 Sat (%): 90 % (18 0723) Intake/Output Summary (Last 24 hours) at 2018 3497 Last data filed at 2018 9188 Gross per 24 hour Intake 678 ml Output 1100 ml Net -422 ml General: No acute distress, obese  
Lungs:  CTA Bilaterally. Heart:  Regular rate and rhythm,  No murmur, rub, or gallop Abdomen: Soft, Non distended, Non tender, Positive bowel sounds Extremities: No cyanosis, L foot with 6 x 3 wound with 3 cm ulcerated area and no purulent drainage noted; less surrounding erythema today; L great toe and 5th toe amputated Neurologic:  No focal deficits Recent Results (from the past 24 hour(s)) GLUCOSE, POC Collection Time: 12/08/18 11:23 AM  
Result Value Ref Range Glucose (POC) 136 (H) 65 - 100 mg/dL GLUCOSE, POC Collection Time: 12/08/18  3:29 PM  
Result Value Ref Range Glucose (POC) 109 (H) 65 - 100 mg/dL GLUCOSE, POC Collection Time: 12/08/18  8:14 PM  
Result Value Ref Range Glucose (POC) 105 (H) 65 - 100 mg/dL GLUCOSE, POC Collection Time: 12/09/18  5:22 AM  
Result Value Ref Range Glucose (POC) 65 65 - 100 mg/dL CBC WITH AUTOMATED DIFF Collection Time: 12/09/18  5:47 AM  
Result Value Ref Range WBC 6.8 4.3 - 11.1 K/uL  
 RBC 4.10 (L) 4.23 - 5.6 M/uL  
 HGB 12.0 (L) 13.6 - 17.2 g/dL HCT 37.1 (L) 41.1 - 50.3 % MCV 90.5 79.6 - 97.8 FL  
 MCH 29.3 26.1 - 32.9 PG  
 MCHC 32.3 31.4 - 35.0 g/dL  
 RDW 13.6 11.9 - 14.6 % PLATELET 784 448 - 339 K/uL MPV 11.9 9.4 - 12.3 FL ABSOLUTE NRBC 0.00 0.0 - 0.2 K/uL  
 DF AUTOMATED NEUTROPHILS 40 (L) 43 - 78 % LYMPHOCYTES 32 13 - 44 % MONOCYTES 16 (H) 4.0 - 12.0 % EOSINOPHILS 9 (H) 0.5 - 7.8 % BASOPHILS 1 0.0 - 2.0 % IMMATURE GRANULOCYTES 1 0.0 - 5.0 %  
 ABS. NEUTROPHILS 2.7 1.7 - 8.2 K/UL  
 ABS. LYMPHOCYTES 2.2 0.5 - 4.6 K/UL  
 ABS. MONOCYTES 1.1 0.1 - 1.3 K/UL  
 ABS. EOSINOPHILS 0.6 0.0 - 0.8 K/UL  
 ABS. BASOPHILS 0.1 0.0 - 0.2 K/UL  
 ABS. IMM. GRANS. 0.1 0.0 - 0.5 K/UL METABOLIC PANEL, COMPREHENSIVE Collection Time: 12/09/18  5:47 AM  
Result Value Ref Range Sodium 141 136 - 145 mmol/L Potassium 4.1 3.5 - 5.1 mmol/L Chloride 106 98 - 107 mmol/L  
 CO2 26 21 - 32 mmol/L Anion gap 9 7 - 16 mmol/L Glucose 83 65 - 100 mg/dL BUN 13 6 - 23 MG/DL Creatinine 1.68 (H) 0.8 - 1.5 MG/DL  
 GFR est AA 55 (L) >60 ml/min/1.73m2 GFR est non-AA 45 (L) >60 ml/min/1.73m2 Calcium 9.1 8.3 - 10.4 MG/DL Bilirubin, total 0.3 0.2 - 1.1 MG/DL  
 ALT (SGPT) 28 12 - 65 U/L  
 AST (SGOT) 14 (L) 15 - 37 U/L Alk. phosphatase 90 50 - 136 U/L Protein, total 6.7 6.3 - 8.2 g/dL Albumin 2.8 (L) 3.5 - 5.0 g/dL Globulin 3.9 (H) 2.3 - 3.5 g/dL A-G Ratio 0.7 (L) 1.2 - 3.5 GLUCOSE, POC Collection Time: 12/09/18  6:56 AM  
Result Value Ref Range Glucose (POC) 125 (H) 65 - 100 mg/dL All Micro Results Procedure Component Value Units Date/Time CULTURE, BLOOD [[id number]] Collected:  12/07/18 1059 Order Status:  Completed Specimen:  Blood Updated:  12/09/18 2012 Special Requests: --     
  LEFT Antecubital 
  
  Culture result: NO GROWTH 2 DAYS     
 CULTURE, BLOOD [[id number]] Collected:  12/07/18 1101 Order Status:  Completed Specimen:  Blood Updated:  12/09/18 1216 Special Requests: --     
  RIGHT Antecubital 
  
  Culture result: NO GROWTH 2 DAYS     
  
 
 
 
ASSESSMENT Hospital Problems as of 12/9/2018 Date Reviewed: 12/6/2018 Codes Class Noted - Resolved POA * (Principal) Cellulitis of left foot ICD-10-CM: L03.116 ICD-9-CM: 682.7  12/7/2018 - Present Unknown DM (diabetes mellitus), type 2, uncontrolled ([facility] ICD-10-CM: E11.65 ICD-9-CM: 250.02  12/3/2018 - Present Diabetic ulcer of left heel ([facility] ICD-10-CM: E11.621, E41.242 ICD-9-CM: 250.80, 707.14  12/3/2018 - Present Yes Hypertension (Chronic) ICD-10-CM: I10 
ICD-9-CM: 401.9  6/1/2015 - Present Yes IDDM (insulin dependent diabetes mellitus) ([facility] ICD-10-CM: E11.9, Z79.4 ICD-9-CM: 250.00, V58.67  6/1/2015 - Present Yes  
   
 CKD (chronic kidney disease), stage III (HCC) ICD-10-CM: N18.3 ICD-9-CM: 585.3  6/1/2015 - Present Yes PVD (peripheral vascular disease) ([facility] ICD-10-CM: I73.9 ICD-9-CM: 443.9  3/16/2015 - Present Yes Overview Signed 3/18/2015  1:57 PM by Es Madrid, NP  
  3/16/15 (Dr Yeni Pierce) 1. Left superficial femoral artery endarterectomy. 2. Common femoral to above knee popliteal bypass with PTFE graft. 3. Left lower extremity arteriogram. 
  
  
   
 S/P AVR (aortic valve replacement) ICD-10-CM: Q47.4 ICD-9-CM: V43.3  3/7/2011 - Present Yes Diabetes mellitus (HCC) (Chronic) ICD-10-CM: E11.9 ICD-9-CM: 250.00  2/28/2011 - Present Yes Plan: · L heel ulcer/cellulitis- continue abx Ceftaroline (day 3) per ID recs. · Continue wound care. · Will need to re-establish with [address] on discharge. · T2DM- hypoglycemic this AM.  Drop lantus to 35 units and stop scheduled Humalog. Continue SSI. · HTN- continue current treatment. · CAD- aspirin, statin. DVT Prophylaxis: Heparin Signed By: Michelle Light MD   
 December 9, 2018

## 2018-12-09 NOTE — PROGRESS NOTES
Patient blood glucose 65. Patient alert and oriented, asymptomatic. Instructed patient to drink 8oz juice and gave patient juice. Patient consumed 8oz juice without difficulty. Will recheck blood glucose.

## 2018-12-09 NOTE — PROGRESS NOTES
Patient resting quietly in bed at this time, eyes closed, respirations even and unlabored on room air. No needs stated. Bed low and locked. Bedside table, personal belongings and call light within reach.

## 2018-12-09 NOTE — PROGRESS NOTES
Bedside and Verbal shift change report received from [facility]. Report included the following information SBAR, Kardex, MAR and Recent Results. assumed care of patient. Patient in bed resting. Medicated per MAR, shift assessment complete. Patient denies any pain at this time. Bed is low lock position and call light is within reach. Will continue to monitor.

## 2018-12-09 NOTE — PROGRESS NOTES
Patient alert and oriented. Respirations even and unlabored on room air. Pain well controlled overnight. Medicated once with norco as ordered PRN. No acute events overnight. Patient resting quietly in bed at this time, eyes closed, respirations present. No needs stated. Bed low and locked. Bedside table, personal belongings and call light within reach.

## 2018-12-09 NOTE — PROGRESS NOTES
Mr. Ajay Raza is in bed resting, watching tv. He has no complaints of pain or needs at this time. No s/s of distress. Bed is in low locked position. Call light within reach. Oncoming nurse assumed care.

## 2018-12-09 NOTE — PROGRESS NOTES
Received bedside shift report from offgoing nurse, Yash Higuera. Patient alert and oriented. Respirations even and unlabored on room air. Bed low and locked. Bedside table, personal belongings and call light within reach.

## 2018-12-09 NOTE — PROGRESS NOTES
Mr. Apoorva Malcolm is in bed watching tv, wife at the bedside. Dressing to the left heel has been changed. Vital signs are stable, no s/s of distress. Bed in low locked position and call light within reach. Patient has no complaints of pain at this time. Will continue to monitor.

## 2018-12-09 NOTE — PROGRESS NOTES
Administered norco as ordered PRN for moderate pain. Patient reports pain 6/10 in left foot. Will reassess per protocol.

## 2018-12-09 NOTE — PROGRESS NOTES
Bedside shift report completed with oncoming nurse, Juan. Patient resting quietly in bed at this time, awake, respirations present. Denies needs at this time. Bed low and locked. Bedside table, personal belongings and call light within reach.

## 2018-12-09 NOTE — PROGRESS NOTES
Norco effective. Patient resting quietly in bed at this time, eyes closed, respirations even and unlabored. Call light within reach.

## 2018-12-10 ENCOUNTER — APPOINTMENT (OUTPATIENT)
Dept: NUCLEAR MEDICINE | Age: 58
DRG: 638 | End: 2018-12-10
Payer: COMMERCIAL

## 2018-12-10 ENCOUNTER — PATIENT OUTREACH (OUTPATIENT)
Dept: CASE MANAGEMENT | Age: 58
End: 2018-12-10

## 2018-12-10 ENCOUNTER — HOME CARE VISIT (OUTPATIENT)
Dept: HOME HEALTH SERVICES | Facility: HOME HEALTH | Age: 58
End: 2018-12-10
Payer: COMMERCIAL

## 2018-12-10 LAB
ALBUMIN SERPL-MCNC: 2.7 G/DL (ref 3.5–5)
ALBUMIN/GLOB SERPL: 0.8 {RATIO} (ref 1.2–3.5)
ALP SERPL-CCNC: 87 U/L (ref 50–136)
ALT SERPL-CCNC: 30 U/L (ref 12–65)
ANION GAP SERPL CALC-SCNC: 7 MMOL/L (ref 7–16)
AST SERPL-CCNC: 22 U/L (ref 15–37)
BASOPHILS # BLD: 0.1 K/UL (ref 0–0.2)
BASOPHILS NFR BLD: 1 % (ref 0–2)
BILIRUB SERPL-MCNC: 0.3 MG/DL (ref 0.2–1.1)
BUN SERPL-MCNC: 12 MG/DL (ref 6–23)
CALCIUM SERPL-MCNC: 8.7 MG/DL (ref 8.3–10.4)
CHLORIDE SERPL-SCNC: 106 MMOL/L (ref 98–107)
CO2 SERPL-SCNC: 26 MMOL/L (ref 21–32)
CREAT SERPL-MCNC: 1.58 MG/DL (ref 0.8–1.5)
DIFFERENTIAL METHOD BLD: ABNORMAL
EOSINOPHIL # BLD: 0.5 K/UL (ref 0–0.8)
EOSINOPHIL NFR BLD: 7 % (ref 0.5–7.8)
ERYTHROCYTE [DISTWIDTH] IN BLOOD BY AUTOMATED COUNT: 13.3 % (ref 11.9–14.6)
GLOBULIN SER CALC-MCNC: 3.6 G/DL (ref 2.3–3.5)
GLUCOSE BLD STRIP.AUTO-MCNC: 102 MG/DL (ref 65–100)
GLUCOSE BLD STRIP.AUTO-MCNC: 190 MG/DL (ref 65–100)
GLUCOSE BLD STRIP.AUTO-MCNC: 191 MG/DL (ref 65–100)
GLUCOSE BLD STRIP.AUTO-MCNC: 231 MG/DL (ref 65–100)
GLUCOSE SERPL-MCNC: 96 MG/DL (ref 65–100)
HCT VFR BLD AUTO: 35.2 % (ref 41.1–50.3)
HGB BLD-MCNC: 11.7 G/DL (ref 13.6–17.2)
IMM GRANULOCYTES # BLD: 0.1 K/UL (ref 0–0.5)
IMM GRANULOCYTES NFR BLD AUTO: 1 % (ref 0–5)
LYMPHOCYTES # BLD: 2.1 K/UL (ref 0.5–4.6)
LYMPHOCYTES NFR BLD: 30 % (ref 13–44)
MCH RBC QN AUTO: 29.5 PG (ref 26.1–32.9)
MCHC RBC AUTO-ENTMCNC: 33.2 G/DL (ref 31.4–35)
MCV RBC AUTO: 88.7 FL (ref 79.6–97.8)
MONOCYTES # BLD: 1 K/UL (ref 0.1–1.3)
MONOCYTES NFR BLD: 14 % (ref 4–12)
NEUTS SEG # BLD: 3.2 K/UL (ref 1.7–8.2)
NEUTS SEG NFR BLD: 47 % (ref 43–78)
NRBC # BLD: 0 K/UL (ref 0–0.2)
PLATELET # BLD AUTO: 230 K/UL (ref 150–450)
PMV BLD AUTO: 11.6 FL (ref 9.4–12.3)
POTASSIUM SERPL-SCNC: 3.7 MMOL/L (ref 3.5–5.1)
PROT SERPL-MCNC: 6.3 G/DL (ref 6.3–8.2)
RBC # BLD AUTO: 3.97 M/UL (ref 4.23–5.6)
SODIUM SERPL-SCNC: 139 MMOL/L (ref 136–145)
WBC # BLD AUTO: 6.9 K/UL (ref 4.3–11.1)

## 2018-12-10 PROCEDURE — 82962 GLUCOSE BLOOD TEST: CPT

## 2018-12-10 PROCEDURE — 74011636637 HC RX REV CODE- 636/637: Performed by: INTERNAL MEDICINE

## 2018-12-10 PROCEDURE — 74011250637 HC RX REV CODE- 250/637: Performed by: INTERNAL MEDICINE

## 2018-12-10 PROCEDURE — 74011250636 HC RX REV CODE- 250/636: Performed by: NURSE PRACTITIONER

## 2018-12-10 PROCEDURE — 87205 SMEAR GRAM STAIN: CPT

## 2018-12-10 PROCEDURE — 65270000029 HC RM PRIVATE

## 2018-12-10 PROCEDURE — A9561 TC99M OXIDRONATE: HCPCS

## 2018-12-10 PROCEDURE — 85025 COMPLETE CBC W/AUTO DIFF WBC: CPT

## 2018-12-10 PROCEDURE — 80053 COMPREHEN METABOLIC PANEL: CPT

## 2018-12-10 PROCEDURE — 74011000258 HC RX REV CODE- 258: Performed by: NURSE PRACTITIONER

## 2018-12-10 PROCEDURE — 74011250636 HC RX REV CODE- 250/636: Performed by: INTERNAL MEDICINE

## 2018-12-10 PROCEDURE — 87075 CULTR BACTERIA EXCEPT BLOOD: CPT

## 2018-12-10 PROCEDURE — 36415 COLL VENOUS BLD VENIPUNCTURE: CPT

## 2018-12-10 RX ORDER — SODIUM CHLORIDE 0.9 % (FLUSH) 0.9 %
10 SYRINGE (ML) INJECTION
Status: COMPLETED | OUTPATIENT
Start: 2018-12-10 | End: 2018-12-10

## 2018-12-10 RX ADMIN — HYDROCODONE BITARTRATE AND ACETAMINOPHEN 1 TABLET: 5; 325 TABLET ORAL at 21:31

## 2018-12-10 RX ADMIN — HEPARIN SODIUM 5000 UNITS: 5000 INJECTION INTRAVENOUS; SUBCUTANEOUS at 02:45

## 2018-12-10 RX ADMIN — POVIDONE-IODINE 1 ML: 10 SOLUTION TOPICAL at 11:55

## 2018-12-10 RX ADMIN — INSULIN LISPRO 4 UNITS: 100 INJECTION, SOLUTION INTRAVENOUS; SUBCUTANEOUS at 21:34

## 2018-12-10 RX ADMIN — INSULIN LISPRO 2 UNITS: 100 INJECTION, SOLUTION INTRAVENOUS; SUBCUTANEOUS at 17:03

## 2018-12-10 RX ADMIN — CEFTAROLINE FOSAMIL 600 MG: 600 POWDER, FOR SOLUTION INTRAVENOUS at 13:18

## 2018-12-10 RX ADMIN — CEFTAROLINE FOSAMIL 600 MG: 600 POWDER, FOR SOLUTION INTRAVENOUS at 00:57

## 2018-12-10 RX ADMIN — AMLODIPINE BESYLATE 10 MG: 10 TABLET ORAL at 06:01

## 2018-12-10 RX ADMIN — Medication 10 ML: at 17:03

## 2018-12-10 RX ADMIN — METOPROLOL SUCCINATE 50 MG: 50 TABLET, EXTENDED RELEASE ORAL at 06:01

## 2018-12-10 RX ADMIN — INSULIN GLARGINE 35 UNITS: 100 INJECTION, SOLUTION SUBCUTANEOUS at 08:14

## 2018-12-10 RX ADMIN — VALSARTAN 80 MG: 80 TABLET ORAL at 21:26

## 2018-12-10 RX ADMIN — PRAVASTATIN SODIUM 80 MG: 80 TABLET ORAL at 21:26

## 2018-12-10 RX ADMIN — HYDROCODONE BITARTRATE AND ACETAMINOPHEN 1 TABLET: 5; 325 TABLET ORAL at 10:34

## 2018-12-10 RX ADMIN — Medication 5 ML: at 06:03

## 2018-12-10 RX ADMIN — Medication 10 ML: at 21:27

## 2018-12-10 RX ADMIN — INSULIN LISPRO 2 UNITS: 100 INJECTION, SOLUTION INTRAVENOUS; SUBCUTANEOUS at 11:52

## 2018-12-10 RX ADMIN — DULOXETINE 60 MG: 60 CAPSULE, DELAYED RELEASE ORAL at 08:14

## 2018-12-10 RX ADMIN — HEPARIN SODIUM 5000 UNITS: 5000 INJECTION INTRAVENOUS; SUBCUTANEOUS at 11:53

## 2018-12-10 RX ADMIN — ASPIRIN 81 MG 81 MG: 81 TABLET ORAL at 08:14

## 2018-12-10 RX ADMIN — Medication 10 ML: at 08:51

## 2018-12-10 RX ADMIN — HEPARIN SODIUM 5000 UNITS: 5000 INJECTION INTRAVENOUS; SUBCUTANEOUS at 21:25

## 2018-12-10 NOTE — PROGRESS NOTES
Bedside shift report completed with oncoming nurse, Dai Mathews. Patient resting quietly in bed at this time, eyes closed, respirations present. No needs stated. Bed low and locked. Bedside table, personal belongings and call light within reach. Wife at bedside.

## 2018-12-10 NOTE — PROGRESS NOTES
Dry dressing change performed on L heel. Betadine applied and new guaze applied and taped. Pt tolerated well.

## 2018-12-10 NOTE — PROGRESS NOTES
Pt resting in the bed watching tv. Pt is stable. Pt is axox4. Pt denies pain or discomfort at this time. Safety measures are in place. Will continue to monitor.

## 2018-12-10 NOTE — PROGRESS NOTES
Received bedside shift report from offgoing nurse, Erasto Cox. Patient alert and oriented. Patient resting quietly in bed at this time, awake, respirations even and unlabored. Denies needs at this time. Bed low and locked. Bedside table, personal belongings and call light within reach. Wife at bedside.

## 2018-12-10 NOTE — PROGRESS NOTES
Norco effective. Patient reports pain 3/10 in left foot. Denies any further needs at this time. Call light with in reach.

## 2018-12-10 NOTE — PROGRESS NOTES
Pt resting in the bed with wife at the bedside. Pt is stable. Pt is axox4. Rooke boots on. Pt is up ad mendy. Pt denies pain or discomfort in the L heel at this time. Safety measures are in place. Pt and wife instructed to ask for assistance if needed. Will continue to monitor.

## 2018-12-10 NOTE — PROGRESS NOTES
Problem: Interdisciplinary Rounds Goal: Interdisciplinary Rounds Interdisciplinary team rounds were held 12/10/2018 with the following team members:Care Management, Physical Therapy, Physician and Clinical Coordinator and the patient. Plan of care discussed. See clinical pathway and/or care plan for interventions and desired outcomes.

## 2018-12-10 NOTE — PROGRESS NOTES
ISAURA CASTRO called and followed up with pt's wife, as pt is currently in the hospital.  Pt's wife said she wasn't sure where they were with things for their power because her  was doing all of that since she works all the time. ISAURA CASTRO tried to talk with pt's wife more about their income but she stated she didn't know that information. PLAN: 
ISAURA CASTRO will try and follow up with pt next week to see if he's out of the hospital. 
 
 
This note will not be viewable in [address]

## 2018-12-10 NOTE — PROGRESS NOTES
Infectious Disease Progress Note Today's Date: 12/10/2018 Admit Date: 2018 Impression: · Left heel DFI/ulcer, with secondary infection. Wound most consistently with DTI, started as a blister and now with extension/worsening, possible ischemic. · PVD hx of left fem-pop bypass graft, now occluded · DM, Hgb A1c 8.9 
· CAD/CABG/PPM/bioprosthetic AV · CKD 3 Plan:  
· Continue Ceftaroline · Unable to get MRI sec to PPM, bone scan pending · Soft/fluctuant area at the heel, with purulence expressed and sent for culture today · Low likelihood this will improve/resolve with antbx alone; May need re-evaluation from vascular sx Anti-infectives: · Teflaro - · Vanc/zosyn -to reduce renal side effects Subjective:  
Left heel pain is stable, no fever, chills, sweats, nausea or diarrhea. Tolerating antbx well. No Known Allergies Review of Systems:  A comprehensive review of systems was negative except for that written in the History of Present Illness. Objective:  
 
Visit Vitals /75 (BP 1 Location: Right arm, BP Patient Position: At rest) Pulse 71 Temp 98.7 °F (37.1 °C) Resp 18 Wt 93.3 kg (205 lb 11.2 oz) SpO2 96% BMI 31.28 kg/m² Temp (24hrs), Av.9 °F (36.6 °C), Min:97.3 °F (36.3 °C), Max:98.7 °F (37.1 °C) Lines:  Peripheral IV:    
 
Physical Exam:   
General:  Alert, cooperative, well noursished, well developed, appears stated age Eyes:  Sclera anicteric. Pupils equally round and reactive to light. Mouth/Throat: Mucous membranes normal, oral pharynx clear, dentition fair Neck: Supple Lungs:   Clear to auscultation bilaterally, good effort CV:  Regular rate and rhythm, soft systolic murmur, no click, rub or gallop Abdomen:   Soft, non-tender. bowel sounds normal. non-distended Extremities: No cyanosis Skin: Skin color, texture, turgor normal. no acute rash or lesions Lymph nodes: Cervical and supraclavicular normal  
 Musculoskeletal: Left foot missing 4th/5th toes, amputation sites healed, small scabbed area at plantar aspect that appears benign, left heel area is edematous, erythematous and fluctuant in some spots, isela at the tip of the heel, purulence expressed here (see images below) Lines/Devices:  Intact, no erythema, drainage or tenderness Psych: Alert and oriented, normal mood affect given the setting Above: image 12/10/18 Data Review: CBC: 
Recent Labs 12/10/18 
0622 18 
0547 18 
0550 WBC 6.9 6.8 7.0 GRANS 47 40* 50 MONOS 14* 16* 12 EOS 7 9* 11* ANEU 3.2 2.7 3.5 ABL 2.1 2.2 1.8 HGB 11.7* 12.0* 12.2* HCT 35.2* 37.1* 36.2*  
 221 203 BMP: 
Recent Labs 12/10/18 
0622 18 
0547 18 
0550 CREA 1.58* 1.68* 1.66* BUN 12 13 17  141 138  
K 3.7 4.1 4.1  106 104 CO2 26 26 25 AGAP 7 9 9 GLU 96 83 118* LFTS: 
Recent Labs 12/10/18 
0622 18 
0547 18 
0550 TBILI 0.3 0.3 0.4 ALT 30 28 31 SGOT 22 14* 14* AP 87 90 100  
TP 6.3 6.7 6.8 ALB 2.7* 2.8* 2.9* Microbiology:  
 
All Micro Results Procedure Component Value Units Date/Time CULTURE, ANAEROBIC [[id number]] Order Status:  Sent Specimen:  Foot, left CULTURE, Lendia Caper STAIN [[id number]] Order Status:  Sent Specimen:  Wound from Heel CULTURE, BLOOD [[id number]] Collected:  18 1059 Order Status:  Completed Specimen:  Blood Updated:  12/10/18 0734 Special Requests: --     
  LEFT Antecubital 
  
  Culture result: NO GROWTH 3 DAYS     
 CULTURE, BLOOD [[id number]] Collected:  18 1101 Order Status:  Completed Specimen:  Blood Updated:  12/10/18 0734 Special Requests: --     
  RIGHT Antecubital 
  
  Culture result: NO GROWTH 3 DAYS Imagin/3/18 Art duplex IMPRESSION:   
1. The left femoral to popliteal bypass graft is occluded. Probable two-vessel 
runoff. 2. No obvious proximal right lower extremity stenosis. Probable 2 vessel runoff. 12/3/18 Left foot Xray IMPRESSION: 
1. Soft tissue swelling adjacent to the calcaneus without radiographic features 
to suggest osteomyelitis. 2. Amputation of the first and fifth digits. Signed By: Natalie Hagan NP December 10, 2018

## 2018-12-10 NOTE — PROGRESS NOTES
Visit with patient to build rapport with . Patient is calm. Receptive to  presence. Encouraged and assured patient of our continued care. Spencer Martínez  Staff  C: [phone]  /  [email or web address]

## 2018-12-10 NOTE — PROGRESS NOTES
Patient alert and oriented. Respirations even and unlabored on room air. Pain well controlled with norco once this shift. No acute events overnight. Patient resting quietly in bed at this time, eyes closed, respirations present. Bed low and locked. Bedside table, personal belongings and call light within reach. Wife at bedside.

## 2018-12-10 NOTE — PROGRESS NOTES
Pt resting in the bed watching tv. Pt is stable. Safety measures are in place. Pt instructed to ask for assistance if needed. Shift report has been given to the oncoming nurse.

## 2018-12-11 LAB
ALBUMIN SERPL-MCNC: 3 G/DL (ref 3.5–5)
ALBUMIN/GLOB SERPL: 1 {RATIO} (ref 1.2–3.5)
ALP SERPL-CCNC: 90 U/L (ref 50–136)
ALT SERPL-CCNC: 35 U/L (ref 12–65)
ANION GAP SERPL CALC-SCNC: 11 MMOL/L (ref 7–16)
AST SERPL-CCNC: 27 U/L (ref 15–37)
BASOPHILS # BLD: 0.1 K/UL (ref 0–0.2)
BASOPHILS NFR BLD: 1 % (ref 0–2)
BILIRUB SERPL-MCNC: 0.3 MG/DL (ref 0.2–1.1)
BUN SERPL-MCNC: 15 MG/DL (ref 6–23)
CALCIUM SERPL-MCNC: 8.8 MG/DL (ref 8.3–10.4)
CHLORIDE SERPL-SCNC: 104 MMOL/L (ref 98–107)
CO2 SERPL-SCNC: 25 MMOL/L (ref 21–32)
CREAT SERPL-MCNC: 1.64 MG/DL (ref 0.8–1.5)
DIFFERENTIAL METHOD BLD: ABNORMAL
EOSINOPHIL # BLD: 0.6 K/UL (ref 0–0.8)
EOSINOPHIL NFR BLD: 8 % (ref 0.5–7.8)
ERYTHROCYTE [DISTWIDTH] IN BLOOD BY AUTOMATED COUNT: 13.2 % (ref 11.9–14.6)
GLOBULIN SER CALC-MCNC: 3.1 G/DL (ref 2.3–3.5)
GLUCOSE BLD STRIP.AUTO-MCNC: 121 MG/DL (ref 65–100)
GLUCOSE BLD STRIP.AUTO-MCNC: 145 MG/DL (ref 65–100)
GLUCOSE BLD STRIP.AUTO-MCNC: 302 MG/DL (ref 65–100)
GLUCOSE BLD STRIP.AUTO-MCNC: 91 MG/DL (ref 65–100)
GLUCOSE SERPL-MCNC: 90 MG/DL (ref 65–100)
HCT VFR BLD AUTO: 36.2 % (ref 41.1–50.3)
HGB BLD-MCNC: 12 G/DL (ref 13.6–17.2)
IMM GRANULOCYTES # BLD: 0.1 K/UL (ref 0–0.5)
IMM GRANULOCYTES NFR BLD AUTO: 1 % (ref 0–5)
LYMPHOCYTES # BLD: 2.3 K/UL (ref 0.5–4.6)
LYMPHOCYTES NFR BLD: 29 % (ref 13–44)
MCH RBC QN AUTO: 29.9 PG (ref 26.1–32.9)
MCHC RBC AUTO-ENTMCNC: 33.1 G/DL (ref 31.4–35)
MCV RBC AUTO: 90.3 FL (ref 79.6–97.8)
MONOCYTES # BLD: 0.9 K/UL (ref 0.1–1.3)
MONOCYTES NFR BLD: 12 % (ref 4–12)
NEUTS SEG # BLD: 3.8 K/UL (ref 1.7–8.2)
NEUTS SEG NFR BLD: 49 % (ref 43–78)
NRBC # BLD: 0 K/UL (ref 0–0.2)
PLATELET # BLD AUTO: 263 K/UL (ref 150–450)
PMV BLD AUTO: 11.4 FL (ref 9.4–12.3)
POTASSIUM SERPL-SCNC: 4.6 MMOL/L (ref 3.5–5.1)
PROT SERPL-MCNC: 6.1 G/DL (ref 6.3–8.2)
RBC # BLD AUTO: 4.01 M/UL (ref 4.23–5.6)
SODIUM SERPL-SCNC: 140 MMOL/L (ref 136–145)
WBC # BLD AUTO: 7.8 K/UL (ref 4.3–11.1)

## 2018-12-11 PROCEDURE — 85025 COMPLETE CBC W/AUTO DIFF WBC: CPT

## 2018-12-11 PROCEDURE — 74011250637 HC RX REV CODE- 250/637: Performed by: INTERNAL MEDICINE

## 2018-12-11 PROCEDURE — 82962 GLUCOSE BLOOD TEST: CPT

## 2018-12-11 PROCEDURE — 74011250636 HC RX REV CODE- 250/636: Performed by: INTERNAL MEDICINE

## 2018-12-11 PROCEDURE — 74011000258 HC RX REV CODE- 258: Performed by: NURSE PRACTITIONER

## 2018-12-11 PROCEDURE — 65270000029 HC RM PRIVATE

## 2018-12-11 PROCEDURE — 36415 COLL VENOUS BLD VENIPUNCTURE: CPT

## 2018-12-11 PROCEDURE — 74011250636 HC RX REV CODE- 250/636: Performed by: NURSE PRACTITIONER

## 2018-12-11 PROCEDURE — 80053 COMPREHEN METABOLIC PANEL: CPT

## 2018-12-11 PROCEDURE — 74011636637 HC RX REV CODE- 636/637: Performed by: INTERNAL MEDICINE

## 2018-12-11 RX ORDER — AMOXICILLIN AND CLAVULANATE POTASSIUM 875; 125 MG/1; MG/1
1 TABLET, FILM COATED ORAL 2 TIMES DAILY WITH MEALS
Status: DISCONTINUED | OUTPATIENT
Start: 2018-12-11 | End: 2018-12-12 | Stop reason: HOSPADM

## 2018-12-11 RX ADMIN — AMLODIPINE BESYLATE 10 MG: 10 TABLET ORAL at 05:14

## 2018-12-11 RX ADMIN — POVIDONE-IODINE: 10 SOLUTION TOPICAL at 11:20

## 2018-12-11 RX ADMIN — DULOXETINE 60 MG: 60 CAPSULE, DELAYED RELEASE ORAL at 09:03

## 2018-12-11 RX ADMIN — METOPROLOL SUCCINATE 50 MG: 50 TABLET, EXTENDED RELEASE ORAL at 05:13

## 2018-12-11 RX ADMIN — HYDROCODONE BITARTRATE AND ACETAMINOPHEN 1 TABLET: 5; 325 TABLET ORAL at 09:06

## 2018-12-11 RX ADMIN — HEPARIN SODIUM 5000 UNITS: 5000 INJECTION INTRAVENOUS; SUBCUTANEOUS at 11:00

## 2018-12-11 RX ADMIN — AMOXICILLIN AND CLAVULANATE POTASSIUM 1 TABLET: 875; 125 TABLET, FILM COATED ORAL at 16:32

## 2018-12-11 RX ADMIN — Medication 5 ML: at 14:00

## 2018-12-11 RX ADMIN — HYDROCODONE BITARTRATE AND ACETAMINOPHEN 1 TABLET: 5; 325 TABLET ORAL at 21:58

## 2018-12-11 RX ADMIN — AMOXICILLIN AND CLAVULANATE POTASSIUM 1 TABLET: 875; 125 TABLET, FILM COATED ORAL at 09:06

## 2018-12-11 RX ADMIN — ASPIRIN 81 MG 81 MG: 81 TABLET ORAL at 09:03

## 2018-12-11 RX ADMIN — PRAVASTATIN SODIUM 80 MG: 80 TABLET ORAL at 20:10

## 2018-12-11 RX ADMIN — CEFTAROLINE FOSAMIL 600 MG: 600 POWDER, FOR SOLUTION INTRAVENOUS at 00:22

## 2018-12-11 RX ADMIN — INSULIN GLARGINE 35 UNITS: 100 INJECTION, SOLUTION SUBCUTANEOUS at 16:32

## 2018-12-11 RX ADMIN — VALSARTAN 80 MG: 80 TABLET ORAL at 20:10

## 2018-12-11 RX ADMIN — INSULIN LISPRO 8 UNITS: 100 INJECTION, SOLUTION INTRAVENOUS; SUBCUTANEOUS at 16:33

## 2018-12-11 RX ADMIN — Medication 10 ML: at 05:13

## 2018-12-11 RX ADMIN — HEPARIN SODIUM 5000 UNITS: 5000 INJECTION INTRAVENOUS; SUBCUTANEOUS at 20:13

## 2018-12-11 RX ADMIN — HEPARIN SODIUM 5000 UNITS: 5000 INJECTION INTRAVENOUS; SUBCUTANEOUS at 05:12

## 2018-12-11 RX ADMIN — Medication 10 ML: at 20:14

## 2018-12-11 NOTE — PROGRESS NOTES
Patient resting quietly in bed at this time, watching TV. States his foot is feeling better. Will continue to monitor.

## 2018-12-11 NOTE — PROGRESS NOTES
Patient stable with no complaints at this time. Patient is resting in bed watching TV. Patient denies any pain and appears comfortable at this time. Foot dressing c/d/i. Call light within reach and patient instructed to call if assistance is needed.   Report to be given to oncoming RN 7p-7a

## 2018-12-11 NOTE — PROGRESS NOTES
Patient voices no concerns at this time. Patient is resting in bed watching TV. Patient denies any pain and appears comfortable at this time. Call light within reach and patient instructed to call if assistance is needed. Will continue to monitor.

## 2018-12-11 NOTE — PROGRESS NOTES
Patient has been resting quietly through the night with no s/s of discomfort. No needs expressed. Will continue to monitor.

## 2018-12-11 NOTE — PROGRESS NOTES
Problem: Falls - Risk of 
Goal: *Absence of Falls Document Rosalina Sanchez Fall Risk and appropriate interventions in the flowsheet. Outcome: Progressing Towards Goal 
Fall Risk Interventions: 
Mobility Interventions: Communicate number of staff needed for ambulation/transfer Medication Interventions: Evaluate medications/consider consulting pharmacy

## 2018-12-11 NOTE — PROGRESS NOTES
Hospitalist Progress Note    2018  Admit Date: 2018  9:28 AM   NAME: Layla Smith   :  1960   MRN:  [id number]   Attending: Delane Bosworth, MD  PCP:  Roxanne Abdul NP    SUBJECTIVE:   Maria Del Rosario Colvin is a 67 yo M with history of L leg PAD s/p remote L fem-tib bypass in , who was admitted 18 for L heel ulcer/infection. He was discharged on  for similar issue, but foot started looking worse. He reports mild pain in the area. Seen by ID and started on Ceftaroline. - denies new complaints. Still with some pain around L heel. 12/10- foot feels about the same. Awaiting 2nd part of bone scan of L foot. - bone scan negative for osteomyelitis. ID has transitioned him to PO augmentin for 2 weeks. Wound cx from yesterday with NGTD and few WBC. Review of Systems negative with exception of pertinent positives noted above  PHYSICAL EXAM     Visit Vitals  /81 (BP 1 Location: Right arm, BP Patient Position: At rest)   Pulse 76   Temp 97.9 °F (36.6 °C)   Resp 18   Wt 94.2 kg (207 lb 9.6 oz)   SpO2 95%   BMI 31.57 kg/m²      Temp (24hrs), Av.1 °F (36.7 °C), Min:97.9 °F (36.6 °C), Max:98.3 °F (36.8 °C)    Patient Vitals for the past 24 hrs:   Temp Pulse Resp BP SpO2   18 1523 97.9 °F (36.6 °C) 76 18 132/81 95 %   18 1108 98.3 °F (36.8 °C) 72 18 118/74 94 %   18 0720 98.2 °F (36.8 °C) 77 16 121/77 94 %   18 0337 98.3 °F (36.8 °C) 73 18 110/64 96 %   12/10/18 2254 98.2 °F (36.8 °C) 74 18 128/80 94 %   12/10/18 1931 97.9 °F (36.6 °C) 74 18 119/78 96 %       Oxygen Therapy  O2 Sat (%): 95 % (18 1523)    Intake/Output Summary (Last 24 hours) at 2018 1727  Last data filed at 2018 1528  Gross per 24 hour   Intake 1200 ml   Output 250 ml   Net 950 ml      General: No acute distress, obese   Lungs:  CTA Bilaterally.    Heart:  Regular rate and rhythm,  No murmur, rub, or gallop  Abdomen: Soft, Non distended, Non tender, Positive bowel sounds  Extremities: No cyanosis, L foot with 6 x 3 wound with 3 cm ulcerated area and no purulent drainage noted; surrounding erythema resolved, L great toe and 5th toe amputated  Neurologic:  No focal deficits    Recent Results (from the past 24 hour(s))   GLUCOSE, POC    Collection Time: 12/10/18  8:11 PM   Result Value Ref Range    Glucose (POC) 231 (H) 65 - 100 mg/dL   GLUCOSE, POC    Collection Time: 12/11/18  5:07 AM   Result Value Ref Range    Glucose (POC) 91 65 - 100 mg/dL   CBC WITH AUTOMATED DIFF    Collection Time: 12/11/18  5:43 AM   Result Value Ref Range    WBC 7.8 4.3 - 11.1 K/uL    RBC 4.01 (L) 4.23 - 5.6 M/uL    HGB 12.0 (L) 13.6 - 17.2 g/dL    HCT 36.2 (L) 41.1 - 50.3 %    MCV 90.3 79.6 - 97.8 FL    MCH 29.9 26.1 - 32.9 PG    MCHC 33.1 31.4 - 35.0 g/dL    RDW 13.2 11.9 - 14.6 %    PLATELET 831 228 - 821 K/uL    MPV 11.4 9.4 - 12.3 FL    ABSOLUTE NRBC 0.00 0.0 - 0.2 K/uL    DF AUTOMATED      NEUTROPHILS 49 43 - 78 %    LYMPHOCYTES 29 13 - 44 %    MONOCYTES 12 4.0 - 12.0 %    EOSINOPHILS 8 (H) 0.5 - 7.8 %    BASOPHILS 1 0.0 - 2.0 %    IMMATURE GRANULOCYTES 1 0.0 - 5.0 %    ABS. NEUTROPHILS 3.8 1.7 - 8.2 K/UL    ABS. LYMPHOCYTES 2.3 0.5 - 4.6 K/UL    ABS. MONOCYTES 0.9 0.1 - 1.3 K/UL    ABS. EOSINOPHILS 0.6 0.0 - 0.8 K/UL    ABS. BASOPHILS 0.1 0.0 - 0.2 K/UL    ABS. IMM. GRANS. 0.1 0.0 - 0.5 K/UL   METABOLIC PANEL, COMPREHENSIVE    Collection Time: 12/11/18  5:43 AM   Result Value Ref Range    Sodium 140 136 - 145 mmol/L    Potassium 4.6 3.5 - 5.1 mmol/L    Chloride 104 98 - 107 mmol/L    CO2 25 21 - 32 mmol/L    Anion gap 11 7 - 16 mmol/L    Glucose 90 65 - 100 mg/dL    BUN 15 6 - 23 MG/DL    Creatinine 1.64 (H) 0.8 - 1.5 MG/DL    GFR est AA 56 (L) >60 ml/min/1.73m2    GFR est non-AA 46 (L) >60 ml/min/1.73m2    Calcium 8.8 8.3 - 10.4 MG/DL    Bilirubin, total 0.3 0.2 - 1.1 MG/DL    ALT (SGPT) 35 12 - 65 U/L    AST (SGOT) 27 15 - 37 U/L    Alk.  phosphatase 90 50 - 136 U/L    Protein, total 6.1 (L) 6.3 - 8.2 g/dL    Albumin 3.0 (L) 3.5 - 5.0 g/dL    Globulin 3.1 2.3 - 3.5 g/dL    A-G Ratio 1.0 (L) 1.2 - 3.5     GLUCOSE, POC    Collection Time: 12/11/18 10:46 AM   Result Value Ref Range    Glucose (POC) 121 (H) 65 - 100 mg/dL   GLUCOSE, POC    Collection Time: 12/11/18  3:44 PM   Result Value Ref Range    Glucose (POC) 302 (H) 65 - 100 mg/dL     All Micro Results     Procedure Component Value Units Date/Time    CULTURE, French Miroslava STAIN [[id number]] Collected:  12/10/18 1250    Order Status:  Completed Specimen:  Wound from Heel Updated:  12/11/18 0820     Special Requests: LEFT HEEL DRAINAGE     GRAM STAIN 0 TO 3 WBC'S/OIF      NO DEFINITE ORGANISM SEEN        Culture result: NO GROWTH 1 DAY       CULTURE, BLOOD [[id number]] Collected:  12/07/18 1059    Order Status:  Completed Specimen:  Blood Updated:  12/11/18 0809     Special Requests: --        LEFT  Antecubital       Culture result: NO GROWTH 4 DAYS       CULTURE, BLOOD [[id number]] Collected:  12/07/18 1101    Order Status:  Completed Specimen:  Blood Updated:  12/11/18 0809     Special Requests: --        RIGHT  Antecubital       Culture result: NO GROWTH 4 DAYS       CULTURE, ANAEROBIC [[id number]] Collected:  12/10/18 1250    Order Status:  Completed Specimen:  Foot, left Updated:  12/10/18 [address] Problems as of 12/11/2018 Date Reviewed: 12/6/2018          Codes Class Noted - Resolved POA    * (Principal) Cellulitis of left foot ICD-10-CM: L03.116  ICD-9-CM: 682.7  12/7/2018 - Present Unknown        DM (diabetes mellitus), type 2, uncontrolled ([facility] [address] ICD-10-CM: E11.65  ICD-9-CM: 250.02  12/3/2018 - Present         Diabetic ulcer of left heel ([facility] ICD-10-CM: E11.621, L97.429  ICD-9-CM: 250.80, 707.14  12/3/2018 - Present Yes        Hypertension (Chronic) ICD-10-CM: I10  ICD-9-CM: 401.9  6/1/2015 - Present Yes        IDDM (insulin dependent diabetes mellitus) ([facility] ICD-10-CM: E11.9, Z79.4  ICD-9-CM: 250.00, V58.67  6/1/2015 - Present Yes        CKD (chronic kidney disease), stage III ([facility] ICD-10-CM: N18.3  ICD-9-CM: 585.3  6/1/2015 - Present Yes        PVD (peripheral vascular disease) ([facility] ICD-10-CM: I73.9  ICD-9-CM: 443.9  3/16/2015 - Present Yes    Overview Signed 3/18/2015  1:57 PM by Robby Schwab, NP     3/16/15 (Dr Onofre Second) 1. Left superficial femoral artery endarterectomy. 2. Common femoral to above knee popliteal bypass with PTFE graft. 3. Left lower extremity arteriogram.             S/P AVR (aortic valve replacement) ICD-10-CM: Z95.2  ICD-9-CM: V43.3  3/7/2011 - Present Yes        Diabetes mellitus ([facility] (Chronic) ICD-10-CM: E11.9  ICD-9-CM: 250.00  2/28/2011 - Present Yes            Plan:  · L heel ulcer/cellulitis- abx changed to augmentin with ID rec to treat for 2 weeks. · Continue wound care. · Will need to re-establish with [facility] on discharge. · Bone scan negative. · Follow wound cx. · T2DM- blood sugars controlled- continue current insulin regimen    · HTN- continue current treatment. · CAD- aspirin, statin. Dispo- hopefully home 12/12- home without power and cannot afford a heater until tomorrow. DVT Prophylaxis: Heparin    Signed By: Nirmal Srinivasan.  Darlene Galss MD     December 11, 2018

## 2018-12-11 NOTE — PROGRESS NOTES
Infectious Disease Progress Note Today's Date: 2018 Admit Date: 2018 Impression: · Left heel DFI/ulcer, with secondary infection. Suspect pressure related blister that has ruptured. No evidence of osteomyelitis on bone scan. · PVD hx of left fem-pop bypass graft, now occluded · DM, Hgb A1c 8.9 
· CAD/CABG/PPM/bioprosthetic AV · CKD 3 
  
Plan:  
· Change back to oral Augmentin, plan 2 weeks rx · Continue wound care · Consider Vascular Surgery evaluation · Follow wound culture result Anti-infectives: 1. Ceftaroline Subjective:  
Bone scan is negative for osteomyelitis. MRSA nasal screen negative, wound culture negative so far. Review of Systems:  A comprehensive review of systems was negative except for that written in the History of Present Illness. Objective:  
 
Visit Vitals /77 (BP 1 Location: Right arm, BP Patient Position: Lying left side) Pulse 77 Temp 98.2 °F (36.8 °C) Resp 16 Wt 94.2 kg (207 lb 9.6 oz) SpO2 94% BMI 31.57 kg/m² Temp (24hrs), Av.3 °F (36.8 °C), Min:97.9 °F (36.6 °C), Max:98.7 °F (37.1 °C) Lines:  Peripheral IV:    
 
Physical Exam:  
 
Physical Exam:   
General:  Alert, cooperative, well noursished, well developed, appears stated age Eyes:  Sclera anicteric. Neck: Supple Lungs:    good effort Abdomen:   Soft, non-tender. bowel sounds normal. non-distended Extremities: No cyanosis Skin: Skin color, texture, turgor normal. no acute rash or lesions Musculoskeletal: Left foot missing 4th/5th toes, amputation sites healed, left heel area has had overlying skin debrided, now with clean shallow ulcer. Lines/Devices:  Intact, no erythema, drainage or tenderness Psych: Alert and oriented, normal mood affect given the setting Data Review: CBC:  
Recent Labs 18 
0543 12/10/18 
0622 18 
2926 WBC 7.8 6.9 6.8  
RBC 4.01* 3.97* 4.10* HGB 12.0* 11.7* 12.0*  
HCT 36.2* 35.2* 37.1*  
  230 221 GRANS 49 47 40* LYMPH 29 30 32 EOS 8* 7 9* CMP:  
Recent Labs 12/11/18 
0543 12/10/18 
0622 12/09/18 
6243 GLU 90 96 83  139 141  
K 4.6 3.7 4.1  106 106 CO2 25 26 26 BUN 15 12 13 CREA 1.64* 1.58* 1.68* CA 8.8 8.7 9.1 AGAP 11 7 9 AP 90 87 90  
TP 6.1* 6.3 6.7 ALB 3.0* 2.7* 2.8*  
GLOB 3.1 3.6* 3.9* AGRAT 1.0* 0.8* 0.7* Liver Enzymes:  
Recent Labs 12/11/18 
0543 TP 6.1* ALB 3.0* AP 90 SGOT 27 Inflammation studies:  
Lab Results Component Value Date/Time Sed rate, automated 61 (H) 12/07/2018 10:59 AM  
 Sed rate, automated 25 07/13/2015 04:45 PM  
 Sed rate, automated 37 (H) 07/06/2015 04:45 PM  
 C-Reactive protein 14.6 (H) 12/07/2018 10:59 AM  
 C-Reactive protein 0.7 07/13/2015 04:45 PM  
 C-Reactive protein 0.8 07/06/2015 04:45 PM  
  
 
Microbiology:  
 
All Micro Results Procedure Component Value Units Date/Time Deena Rinconer STAIN [[id number]] Collected:  12/10/18 1250 Order Status:  Completed Specimen:  Wound from Heel Updated:  12/11/18 0820 Special Requests: LEFT HEEL DRAINAGE  
  GRAM STAIN 0 TO 3 WBC'S/OIF  
   NO DEFINITE ORGANISM SEEN Culture result: NO GROWTH 1 DAY     
 CULTURE, BLOOD [[id number]] Collected:  12/07/18 1059 Order Status:  Completed Specimen:  Blood Updated:  12/11/18 0809 Special Requests: --     
  LEFT Antecubital 
  
  Culture result: NO GROWTH 4 DAYS     
 CULTURE, BLOOD [[id number]] Collected:  12/07/18 1101 Order Status:  Completed Specimen:  Blood Updated:  12/11/18 0809 Special Requests: --     
  RIGHT Antecubital 
  
  Culture result: NO GROWTH 4 DAYS     
 CULTURE, ANAEROBIC [[id number]] Collected:  12/10/18 1250 Order Status:  Completed Specimen:  Foot, left Updated:  12/10/18 1347 Imaging:  
 
Bone scan Signed By: Ruth Matthews MD   
 December 11, 2018

## 2018-12-11 NOTE — PROGRESS NOTES
Patient requested pain medication for discomfort of left foot. Medicated with Norco 1 tab per prn order. Resting quietly, watching TV.

## 2018-12-12 ENCOUNTER — HOME CARE VISIT (OUTPATIENT)
Dept: HOME HEALTH SERVICES | Facility: HOME HEALTH | Age: 58
End: 2018-12-12
Payer: COMMERCIAL

## 2018-12-12 VITALS
OXYGEN SATURATION: 96 % | WEIGHT: 206.5 LBS | RESPIRATION RATE: 19 BRPM | SYSTOLIC BLOOD PRESSURE: 119 MMHG | TEMPERATURE: 98.6 F | DIASTOLIC BLOOD PRESSURE: 68 MMHG | HEART RATE: 89 BPM | BODY MASS INDEX: 31.4 KG/M2

## 2018-12-12 LAB
BACTERIA SPEC CULT: NORMAL
GLUCOSE BLD STRIP.AUTO-MCNC: 166 MG/DL (ref 65–100)
GLUCOSE BLD STRIP.AUTO-MCNC: 224 MG/DL (ref 65–100)
GRAM STN SPEC: NORMAL
GRAM STN SPEC: NORMAL
SERVICE CMNT-IMP: NORMAL

## 2018-12-12 PROCEDURE — 74011636637 HC RX REV CODE- 636/637: Performed by: INTERNAL MEDICINE

## 2018-12-12 PROCEDURE — 82962 GLUCOSE BLOOD TEST: CPT

## 2018-12-12 PROCEDURE — 74011250636 HC RX REV CODE- 250/636: Performed by: INTERNAL MEDICINE

## 2018-12-12 PROCEDURE — 74011250637 HC RX REV CODE- 250/637: Performed by: INTERNAL MEDICINE

## 2018-12-12 RX ORDER — AMOXICILLIN AND CLAVULANATE POTASSIUM 875; 125 MG/1; MG/1
1 TABLET, FILM COATED ORAL EVERY 12 HOURS
Qty: 28 TAB | Refills: 0 | Status: SHIPPED | OUTPATIENT
Start: 2018-12-12 | End: 2018-12-26

## 2018-12-12 RX ADMIN — HEPARIN SODIUM 5000 UNITS: 5000 INJECTION INTRAVENOUS; SUBCUTANEOUS at 03:30

## 2018-12-12 RX ADMIN — INSULIN LISPRO 4 UNITS: 100 INJECTION, SOLUTION INTRAVENOUS; SUBCUTANEOUS at 11:24

## 2018-12-12 RX ADMIN — INSULIN GLARGINE 35 UNITS: 100 INJECTION, SOLUTION SUBCUTANEOUS at 09:29

## 2018-12-12 RX ADMIN — POVIDONE-IODINE: 10 SOLUTION TOPICAL at 09:30

## 2018-12-12 RX ADMIN — Medication 10 ML: at 05:56

## 2018-12-12 RX ADMIN — ASPIRIN 81 MG 81 MG: 81 TABLET ORAL at 09:29

## 2018-12-12 RX ADMIN — ACETAMINOPHEN 650 MG: 325 TABLET, FILM COATED ORAL at 11:10

## 2018-12-12 RX ADMIN — AMOXICILLIN AND CLAVULANATE POTASSIUM 1 TABLET: 875; 125 TABLET, FILM COATED ORAL at 09:29

## 2018-12-12 RX ADMIN — METOPROLOL SUCCINATE 50 MG: 50 TABLET, EXTENDED RELEASE ORAL at 05:55

## 2018-12-12 RX ADMIN — AMLODIPINE BESYLATE 10 MG: 10 TABLET ORAL at 05:55

## 2018-12-12 RX ADMIN — DULOXETINE 60 MG: 60 CAPSULE, DELAYED RELEASE ORAL at 09:29

## 2018-12-12 RX ADMIN — INSULIN LISPRO 2 UNITS: 100 INJECTION, SOLUTION INTRAVENOUS; SUBCUTANEOUS at 05:55

## 2018-12-12 NOTE — DISCHARGE SUMMARY
Hospitalist Discharge Summary     Patient ID:  Amanda Alcala  [id number]  30 y.o.  1960  Admit date: 12/7/2018  9:28 AM  Discharge date and time: 12/12/2018  Attending: Gerry Catherine MD  PCP:  Matthieu Abarca NP  Treatment Team: Attending Provider: Gerry Catherine MD; Consulting Provider: Deann Owen MD; Utilization Review: Martha Cortes RN    Principal Diagnosis Cellulitis of left foot   Hospital Problems as of 12/12/2018 Date Reviewed: 12/6/2018          Codes Class Noted - Resolved POA    * (Principal) Cellulitis of left foot ICD-10-CM: L03.116  ICD-9-CM: 682.7  12/7/2018 - Present Unknown        DM (diabetes mellitus), type 2, uncontrolled ([facility] ICD-10-CM: E11.65  ICD-9-CM: 250.02  12/3/2018 - Present         Diabetic ulcer of left heel ([facility] ICD-10-CM: E11.621, L97.429  ICD-9-CM: 250.80, 707.14  12/3/2018 - Present Yes        Hypertension (Chronic) ICD-10-CM: I10  ICD-9-CM: 401.9  6/1/2015 - Present Yes        IDDM (insulin dependent diabetes mellitus) ([facility] ICD-10-CM: E11.9, Z79.4  ICD-9-CM: 250.00, V58.67  6/1/2015 - Present Yes        CKD (chronic kidney disease), stage III ([facility] ICD-10-CM: N18.3  ICD-9-CM: 585.3  6/1/2015 - Present Yes        PVD (peripheral vascular disease) ([facility] ICD-10-CM: I73.9  ICD-9-CM: 443.9  3/16/2015 - Present Yes    Overview Signed 3/18/2015  1:57 PM by Wade Pierce NP     3/16/15 (Dr Bertha Sigala) 1. Left superficial femoral artery endarterectomy. 2. Common femoral to above knee popliteal bypass with PTFE graft.   3. Left lower extremity arteriogram.             S/P AVR (aortic valve replacement) ICD-10-CM: Z95.2  ICD-9-CM: V43.3  3/7/2011 - Present Yes        Diabetes mellitus ([facility] (Chronic) ICD-10-CM: E11.9  ICD-9-CM: 250.00  2/28/2011 - Present Yes                 HPI:  '66years old M with PMH PAD,CAD with CABG ,Aortic valve replacement bioprostetic, DM, HTN,s/p pacemaker,ckd 3 and diastolic chf presented to the hospital as direct admission for worsening LE infection. Patient reported he was recently discharged from the hospital on PO abxs on 12/04 for L foot infection. Patient noted since discharged he is having worsening redness, tenderness and swelling on his L heel. Patient reported he was taking antibiotics as prescribed. Today his PCP wanted to direct admit patient due to worsening of the symptoms, elevated WBC to 12 and patient does not have a heater at home. Patient denies SOB, fever, dysuria, chest pain, N/V/D or abdominal pain.'      Hospital Course:  1. L foot diabetic heel blister/ulcer/cellulitis- he was admitted. ID consulted due to concern for worsening from prior discharge on 12/5. ID started him on Ceftaroline and he received 4 days of this. Surrounding erythema of ulcerated area improved and he was transitioned back to augmentin on 12/11 with plans for 2 weeks of augmentin. Wound culture obtained and had no growth, with anaerobic culture pending. Wound care evaluated. He will be discharged with [facility] appointment on 12/13 at 2:30 PM.  He also has follow up with Dr. Tahi Jeter (vascular surgery) scheduled for 1/8/19. Significant Diagnostic Studies:       Labs: Results:       Chemistry Recent Labs     12/11/18  0543 12/10/18  0622   GLU 90 96    139   K 4.6 3.7    106   CO2 25 26   BUN 15 12   CREA 1.64* 1.58*   CA 8.8 8.7   AGAP 11 7   AP 90 87   TP 6.1* 6.3   ALB 3.0* 2.7*   GLOB 3.1 3.6*   AGRAT 1.0* 0.8*      CBC w/Diff Recent Labs     12/11/18  0543 12/10/18  0622   WBC 7.8 6.9   RBC 4.01* 3.97*   HGB 12.0* 11.7*   HCT 36.2* 35.2*    230   GRANS 49 47   LYMPH 29 30   EOS 8* 7      Cardiac Enzymes No results for input(s): CPK, CKND1, LUCERO in the last 72 hours. No lab exists for component: CKRMB, TROIP   Coagulation No results for input(s): PTP, INR, APTT in the last 72 hours.     No lab exists for component: INREXT    Lipid Panel Lab Results   Component Value Date/Time Cholesterol, total 180 11/14/2018 04:35 PM    HDL Cholesterol 33 (L) 11/14/2018 04:35 PM    LDL, calculated 88 11/14/2018 04:35 PM    VLDL, calculated 59 (H) 11/14/2018 04:35 PM    Triglyceride 296 (H) 11/14/2018 04:35 PM      BNP No results for input(s): BNPP in the last 72 hours. Liver Enzymes Recent Labs     12/11/18  0543   TP 6.1*   ALB 3.0*   AP 90   SGOT 27      Thyroid Studies Lab Results   Component Value Date/Time    T4, Total 9.5 02/08/2011 03:15 PM    TSH 1.160 08/22/2015 08:00 AM            Discharge Exam:  Visit Vitals  /80 (BP 1 Location: Right arm, BP Patient Position: At rest)   Pulse 73   Temp 98.5 °F (36.9 °C)   Resp 19   Wt 93.7 kg (206 lb 8 oz)   SpO2 95%   BMI 31.40 kg/m²     Patient Vitals for the past 24 hrs:   Temp Pulse Resp BP SpO2   12/12/18 0714 98.5 °F (36.9 °C) 73 19 126/80 95 %   12/12/18 0303 97.7 °F (36.5 °C) 82 18 125/76 96 %   12/11/18 2305 98.4 °F (36.9 °C) 72 18 117/61 93 %   12/11/18 1911 98.2 °F (36.8 °C) 71 18 120/74 95 %   12/11/18 1523 97.9 °F (36.6 °C) 76 18 132/81 95 %   12/11/18 1108 98.3 °F (36.8 °C) 72 18 118/74 94 %       General appearance: alert, cooperative, no distress, appears stated age  Lungs: clear to auscultation bilaterally  Heart: regular rate and rhythm, S1, S2 normal, no murmur, click, rub or gallop  Abdomen: soft, non-tender. Bowel sounds normal. No masses,  no organomegaly  Extremities: L foot wrapped  Neurologic: Grossly normal    Disposition: home with home health  Discharge Condition: stable  Patient Instructions:   Current Discharge Medication List      CONTINUE these medications which have CHANGED    Details   amoxicillin-clavulanate (AUGMENTIN) 875-125 mg per tablet Take 1 Tab by mouth every twelve (12) hours for 14 days. Qty: 28 Tab, Refills: 0         CONTINUE these medications which have NOT CHANGED    Details   HYDROcodone-acetaminophen (NORCO) 5-325 mg per tablet Take 1 Tab by mouth every four (4) hours as needed for Pain.  Max Daily Amount: 6 Tabs. Qty: 15 Tab, Refills: 0    Associated Diagnoses: Diabetic foot infection ([address]      insulin glargine U-300 conc (TOUJEO SOLOSTAR U-300 INSULIN) 300 unit/mL (1.5 mL) inpn 50 units daily  Qty: 6 Pen, Refills: 5      pravastatin (PRAVACHOL) 80 mg tablet Take 1 Tab by mouth nightly. Qty: 30 Tab, Refills: 5    Associated Diagnoses: Coronary artery disease involving coronary bypass graft of native heart without angina pectoris      amLODIPine (NORVASC) 10 mg tablet Take 1 Tab by mouth every morning. Qty: 30 Tab, Refills: 5    Associated Diagnoses: Essential hypertension with goal blood pressure less than 140/90      metoprolol succinate (TOPROL-XL) 50 mg XL tablet Take 1 Tab by mouth every morning. Qty: 30 Tab, Refills: 5    Associated Diagnoses: IDDM (insulin dependent diabetes mellitus) ([facility])      DULoxetine (CYMBALTA) 60 mg capsule Take 1 Cap by mouth daily. Qty: 30 Cap, Refills: 5    Associated Diagnoses: Moderate episode of recurrent major depressive disorder ([facility])      insulin aspart U-100 (NOVOLOG FLEXPEN U-100 INSULIN) 100 unit/mL inpn 15 Units by SubCUTAneous route Before breakfast, lunch, and dinner. Qty: 6 Pen, Refills: 5    Associated Diagnoses: Type 2 diabetes mellitus with hyperglycemia, with long-term current use of insulin ([facility])      irbesartan (AVAPRO) 150 mg tablet Take 1 Tab by mouth nightly. Qty: 30 Tab, Refills: 5    Associated Diagnoses: Type 2 diabetes mellitus with hyperglycemia, with long-term current use of insulin ([facility])      aspirin 81 mg chewable tablet Take 81 mg by mouth daily. eszopiclone (LUNESTA) 2 mg tablet Take 1 Tab by mouth nightly. Max Daily Amount: 2 mg. Qty: 30 Tab, Refills: 5    Associated Diagnoses: Insomnia, unspecified type             Activity: Activity as tolerated  Diet: Diabetic Diet  Wound Care: Cleanse left heel/ inside blister with betadine, cover with dry dressing, daily.   Use rooke boots for bilateral heels at all times when in bed or recliner. Follow-up      Follow-up Appointments   Procedures    FOLLOW UP VISIT Appointment in: Other (Specify) 1) [address] [id number] for L heel ulcer (previously followed for L foot wound in other area)- please schedule for 12/13 or 12/14/18 if possible. 1) [address] [facility] for L heel ulcer (previously followed for L foot wound in other area)- please schedule for 12/13 or 12/14/18 if possible. Standing Status:   Standing     Number of Occurrences:   1     Order Specific Question:   Appointment in     Answer: Other (Specify)    FOLLOW UP VISIT Appointment in: One Week With PCP for hospital follow up. With PCP for hospital follow up. Standing Status:   Standing     Number of Occurrences:   1     Standing Expiration Date:   12/13/2018     Order Specific Question:   Appointment in     Answer: One Week     Order Specific Question:   Appt Date: Answer:   12/19/2018     Order Specific Question:   Appt Time:     Answer:   2:15 PM     Order Specific Question:   Office Contact:     Answer:   [phone]     Order Specific Question:   What provider will pt be seeing:     Answer:   Stephanie Hodgson NP   ·   ·   Time spent to discharge patient 25 minutes  Signed:  Isidro Paulino.  Evelyn Vale MD  12/12/2018  10:54 AM

## 2018-12-12 NOTE — PROGRESS NOTES
Problem: Falls - Risk of  Goal: *Absence of Falls  Document Yael Fall Risk and appropriate interventions in the flowsheet.   Outcome: Progressing Towards Goal  Fall Risk Interventions:  Mobility Interventions: Communicate number of staff needed for ambulation/transfer         Medication Interventions: Evaluate medications/consider consulting pharmacy

## 2018-12-12 NOTE — PROGRESS NOTES
Bedside shift report completed with oncoming nurse, Aundrea Bhatti. Patient resting quietly in bed at this time, eyes closed, respirations present. Bed low and locked. Bedside table, personal belongings and call light within reach.

## 2018-12-12 NOTE — ADT AUTH CERT NOTES
REQUESTED REVIEW FOR 12/9/18 by Yvonne Pennington RN        Review Status Review Entered   In Primary 12/12/2018 14:05      Criteria Review   12/9/18     Plan:  ·           L heel ulcer/cellulitis- continue abx Ceftaroline (day 3) per ID recs. Continue wound care. Will need to re-establish with [address] on discharge.     ·           T2DM- hypoglycemic this AM.  Drop lantus to 35 units and stop scheduled Humalog.   Continue SSI.     ·           HTN- continue current treatment.     ·           CAD- aspirin, statin.     DVT Prophylaxis: Heparin      /77, HR 61, SAT 90%   NORVASC, ASA, HEPARIN SQ Q 8, LANTUS, SSI, TOPROL, PRAVACHOL, DIOVAN, TEFLARO 600 MG IV Q 12,             Cellulitis - Care Day 5 (12/11/2018) by Yvonne Pennington RN        Review Status Review Entered   Completed 12/11/2018 15:52      Criteria Review      Care Day: 5 Care Date: 12/11/2018 Level of Care: Inpatient Floor   Guideline Day 3    Clinical Status   (X) * Hemodynamic stability   12/11/2018 3:52 PM EST by Halle Rodriguez     /81, HR 76, SAT 95%      (X) * Fever absent or improved   12/11/2018 3:52 PM EST by Rusty Matos 97.9      ( ) * Skin exam stable or improved   (X) * Mental status at baseline   12/11/2018 3:52 PM EST by Adalgisa Pagan      ( ) * Antibiotic treatment needs appropriate for next level of care   (X) * Pain absent or manageable at next level of care   ( ) * Discharge plans and education understood      Activity   (X) * Ambulatory   12/11/2018 3:52 PM EST by Halle Rodriguez     AMBULATE         Routes   (X) * Oral hydration, medications,[E]and diet   12/11/2018 3:52 PM EST by Halle Rodriguez     NORVASC, AUGMENTIN PO BID, HEPARIN SQ, NORCO, LANTUS, TOPROL, DIOVAN,         Interventions   (X) WBC   12/11/2018 3:52 PM EST by Halle Rodriguez     WBC: 7.8 RBC: 4.01 (L) HGB: 12.0 (L) HCT: 36.2 (L)         Medications   (X) Parenteral or oral antibiotics[A]   12/11/2018 3:52 PM EST by Orriaz Cheese 600 MG IV Q 12,         12/11/2018 3:52 PM EST by Thai Coello   Subject: Additional Clinical Information   Impression:Left heel DFI/ulcer, with secondary infection.   Suspect pressure related blister that has ruptured.   No evidence of osteomyelitis on bone scan. PVD hx of left fem-pop bypass graft, now occluded DM, Hgb A1c 8. 9CAD/CABG/PPM/bioprosthetic AVCKD 3  Plan:Change back to oral Augmentin, plan 2 weeks rxContinue wound careConsider Vascular Surgery evaluationFollow wound culture resultCreatinine: 1.64 (H)               * Milestone

## 2018-12-12 NOTE — PROGRESS NOTES
Patient is discharging home today. He will follow at the [address] [place] [address] instead of having home health per MD preference. No other discharge planning needs identified at this time. Case Management will remain available to assist as needed. Care Management Interventions  PCP Verified by CM:  Yes  Transition of Care Consult (CM Consult): Discharge Planning  Discharge Durable Medical Equipment: No  Physical Therapy Consult: Yes  Occupational Therapy Consult: No  Speech Therapy Consult: No  Current Support Network: Lives with Spouse, Own Home  Confirm Follow Up Transport: Family  Plan discussed with Pt/Family/Caregiver: Yes  Freedom of Choice Offered: Yes  Discharge Location  Discharge Placement: Home

## 2018-12-12 NOTE — DISCHARGE INSTRUCTIONS
Activity: Activity as tolerated  Diet: Diabetic Diet  Wound Care: Cleanse left heel/ inside blister with betadine, cover with dry dressing, daily. Use rooke boots for bilateral heels at all times when in bed or recliner.              Cellulitis: Care Instructions  Your Care Instructions    Cellulitis is a skin infection caused by bacteria, most often strep or staph. It often occurs after a break in the skin from a scrape, cut, bite, or puncture, or after a rash. Cellulitis may be treated without doing tests to find out what caused it. But your doctor may do tests, if needed, to look for a specific bacteria, like methicillin-resistant Staphylococcus aureus (MRSA). The doctor has checked you carefully, but problems can develop later. If you notice any problems or new symptoms, get medical treatment right away. Follow-up care is a key part of your treatment and safety. Be sure to make and go to all appointments, and call your doctor if you are having problems. It's also a good idea to know your test results and keep a list of the medicines you take. How can you care for yourself at home? · Take your antibiotics as directed. Do not stop taking them just because you feel better. You need to take the full course of antibiotics. · Prop up the infected area on pillows to reduce pain and swelling. Try to keep the area above the level of your heart as often as you can. · If your doctor told you how to care for your wound, follow your doctor's instructions. If you did not get instructions, follow this general advice:  ? Wash the wound with clean water 2 times a day. Don't use hydrogen peroxide or alcohol, which can slow healing. ? You may cover the wound with a thin layer of petroleum jelly, such as Vaseline, and a nonstick bandage. ? Apply more petroleum jelly and replace the bandage as needed. · Be safe with medicines. Take pain medicines exactly as directed.   ? If the doctor gave you a prescription medicine for pain, take it as prescribed. ? If you are not taking a prescription pain medicine, ask your doctor if you can take an over-the-counter medicine. To prevent cellulitis in the future  · Try to prevent cuts, scrapes, or other injuries to your skin. Cellulitis most often occurs where there is a break in the skin. · If you get a scrape, cut, mild burn, or bite, wash the wound with clean water as soon as you can to help avoid infection. Don't use hydrogen peroxide or alcohol, which can slow healing. · If you have swelling in your legs (edema), support stockings and good skin care may help prevent leg sores and cellulitis. · Take care of your feet, especially if you have diabetes or other conditions that increase the risk of infection. Wear shoes and socks. Do not go barefoot. If you have athlete's foot or other skin problems on your feet, talk to your doctor about how to treat them. When should you call for help? Call your doctor now or seek immediate medical care if:    · You have signs that your infection is getting worse, such as:  ? Increased pain, swelling, warmth, or redness. ? Red streaks leading from the area. ? Pus draining from the area. ? A fever.     · You get a rash.    Watch closely for changes in your health, and be sure to contact your doctor if:    · You do not get better as expected. Where can you learn more? Go to http://dee-rolanda.info/. Niki Zheng in the search box to learn more about \"Cellulitis: Care Instructions. \"  Current as of: April 18, 2018  Content Version: 11.8  © 4073-5807 [employer]. Care instructions adapted under license by [employer] (which disclaims liability or warranty for this information). If you have questions about a medical condition or this instruction, always ask your healthcare professional. [address] any warranty or liability for your use of this information.            Venous Skin Ulcer: Care Instructions  Your Care Instructions  A venous skin ulcer is a shallow wound that develops when the leg veins do not move blood back to the heart normally. Your veins have one-way valves that keep blood flowing toward the heart. When the valves are damaged, the blood can back up and pool in the vein. The blood may leak out of the vein into tissue around the vein. The tissue can break down and form an ulcer. The first sign of a venous skin ulcer is skin that turns dark red or purple over the area where the blood is leaking out of the vein. The skin also may become thick, dry, and itchy. Without treatment, an ulcer may form. The ulcer may be painful. Your leg also may swell and ache. If the ulcer becomes infected, the infection may cause an odor, and pus may drain from the ulcer. The area around the ulcer also may be more tender and red. Follow-up care is a key part of your treatment and safety. Be sure to make and go to all appointments, and call your doctor if you are having problems. It's also a good idea to know your test results and keep a list of the medicines you take. How can you care for yourself at home? · Follow your doctor's instructions on how to clean the ulcer and change the bandage. · If your doctor prescribed antibiotics, take them as directed. Do not stop taking them just because you feel better. You need to take the full course of antibiotics. · Lift your legs above the level of your heart as often as possible. For example, lie down and then prop up your legs with pillows. · Wear compression stockings or bandages. They help the blood circulate in your legs. And they help prevent blood from pooling in your legs. But there are different types of stockings, and they need to fit right. So your doctor will recommend what you need. · After your ulcer has healed, continue to wear compression stockings. Take them off only when you bathe and sleep.  Compression helps your blood circulate and helps prevent other ulcers from forming. · Walk daily. Walking helps your blood circulation. When should you call for help? Call your doctor now or seek immediate medical care if:    · You have symptoms of infection, such as:  ? Increased pain, swelling, warmth, or redness. ? Red streaks leading from the ulcer. ? Pus draining from the ulcer. ? A fever.    Watch closely for changes in your health, and be sure to contact your doctor if:    · Your ulcer is not healing.     · You have new ulcers.     · The ulcer starts to bleed, and blood soaks through the bandage. Oozing small amounts of a mix of blood and fluid is normal.     · You have new bleeding.     · You do not get better as expected. Where can you learn more? Go to http://dee-rolanda.info/. Enter O269 in the search box to learn more about \"Venous Skin Ulcer: Care Instructions. \"  Current as of: November 20, 2017  Content Version: 11.8  © 3174-7904 [employer]. Care instructions adapted under license by [employer] (which disclaims liability or warranty for this information). If you have questions about a medical condition or this instruction, always ask your healthcare professional. [place] [address] any warranty or liability for your use of this information. DISCHARGE SUMMARY from Nurse    PATIENT INSTRUCTIONS:    After general anesthesia or intravenous sedation, for 24 hours or while taking prescription Narcotics:  · Limit your activities  · Do not drive and operate hazardous machinery  · Do not make important personal or business decisions  · Do  not drink alcoholic beverages  · If you have not urinated within 8 hours after discharge, please contact your surgeon on call.     Report the following to your surgeon:  · Excessive pain, swelling, redness or odor of or around the surgical area  · Temperature over 100.5  · Nausea and vomiting lasting longer than 4 hours or if unable to take medications  · Any signs of decreased circulation or nerve impairment to extremity: change in color, persistent  numbness, tingling, coldness or increase pain  · Any questions    What to do at Home:  *  Please give a list of your current medications to your Primary Care Provider. *  Please update this list whenever your medications are discontinued, doses are      changed, or new medications (including over-the-counter products) are added. *  Please carry medication information at all times in case of emergency situations. These are general instructions for a healthy lifestyle:    No smoking/ No tobacco products/ Avoid exposure to second hand smoke  Surgeon General's Warning:  Quitting smoking now greatly reduces serious risk to your health. Obesity, smoking, and sedentary lifestyle greatly increases your risk for illness    A healthy diet, regular physical exercise & weight monitoring are important for maintaining a healthy lifestyle    You may be retaining fluid if you have a history of heart failure or if you experience any of the following symptoms:  Weight gain of 3 pounds or more overnight or 5 pounds in a week, increased swelling in our hands or feet or shortness of breath while lying flat in bed. Please call your doctor as soon as you notice any of these symptoms; do not wait until your next office visit. Recognize signs and symptoms of STROKE:    F-face looks uneven    A-arms unable to move or move unevenly    S-speech slurred or non-existent    T-time-call 911 as soon as signs and symptoms begin-DO NOT go       Back to bed or wait to see if you get better-TIME IS BRAIN. Warning Signs of HEART ATTACK     Call 911 if you have these symptoms:   Chest discomfort. Most heart attacks involve discomfort in the center of the chest that lasts more than a few minutes, or that goes away and comes back. It can feel like uncomfortable pressure, squeezing, fullness, or pain.    Discomfort in other areas of the upper body. Symptoms can include pain or discomfort in one or both arms, the back, neck, jaw, or stomach.  Shortness of breath with or without chest discomfort.  Other signs may include breaking out in a cold sweat, nausea, or lightheadedness. Don't wait more than five minutes to call 911 - MINUTES MATTER! Fast action can save your life. Calling 911 is almost always the fastest way to get lifesaving treatment. Emergency Medical Services staff can begin treatment when they arrive -- up to an hour sooner than if someone gets to the hospital by car. The discharge information has been reviewed with the patient. The patient verbalized understanding. Discharge medications reviewed with the patient and appropriate educational materials and side effects teaching were provided.   ___________________________________________________________________________________________________________________________________

## 2018-12-12 NOTE — PROGRESS NOTES
Received bedside shift report from [facility], Michelle Murray. Patient resting quietly in bed at this time, awake, respirations even and unlabored. Denies needs at this time. Bed low and locked. Bedside table, personal belongings and call light within reach.

## 2018-12-12 NOTE — PROGRESS NOTES
Patient resting quietly in bed at this time, eyes closed, respirations even and unlabored. No needs stated. Bed low and locked. Bedside table, personal belongings and call light within reach.

## 2018-12-13 ENCOUNTER — HOSPITAL ENCOUNTER (OUTPATIENT)
Dept: WOUND CARE | Age: 58
Discharge: HOME | End: 2018-12-13
Attending: SURGERY
Payer: COMMERCIAL

## 2018-12-13 VITALS
DIASTOLIC BLOOD PRESSURE: 73 MMHG | WEIGHT: 208 LBS | TEMPERATURE: 98.8 F | OXYGEN SATURATION: 98 % | RESPIRATION RATE: 18 BRPM | BODY MASS INDEX: 31.52 KG/M2 | HEIGHT: 68 IN | HEART RATE: 88 BPM | SYSTOLIC BLOOD PRESSURE: 124 MMHG

## 2018-12-13 DIAGNOSIS — I73.9 PAD (PERIPHERAL ARTERY DISEASE) (HCC): ICD-10-CM

## 2018-12-13 DIAGNOSIS — L97.422 DIABETIC ULCER OF LEFT HEEL ASSOCIATED WITH TYPE 2 DIABETES MELLITUS, WITH FAT LAYER EXPOSED (HCC): ICD-10-CM

## 2018-12-13 DIAGNOSIS — L03.116 CELLULITIS OF LEFT FOOT: ICD-10-CM

## 2018-12-13 DIAGNOSIS — E11.621 DIABETIC ULCER OF LEFT HEEL ASSOCIATED WITH TYPE 2 DIABETES MELLITUS, WITH FAT LAYER EXPOSED (HCC): ICD-10-CM

## 2018-12-13 PROCEDURE — 11042 DBRDMT SUBQ TIS 1ST 20SQCM/<: CPT | Performed by: SURGERY

## 2018-12-13 PROCEDURE — 99214 OFFICE O/P EST MOD 30 MIN: CPT

## 2018-12-13 PROCEDURE — 99204 OFFICE O/P NEW MOD 45 MIN: CPT | Performed by: SURGERY

## 2018-12-13 PROCEDURE — 11042 DBRDMT SUBQ TIS 1ST 20SQCM/<: CPT

## 2018-12-13 NOTE — WOUND CARE
[facility], [address]  Phone: [phone]  Fax: [phone]    Patient: Ashish Hopkins MRN: [id number]  SSN: [id number]    YOB: 1960  Age: 66 y.o. Sex: male       Return Appointment: 1 week with Dolores Byrd MD    Instructions:   Left Medial to Posterior Heel:  Cleanse wound and periwound with wound cleanser or normal saline. Hydrofera Blue: Cut to wound size, moisten with saline, and apply to wound bed. Secure with Mepitel One, cover with ABD, wrap with Rolled Gauze. Dressing change 3x week. Minimize ambulation to offload pressure on heel. Wear Susan Harada at all times and [place] mydala with Susan Harada on. Complete Antibiotic as ordered. Diabetic Diet with daily blood sugar checks; Fasting Blood Sugar Goal:  , max 130. Keep January 8, 2018 appointment with Dr. Robel Brito. Should you experience increased redness, swelling, pain, foul odor, size of wound(s), or have a temperature over 101 degrees please contact the [address] [place] [address] Road at [phone] or if after hours contact your primary care physician or go to the hospital emergency department.     Signed By: Michelle Friedman RN     December 13, 2018

## 2018-12-13 NOTE — PROCEDURES
Wound Center Debridement    Patient: Subha Davis MRN: [id number]  SSN: [id number]    YOB: 1960  Age: 66 y.o. Sex: male      December 13, 2018     Procedure Performed By: Martha Wheatley MD    Wound: 1 Left  Non Pressure  Heel & Midfoot To Fat Layer    Type of Debridement:  Surgical      Time Out Taken: Yes    Pain Control: N/A    Level: Skin/Subcutaneous Tissue    Type of Tissue Removed:  Adipose, Biofilm, Callus, Dermis, Epidermis, Exudate, Fibrin, Slough and Subcutaneous    Frequency of Debridements: PRN    Consent in chart     Instrument: Blade & Forceps     Bleeding: Minimal     Hemostasis: Pressure     Procedural Pain: 2    Post-Procedural Pain: 0    Pre-Debridement Measurements: 3.8 x 3.5 x 0.1 cm    Post-Debridement Measurements: 4.5 x 4.5 x 0.6 cm    Surface Area Debrided: 13.3 sq. cm    Response to Procedure: tolerated the procedure well with no complications     Dayne Dunne MD

## 2018-12-13 NOTE — PROGRESS NOTES
WOUND CENTER Consult Note/Progress Note:   Judi Juarez  MRN: [id number]  :1960  Age:58 y.o.    HPI: Judi Juarez is a 66 y.o. male who is referred from [facility].  He was recently discharged. He was a patient of Dr. Chapincito Vyas who underwent above-knee femoropopliteal bypass with PTFE in the left leg in . He had multiple toe amputations at that time. He was lost to follow-up. He presented with cellulitis of his left foot and the medial heel region. He was treated with antibiotics. Noninvasive vascular study showed occlusion of his bypass but he has no rest pain and was felt to have adequate circulation. He has follow-up with Dr. Chapincito Vyas in 2019. He is completing a course of antibiotics. Wound care has been primarily painting with Betadine. Plain films were negative for foreign body. There is no radiographic evidence of osteomyelitis. He has a pacemaker and cannot tolerate MRI. He had bone scan which showed some activity in 1 of his toes but the toes are without clinical abnormality. This information was obtained from the patient  The following HPI elements were documented for the patient's wound:  Location: L heel  Duration: 12/3/2018  Severity:  5/10  Context:  Cellulitis, poss ischemia  Modifying Factors:  Nothing makes better or worse  Quality: burning  Timing: constant and especially when manipulated  Associated Signs and Symptoms: n/a    Past Medical History:   Diagnosis Date    Acute on chronic diastolic heart failure ([facility] 2011    followed by dr Seferino Schilder     pacemaker Medtronic-- followed by [facility] cardio    Atherosclerotic PVD with ulceration ([facility] 2015    CAD (coronary artery disease) 2011     3 vessel CABG; aortic valve replacement/Bovine-- both at same time-- followed by dr Rocio Mirza Chronic kidney disease     chronic renal insuff. --- dos not see a nephrologist     Chronic pain     left foot    Depression 2/28/2011    Diabetes mellitus ([facility] dx age 31    type2--- sqbs avg am--100--- hypo at 52's    H/O aortic valve replacement 2/2011    Bovine valve    Heart failure ([facility]     Hypercholesterolemia     Hypertension     controlled with med    Murmur, cardiac 02/2011    aortic valve replaced with CABG, 2011------- 2/6 SALVADOR RUSB apex, 2/6 TR murmur-- ECHO 2/12/15 LVEF 55% --- followed by dr Eva Garza PVD (peripheral vascular disease) ([facility]     Shingles 6/2015    Warfarin anticoagulation      Past Surgical History:   Procedure Laterality Date    CARDIAC SURG PROCEDURE UNLIST  2011    Dr. Ernie Morrison  3 vessel cabg and aortic valve replacement    COLONOSCOPY N/A 2/14/2018    COLONOSCOPY  BMI 37 performed by Darien Farah MD at [facility] ENDOSCOPY    HX FREE SKIN GRAFT Left 8/11/15    thigh to foot graft    HX HEART CATHETERIZATION  03/07/2011    HX PACEMAKER  2011    medtronic per pt (phone assessment)    HX UROLOGICAL  2005    Penile Impant    VASCULAR SURGERY PROCEDURE UNLIST  2/17/15    LE arteriogram    VASCULAR SURGERY PROCEDURE UNLIST Left 3-16-15    fem-tibial by-pass    VASCULAR SURGERY PROCEDURE UNLIST Left 6/2/15    5th toe amp    VASCULAR SURGERY PROCEDURE UNLIST Left 6/5/15    wound debridement    VASCULAR SURGERY PROCEDURE UNLIST Left 8/24/15    great toe amp     Current Outpatient Medications   Medication Sig    amoxicillin-clavulanate (AUGMENTIN) 875-125 mg per tablet Take 1 Tab by mouth every twelve (12) hours for 14 days.  HYDROcodone-acetaminophen (NORCO) 5-325 mg per tablet Take 1 Tab by mouth every four (4) hours as needed for Pain. Max Daily Amount: 6 Tabs.  insulin glargine U-300 conc (TOUJEO SOLOSTAR U-300 INSULIN) 300 unit/mL (1.5 mL) inpn 50 units daily    pravastatin (PRAVACHOL) 80 mg tablet Take 1 Tab by mouth nightly.  amLODIPine (NORVASC) 10 mg tablet Take 1 Tab by mouth every morning.     metoprolol succinate (TOPROL-XL) 50 mg XL tablet Take 1 Tab by mouth every morning.  eszopiclone (LUNESTA) 2 mg tablet Take 1 Tab by mouth nightly. Max Daily Amount: 2 mg.  DULoxetine (CYMBALTA) 60 mg capsule Take 1 Cap by mouth daily.  insulin aspart U-100 (NOVOLOG FLEXPEN U-100 INSULIN) 100 unit/mL inpn 15 Units by SubCUTAneous route Before breakfast, lunch, and dinner.  irbesartan (AVAPRO) 150 mg tablet Take 1 Tab by mouth nightly.  aspirin 81 mg chewable tablet Take 81 mg by mouth daily. No current facility-administered medications for this encounter. ALLERGIES: Patient has no known allergies. Social History     Socioeconomic History    Marital status:      Spouse name: Not on file    Number of children: Not on file    Years of education: Not on file    Highest education level: Not on file   Occupational History    Occupation: works in the lab     Comment: [employer]   Tobacco Use    Smoking status: Never Smoker    Smokeless tobacco: Never Used   Substance and Sexual Activity    Alcohol use: No    Drug use: Yes     Types: Prescription     Social History     Tobacco Use   Smoking Status Never Smoker   Smokeless Tobacco Never Used     Family History   Problem Relation Age of Onset    Diabetes Mother     Heart Disease Mother     Heart Surgery Mother 54        valve replacement    Other Mother         Shrogens    Diabetes Father     Heart Disease Father     Heart Surgery Father 61        cabg    Lung Disease Father     Parkinsonism Father     Other Father         polio as a child-affected lungs    Diabetes Brother     Diabetes Paternal Uncle         x2    Thyroid Disease Brother     Stroke Brother     Cancer Paternal Grandfather        ROS: The patient has no difficulty with chest pain or shortness of breath. No fever or chills. Comprehensive review of systems was otherwise unremarkable except as noted above.     Physical Exam:   Visit Vitals  /73 (BP 1 Location: Right arm)   Pulse 88   Temp 98.8 °F (37.1 °C)   Resp 18   Ht 5' 8\" (1.727 m)   Wt 208 lb (94.3 kg)   SpO2 98%   BMI 31.63 kg/m²     Constitutional: Alert, oriented, cooperative patient in no acute distress; appears stated age;  General appearance is within normal limits for wound care patient population. See the today's recorded vitals signs and constitutional data. Eyes: Pupils equal; Sclera are clear. EOMs intact; The eyes appear to track and move normally. The sclera are not injected. The conjunctive are clear. The eyelids are normal. There is no scleral icterus. ENMT: There are no obvious external ear, nose, lip or mouth lesions. Nares normal; Neck: Overall contour of the neck is normal with no obvious neck masses. Gross hearing is within normal limits. No obvious neck masses  CV: RRR;  no JVD; No evidence of cyanosis of the upper extremities. The extremities are perfused without embolic sign, splinter hemorrhages, or petechia. Resp:  Breathing is non-labored; normal rate and effort; no audible wheezing. GI: soft and non-distended without acute abnormality noted. Musculoskeletal: unremarkable with normal function. No embolic signs or cyanosis. Neuro:  Oriented; moves all 4; no focal deficits  Psychiatric: Judgement and insight are within normal limits for the wound care population of patients. Patient is oriented to person, place, and time. Recent and remote memory are within normal limits. Mood and affect are within normal limits. Integumentary (Skin/Wounds)  See inspection of wound(s) below. There are no other skin areas of palpable concern. See wound center documentation including photos in the [address] Wound Template made part of this record by reference.      Wounds:  Wound Foot Left (Active)   Number of days: 7       Wound Heel Left (Active)   Number of days: 7       Wound Heel Left;Medial;Posterior (Active)   DRESSING STATUS New drainage 12/13/2018  2:16 PM   Non-Pressure Injury Full thickness (subcut/muscle) 12/13/2018  2:16 PM   Wound Length (cm) 3.8 cm 12/13/2018  2:16 PM   Wound Width (cm) 3.5 cm 12/13/2018  2:16 PM   Wound Depth (cm) 0.1 12/13/2018  2:16 PM   Wound Surface area (cm^2) 13.3 cm^2 12/13/2018  2:16 PM   Condition of Base Granulation;[place];Slough 12/13/2018  2:16 PM   Condition of Edges Calloused; Open 12/13/2018  2:16 PM   Tissue Type Percent Pink 95 12/13/2018  2:16 PM   Tissue Type Percent Yellow 5 12/13/2018  2:16 PM   Drainage Amount  Small  12/13/2018  2:16 PM   Drainage Color Serosanguinous 12/13/2018  2:16 PM   Wound Odor None 12/13/2018  2:16 PM   Periwound Skin Condition Calloused;Edematous; Erythema, blanchable 12/13/2018  2:16 PM   Cleansing and Cleansing Agents  Normal saline 12/13/2018  2:16 PM   Procedure Tolerated Well 12/13/2018  2:16 PM   Number of days: 0       [REMOVED] Wound Foot Right;Left (Removed)   Number of days: 1705       [REMOVED] Wound Foot Plantar;Lateral (Removed)   Number of days: 1027       [REMOVED] Wound Foot Left (Removed)   Number of days: 1280       [REMOVED] Wound Thigh Left;Upper (Removed)   Number of days: 1519       [REMOVED] Wound Foot Left (Removed)   Number of days: 8412       [REMOVED] Wound Left (Removed)   Number of days: 1200       [REMOVED] Wound Foot Right (Removed)   Number of days: 587       [REMOVED] Wound Heel Dorsal;Left;Posterior (Removed)   Number of days: 2             Recent vitals (if inpt):  Patient Vitals for the past 24 hrs:   BP Temp Pulse Resp SpO2 Height Weight   12/13/18 1357 124/73 98.8 °F (37.1 °C) 88 18 98 % 5' 8\" (1.727 m) 208 lb (94.3 kg)       Labs:  Recent Labs     12/11/18  0543   WBC 7.8   HGB 12.0*         K 4.6      CO2 25   BUN 15   CREA 1.64*   GLU 90   TBILI 0.3   SGOT 27   ALT 35   AP 90       Lab Results   Component Value Date/Time    WBC 7.8 12/11/2018 05:43 AM    HGB 12.0 (L) 12/11/2018 05:43 AM    PLATELET 726 08/90/2938 05:43 AM    Sodium 140 12/11/2018 05:43 AM    Potassium 4.6 12/11/2018 05:43 AM    Chloride 104 12/11/2018 05:43 AM    CO2 25 12/11/2018 05:43 AM    BUN 15 12/11/2018 05:43 AM    Creatinine 1.64 (H) 12/11/2018 05:43 AM    Glucose 90 12/11/2018 05:43 AM    Glucose 126 (H) 02/17/2011 10:25 AM    INR 1.7 (H) 06/01/2016 11:26 AM    aPTT 45.6 (H) 08/24/2015 10:07 PM    Bilirubin, total 0.3 12/11/2018 05:43 AM    AST (SGOT) 27 12/11/2018 05:43 AM    ALT (SGPT) 35 12/11/2018 05:43 AM    Alk. phosphatase 90 12/11/2018 05:43 AM    Amylase 49 12/07/2016 02:49 PM    Lipase 36 12/07/2016 02:49 PM    Troponin-I <0.05 02/28/2011 12:10 PM    Troponin-I, Qt. <0.02 (L) 04/24/2018 04:29 PM     XR Results (most recent):  Results from Hospital Encounter encounter on 12/03/18   XR FOOT LT MIN 3 V    Narrative Left foot series    HISTORY: Diabetic wound. 3 views of the left foot were obtained. Comparison is made to the prior exam  06/21/2016. FINDINGS: There are amputations of the first and fifth digits. There are  hammertoe deformities of the second through fourth digits. There is soft tissue  edema along the posterior/inferior margin of the calcaneus without bony  destruction. Vascular calcifications are present. Impression IMPRESSION:  1. Soft tissue swelling adjacent to the calcaneus without radiographic features  to suggest osteomyelitis. 2. Amputation of the first and fifth digits. I reviewed recent labs and recent radiologic studies. I independently reviewed radiology images for studies I described above or studies I have ordered. Admission date (for inpatients): 12/13/2018   * No surgery found *  * No surgery found *      ASSESSMENT/PLAN:  Patient with known vascular disease. He has failed femoropopliteal bypass from 2015. He has no rest pain or acute ischemic changes. He was admitted with cellulitis and heel wound. Wound was debrided today. Devitalized tissue was removed. There is some wound pain. We will change dressings from Betadine to [facility]. This will hopefully further debride the wound. There appears to be reasonable blood flow to sustain healing. Skin substitute grafts may be appropriate. Follow-up in 1 week.     Problem List  Date Reviewed: 12/6/2018          Codes Class Noted    Cellulitis of left foot ICD-10-CM: L03.116  ICD-9-CM: 682.7  12/7/2018        DM (diabetes mellitus), type 2, uncontrolled ([facility] ICD-10-CM: E11.65  ICD-9-CM: 250.02  12/3/2018        Acute kidney injury superimposed on CKD [facility]) ICD-10-CM: N17.9, N18.9  ICD-9-CM: 866.00, 585.9  12/3/2018        Diabetic ulcer of left heel ([facility] ICD-10-CM: E11.621, L97.429  ICD-9-CM: 250.80, 707.14  12/3/2018        Class 2 severe obesity due to excess calories with serious comorbidity and body mass index (BMI) of 35.0 to 35.9 in adult [facility]) ICD-10-CM: E66.01, Z68.35  ICD-9-CM: 278.01, V85.35  8/20/2018        Preop cardiovascular exam (Chronic) ICD-10-CM: Z01.810  ICD-9-CM: V72.81  8/15/2017        Aortic stenosis ICD-10-CM: I35.0  ICD-9-CM: 424.1  2/8/2017        Edema ICD-10-CM: R60.9  ICD-9-CM: 782.3  2/8/2017        H/O heart valve replacement with bioprosthetic valve ICD-10-CM: Z95.3  ICD-9-CM: V42.2  2/8/2017    Overview Signed 8/15/2017 10:50 AM by Donell Kaur MD     Aortic              Long term current use of anticoagulant therapy ICD-10-CM: Z79.01  ICD-9-CM: V58.61  10/14/2015        Diabetic ulcer of left great toe (HCC) (Chronic) ICD-10-CM: R31.214, H15.232  ICD-9-CM: 250.80, 707.15  8/22/2015    Overview Signed 8/24/2015  4:00 PM by Jonas Sosa NP     8/24/15 (Dr Joselyn Vo) AMPUTATION LEFT FIRST TOE, LEFT FOOT DEBRIDMENT             Ischemia of foot ICD-10-CM: I99.8  ICD-9-CM: 459.9  8/21/2015        Acute renal failure (ARF) ([facility] ICD-10-CM: N17.9  ICD-9-CM: 584.9  8/21/2015        Hypertension (Chronic) ICD-10-CM: I10  ICD-9-CM: 401.9  6/1/2015        Warfarin-induced coagulopathy ([facility] ICD-10-CM: G04.38, T45.515A  ICD-9-CM: 286.7, E934.2  6/1/2015        Diabetic foot ulcer ([facility] ICD-10-CM: [id number], L97.509  ICD-9-CM: 250.80, 707.15  6/1/2015    Overview Addendum 6/6/2015  7:04 AM by Rosario Marquez NP     6/5/15 (Dr Abe Knapp) Left foot wound debridement, measuring 6 x 6 cm.   6/2/15 (Dr Abe Knapp) 1. Left fifth toe open amputation. 2. Drainage of the forefoot abscess. IDDM (insulin dependent diabetes mellitus) ([facility] ICD-10-CM: E11.9, Z79.4  ICD-9-CM: 250.00, V58.67  6/1/2015        PAD (peripheral artery disease) ([facility] ICD-10-CM: I73.9  ICD-9-CM: 443.9  6/1/2015        CKD (chronic kidney disease), stage III ([facility] ICD-10-CM: N18.3  ICD-9-CM: 585.3  6/1/2015        Shingles ICD-10-CM: B02.9  ICD-9-CM: 053.9  6/1/2015        PVD (peripheral vascular disease) ([facility] ICD-10-CM: I73.9  ICD-9-CM: 443.9  3/16/2015    Overview Signed 3/18/2015  1:57 PM by Rosario Marquez NP     3/16/15 (Dr Abe Knapp) 1. Left superficial femoral artery endarterectomy. 2. Common femoral to above knee popliteal bypass with PTFE graft.   3. Left lower extremity arteriogram.             Atherosclerotic PVD with ulceration (HCC) (Chronic) ICD-10-CM: Y69.686, L98.499  ICD-9-CM: 440.23, 707.9  2/2/2015        Abscess or cellulitis of back ICD-10-CM: [id number]  ICD-9-CM: 682.2  12/26/2014        Other and unspecified hyperlipidemia ICD-10-CM: E78.5  ICD-9-CM: 272.4  6/24/2013        Thrombocytopenia (HCC) ICD-10-CM: D69.6  ICD-9-CM: 287.5  3/10/2011        Postsurgical aortocoronary bypass status ICD-10-CM: Z95.1  ICD-9-CM: V45.81  3/7/2011        Bicuspid aortic valve (Chronic) ICD-10-CM: Q23.1  ICD-9-CM: 746.4  3/7/2011        S/P AVR (aortic valve replacement) ICD-10-CM: Z95.2  ICD-9-CM: V43.3  3/7/2011        Acute on chronic diastolic heart failure (HCC) ICD-10-CM: I50.33  ICD-9-CM: 428.33  2/28/2011        CAD (coronary artery disease) (Chronic) ICD-10-CM: I25.10  ICD-9-CM: 414.00  2/28/2011        Diabetes mellitus ([facility] (Chronic) ICD-10-CM: E11.9  ICD-9-CM: 250.00  2/28/2011        Depression (Chronic) ICD-10-CM: F32.9  ICD-9-CM: 228  2/28/2011        Dyslipidemia (Chronic) ICD-10-CM: E78.5  ICD-9-CM: 272.4  2/28/2011        Acute on chronic renal failure (HCC) (Chronic) ICD-10-CM: N17.9, N18.9  ICD-9-CM: 584.9, 585.9  2/28/2011            Active Problems:    * No active hospital problems. *       Any procedures done today in the wound center are documented in a separate note in connect care made part of this record by reference     Wound Care  Orders Placed This Encounter    WOUND CARE, DRESSING CHANGE     Left Medial to Posterior Heel:  Cleanse wound and periwound with wound cleanser or normal saline. Hydrofera Blue: Cut to wound size, moisten with saline, and apply to wound bed. Secure with Mepitel One, cover with ABD, wrap with Rolled Gauze. Dressing change 3x week. Minimize ambulation to offload pressure on heel. Wear Zosano Pharmabe at all times and [place] with Esteban Grayson on. Complete Antibiotic as ordered. Diabetic Diet with daily blood sugar checks; Fasting Blood Sugar Goal:  , max 130. Keep January 8, 2018 appointment with Dr. Marco Varma.      Standing Status:   Standing     Number of Occurrences:   1             Follow-up Information     Follow up With Specialties Details Why Contact Info    [facility]OUND [facility] Wound Care In 1 week For wound re-check Elder Schmid [place] [phone]          Signed:  Dionne Roe MD,  FACS

## 2018-12-13 NOTE — WOUND CARE
12/13/18 1416   Wound Heel Left;Medial   Date First Assessed/Time First Assessed: 12/13/18 1415   POA: Yes  Wound Type: Diabetic  Location: Heel  Wound Description (Optional): Diabetic Foot Ulcer  Orientation: Left;Medial   DRESSING STATUS New drainage   DRESSING TYPE (Betadine, Gauze, Rolled Gauze)   Non-Pressure Injury Full thickness (subcut/muscle)   Wound Length (cm) 3.8 cm   Wound Width (cm) 3.5 cm   Wound Depth (cm) 0.1   Wound Surface area (cm^2) 13.3 cm^2   Condition of Base Granulation;Pink;Slough   Condition of Edges Calloused; Open   Tissue Type Percent Pink 95   Tissue Type Percent Yellow 5   Drainage Amount  Small    Drainage Color Serosanguinous   Wound Odor None   Periwound Skin Condition Calloused;Edematous; Erythema, blanchable   Cleansing and Cleansing Agents  Normal saline   Procedure Tolerated Well

## 2018-12-14 NOTE — ADT AUTH CERT NOTES
Cellulitis - Care Day 5 (12/11/2018) by Angelina Figueroa RN        Review Status Review Entered   Completed 12/11/2018 15:52      Criteria Review      Care Day: 5 Care Date: 12/11/2018 Level of Care: Inpatient Floor   Guideline Day 3    Clinical Status   (X) * Hemodynamic stability   12/11/2018 3:52 PM EST by Theo Dhaliwal     /81, HR 76, SAT 95%      (X) * Fever absent or improved   12/11/2018 3:52 PM EST by Rebollar Snowball 97.9      ( ) * Skin exam stable or improved   (X) * Mental status at baseline   12/11/2018 3:52 PM EST by Piedad Ndiaye      ( ) * Antibiotic treatment needs appropriate for next level of care   (X) * Pain absent or manageable at next level of care   ( ) * Discharge plans and education understood      Activity   (X) * Ambulatory   12/11/2018 3:52 PM EST by Theo Dhaliwal     AMBULATE         Routes   (X) * Oral hydration, medications,[E]and diet   12/11/2018 3:52 PM EST by Nancy Russo, AUGMENTIN PO BID, HEPARIN SQ, NORCO, LANTUS, TOPROL, DIOVAN,         Interventions   (X) WBC   12/11/2018 3:52 PM EST by Theo Gum     WBC: 7.8 RBC: 4.01 (L) HGB: 12.0 (L) HCT: 36.2 (L)         Medications   (X) Parenteral or oral antibiotics[A]   12/11/2018 3:52 PM EST by Antonia Rinks 600 MG IV Q 12,         12/11/2018 3:52 PM EST by Theo Gum   Subject: Additional Clinical Information   Impression:Left heel DFI/ulcer, with secondary infection.   Suspect pressure related blister that has ruptured.   No evidence of osteomyelitis on bone scan. PVD hx of left fem-pop bypass graft, now occluded DM, Hgb A1c 8. 9CAD/CABG/PPM/bioprosthetic AVCKD 3  Plan:Change back to oral Augmentin, plan 2 weeks rxContinue wound careConsider Vascular Surgery evaluationFollow wound culture resultCreatinine: 1.64 (H)               * Milestone

## 2018-12-17 ENCOUNTER — PATIENT OUTREACH (OUTPATIENT)
Dept: CASE MANAGEMENT | Age: 58
End: 2018-12-17

## 2018-12-17 LAB
BACTERIA SPEC CULT: NORMAL
SERVICE CMNT-IMP: NORMAL

## 2018-12-17 NOTE — PROGRESS NOTES
ISAURA CASTRO called pt to talk with him about his power situation. Pt stated that they still don't have power at their house. ISAURA CASTRO tried again to get pt to let her three way call with him to his power company to see if between the two of them the power company would be willing to lower the bill some. Pt declined stating that he's already tried that and they only came down a little. Pt stated that he's been calling the places that ISAURA CASTRO gave him information on but that he feels like its all just a hury up and wait kind of situation. Pt stated that he's working on setting up an appointment to go meet with Mohit Loza. ISAURA CASTRO let pt know that there's not any other resources ISAURA CASTRO is aware of and that without ISAURA CASTRO being able to help him call his power company ISAURA CASTRO isn't able to help provide any more insight or assistance. PLAN:  ISAURA CASTRO will remain available for pt's questions over the next two weeks but if ISAURA CASTRO doesn't hear from pt in that time ISAURA CASTRO will move to close this CCM episode at this time. This note will not be viewable in [address]

## 2018-12-20 ENCOUNTER — HOSPITAL ENCOUNTER (OUTPATIENT)
Dept: WOUND CARE | Age: 58
Discharge: HOME | End: 2018-12-20
Attending: SURGERY
Payer: COMMERCIAL

## 2018-12-20 VITALS
DIASTOLIC BLOOD PRESSURE: 78 MMHG | RESPIRATION RATE: 18 BRPM | TEMPERATURE: 98.4 F | HEIGHT: 68 IN | HEART RATE: 86 BPM | WEIGHT: 208 LBS | SYSTOLIC BLOOD PRESSURE: 139 MMHG | OXYGEN SATURATION: 96 % | BODY MASS INDEX: 31.52 KG/M2

## 2018-12-20 DIAGNOSIS — E11.621 DIABETIC ULCER OF LEFT HEEL ASSOCIATED WITH TYPE 2 DIABETES MELLITUS, WITH FAT LAYER EXPOSED (HCC): ICD-10-CM

## 2018-12-20 DIAGNOSIS — E11.65 UNCONTROLLED TYPE 2 DIABETES MELLITUS WITH HYPERGLYCEMIA (HCC): ICD-10-CM

## 2018-12-20 DIAGNOSIS — L97.422 DIABETIC ULCER OF LEFT HEEL ASSOCIATED WITH TYPE 2 DIABETES MELLITUS, WITH FAT LAYER EXPOSED (HCC): ICD-10-CM

## 2018-12-20 DIAGNOSIS — I73.9 PVD (PERIPHERAL VASCULAR DISEASE) (HCC): ICD-10-CM

## 2018-12-20 PROCEDURE — 99214 OFFICE O/P EST MOD 30 MIN: CPT | Performed by: SURGERY

## 2018-12-20 PROCEDURE — 99214 OFFICE O/P EST MOD 30 MIN: CPT

## 2018-12-20 NOTE — WOUND CARE
[address]  [address], [address]  Phone: [phone]  Fax: [phone]    Patient: Nino Scott MRN: [id number]  SSN: [id number]    YOB: 1960  Age: 66 y.o. Sex: male       Return Appointment: 1 week with Sanjay Horn MD    Instructions:   Left Medial Heel-  Cleanse wound and periwound with wound cleanser or normal saline. Hydrofera Blue: Cut to wound size, moisten with saline, and apply to wound bed. Cover with ABD, wrap with Rolled Gauze. Dressing change 3x week. Minimize ambulation to offload pressure on heel. Wear Rooke Boot at all times and [employer] with [employer] on. Should you experience increased redness, swelling, pain, foul odor, size of wound(s), or have a temperature over 101 degrees please contact the [address] [place] [address] Road at [phone] or if after hours contact your primary care physician or go to the hospital emergency department.     Signed By: Emilia Dumont RN     December 20, 2018

## 2018-12-20 NOTE — PROGRESS NOTES
WOUND CENTER Consult Note/Progress Note:   Lynne Rawls  MRN: [id number]  :1960  Age:58 y.o.    HPI: Lynne Rawls is a 66 y.o. male who is referred from [facility].  He was recently discharged. He was a patient of Dr. Ingrid Gupta who underwent above-knee femoropopliteal bypass with PTFE in the left leg in . He had multiple toe amputations at that time. He was lost to follow-up. He presented with cellulitis of his left foot and the medial heel region. He was treated with antibiotics. Noninvasive vascular study showed occlusion of his bypass but he has no rest pain and was felt to have adequate circulation. He has follow-up with Dr. Ingrid Gupta in 2019. He is completing a course of antibiotics. Wound care has been primarily painting with Betadine. Plain films were negative for foreign body. There is no radiographic evidence of osteomyelitis. He has a pacemaker and cannot tolerate MRI. He had bone scan which showed some activity in 1 of his toes but the toes are without clinical abnormality. This information was obtained from the patient  The following HPI elements were documented for the patient's wound:  Location: L heel  Duration: 12/3/2018  Severity:  5/10  Context:  Cellulitis, poss ischemia  Modifying Factors:  Nothing makes better or worse  Quality: burning  Timing: constant and especially when manipulated  Associated Signs and Symptoms: n/a    Past Medical History:   Diagnosis Date    Acute on chronic diastolic heart failure ([facility] 2011    followed by dr Rickey Cullen     pacemaker Medtronic-- [facility] cardio    Atherosclerotic PVD with ulceration ([facility] 2015    CAD (coronary artery disease) 2011     3 vessel CABG; aortic valve replacement/Bovine-- both at same time-- followed by dr Venecia Douglas Chronic kidney disease     chronic renal insuff. --- dos not see a nephrologist     Chronic pain     left foot    Depression 2/28/2011    Diabetes mellitus ([facility] dx age 31    type2--- sqbs avg am--100--- hypo at 52's    H/O aortic valve replacement 2/2011    Bovine valve    Heart failure ([facility]     Hypercholesterolemia     Hypertension     controlled with med    Murmur, cardiac 02/2011    aortic valve replaced with CABG, 2011------- 2/6 SALVADOR RUSB apex, 2/6 TR murmur-- ECHO 2/12/15 LVEF 55% --- followed by dr Ariadna Monique PVD (peripheral vascular disease) ([facility]     Shingles 6/2015    Warfarin anticoagulation      Past Surgical History:   Procedure Laterality Date    CARDIAC SURG PROCEDURE UNLIST  2011    Dr. Alida Tomas  3 vessel cabg and aortic valve replacement    COLONOSCOPY N/A 2/14/2018    COLONOSCOPY  BMI 37 performed by Vern Peralta [facility] at [facility] ENDOSCOPY    HX FREE SKIN GRAFT Left 8/11/15    thigh to foot graft    HX HEART CATHETERIZATION  03/07/2011    HX PACEMAKER  2011    medtronic per pt (phone assessment)    HX UROLOGICAL  2005    Penile Impant    VASCULAR SURGERY PROCEDURE UNLIST  2/17/15    LE arteriogram    VASCULAR SURGERY PROCEDURE UNLIST Left 3-16-15    fem-tibial by-pass    VASCULAR SURGERY PROCEDURE UNLIST Left 6/2/15    5th toe amp    VASCULAR SURGERY PROCEDURE UNLIST Left 6/5/15    wound debridement    VASCULAR SURGERY PROCEDURE UNLIST Left 8/24/15    great toe amp     Current Outpatient Medications   Medication Sig    icosapent ethyl (VASCEPA) 1 gram capsule Take 2 Caps by mouth two (2) times daily (with meals).  amoxicillin-clavulanate (AUGMENTIN) 875-125 mg per tablet Take 1 Tab by mouth every twelve (12) hours for 14 days.  HYDROcodone-acetaminophen (NORCO) 5-325 mg per tablet Take 1 Tab by mouth every four (4) hours as needed for Pain. Max Daily Amount: 6 Tabs.  insulin glargine U-300 conc (TOUJEO SOLOSTAR U-300 INSULIN) 300 unit/mL (1.5 mL) inpn 50 units daily    pravastatin (PRAVACHOL) 80 mg tablet Take 1 Tab by mouth nightly.     amLODIPine (NORVASC) 10 mg tablet Take 1 Tab by mouth every morning.  metoprolol succinate (TOPROL-XL) 50 mg XL tablet Take 1 Tab by mouth every morning.  eszopiclone (LUNESTA) 2 mg tablet Take 1 Tab by mouth nightly. Max Daily Amount: 2 mg.  DULoxetine (CYMBALTA) 60 mg capsule Take 1 Cap by mouth daily.  insulin aspart U-100 (NOVOLOG FLEXPEN U-100 INSULIN) 100 unit/mL inpn 15 Units by SubCUTAneous route Before breakfast, lunch, and dinner.  irbesartan (AVAPRO) 150 mg tablet Take 1 Tab by mouth nightly.  aspirin 81 mg chewable tablet Take 81 mg by mouth daily. No current facility-administered medications for this encounter. ALLERGIES: Patient has no known allergies. Social History     Socioeconomic History    Marital status:      Spouse name: Not on file    Number of children: Not on file    Years of education: Not on file    Highest education level: Not on file   Occupational History    Occupation: works in the lab     Comment: F   Tobacco Use    Smoking status: Never Smoker    Smokeless tobacco: Never Used   Substance and Sexual Activity    Alcohol use: No    Drug use: Yes     Types: Prescription     Social History     Tobacco Use   Smoking Status Never Smoker   Smokeless Tobacco Never Used     Family History   Problem Relation Age of Onset    Diabetes Mother     Heart Disease Mother     Heart Surgery Mother 54        valve replacement    Other Mother         Shrogens    Diabetes Father     Heart Disease Father     Heart Surgery Father 61        cabg    Lung Disease Father     Parkinsonism Father     Other Father         polio as a child-affected lungs    Diabetes Brother     Diabetes Paternal Uncle         x2    Thyroid Disease Brother     Stroke Brother     Cancer Paternal Grandfather        ROS: The patient has no difficulty with chest pain or shortness of breath. No fever or chills. Comprehensive review of systems was otherwise unremarkable except as noted above.     Physical Exam:   Visit Vitals  BP 139/78 (BP 1 Location: Left arm)   Pulse 86   Temp 98.4 °F (36.9 °C)   Resp 18   Ht 5' 8\" (1.727 m)   Wt 208 lb (94.3 kg)   SpO2 96%   BMI 31.63 kg/m²     Constitutional: Alert, oriented, cooperative patient in no acute distress; appears stated age;  General appearance is within normal limits for wound care patient population. See the today's recorded vitals signs and constitutional data. Eyes: Pupils equal; Sclera are clear. EOMs intact; The eyes appear to track and move normally. The sclera are not injected. The conjunctive are clear. The eyelids are normal. There is no scleral icterus. ENMT: There are no obvious external ear, nose, lip or mouth lesions. Nares normal; Neck: Overall contour of the neck is normal with no obvious neck masses. Gross hearing is within normal limits. No obvious neck masses  CV: RRR;  no JVD; No evidence of cyanosis of the upper extremities. The extremities are perfused without embolic sign, splinter hemorrhages, or petechia. Resp:  Breathing is non-labored; normal rate and effort; no audible wheezing. GI: soft and non-distended without acute abnormality noted. Musculoskeletal: unremarkable with normal function. No embolic signs or cyanosis. Neuro:  Oriented; moves all 4; no focal deficits  Psychiatric: Judgement and insight are within normal limits for the wound care population of patients. Patient is oriented to person, place, and time. Recent and remote memory are within normal limits. Mood and affect are within normal limits. Integumentary (Skin/Wounds)  See inspection of wound(s) below. There are no other skin areas of palpable concern. See wound center documentation including photos in the [address] Wound Template made part of this record by reference.      Wounds:  Wound Foot Left (Active)   Number of days: 14       Wound Heel Left (Active)   Number of days: 14       Wound Heel Left;Medial;Posterior (Active)   DRESSING STATUS Clean, dry, and intact; Old drainage 12/20/2018  2:56 PM   Non-Pressure Injury Full thickness (subcut/muscle) 12/13/2018  2:16 PM   Wound Length (cm) 6 cm 12/20/2018  2:56 PM   Wound Width (cm) 3 cm 12/20/2018  2:56 PM   Wound Depth (cm) 0.4 12/20/2018  2:56 PM   Wound Surface area (cm^2) 18 cm^2 12/20/2018  2:56 PM   Change in Wound Size % -35.34 12/20/2018  2:56 PM   Condition of Base Granulation;[place] 12/20/2018  2:56 PM   Condition of Edges Calloused; Open 12/13/2018  2:16 PM   Tissue Type Percent Pink 90 12/20/2018  2:56 PM   Tissue Type Percent Yellow 10 12/20/2018  2:56 PM   Drainage Amount  Small  12/20/2018  2:56 PM   Drainage Color Serosanguinous 12/20/2018  2:56 PM   Wound Odor None 12/20/2018  2:56 PM   Periwound Skin Condition Intact 12/20/2018  2:56 PM   Cleansing and Cleansing Agents  Normal saline 12/20/2018  2:56 PM   Procedure Tolerated Well 12/13/2018  2:16 PM   Number of days: 7       [REMOVED] Wound Foot Right;Left (Removed)   Number of days: 2085       [REMOVED] Wound Foot Plantar;Lateral (Removed)   Number of days: 6516       [REMOVED] Wound Foot Left (Removed)   Number of days: 1280       [REMOVED] Wound Thigh Left;Upper (Removed)   Number of days: 7914       [REMOVED] Wound Foot Left (Removed)   Number of days: 5072       [REMOVED] Wound Left (Removed)   Number of days: 1200       [REMOVED] Wound Foot Right (Removed)   Number of days: 377       [REMOVED] Wound Heel Dorsal;Left;Posterior (Removed)   Number of days: 2                   Recent vitals (if inpt):  Patient Vitals for the past 24 hrs:   BP Temp Pulse Resp SpO2 Height Weight   12/20/18 1434 139/78 98.4 °F (36.9 °C) 86 18 96 % 5' 8\" (1.727 m) 208 lb (94.3 kg)       Labs:  No results for input(s): WBC, HGB, PLT, NA, K, CL, CO2, BUN, CREA, GLU, PTP, INR, APTT, TBIL, TBILI, CBIL, SGOT, GPT, ALT, AP, AML, LPSE, LCAD, NH4, TROPT, TROIQ, PCO2, PO2, HCO3, HGBEXT, PLTEXT, HGBEXT, PLTEXT in the last 72 hours.     No lab exists for component:  PH, INREXT, INREXT    Lab Results   Component Value Date/Time    WBC 7.8 12/11/2018 05:43 AM    HGB 12.0 (L) 12/11/2018 05:43 AM    PLATELET 875 45/48/2242 05:43 AM    Sodium 140 12/11/2018 05:43 AM    Potassium 4.6 12/11/2018 05:43 AM    Chloride 104 12/11/2018 05:43 AM    CO2 25 12/11/2018 05:43 AM    BUN 15 12/11/2018 05:43 AM    Creatinine 1.64 (H) 12/11/2018 05:43 AM    Glucose 90 12/11/2018 05:43 AM    Glucose 126 (H) 02/17/2011 10:25 AM    INR 1.7 (H) 06/01/2016 11:26 AM    aPTT 45.6 (H) 08/24/2015 10:07 PM    Bilirubin, total 0.3 12/11/2018 05:43 AM    AST (SGOT) 27 12/11/2018 05:43 AM    ALT (SGPT) 35 12/11/2018 05:43 AM    Alk. phosphatase 90 12/11/2018 05:43 AM    Amylase 49 12/07/2016 02:49 PM    Lipase 36 12/07/2016 02:49 PM    Troponin-I <0.05 02/28/2011 12:10 PM    Troponin-I, Qt. <0.02 (L) 04/24/2018 04:29 PM     XR Results (most recent):  Results from Hospital Encounter encounter on 12/03/18   XR FOOT LT MIN 3 V    Narrative Left foot series    HISTORY: Diabetic wound. 3 views of the left foot were obtained. Comparison is made to the prior exam  06/21/2016. FINDINGS: There are amputations of the first and fifth digits. There are  hammertoe deformities of the second through fourth digits. There is soft tissue  edema along the posterior/inferior margin of the calcaneus without bony  destruction. Vascular calcifications are present. Impression IMPRESSION:  1. Soft tissue swelling adjacent to the calcaneus without radiographic features  to suggest osteomyelitis. 2. Amputation of the first and fifth digits. I reviewed recent labs and recent radiologic studies. I independently reviewed radiology images for studies I described above or studies I have ordered. Admission date (for inpatients): 12/20/2018   * No surgery found *  * No surgery found *      ASSESSMENT/PLAN:  Patient with known vascular disease. He has failed femoropopliteal bypass from 2015.   He has no rest pain or acute ischemic changes. He was admitted with cellulitis and heel wound. Wound was debrided 12/13. Devitalized tissue was removed. There is some pain. Continue dressings of Hydrofera Blue to further debride the wound. There is improvement. There appears to be reasonable blood flow to sustain healing. Skin substitute grafts may be appropriate. Follow-up in 1 week.     Problem List  Date Reviewed: 12/18/2018          Codes Class Noted    Cellulitis of left foot ICD-10-CM: L03.116  ICD-9-CM: 682.7  12/7/2018        DM (diabetes mellitus), type 2, uncontrolled ([facility] ICD-10-CM: E11.65  ICD-9-CM: 250.02  12/3/2018        Acute kidney injury superimposed on CKD [facility]) ICD-10-CM: N17.9, N18.9  ICD-9-CM: 866.00, 585.9  12/3/2018        Diabetic ulcer of left heel ([facility] ICD-10-CM: E11.621, L97.429  ICD-9-CM: 250.80, 707.14  12/3/2018        Class 2 severe obesity due to excess calories with serious comorbidity and body mass index (BMI) of 35.0 to 35.9 in adult [facility]) ICD-10-CM: E66.01, Z68.35  ICD-9-CM: 278.01, V85.35  8/20/2018        Preop cardiovascular exam (Chronic) ICD-10-CM: Z01.810  ICD-9-CM: V72.81  8/15/2017        Aortic stenosis ICD-10-CM: I35.0  ICD-9-CM: 424.1  2/8/2017        Edema ICD-10-CM: R60.9  ICD-9-CM: 782.3  2/8/2017        H/O heart valve replacement with bioprosthetic valve ICD-10-CM: Z95.3  ICD-9-CM: V42.2  2/8/2017    Overview Signed 8/15/2017 10:50 AM by Darrin Jose MD     Aortic              Long term current use of anticoagulant therapy ICD-10-CM: Z79.01  ICD-9-CM: V58.61  10/14/2015        Diabetic ulcer of left great toe (HCC) (Chronic) ICD-10-CM: E11.621, B21.612  ICD-9-CM: 250.80, 707.15  8/22/2015    Overview Signed 8/24/2015  4:00 PM by Farris Nageotte, NP     8/24/15 (Dr Jennifer Kaufman) AMPUTATION LEFT FIRST TOE, LEFT FOOT DEBRIDMENT             Ischemia of foot ICD-10-CM: I99.8  ICD-9-CM: 459.9  8/21/2015        Acute renal failure (ARF) ([facility] ICD-10-CM: N17.9  ICD-9-CM: 584.9 8/21/2015        Hypertension (Chronic) ICD-10-CM: I10  ICD-9-CM: 401.9  6/1/2015        Warfarin-induced coagulopathy ([place] [address] ICD-10-CM: V64.16, T45.515A  ICD-9-CM: 286.7, E934.2  6/1/2015        Diabetic foot ulcer ([place] [address] ICD-10-CM: N42.695, L97.509  ICD-9-CM: 250.80, 707.15  6/1/2015    Overview Addendum 6/6/2015  7:04 AM by Bala Cook NP     6/5/15 (Dr Traci Acuña) Left foot wound debridement, measuring 6 x 6 cm.   6/2/15 (Dr Traci Acuña) 1. Left fifth toe open amputation. 2. Drainage of the forefoot abscess. IDDM (insulin dependent diabetes mellitus) ([place] [address] ICD-10-CM: E11.9, Z79.4  ICD-9-CM: 250.00, V58.67  6/1/2015        PAD (peripheral artery disease) ([place] [address] ICD-10-CM: I73.9  ICD-9-CM: 443.9  6/1/2015        CKD (chronic kidney disease), stage III ([place] [address] ICD-10-CM: N18.3  ICD-9-CM: 585.3  6/1/2015        Shingles ICD-10-CM: B02.9  ICD-9-CM: 053.9  6/1/2015        PVD (peripheral vascular disease) ([place] [address] ICD-10-CM: I73.9  ICD-9-CM: 443.9  3/16/2015    Overview Signed 3/18/2015  1:57 PM by Bala Cook NP     3/16/15 (Dr Traci Acuña) 1. Left superficial femoral artery endarterectomy. 2. Common femoral to above knee popliteal bypass with PTFE graft.   3. Left lower extremity arteriogram.             Atherosclerotic PVD with ulceration (HCC) (Chronic) ICD-10-CM: G40.510, L98.499  ICD-9-CM: 440.23, 707.9  2/2/2015        Abscess or cellulitis of back ICD-10-CM: [id number]  ICD-9-CM: 682.2  12/26/2014        Other and unspecified hyperlipidemia ICD-10-CM: E78.5  ICD-9-CM: 272.4  6/24/2013        Thrombocytopenia (HCC) ICD-10-CM: D69.6  ICD-9-CM: 287.5  3/10/2011        Postsurgical aortocoronary bypass status ICD-10-CM: Z95.1  ICD-9-CM: V45.81  3/7/2011        Bicuspid aortic valve (Chronic) ICD-10-CM: Q23.1  ICD-9-CM: 746.4  3/7/2011        S/P AVR (aortic valve replacement) ICD-10-CM: Z95.2  ICD-9-CM: V43.3  3/7/2011        Acute on chronic diastolic heart failure (HCC) ICD-10-CM: I50.33  ICD-9-CM: 428.33  2/28/2011        CAD (coronary artery disease) (Chronic) ICD-10-CM: I25.10  ICD-9-CM: 414.00  2/28/2011        Diabetes mellitus (HCC) (Chronic) ICD-10-CM: E11.9  ICD-9-CM: 250.00  2/28/2011        Depression (Chronic) ICD-10-CM: F32.9  ICD-9-CM: 220  2/28/2011        Dyslipidemia (Chronic) ICD-10-CM: E78.5  ICD-9-CM: 272.4  2/28/2011        Acute on chronic renal failure (HCC) (Chronic) ICD-10-CM: N17.9, N18.9  ICD-9-CM: 584.9, 585.9  2/28/2011            Active Problems:    * No active hospital problems. *       Any procedures done today in the wound center are documented in a separate note in connect care made part of this record by reference     Wound Care  Orders Placed This Encounter    WOUND CARE, DRESSING CHANGE     Left Medial Heel:  Cleanse wound and periwound with wound cleanser or normal saline. Hydrofera Blue: Cut to wound size, moisten with saline, and apply to wound bed. Cover with ABD, wrap with Rolled Gauze. Dressing change 3x week. Minimize ambulation to offload pressure on heel. Wear [employer] Boot at all times and [employer] with [employer] on.      Standing Status:   Standing     Number of Occurrences:   1             Follow-up Information     Follow up With Specialties Details Why Contact Info    [facility]OUND [facility] Wound Care In 1 week For wound re-check Elder [address] [place] [phone] [place] [phone] [address]          Signed:  Jaun Hall MD,  FACS

## 2018-12-20 NOTE — WOUND CARE
12/20/18 1456   Wound Heel Left;Medial;Posterior   Date First Assessed/Time First Assessed: 12/13/18 1415   POA: Yes  Wound Type: Diabetic  Location: Heel  Wound Description (Optional): Diabetic Foot Ulcer  Orientation: Left;Medial;Posterior   DRESSING STATUS Clean, dry, and intact; Old drainage   DRESSING TYPE (hydrofera, ABD, rolled gauze)   Wound Length (cm) 6 cm   Wound Width (cm) 3 cm   Wound Depth (cm) 0.4   Wound Surface area (cm^2) 18 cm^2   Change in Wound Size % -35.34   Condition of Base Granulation;Pink;Slough   Tissue Type Percent Pink 90   Tissue Type Percent Yellow 10   Drainage Amount  Small    Drainage Color Serosanguinous   Wound Odor None   Periwound Skin Condition Intact   Cleansing and Cleansing Agents  Normal saline

## 2018-12-27 ENCOUNTER — HOSPITAL ENCOUNTER (OUTPATIENT)
Dept: WOUND CARE | Age: 58
Discharge: HOME | End: 2018-12-27
Attending: SURGERY
Payer: COMMERCIAL

## 2018-12-27 VITALS
BODY MASS INDEX: 30.25 KG/M2 | HEART RATE: 112 BPM | RESPIRATION RATE: 18 BRPM | OXYGEN SATURATION: 98 % | WEIGHT: 199.6 LBS | SYSTOLIC BLOOD PRESSURE: 84 MMHG | DIASTOLIC BLOOD PRESSURE: 65 MMHG | TEMPERATURE: 97.7 F | HEIGHT: 68 IN

## 2018-12-27 DIAGNOSIS — I73.9 PAD (PERIPHERAL ARTERY DISEASE) (HCC): ICD-10-CM

## 2018-12-27 DIAGNOSIS — Z79.01 LONG TERM CURRENT USE OF ANTICOAGULANT THERAPY: ICD-10-CM

## 2018-12-27 DIAGNOSIS — E11.65 TYPE 2 DIABETES MELLITUS WITH HYPERGLYCEMIA, WITH LONG-TERM CURRENT USE OF INSULIN (HCC): Chronic | ICD-10-CM

## 2018-12-27 DIAGNOSIS — L97.422 DIABETIC ULCER OF LEFT HEEL ASSOCIATED WITH TYPE 2 DIABETES MELLITUS, WITH FAT LAYER EXPOSED (HCC): ICD-10-CM

## 2018-12-27 DIAGNOSIS — Z79.4 TYPE 2 DIABETES MELLITUS WITH HYPERGLYCEMIA, WITH LONG-TERM CURRENT USE OF INSULIN (HCC): Chronic | ICD-10-CM

## 2018-12-27 DIAGNOSIS — E11.621 DIABETIC ULCER OF LEFT HEEL ASSOCIATED WITH TYPE 2 DIABETES MELLITUS, WITH FAT LAYER EXPOSED (HCC): ICD-10-CM

## 2018-12-27 PROCEDURE — 99214 OFFICE O/P EST MOD 30 MIN: CPT | Performed by: SURGERY

## 2018-12-27 PROCEDURE — 99214 OFFICE O/P EST MOD 30 MIN: CPT

## 2018-12-27 NOTE — PROGRESS NOTES
WOUND CENTER Consult Note/Progress Note:   Kang Vega  MRN: [id number]  :1960  Age:58 y.o.    HPI: Kang Vega is a 66 y.o. male who is referred from [facility].  He was recently discharged. He was a patient of Dr. Александр Campuzano who underwent above-knee femoropopliteal bypass with PTFE in the left leg in . He had multiple toe amputations at that time. He was lost to follow-up. He presented with cellulitis of his left foot and the medial heel region. He was treated with antibiotics. Noninvasive vascular study showed occlusion of his bypass but he has no rest pain and was felt to have adequate circulation. He has follow-up with Dr. Александр Campuzano in 2019. He is completing a course of antibiotics. Wound care has been primarily painting with Betadine. Plain films were negative for foreign body. There is no radiographic evidence of osteomyelitis. He has a pacemaker and cannot tolerate MRI. He had bone scan which showed some activity in 1 of his toes but the toes are without clinical abnormality. This information was obtained from the patient  The following HPI elements were documented for the patient's wound:  Location: L heel  Duration: 12/3/2018  Severity:  5/10  Context:  Cellulitis, poss ischemia  Modifying Factors:  Nothing makes better or worse  Quality: burning  Timing: constant and especially when manipulated  Associated Signs and Symptoms: n/a    Past Medical History:   Diagnosis Date    Acute on chronic diastolic heart failure ([facility] 2011    followed by dr Sneed Horse 2011    pacemaker Medtronic-- [facility] by [facility] cardio    Atherosclerotic PVD with ulceration ([facility] [address] 2015    CAD (coronary artery disease) 2011     3 vessel CABG; aortic valve replacement/Bovine-- both at same time-- followed by dr Lisa Faustin Chronic kidney disease     chronic renal insuff. --- dos not see a nephrologist     Chronic pain     left foot    Depression 2/28/2011    Diabetes mellitus ([facility] dx age 31    type2--- sqbs avg am--100--- hypo at 52's    H/O aortic valve replacement 2/2011    Bovine valve    Heart failure ([facility]     Hypercholesterolemia     Hypertension     controlled with med    Murmur, cardiac 02/2011    aortic valve replaced with CABG, 2011------- 2/6 SALVADOR RUSB apex, 2/6 TR murmur-- ECHO 2/12/15 LVEF 55% --- followed by dr Xavier Mann PVD (peripheral vascular disease) ([facility]     Shingles 6/2015    Warfarin anticoagulation      Past Surgical History:   Procedure Laterality Date    CARDIAC SURG PROCEDURE UNLIST  2011    Dr. Jimenez  3 vessel cabg and aortic valve replacement    COLONOSCOPY N/A 2/14/2018    COLONOSCOPY  BMI 37 performed by Taty [facility]enhall [facility] ENDOSCOPY    HX FREE SKIN GRAFT Left 8/11/15    thigh to foot graft    HX HEART CATHETERIZATION  03/07/2011    HX PACEMAKER  2011    medtronic per pt (phone assessment)    HX UROLOGICAL  2005    Penile Impant    VASCULAR SURGERY PROCEDURE UNLIST  2/17/15    LE arteriogram    VASCULAR SURGERY PROCEDURE UNLIST Left 3-16-15    fem-tibial by-pass    VASCULAR SURGERY PROCEDURE UNLIST Left 6/2/15    5th toe amp    VASCULAR SURGERY PROCEDURE UNLIST Left 6/5/15    wound debridement    VASCULAR SURGERY PROCEDURE UNLIST Left 8/24/15    great toe amp     Current Outpatient Medications   Medication Sig    icosapent ethyl (VASCEPA) 1 gram capsule Take 2 Caps by mouth two (2) times daily (with meals).  HYDROcodone-acetaminophen (NORCO) 5-325 mg per tablet Take 1 Tab by mouth every four (4) hours as needed for Pain. Max Daily Amount: 6 Tabs.  insulin glargine U-300 conc (TOUJEO SOLOSTAR U-300 INSULIN) 300 unit/mL (1.5 mL) inpn 50 units daily (Patient taking differently: 50 Units by SubCUTAneous route daily. 50 units daily)    pravastatin (PRAVACHOL) 80 mg tablet Take 1 Tab by mouth nightly.  amLODIPine (NORVASC) 10 mg tablet Take 1 Tab by mouth every morning.     metoprolol succinate (TOPROL-XL) 50 mg XL tablet Take 1 Tab by mouth every morning.  eszopiclone (LUNESTA) 2 mg tablet Take 1 Tab by mouth nightly. Max Daily Amount: 2 mg.  DULoxetine (CYMBALTA) 60 mg capsule Take 1 Cap by mouth daily.  insulin aspart U-100 (NOVOLOG FLEXPEN U-100 INSULIN) 100 unit/mL inpn 15 Units by SubCUTAneous route Before breakfast, lunch, and dinner.  irbesartan (AVAPRO) 150 mg tablet Take 1 Tab by mouth nightly.  aspirin 81 mg chewable tablet Take 81 mg by mouth daily. No current facility-administered medications for this encounter. ALLERGIES: Patient has no known allergies. Social History     Socioeconomic History    Marital status:      Spouse name: Not on file    Number of children: Not on file    Years of education: Not on file    Highest education level: Not on file   Occupational History    Occupation: works in the lab     Comment: [employer]   Tobacco Use    Smoking status: Never Smoker    Smokeless tobacco: Never Used   Substance and Sexual Activity    Alcohol use: No    Drug use: Yes     Types: Prescription     Social History     Tobacco Use   Smoking Status Never Smoker   Smokeless Tobacco Never Used     Family History   Problem Relation Age of Onset    Diabetes Mother     Heart Disease Mother     Heart Surgery Mother 54        valve replacement    Other Mother         Shrogens    Diabetes Father     Heart Disease Father     Heart Surgery Father 61        cabg    Lung Disease Father     Parkinsonism Father     Other Father         polio as a child-affected lungs    Diabetes Brother     Diabetes Paternal Uncle         x2    Thyroid Disease Brother     Stroke Brother     Cancer Paternal Grandfather        ROS: The patient has no difficulty with chest pain or shortness of breath. No fever or chills. Comprehensive review of systems was otherwise unremarkable except as noted above.     Physical Exam:   Visit Vitals  BP (!) 84/65 (BP 1 Location: Left arm)   Pulse (!) 112   Temp 97.7 °F (36.5 °C)   Resp 18   Ht 5' 8\" (1.727 m)   Wt 199 lb 9.6 oz (90.5 kg)   SpO2 98%   BMI 30.35 kg/m²     Constitutional: Alert, oriented, cooperative patient in no acute distress; appears stated age;  General appearance is within normal limits for wound care patient population. See the today's recorded vitals signs and constitutional data. Eyes: Pupils equal; Sclera are clear. EOMs intact; The eyes appear to track and move normally. The sclera are not injected. The conjunctive are clear. The eyelids are normal. There is no scleral icterus. ENMT: There are no obvious external ear, nose, lip or mouth lesions. Nares normal; Neck: Overall contour of the neck is normal with no obvious neck masses. Gross hearing is within normal limits. No obvious neck masses  CV: RRR;  no JVD; No evidence of cyanosis of the upper extremities. The extremities are perfused without embolic sign, splinter hemorrhages, or petechia. Resp:  Breathing is non-labored; normal rate and effort; no audible wheezing. GI: soft and non-distended without acute abnormality noted. Musculoskeletal: unremarkable with normal function. No embolic signs or cyanosis. Neuro:  Oriented; moves all 4; no focal deficits  Psychiatric: Judgement and insight are within normal limits for the wound care population of patients. Patient is oriented to person, place, and time. Recent and remote memory are within normal limits. Mood and affect are within normal limits. Integumentary (Skin/Wounds)  See inspection of wound(s) below. There are no other skin areas of palpable concern. See wound center documentation including photos in the [address] Wound Template made part of this record by reference.      Wounds:  Wound Foot Left (Active)   Number of days: 21       Wound Heel Left (Active)   Number of days: 21       Wound Heel Left;Medial;Posterior (Active)   DRESSING STATUS Clean, dry, and intact 12/27/2018 3:25 PM   Non-Pressure Injury Full thickness (subcut/muscle) 12/27/2018  3:25 PM   Wound Length (cm) 1.8 cm 12/27/2018  3:25 PM   Wound Width (cm) 4.3 cm 12/27/2018  3:25 PM   Wound Depth (cm) 0.4 12/27/2018  3:25 PM   Wound Surface area (cm^2) 7.74 cm^2 12/27/2018  3:25 PM   Change in Wound Size % 41.8 12/27/2018  3:25 PM   Condition of Base Granulation;Slough 12/27/2018  3:25 PM   Condition of Edges Calloused 12/27/2018  3:25 PM   Tissue Type Percent Pink 90 12/27/2018  3:25 PM   Tissue Type Percent Yellow 10 12/27/2018  3:25 PM   Drainage Amount  Scant 12/27/2018  3:25 PM   Drainage Color Serous 12/27/2018  3:25 PM   Wound Odor None 12/27/2018  3:25 PM   Periwound Skin Condition Intact 12/27/2018  3:25 PM   Cleansing and Cleansing Agents  Normal saline 12/27/2018  3:25 PM   Procedure Tolerated Well 12/27/2018  3:25 PM   Number of days: 14       [REMOVED] Wound Foot Right;Left (Removed)   Number of days: 3477       [REMOVED] Wound Foot Plantar;Lateral (Removed)   Number of days: 0067       [REMOVED] Wound Foot Left (Removed)   Number of days: 1280       [REMOVED] Wound Thigh Left;Upper (Removed)   Number of days: 4235       [REMOVED] Wound Foot Left (Removed)   Number of days: 1213       [REMOVED] Wound Left (Removed)   Number of days: 1200       [REMOVED] Wound Foot Right (Removed)   Number of days: 708       [REMOVED] Wound Heel Dorsal;Left;Posterior (Removed)   Number of days: 2                     Recent vitals (if inpt):  Patient Vitals for the past 24 hrs:   BP Temp Pulse Resp SpO2 Height Weight   12/27/18 1437 (!) 84/65 97.7 °F (36.5 °C) (!) 112 18 98 % 5' 8\" (1.727 m) 199 lb 9.6 oz (90.5 kg)       Labs:  No results for input(s): WBC, HGB, PLT, NA, K, CL, CO2, BUN, CREA, GLU, PTP, INR, APTT, TBIL, TBILI, CBIL, SGOT, GPT, ALT, AP, AML, LPSE, LCAD, NH4, TROPT, TROIQ, PCO2, PO2, HCO3, HGBEXT, PLTEXT, HGBEXT, PLTEXT in the last 72 hours.     No lab exists for component:  PH, INREXT, INREXT    Lab Results Component Value Date/Time    WBC 7.8 12/11/2018 05:43 AM    HGB 12.0 (L) 12/11/2018 05:43 AM    PLATELET 377 54/64/8233 05:43 AM    Sodium 140 12/11/2018 05:43 AM    Potassium 4.6 12/11/2018 05:43 AM    Chloride 104 12/11/2018 05:43 AM    CO2 25 12/11/2018 05:43 AM    BUN 15 12/11/2018 05:43 AM    Creatinine 1.64 (H) 12/11/2018 05:43 AM    Glucose 90 12/11/2018 05:43 AM    Glucose 126 (H) 02/17/2011 10:25 AM    INR 1.7 (H) 06/01/2016 11:26 AM    aPTT 45.6 (H) 08/24/2015 10:07 PM    Bilirubin, total 0.3 12/11/2018 05:43 AM    AST (SGOT) 27 12/11/2018 05:43 AM    ALT (SGPT) 35 12/11/2018 05:43 AM    Alk. phosphatase 90 12/11/2018 05:43 AM    Amylase 49 12/07/2016 02:49 PM    Lipase 36 12/07/2016 02:49 PM    Troponin-I <0.05 02/28/2011 12:10 PM    Troponin-I, Qt. <0.02 (L) 04/24/2018 04:29 PM     XR Results (most recent):  Results from Hospital Encounter encounter on 12/03/18   XR FOOT LT MIN 3 V    Narrative Left foot series    HISTORY: Diabetic wound. 3 views of the left foot were obtained. Comparison is made to the prior exam  06/21/2016. FINDINGS: There are amputations of the first and fifth digits. There are  hammertoe deformities of the second through fourth digits. There is soft tissue  edema along the posterior/inferior margin of the calcaneus without bony  destruction. Vascular calcifications are present. Impression IMPRESSION:  1. Soft tissue swelling adjacent to the calcaneus without radiographic features  to suggest osteomyelitis. 2. Amputation of the first and fifth digits. I reviewed recent labs and recent radiologic studies. I independently reviewed radiology images for studies I described above or studies I have ordered. Admission date (for inpatients): 12/27/2018   * No surgery found *  * No surgery found *      ASSESSMENT/PLAN:  Patient with known vascular disease. He has failed femoropopliteal bypass from 2015. He has no rest pain or acute ischemic changes.   He was admitted with cellulitis and heel wound. Wound was debrided 12/13. Devitalized tissue was removed. There is some pain. Continue dressings of Hydrofera Blue to further debride the wound. There is improvement. There appears to be reasonable blood flow to sustain healing. Skin substitute grafts may be appropriate, but wound may close without them. .  Follow-up in 1 week.     Problem List  Date Reviewed: 12/18/2018          Codes Class Noted    Cellulitis of left foot ICD-10-CM: L03.116  ICD-9-CM: 682.7  12/7/2018        DM (diabetes mellitus), type 2, uncontrolled ([facility] ICD-10-CM: E11.65  ICD-9-CM: 250.02  12/3/2018        Acute kidney injury superimposed on CKD [facility]) ICD-10-CM: N17.9, N18.9  ICD-9-CM: 866.00, 585.9  12/3/2018        Diabetic ulcer of left heel ([facility] ICD-10-CM: E11.621, L97.429  ICD-9-CM: 250.80, 707.14  12/3/2018        Class 2 severe obesity due to excess calories with serious comorbidity and body mass index (BMI) of 35.0 to 35.9 in adult [facility]) ICD-10-CM: E66.01, Z68.35  ICD-9-CM: 278.01, V85.35  8/20/2018        Preop cardiovascular exam (Chronic) ICD-10-CM: Z01.810  ICD-9-CM: V72.81  8/15/2017        Aortic stenosis ICD-10-CM: I35.0  ICD-9-CM: 424.1  2/8/2017        Edema ICD-10-CM: R60.9  ICD-9-CM: 782.3  2/8/2017        H/O heart valve replacement with bioprosthetic valve ICD-10-CM: Z95.3  ICD-9-CM: V42.2  2/8/2017    Overview Signed 8/15/2017 10:50 AM by Rosana Lara MD     Aortic              Long term current use of anticoagulant therapy ICD-10-CM: Z79.01  ICD-9-CM: V58.61  10/14/2015        Diabetic ulcer of left great toe (HCC) (Chronic) ICD-10-CM: E11.621, J84.122  ICD-9-CM: 250.80, 707.15  8/22/2015    Overview Signed 8/24/2015  4:00 PM by Berry Rush NP     8/24/15 (Dr Lisest Lang) AMPUTATION LEFT FIRST TOE, LEFT FOOT DEBRIDMENT             Ischemia of foot ICD-10-CM: I99.8  ICD-9-CM: 459.9  8/21/2015        Acute renal failure (ARF) ([facility] [address] ICD-10-CM: N17.9  ICD-9-CM: 584.9  8/21/2015        Hypertension (Chronic) ICD-10-CM: I10  ICD-9-CM: 401.9  6/1/2015        Warfarin-induced coagulopathy ([place] [address] ICD-10-CM: E75.45, T45.515A  ICD-9-CM: 286.7, E934.2  6/1/2015        Diabetic foot ulcer ([place] [address] ICD-10-CM: Q50.415, L97.509  ICD-9-CM: 250.80, 707.15  6/1/2015    Overview Addendum 6/6/2015  7:04 AM by Evonne Flores NP     6/5/15 (Dr Josee Hylton) Left foot wound debridement, measuring 6 x 6 cm.   6/2/15 (Dr Josee Hylton) 1. Left fifth toe open amputation. 2. Drainage of the forefoot abscess. IDDM (insulin dependent diabetes mellitus) ([place] [address] ICD-10-CM: E11.9, Z79.4  ICD-9-CM: 250.00, V58.67  6/1/2015        PAD (peripheral artery disease) ([place] [address] ICD-10-CM: I73.9  ICD-9-CM: 443.9  6/1/2015        CKD (chronic kidney disease), stage III ([place] [address] ICD-10-CM: N18.3  ICD-9-CM: 585.3  6/1/2015        Shingles ICD-10-CM: B02.9  ICD-9-CM: 053.9  6/1/2015        PVD (peripheral vascular disease) ([place] [address] ICD-10-CM: I73.9  ICD-9-CM: 443.9  3/16/2015    Overview Signed 3/18/2015  1:57 PM by Evonne Flores NP     3/16/15 (Dr Josee Hylton) 1. Left superficial femoral artery endarterectomy. 2. Common femoral to above knee popliteal bypass with PTFE graft.   3. Left lower extremity arteriogram.             Atherosclerotic PVD with ulceration (HCC) (Chronic) ICD-10-CM: B25.964, L98.499  ICD-9-CM: 440.23, 707.9  2/2/2015        Abscess or cellulitis of back ICD-10-CM: LLX6673  ICD-9-CM: 682.2  12/26/2014        Other and unspecified hyperlipidemia ICD-10-CM: E78.5  ICD-9-CM: 272.4  6/24/2013        Thrombocytopenia (HCC) ICD-10-CM: D69.6  ICD-9-CM: 287.5  3/10/2011        Postsurgical aortocoronary bypass status ICD-10-CM: Z95.1  ICD-9-CM: V45.81  3/7/2011        Bicuspid aortic valve (Chronic) ICD-10-CM: Q23.1  ICD-9-CM: 746.4  3/7/2011        S/P AVR (aortic valve replacement) ICD-10-CM: Z95.2  ICD-9-CM: V43.3  3/7/2011        Acute on chronic diastolic heart failure ([facility] ICD-10-CM: I50.33  ICD-9-CM: 428.33  2/28/2011        CAD (coronary artery disease) (Chronic) ICD-10-CM: I25.10  ICD-9-CM: 414.00  2/28/2011        Diabetes mellitus (HCC) (Chronic) ICD-10-CM: E11.9  ICD-9-CM: 250.00  2/28/2011        Depression (Chronic) ICD-10-CM: F32.9  ICD-9-CM: 879  2/28/2011        Dyslipidemia (Chronic) ICD-10-CM: E78.5  ICD-9-CM: 272.4  2/28/2011        Acute on chronic renal failure (HCC) (Chronic) ICD-10-CM: N17.9, N18.9  ICD-9-CM: 584.9, 585.9  2/28/2011            Active Problems:    * No active hospital problems. *       Any procedures done today in the wound center are documented in a separate note in connect care made part of this record by reference     Wound Care  Orders Placed This Encounter    WOUND CARE, DRESSING CHANGE     Left Medial Heel-  Cleanse wound and periwound with wound cleanser or normal saline. Hydrofera Blue: Cut to wound size, moisten with saline, and apply to wound bed. Cover with ABD, wrap with Rolled Gauze. Dressing change 3x week. Minimize ambulation to offload pressure on heel. Wear Rooke Boot at all times and [employer]ify with [employer] on.      Standing Status:   Standing     Number of Occurrences:   1             Follow-up Information     Follow up With Specialties Details Why Contact Info    [facility] WOUND CARE Wound Care In 1 week [facility] [phone] [place] [address]          Signed:  Bria Mosqueda MD,  FACS

## 2018-12-27 NOTE — WOUND CARE
[address]  [address], [address]   Phone: [phone]  Fax: [phone]    Patient: Gerhardt Maltos MRN: [id number]  SSN: [id number]    YOB: 1960  Age: 66 y.o. Sex: male       Return Appointment: 1 week with Be Rios MD    Instructions:   Left Medial Heel:  Cleanse wound and periwound with wound cleanser or normal saline. Hydrofera Blue: Cut to wound size, moisten with saline, and apply to wound bed. Cover with ABD, wrap with Rolled Gauze. Dressing change 3x week. Minimize ambulation to offload pressure on heel. Wear Rooke Boot at all times and [facility] with [employer] on. Should you experience increased redness, swelling, pain, foul odor, size of wound(s), or have a temperature over 101 degrees please contact the [address] [place] [address] Road at [phone] or if after hours contact your primary care physician or go to the hospital emergency department.     Signed By: Mary Brooke RN     December 27, 2018

## 2018-12-27 NOTE — WOUND CARE
12/27/18 1525   Wound Heel Left;Medial;Posterior   Date First Assessed/Time First Assessed: 12/13/18 1415   POA: Yes  Wound Type: Diabetic  Location: Heel  Wound Description (Optional): Diabetic Foot Ulcer  Orientation: Left;Medial;Posterior   DRESSING STATUS Clean, dry, and intact   DRESSING TYPE (Hydrofera Blue, ABD, Rolled Gauze)   Non-Pressure Injury Full thickness (subcut/muscle)   Wound Length (cm) 1.8 cm   Wound Width (cm) 4.3 cm   Wound Depth (cm) 0.4   Wound Surface area (cm^2) 7.74 cm^2   Change in Wound Size % 41.8   Condition of Base Granulation;Slough   Condition of Edges Calloused   Tissue Type Percent Pink 90   Tissue Type Percent Yellow 10   Drainage Amount  Scant   Drainage Color Serous   Wound Odor None   Periwound Skin Condition Intact   Cleansing and Cleansing Agents  Normal saline

## 2018-12-28 NOTE — ROUTINE PROCESS
12/27/18  1437  Patient  VS :  B/P 84/65 . MD notified. Patient reported having had nausea/vomitting/diarrhea over the holidays 12/24-25/18. Admits to minimal fluid intake. MD encouraged patient to rehydrate with Pediolyte. 12/28/18  1530  Called patient at home. Patient reports \"drinking more\" -- having greater fluid intake for rehydration during last 24 hours than before 12/27/18. Over the phone assessment limited/subective.

## 2019-01-02 ENCOUNTER — PATIENT OUTREACH (OUTPATIENT)
Dept: CASE MANAGEMENT | Age: 59
End: 2019-01-02

## 2019-01-02 NOTE — Clinical Note
Follow up,I know you sent this patient as a referral for his lack of power. I have made numerus outreaches to both the pt and his wife. I have sent them information on utility assistance programs and offered to help them call the power company but both the pt and his wife are evasive and said that they will look into the information I have given them. They have declined a home visit and unfortunately I don't know of any other resources that maybe able to help them without them being more engaged. Please let me know if you feel like things change or that I can be of a help in the future and I'd love to step back in. ISAURA Zapata CM

## 2019-01-02 NOTE — PROGRESS NOTES
ISAURA CASTRO has not heard back from pt or his wife. Pt was unwilling to let ISAURA MATTHEW assist him in calling power company to try and talk about their bill. ISAURA MATTHEW has provided pt and his wife with information on utility assistance programs but pt said he hasn't been yee to fully contact all of them yet. PLAN: 
ISAURA CASTRO will close CCM episode at this time and inform pt's PCP of supports provided. This note will not be viewable in [address]

## 2019-01-03 ENCOUNTER — HOSPITAL ENCOUNTER (OUTPATIENT)
Dept: WOUND CARE | Age: 59
Discharge: HOME | End: 2019-01-03
Attending: SURGERY
Payer: COMMERCIAL

## 2019-01-03 VITALS
BODY MASS INDEX: 30.77 KG/M2 | TEMPERATURE: 98.7 F | WEIGHT: 203 LBS | HEIGHT: 68 IN | RESPIRATION RATE: 18 BRPM | OXYGEN SATURATION: 95 % | HEART RATE: 100 BPM | SYSTOLIC BLOOD PRESSURE: 138 MMHG | DIASTOLIC BLOOD PRESSURE: 82 MMHG

## 2019-01-03 DIAGNOSIS — I73.9 PAD (PERIPHERAL ARTERY DISEASE) (HCC): ICD-10-CM

## 2019-01-03 DIAGNOSIS — E11.621 DIABETIC ULCER OF LEFT HEEL ASSOCIATED WITH TYPE 2 DIABETES MELLITUS, WITH FAT LAYER EXPOSED (HCC): Primary | ICD-10-CM

## 2019-01-03 DIAGNOSIS — L97.422 DIABETIC ULCER OF LEFT HEEL ASSOCIATED WITH TYPE 2 DIABETES MELLITUS, WITH FAT LAYER EXPOSED (HCC): Primary | ICD-10-CM

## 2019-01-03 DIAGNOSIS — Z79.01 LONG TERM CURRENT USE OF ANTICOAGULANT THERAPY: ICD-10-CM

## 2019-01-03 PROCEDURE — 99214 OFFICE O/P EST MOD 30 MIN: CPT

## 2019-01-03 PROCEDURE — 99214 OFFICE O/P EST MOD 30 MIN: CPT | Performed by: SURGERY

## 2019-01-03 NOTE — WOUND CARE
01/03/19 1427 Wound Heel Left;Medial;Posterior Date First Assessed/Time First Assessed: 12/13/18 1415   POA: Yes  Wound Type: Diabetic  Location: Heel  Wound Description (Optional): Diabetic Foot Ulcer  Orientation: Left;Medial;Posterior DRESSING STATUS Clean, dry, and intact DRESSING TYPE (Hydrofera Blue, ABD, Rolled Gauze) Non-Pressure Injury Full thickness (subcut/muscle) Wound Length (cm) 1.4 cm Wound Width (cm) 4.6 cm Wound Depth (cm) 0.4 Wound Surface area (cm^2) 6.44 cm^2 Change in Wound Size % 51.58 Condition of Base Adipose exposed;Granulation;Slough Condition of Edges (open edges on medial aspect; thickened on posterior aspect) Tissue Type Percent Pink 70 Tissue Type Percent Yellow 30 Drainage Amount  Small Drainage Color Serous Wound Odor Mild Periwound Skin Condition Intact Cleansing and Cleansing Agents  Normal saline Procedure Tolerated Well [place] Grading Scale 0-5  Grade 2

## 2019-01-03 NOTE — PROGRESS NOTES
WOUND CENTER Consult Note/Progress Note:  
Davina Bee  MRN: [id number]  :1960  Age:58 y.o. 
 
HPI: Davina Bee is a 66 y.o. male who is referred from [facility].  He was recently discharged. He was a patient of Dr. Marvel Oliveira who underwent above-knee femoropopliteal bypass with PTFE in the left leg in . He had multiple toe amputations at that time. He was lost to follow-up. He presented with cellulitis of his left foot and the medial heel region. He was treated with antibiotics. Noninvasive vascular study showed occlusion of his bypass but he has no rest pain and was felt to have adequate circulation. He has follow-up with Dr. Marvel Oliveira in 2019. He is completing a course of antibiotics. Wound care has been primarily painting with Betadine. Plain films were negative for foreign body. There is no radiographic evidence of osteomyelitis. He has a pacemaker and cannot tolerate MRI. He had bone scan which showed some activity in 1 of his toes but the toes are without clinical abnormality. This information was obtained from the patient The following HPI elements were documented for the patient's wound: 
Location: L heel Duration: 12/3/2018 Severity:  5/10 Context:  Cellulitis, poss ischemia Modifying Factors:  Nothing makes better or worse Quality: burning Timing: constant and especially when manipulated Associated Signs and Symptoms: n/a Past Medical History:  
Diagnosis Date  Acute on chronic diastolic heart failure ([facility] 2011  
 followed by dr Joanna Walker  Arrhythmia   
 pacemaker Medtronic-- followed by [facility] cardio  Atherosclerotic PVD with ulceration ([facility] 2015  CAD (coronary artery disease) 2011  
  3 vessel CABG; aortic valve replacement/Bovine-- both at same time-- followed by dr Joanna Walker  Chronic kidney disease   
 chronic renal insuff. --- dos not see a nephrologist   
 Chronic pain   
 left foot  Depression 2/28/2011  Diabetes mellitus ([facility] dx age 31  
 type2--- sqbs avg am--100--- hypo at 52's  H/O aortic valve replacement 2/2011 Bovine valve  Heart failure ([facility]  Hypercholesterolemia  Hypertension   
 controlled with med  Murmur, cardiac 02/2011  
 aortic valve replaced with CABG, 2011------- 2/6 SALVADOR RUSB apex, 2/6 TR murmur-- ECHO 2/12/15 LVEF 55% --- followed by dr Alvin Rivers  PVD (peripheral vascular disease) ([facility]  Shingles 6/2015  Warfarin anticoagulation Past Surgical History:  
Procedure Laterality Date  CARDIAC SURG PROCEDURE UNLIST  2011 Dr. Louisa Meraz  3 vessel cabg and aortic valve replacement  COLONOSCOPY N/A 2/14/2018 COLONOSCOPY  BMI 37 performed by Avril Torres MD at [facility] ENDOSCOPY  
 HX FREE SKIN GRAFT Left 8/11/15  
 thigh to foot graft  HX HEART CATHETERIZATION  03/07/2011  HX PACEMAKER  2011  
 medtronic per pt (phone assessment)  HX UROLOGICAL  2005 Penile Impant  VASCULAR SURGERY PROCEDURE UNLIST  2/17/15 LE arteriogram  
 VASCULAR SURGERY PROCEDURE UNLIST Left 3-16-15  
 fem-tibial by-pass  VASCULAR SURGERY PROCEDURE UNLIST Left 6/2/15  
 5th toe amp  VASCULAR SURGERY PROCEDURE UNLIST Left 6/5/15  
 wound debridement  VASCULAR SURGERY PROCEDURE UNLIST Left 8/24/15  
 great toe amp Current Outpatient Medications Medication Sig  
 icosapent ethyl (VASCEPA) 1 gram capsule Take 2 Caps by mouth two (2) times daily (with meals).  HYDROcodone-acetaminophen (NORCO) 5-325 mg per tablet Take 1 Tab by mouth every four (4) hours as needed for Pain. Max Daily Amount: 6 Tabs.  insulin glargine U-300 conc (TOUJEO SOLOSTAR U-300 INSULIN) 300 unit/mL (1.5 mL) inpn 50 units daily (Patient taking differently: 50 Units by SubCUTAneous route daily. 50 units daily)  pravastatin (PRAVACHOL) 80 mg tablet Take 1 Tab by mouth nightly.  amLODIPine (NORVASC) 10 mg tablet Take 1 Tab by mouth every morning.  metoprolol succinate (TOPROL-XL) 50 mg XL tablet Take 1 Tab by mouth every morning.  eszopiclone (LUNESTA) 2 mg tablet Take 1 Tab by mouth nightly. Max Daily Amount: 2 mg.  DULoxetine (CYMBALTA) 60 mg capsule Take 1 Cap by mouth daily.  insulin aspart U-100 (NOVOLOG FLEXPEN U-100 INSULIN) 100 unit/mL inpn 15 Units by SubCUTAneous route Before breakfast, lunch, and dinner.  irbesartan (AVAPRO) 150 mg tablet Take 1 Tab by mouth nightly.  aspirin 81 mg chewable tablet Take 81 mg by mouth daily. No current facility-administered medications for this encounter. ALLERGIES: Patient has no known allergies. Social History Socioeconomic History  Marital status:  Spouse name: Not on file  Number of children: Not on file  Years of education: Not on file  Highest education level: Not on file Occupational History  Occupation: works in the lab Comment: [employer] Tobacco Use  Smoking status: Never Smoker  Smokeless tobacco: Never Used Substance and Sexual Activity  Alcohol use: No  
 Drug use: Yes Types: Prescription Social History Tobacco Use Smoking Status Never Smoker Smokeless Tobacco Never Used Family History Problem Relation Age of Onset  Diabetes Mother  Heart Disease Mother  Heart Surgery Mother 54  
     valve replacement [place]kinson Other Mother Shrogens  Diabetes Father  Heart Disease Father  Heart Surgery Father 61  
     cabg  Lung Disease Father  Parkinsonism Father  Other Father   
     polio as a child-affected lungs  Diabetes Brother  Diabetes Paternal Uncle x2  Thyroid Disease Brother  Stroke Brother  Cancer Paternal Grandfather ROS: The patient has no difficulty with chest pain or shortness of breath. No fever or chills. Comprehensive review of systems was otherwise unremarkable except as noted above. Physical Exam:  
Visit Vitals /82 (BP 1 Location: Left arm, BP Patient Position: Sitting) Pulse 100 Temp 98.7 °F (37.1 °C) Resp 18 Ht 5' 8\" (1.727 m) Wt 203 lb (92.1 kg) SpO2 95% BMI 30.87 kg/m² Constitutional: Alert, oriented, cooperative patient in no acute distress; appears stated age;  General appearance is within normal limits for wound care patient population. See the today's recorded vitals signs and constitutional data. Eyes: Pupils equal; Sclera are clear. EOMs intact; The eyes appear to track and move normally. The sclera are not injected. The conjunctive are clear. The eyelids are normal. There is no scleral icterus. ENMT: There are no obvious external ear, nose, lip or mouth lesions. Nares normal; Neck: Overall contour of the neck is normal with no obvious neck masses. Gross hearing is within normal limits. No obvious neck masses CV: RRR;  no JVD; No evidence of cyanosis of the upper extremities. The extremities are perfused without embolic sign, splinter hemorrhages, or petechia. Resp:  Breathing is non-labored; normal rate and effort; no audible wheezing. GI: soft and non-distended without acute abnormality noted. Musculoskeletal: unremarkable with normal function. No embolic signs or cyanosis. Neuro:  Oriented; moves all 4; no focal deficits Psychiatric: Judgement and insight are within normal limits for the wound care population of patients. Patient is oriented to person, place, and time. Recent and remote memory are within normal limits. Mood and affect are within normal limits. Integumentary (Skin/Wounds) See inspection of wound(s) below. There are no other skin areas of palpable concern. See wound center documentation including photos in the [address] Wound Template made part of this record by reference. Wounds: 
Wound Foot Left (Active) Number of days: 28 Wound Heel Left (Active) Number of days: 28 Wound Heel Left;Medial;Posterior (Active) DRESSING STATUS Clean, dry, and intact 1/3/2019  2:27 PM  
Non-Pressure Injury Full thickness (subcut/muscle) 1/3/2019  2:27 PM  
Wound Length (cm) 1.4 cm 1/3/2019  2:27 PM  
Wound Width (cm) 4.6 cm 1/3/2019  2:27 PM  
Wound Depth (cm) 0.4 1/3/2019  2:27 PM  
Wound Surface area (cm^2) 6.44 cm^2 1/3/2019  2:27 PM  
Change in Wound Size % 51.58 1/3/2019  2:27 PM  
Condition of Base Adipose exposed;Granulation;Slough 1/3/2019  2:27 PM  
Condition of Edges Calloused 12/27/2018  3:25 PM  
Tissue Type Percent Pink 70 1/3/2019  2:27 PM  
Tissue Type Percent Yellow 30 1/3/2019  2:27 PM  
Drainage Amount  Small  1/3/2019  2:27 PM  
Drainage Color Serous 1/3/2019  2:27 PM  
Wound Odor Mild 1/3/2019  2:27 PM  
Periwound Skin Condition Intact 1/3/2019  2:27 PM  
Cleansing and Cleansing Agents  Normal saline 1/3/2019  2:27 PM  
Procedure Tolerated Well 1/3/2019  2:27 PM  
[place] Grading Scale 0-5  Grade 2 1/3/2019  2:27 PM  
Number of days: 21  
   
[REMOVED] Wound Foot Right;Left (Removed) Number of days: 1704 [REMOVED] Wound Foot Plantar;Lateral (Removed) Number of days: 6366 [REMOVED] Wound Foot Left (Removed) Number of days: 1280 [REMOVED] Wound Thigh Left;Upper (Removed) Number of days: 1213 [REMOVED] Wound Foot Left (Removed) Number of days: 1213 [REMOVED] Wound Left (Removed) Number of days: 1200 [REMOVED] Wound Foot Right (Removed) Number of days: 759 [REMOVED] Wound Heel Dorsal;Left;Posterior (Removed) Number of days: 2 Recent vitals (if inpt): 
Patient Vitals for the past 24 hrs: 
 BP Temp Pulse Resp SpO2 Height Weight 01/03/19 1424 138/82 98.7 °F (37.1 °C) 100 18 95 % 5' 8\" (1.727 m) 203 lb (92.1 kg) Labs: 
No results for input(s): WBC, HGB, PLT, NA, K, CL, CO2, BUN, CREA, GLU, PTP, INR, APTT, TBIL, TBILI, CBIL, SGOT, GPT, ALT, AP, AML, LPSE, LCAD, NH4, TROPT, TROIQ, PCO2, PO2, HCO3, HGBEXT, PLTEXT, HGBEXT, PLTEXT in the last 72 hours. No lab exists for component:  PH, INREXT, INREXT Lab Results Component Value Date/Time WBC 7.8 12/11/2018 05:43 AM  
 HGB 12.0 (L) 12/11/2018 05:43 AM  
 PLATELET 771 95/49/2929 05:43 AM  
 Sodium 140 12/11/2018 05:43 AM  
 Potassium 4.6 12/11/2018 05:43 AM  
 Chloride 104 12/11/2018 05:43 AM  
 CO2 25 12/11/2018 05:43 AM  
 BUN 15 12/11/2018 05:43 AM  
 Creatinine 1.64 (H) 12/11/2018 05:43 AM  
 Glucose 90 12/11/2018 05:43 AM  
 Glucose 126 (H) 02/17/2011 10:25 AM  
 INR 1.7 (H) 06/01/2016 11:26 AM  
 aPTT 45.6 (H) 08/24/2015 10:07 PM  
 Bilirubin, total 0.3 12/11/2018 05:43 AM  
 AST (SGOT) 27 12/11/2018 05:43 AM  
 ALT (SGPT) 35 12/11/2018 05:43 AM  
 Alk. phosphatase 90 12/11/2018 05:43 AM  
 Amylase 49 12/07/2016 02:49 PM  
 Lipase 36 12/07/2016 02:49 PM  
 Troponin-I <0.05 02/28/2011 12:10 PM  
 Troponin-I, Qt. <0.02 (L) 04/24/2018 04:29 PM  
 
XR Results (most recent): 
Results from Hospital Encounter encounter on 12/03/18 XR FOOT LT MIN 3 V Narrative Left foot series HISTORY: Diabetic wound. 3 views of the left foot were obtained. Comparison is made to the prior exam 
06/21/2016. FINDINGS: There are amputations of the first and fifth digits. There are 
hammertoe deformities of the second through fourth digits. There is soft tissue 
edema along the posterior/inferior margin of the calcaneus without bony 
destruction. Vascular calcifications are present. Impression IMPRESSION: 
1. Soft tissue swelling adjacent to the calcaneus without radiographic features 
to suggest osteomyelitis. 2. Amputation of the first and fifth digits. I reviewed recent labs and recent radiologic studies. I independently reviewed radiology images for studies I described above or studies I have ordered. Admission date (for inpatients): 1/3/2019 * No surgery found *  * No surgery found * ASSESSMENT/PLAN: 
 Patient with known vascular disease. He has failed femoropopliteal bypass from 2015. He has no rest pain or acute ischemic changes. He was admitted with cellulitis and heel wound. Wound was debrided 12/13. Devitalized tissue was removed. There is some pain. Continue dressings of Hydrofera Blue to further debride the wound. There is improvement. There appears to be reasonable blood flow to sustain healing. Skin substitute grafts may be appropriate, but wound may close without them. We are ordering a knee scooter to help with better offloading. He will see Dr. Stefan Brito next week. Follow-up in 1 week. Problem List  Date Reviewed: 12/18/2018 Codes Class Noted Cellulitis of left foot ICD-10-CM: L03.116 ICD-9-CM: 682.7  12/7/2018 DM (diabetes mellitus), type 2, uncontrolled ([facility] ICD-10-CM: E11.65 ICD-9-CM: 250.02  12/3/2018 Acute kidney injury superimposed on CKD ([facility] ICD-10-CM: N17.9, N18.9 ICD-9-CM: 866.00, 585.9  12/3/2018 Diabetic ulcer of left heel ([facility] ICD-10-CM: E11.621, U58.594 ICD-9-CM: 250.80, 707.14  12/3/2018 Class 2 severe obesity due to excess calories with serious comorbidity and body mass index (BMI) of 35.0 to 35.9 in adult [facility]) ICD-10-CM: E66.01, Z68.35 ICD-9-CM: 278.01, V85.35  8/20/2018 Preop cardiovascular exam (Chronic) ICD-10-CM: P16.757 ICD-9-CM: V72.81  8/15/2017 Aortic stenosis ICD-10-CM: I35.0 ICD-9-CM: 424.1  2/8/2017 Edema ICD-10-CM: R60.9 ICD-9-CM: 782.3  2/8/2017 H/O heart valve replacement with bioprosthetic valve ICD-10-CM: Z95.3 ICD-9-CM: V42.2  2/8/2017 Overview Signed 8/15/2017 10:50 AM by Ward Dietrich MD  
  Aortic Long term current use of anticoagulant therapy ICD-10-CM: Z79.01 
ICD-9-CM: V58.61  10/14/2015 Diabetic ulcer of left great toe (HCC) (Chronic) ICD-10-CM: E11.621, P42.183 ICD-9-CM: 250.80, 707.15  8/22/2015 Overview Signed 8/24/2015  4:00 PM by Linette Cortez NP  
  8/24/15 (Dr Ingrid Mitchell) AMPUTATION LEFT FIRST TOE, LEFT FOOT DEBRIDMENT Ischemia of foot ICD-10-CM: I99.8 ICD-9-CM: 459.9  8/21/2015 Acute renal failure (ARF) (HCC) ICD-10-CM: N17.9 ICD-9-CM: 584.9  8/21/2015 Hypertension (Chronic) ICD-10-CM: I10 
ICD-9-CM: 401.9  6/1/2015 Warfarin-induced coagulopathy (HCC) ICD-10-CM: C06.08, M94.999P ICD-9-CM: 286.7, E934.2  6/1/2015 Diabetic foot ulcer ([facility] ICD-10-CM: E11.621, X98.410 ICD-9-CM: 250.80, 707.15  6/1/2015 Overview Addendum 6/6/2015  7:04 AM by Linette Cortez NP  
  6/5/15 (Dr Lina Zuleta) Left foot wound debridement, measuring 6 x 6 cm.  
6/2/15 (Dr Lina Zuleta) 1. Left fifth toe open amputation. 2. Drainage of the forefoot abscess. IDDM (insulin dependent diabetes mellitus) ([facility] ICD-10-CM: E11.9, Z79.4 ICD-9-CM: 250.00, V58.67  6/1/2015 PAD (peripheral artery disease) ([facility]) ICD-10-CM: I73.9 ICD-9-CM: 443.9  6/1/2015 CKD (chronic kidney disease), stage III ([facility]) ICD-10-CM: N18.3 ICD-9-CM: 585.3  6/1/2015 Shingles ICD-10-CM: B02.9 ICD-9-CM: 053.9  6/1/2015 PVD (peripheral vascular disease) ([facility] ICD-10-CM: I73.9 ICD-9-CM: 443.9  3/16/2015 Overview Signed 3/18/2015  1:57 PM by Linette Cortez NP  
  3/16/15 (Dr Lina Brothers) 1. Left superficial femoral artery endarterectomy. 2. Common femoral to above knee popliteal bypass with PTFE graft. 3. Left lower extremity arteriogram. 
  
  
   
 Atherosclerotic PVD with ulceration (HCC) (Chronic) ICD-10-CM: I70.209, L29.029 ICD-9-CM: 440.23, 707.9  2/2/2015 Abscess or cellulitis of back ICD-10-CM: EUH7474 ICD-9-CM: 682.2  12/26/2014 Other and unspecified hyperlipidemia ICD-10-CM: E78.5 ICD-9-CM: 272.4  6/24/2013 Thrombocytopenia ([facility] ICD-10-CM: D69.6 ICD-9-CM: 287.5  3/10/2011 Postsurgical aortocoronary bypass status ICD-10-CM: Z95.1 ICD-9-CM: V45.81  3/7/2011 Bicuspid aortic valve (Chronic) ICD-10-CM: Q23.1 ICD-9-CM: 746.4  3/7/2011 S/P AVR (aortic valve replacement) ICD-10-CM: T76.3 ICD-9-CM: V43.3  3/7/2011 Acute on chronic diastolic heart failure (HCC) ICD-10-CM: I50.33 ICD-9-CM: 428.33  2/28/2011 CAD (coronary artery disease) (Chronic) ICD-10-CM: I25.10 ICD-9-CM: 414.00  2/28/2011 Diabetes mellitus (HCC) (Chronic) ICD-10-CM: E11.9 ICD-9-CM: 250.00  2/28/2011 Depression (Chronic) ICD-10-CM: F32.9 ICD-9-CM: 304  2/28/2011 Dyslipidemia (Chronic) ICD-10-CM: B68.3 ICD-9-CM: 272.4  2/28/2011 Acute on chronic renal failure (HCC) (Chronic) ICD-10-CM: N17.9, N18.9 ICD-9-CM: 584.9, 585.9  2/28/2011 Active Problems: * No active hospital problems. * Any procedures done today in the wound center are documented in a separate note in connect care made part of this record by reference Wound Care Orders Placed This Encounter  REFERRAL TO DME Referral Priority:   Routine Referral Type:   Consultation Referral Reason:   Specialty Services Required Number of Visits Requested:   1  
 WOUND CARE, DRESSING CHANGE Left Medial Heel: 
Cleanse wound and periwound with wound cleanser or normal saline. Hydrofera Blue: Cut to wound size, moisten with saline, and apply to wound bed. Cover with ABD, wrap with Rolled Gauze. Dressing change 3x week. Minimize ambulation to offload pressure on heel. Wear M[place] Boot at all times and Volofy with [employer] on. Utilize wheelchair that you presently have. Obtain knee scooter. Appointment with Dr. Fredy Whitley, Tuesday, 01/08/19. 
  
   
  Standing Status:   Standing Number of Occurrences:   1 Follow-up Information Follow up With Specialties Details Why Contact Info [facility] WOUND CARE Wound Care In 1 week For wound re-check [address] [place] [phone] [id number] [phone] Signed:  Devyn Arrington MD,  FACS

## 2019-01-03 NOTE — WOUND CARE
[address] [place] [address] [address] Phone: [phone] Fax: [phone] Patient: James White MRN: [id number]  SSN: [id number] YOB: 1960  Age: 66 y.o. Sex: male Return Appointment: 1 week with Paloma Herrera MD 
 
Instructions: Left Medial Heel: 
Cleanse wound and periwound with wound cleanser or normal saline. Hydrofera Blue: Cut to wound size, moisten with saline, and apply to wound bed. Cover with ABD, wrap with Rolled Gauze. Dressing change 3x week. Minimize ambulation to offload pressure on heel. Wear [employer] Boot at all times and [employer] with [employer] on. Utilize wheelchair that you presently have. Obtain knee scooter. Appointment with Dr. Tiara Leslie, Tuesday, 01/08/19. 
  
  
 
 
 
Should you experience increased redness, swelling, pain, foul odor, size of wound(s), or have a temperature over 101 degrees please contact the [address] [place] [address] Road at [phone] or if after hours contact your primary care physician or go to the hospital emergency department. Signed By: Parvin Gorman RN   
 January 3, 2019

## 2019-01-08 RX ORDER — SODIUM CHLORIDE 0.9 % (FLUSH) 0.9 %
5-40 SYRINGE (ML) INJECTION EVERY 8 HOURS
Status: CANCELLED | OUTPATIENT
Start: 2019-01-08

## 2019-01-08 RX ORDER — CEFAZOLIN SODIUM/WATER 2 G/20 ML
2 SYRINGE (ML) INTRAVENOUS ONCE
Status: CANCELLED | OUTPATIENT
Start: 2019-01-08 | End: 2019-01-08

## 2019-01-08 RX ORDER — SODIUM CHLORIDE 0.9 % (FLUSH) 0.9 %
5-40 SYRINGE (ML) INJECTION AS NEEDED
Status: CANCELLED | OUTPATIENT
Start: 2019-01-08

## 2019-01-10 ENCOUNTER — HOSPITAL ENCOUNTER (OUTPATIENT)
Dept: WOUND CARE | Age: 59
Discharge: HOME | End: 2019-01-10
Attending: SURGERY
Payer: COMMERCIAL

## 2019-01-10 VITALS
SYSTOLIC BLOOD PRESSURE: 136 MMHG | HEIGHT: 68 IN | HEART RATE: 80 BPM | WEIGHT: 200.6 LBS | OXYGEN SATURATION: 99 % | BODY MASS INDEX: 30.4 KG/M2 | TEMPERATURE: 97.9 F | DIASTOLIC BLOOD PRESSURE: 76 MMHG | RESPIRATION RATE: 18 BRPM

## 2019-01-10 DIAGNOSIS — E11.621 DIABETIC ULCER OF LEFT HEEL ASSOCIATED WITH TYPE 2 DIABETES MELLITUS, WITH FAT LAYER EXPOSED (HCC): ICD-10-CM

## 2019-01-10 DIAGNOSIS — Z79.01 LONG TERM CURRENT USE OF ANTICOAGULANT THERAPY: ICD-10-CM

## 2019-01-10 DIAGNOSIS — L97.422 DIABETIC ULCER OF LEFT HEEL ASSOCIATED WITH TYPE 2 DIABETES MELLITUS, WITH FAT LAYER EXPOSED (HCC): ICD-10-CM

## 2019-01-10 DIAGNOSIS — I99.8 ISCHEMIA OF FOOT: ICD-10-CM

## 2019-01-10 DIAGNOSIS — I73.9 PVD (PERIPHERAL VASCULAR DISEASE) (HCC): ICD-10-CM

## 2019-01-10 PROCEDURE — 99214 OFFICE O/P EST MOD 30 MIN: CPT

## 2019-01-10 PROCEDURE — 99214 OFFICE O/P EST MOD 30 MIN: CPT | Performed by: SURGERY

## 2019-01-10 NOTE — PROGRESS NOTES
WOUND CENTER Consult Note/Progress Note:  
Miladis Proctor  MRN: [id number]  :1960  Age:58 y.o. 
 
HPI: Miladis Proctor is a 66 y.o. male who is referred from [facility].  He was recently discharged. He was a patient of Dr. Keke Chatman who underwent above-knee femoropopliteal bypass with PTFE in the left leg in . He had multiple toe amputations at that time. He was lost to follow-up. He presented with cellulitis of his left foot and the medial heel region. He was treated with antibiotics. Noninvasive vascular study showed occlusion of his bypass but he has no rest pain and was felt to have adequate circulation. He has follow-up with Dr. Keke Chatman in 2019. He is completing a course of antibiotics. Wound care has been primarily painting with Betadine. Plain films were negative for foreign body. There is no radiographic evidence of osteomyelitis. He has a pacemaker and cannot tolerate MRI. He had bone scan which showed some activity in 1 of his toes but the toes are without clinical abnormality. This information was obtained from the patient The following HPI elements were documented for the patient's wound: 
Location: L heel Duration: 12/3/2018 Severity:  5/10 Context:  Cellulitis, poss ischemia Modifying Factors:  Nothing makes better or worse Quality: burning Timing: constant and especially when manipulated Associated Signs and Symptoms: n/a Past Medical History:  
Diagnosis Date  Acute on chronic diastolic heart failure ([facility] 2011  
 followed by dr Thurmond Frankel  Arrhythmia   
 pacemaker Medtronic-- followed by [facility] cardio  Atherosclerotic PVD with ulceration ([facility] 2015  CAD (coronary artery disease) 2011  
  3 vessel CABG; aortic valve replacement/Bovine-- both at same time-- followed by dr Thurmond Frankel  Chronic kidney disease   
 chronic renal insuff. --- dos not see a nephrologist   
 Chronic pain   
 left foot  Depression 2/28/2011  Diabetes mellitus ([facility] dx age 31  
 type2--- sqbs avg am--100--- hypo at 52's  H/O aortic valve replacement 2/2011 Bovine valve  Heart failure ([facility]  Hypercholesterolemia  Hypertension   
 controlled with med  Murmur, cardiac 02/2011  
 aortic valve replaced with CABG, 2011------- 2/6 SALVADOR RUSB apex, 2/6 TR murmur-- ECHO 2/12/15 LVEF 55% --- followed by dr Phillip Narayan  PVD (peripheral vascular disease) ([facility]  Shingles 6/2015  Warfarin anticoagulation Past Surgical History:  
Procedure Laterality Date  CARDIAC SURG PROCEDURE UNLIST  2011 Dr. Veronica Morrow  3 vessel cabg and aortic valve replacement  COLONOSCOPY N/A 2/14/2018 COLONOSCOPY  BMI 37 performed by Sagrario Garcia MD at [facility] ENDOSCOPY  
 HX FREE SKIN GRAFT Left 8/11/15  
 thigh to foot graft  HX HEART CATHETERIZATION  03/07/2011  HX PACEMAKER  2011  
 medtronic per pt (phone assessment)  HX UROLOGICAL  2005 Penile Impant  VASCULAR SURGERY PROCEDURE UNLIST  2/17/15 LE arteriogram  
 VASCULAR SURGERY PROCEDURE UNLIST Left 3-16-15  
 fem-tibial by-pass  VASCULAR SURGERY PROCEDURE UNLIST Left 6/2/15  
 5th toe amp  VASCULAR SURGERY PROCEDURE UNLIST Left 6/5/15  
 wound debridement  VASCULAR SURGERY PROCEDURE UNLIST Left 8/24/15  
 great toe amp Current Outpatient Medications Medication Sig  
 icosapent ethyl (VASCEPA) 1 gram capsule Take 2 Caps by mouth two (2) times daily (with meals).  insulin glargine U-300 conc (TOUJEO SOLOSTAR U-300 INSULIN) 300 unit/mL (1.5 mL) inpn 50 units daily (Patient taking differently: 50 Units by SubCUTAneous route daily. 50 units daily)  pravastatin (PRAVACHOL) 80 mg tablet Take 1 Tab by mouth nightly.  amLODIPine (NORVASC) 10 mg tablet Take 1 Tab by mouth every morning.  metoprolol succinate (TOPROL-XL) 50 mg XL tablet Take 1 Tab by mouth every morning.  eszopiclone (LUNESTA) 2 mg tablet Take 1 Tab by mouth nightly. Max Daily Amount: 2 mg.  DULoxetine (CYMBALTA) 60 mg capsule Take 1 Cap by mouth daily.  insulin aspart U-100 (NOVOLOG FLEXPEN U-100 INSULIN) 100 unit/mL inpn 15 Units by SubCUTAneous route Before breakfast, lunch, and dinner.  irbesartan (AVAPRO) 150 mg tablet Take 1 Tab by mouth nightly.  aspirin 81 mg chewable tablet Take 81 mg by mouth daily. No current facility-administered medications for this encounter. ALLERGIES: Patient has no known allergies. Social History Socioeconomic History  Marital status:  Spouse name: Not on file  Number of children: Not on file  Years of education: Not on file  Highest education level: Not on file Occupational History  Occupation: works in the lab Comment: [employer] Tobacco Use  Smoking status: Never Smoker  Smokeless tobacco: Never Used Substance and Sexual Activity  Alcohol use: No  
 Drug use: Yes Types: Prescription Social History Tobacco Use Smoking Status Never Smoker Smokeless Tobacco Never Used Family History Problem Relation Age of Onset  Diabetes Mother  Heart Disease Mother  Heart Surgery Mother 54  
     valve replacement Nova.Precise Other Mother Shrogens  Diabetes Father  Heart Disease Father  Heart Surgery Father 61  
     cabg  Lung Disease Father  Parkinsonism Father  Other Father   
     polio as a child-affected lungs  Diabetes Brother  Diabetes Paternal Uncle x2  Thyroid Disease Brother  Stroke Brother  Cancer Paternal Grandfather ROS: The patient has no difficulty with chest pain or shortness of breath. No fever or chills. Comprehensive review of systems was otherwise unremarkable except as noted above. Physical Exam:  
Visit Vitals /76 (BP 1 Location: Left arm) Pulse 80 Temp 97.9 °F (36.6 °C) Resp 18 Ht 5' 8\" (1.727 m) Wt 200 lb 9.6 oz (91 kg) SpO2 99% BMI 30.50 kg/m² Constitutional: Alert, oriented, cooperative patient in no acute distress; appears stated age;  General appearance is within normal limits for wound care patient population. See the today's recorded vitals signs and constitutional data. Eyes: Pupils equal; Sclera are clear. EOMs intact; The eyes appear to track and move normally. The sclera are not injected. The conjunctive are clear. The eyelids are normal. There is no scleral icterus. ENMT: There are no obvious external ear, nose, lip or mouth lesions. Nares normal; Neck: Overall contour of the neck is normal with no obvious neck masses. Gross hearing is within normal limits. No obvious neck masses CV: RRR;  no JVD; No evidence of cyanosis of the upper extremities. The extremities are perfused without embolic sign, splinter hemorrhages, or petechia. Resp:  Breathing is non-labored; normal rate and effort; no audible wheezing. GI: soft and non-distended without acute abnormality noted. Musculoskeletal: unremarkable with normal function. No embolic signs or cyanosis. Neuro:  Oriented; moves all 4; no focal deficits Psychiatric: Judgement and insight are within normal limits for the wound care population of patients. Patient is oriented to person, place, and time. Recent and remote memory are within normal limits. Mood and affect are within normal limits. Integumentary (Skin/Wounds) See inspection of wound(s) below. There are no other skin areas of palpable concern. See wound center documentation including photos in the [address] Wound Template made part of this record by reference. Wounds: 
Wound Foot Left (Active) Number of days: 35 Wound Heel Left (Active) Number of days: 35 Wound Heel Left;Medial;Posterior (Active) DRESSING STATUS Clean, dry, and intact 1/10/2019  2:56 PM  
Non-Pressure Injury Full thickness (subcut/muscle) 1/10/2019  2:56 PM  
 Wound Length (cm) 1 cm 1/10/2019  2:56 PM  
Wound Width (cm) 1 cm 1/10/2019  2:56 PM  
Wound Depth (cm) 0.1 1/10/2019  2:56 PM  
Wound Surface area (cm^2) 1 cm^2 1/10/2019  2:56 PM  
Change in Wound Size % 92.48 1/10/2019  2:56 PM  
Condition of Base Granulation 1/10/2019  2:56 PM  
Condition of Edges Open 1/10/2019  2:56 PM  
Tissue Type Red 1/10/2019  2:56 PM  
Tissue Type Percent Pink 100 1/10/2019  2:56 PM  
Tissue Type Percent Yellow 30 1/3/2019  2:27 PM  
Drainage Amount  Small  1/10/2019  2:56 PM  
Drainage Color Serosanguinous 1/10/2019  2:56 PM  
Wound Odor None 1/10/2019  2:56 PM  
Periwound Skin Condition Intact 1/10/2019  2:56 PM  
Cleansing and Cleansing Agents  Normal saline 1/10/2019  2:56 PM  
Procedure Tolerated Well 1/3/2019  2:27 PM  
[place] Grading Scale 0-5  Grade 2 1/3/2019  2:27 PM  
Number of days: 28  
   
[REMOVED] Wound Foot Right;Left (Removed) Number of days: 7680 [REMOVED] Wound Foot Plantar;Lateral (Removed) Number of days: 2537 [REMOVED] Wound Foot Left (Removed) Number of days: 1280 [REMOVED] Wound Thigh Left;Upper (Removed) Number of days: 1213 [REMOVED] Wound Foot Left (Removed) Number of days: 1213 [REMOVED] Wound Left (Removed) Number of days: 1200 [REMOVED] Wound Foot Right (Removed) Number of days: 782 [REMOVED] Wound Heel Dorsal;Left;Posterior (Removed) Number of days: 2 Recent vitals (if inpt): 
Patient Vitals for the past 24 hrs: 
 BP Temp Pulse Resp SpO2 Height Weight 01/10/19 1446 136/76 97.9 °F (36.6 °C) 80 18 99 % 5' 8\" (1.727 m) 200 lb 9.6 oz (91 kg) Labs: 
No results for input(s): WBC, HGB, PLT, NA, K, CL, CO2, BUN, CREA, GLU, PTP, INR, APTT, TBIL, TBILI, CBIL, SGOT, GPT, ALT, AP, AML, LPSE, LCAD, NH4, TROPT, TROIQ, PCO2, PO2, HCO3, HGBEXT, PLTEXT, HGBEXT, PLTEXT in the last 72 hours. No lab exists for component:  PH, INREXT, INREXT Lab Results Component Value Date/Time WBC 7.8 12/11/2018 05:43 AM  
 HGB 12.0 (L) 12/11/2018 05:43 AM  
 PLATELET 724 81/67/3062 05:43 AM  
 Sodium 140 12/11/2018 05:43 AM  
 Potassium 4.6 12/11/2018 05:43 AM  
 Chloride 104 12/11/2018 05:43 AM  
 CO2 25 12/11/2018 05:43 AM  
 BUN 15 12/11/2018 05:43 AM  
 Creatinine 1.64 (H) 12/11/2018 05:43 AM  
 Glucose 90 12/11/2018 05:43 AM  
 Glucose 126 (H) 02/17/2011 10:25 AM  
 INR 1.7 (H) 06/01/2016 11:26 AM  
 aPTT 45.6 (H) 08/24/2015 10:07 PM  
 Bilirubin, total 0.3 12/11/2018 05:43 AM  
 AST (SGOT) 27 12/11/2018 05:43 AM  
 ALT (SGPT) 35 12/11/2018 05:43 AM  
 Alk. phosphatase 90 12/11/2018 05:43 AM  
 Amylase 49 12/07/2016 02:49 PM  
 Lipase 36 12/07/2016 02:49 PM  
 Troponin-I <0.05 02/28/2011 12:10 PM  
 Troponin-I, Qt. <0.02 (L) 04/24/2018 04:29 PM  
 
XR Results (most recent): 
Results from Hospital Encounter encounter on 12/03/18 XR FOOT LT MIN 3 V Narrative Left foot series HISTORY: Diabetic wound. 3 views of the left foot were obtained. Comparison is made to the prior exam 
06/21/2016. FINDINGS: There are amputations of the first and fifth digits. There are 
hammertoe deformities of the second through fourth digits. There is soft tissue 
edema along the posterior/inferior margin of the calcaneus without bony 
destruction. Vascular calcifications are present. Impression IMPRESSION: 
1. Soft tissue swelling adjacent to the calcaneus without radiographic features 
to suggest osteomyelitis. 2. Amputation of the first and fifth digits. I reviewed recent labs and recent radiologic studies. I independently reviewed radiology images for studies I described above or studies I have ordered. Admission date (for inpatients): 1/10/2019 * No surgery found *  * No surgery found * ASSESSMENT/PLAN: 
Patient with known vascular disease. He has failed femoropopliteal bypass from 2015. He has no rest pain or acute ischemic changes.   He was admitted with cellulitis and heel wound. Wound was debrided 12/13. Devitalized tissue was removed. There is some pain. Continue dressings of Hydrofera Blue to further debride the wound. There is continued improvement. There appears to be reasonable blood flow to sustain healing. Dr. Millie Garcia plans for an arteriogram next week. We will continue our present dressing. Follow-up in 2 weeks. Problem List  Date Reviewed: 12/18/2018 Codes Class Noted Cellulitis of left foot ICD-10-CM: L03.116 ICD-9-CM: 682.7  12/7/2018 DM (diabetes mellitus), type 2, uncontrolled ([facility] ICD-10-CM: E11.65 ICD-9-CM: 250.02  12/3/2018 Acute kidney injury superimposed on CKD ([facility] ICD-10-CM: N17.9, N18.9 ICD-9-CM: 866.00, 585.9  12/3/2018 Diabetic ulcer [facility] ICD-10-CM: E11.621, K38.488 ICD-9-CM: 250.80, 707.14  12/3/2018 Class 2 severe obesity due to excess calories with serious comorbidity and body mass index (BMI) of 35.0 to 35.9 in adult [facility]) ICD-10-CM: E66.01, Z68.35 ICD-9-CM: 278.01, V85.35  8/20/2018 Preop cardiovascular exam (Chronic) ICD-10-CM: E50.928 ICD-9-CM: V72.81  8/15/2017 Aortic stenosis ICD-10-CM: I35.0 ICD-9-CM: 424.1  2/8/2017 Edema ICD-10-CM: R60.9 ICD-9-CM: 782.3  2/8/2017 H/O heart valve replacement with bioprosthetic valve ICD-10-CM: Z95.3 ICD-9-CM: V42.2  2/8/2017 Overview Signed 8/15/2017 10:50 AM by Brian Donovan MD  
  Aortic Long term current use of anticoagulant therapy ICD-10-CM: Z79.01 
ICD-9-CM: V58.61  10/14/2015 Diabetic ulcer of left great toe (HCC) (Chronic) ICD-10-CM: E11.621, M28.819 ICD-9-CM: 250.80, 707.15  8/22/2015 Overview Signed 8/24/2015  4:00 PM by Kait Carson NP  
  8/24/15 (Dr Mona Chan) AMPUTATION LEFT FIRST TOE, LEFT FOOT DEBRIDMENT Ischemia of foot ICD-10-CM: I99.8 ICD-9-CM: 459.9  8/21/2015 Acute renal failure (ARF) (HCC) ICD-10-CM: N17.9 ICD-9-CM: 584.9  8/21/2015 Hypertension (Chronic) ICD-10-CM: I10 
ICD-9-CM: 401.9  6/1/2015 Warfarin-induced coagulopathy (HCC) ICD-10-CM: Y96.64, S24.140B ICD-9-CM: 286.7, E934.2  6/1/2015 Diabetic foot ulcer ([place] [address].) ICD-10-CM: E11.621, F05.561 ICD-9-CM: 250.80, 707.15  6/1/2015 Overview Addendum 6/6/2015  7:04 AM by Carina Lopez NP  
  6/5/15 (Dr Juany Palomino) Left foot wound debridement, measuring 6 x 6 cm.  
6/2/15 (Dr Juany Palomino) 1. Left fifth toe open amputation. 2. Drainage of the forefoot abscess. IDDM (insulin dependent diabetes mellitus) ([place] [address] ICD-10-CM: E11.9, Z79.4 ICD-9-CM: 250.00, V58.67  6/1/2015 PAD (peripheral artery disease) ([facility]) ICD-10-CM: I73.9 ICD-9-CM: 443.9  6/1/2015 CKD (chronic kidney disease), stage III ([facility]) ICD-10-CM: N18.3 ICD-9-CM: 585.3  6/1/2015 Shingles ICD-10-CM: B02.9 ICD-9-CM: 053.9  6/1/2015 PVD (peripheral vascular disease) ([place] [address] ICD-10-CM: I73.9 ICD-9-CM: 443.9  3/16/2015 Overview Signed 3/18/2015  1:57 PM by Carina Lopez NP  
  3/16/15 (Dr Juany Palomino) 1. Left superficial femoral artery endarterectomy. 2. Common femoral to above knee popliteal bypass with PTFE graft. 3. Left lower extremity arteriogram. 
  
  
   
 Atherosclerotic PVD with ulceration (HCC) (Chronic) ICD-10-CM: I70.209, P57.294 ICD-9-CM: 440.23, 707.9  2/2/2015 Abscess or cellulitis of back ICD-10-CM: [id number] ICD-9-CM: 682.2  12/26/2014 Other and unspecified hyperlipidemia ICD-10-CM: E78.5 ICD-9-CM: 272.4  6/24/2013 Thrombocytopenia ([address] ICD-10-CM: D69.6 ICD-9-CM: 287.5  3/10/2011 Postsurgical aortocoronary bypass status ICD-10-CM: Z95.1 ICD-9-CM: V45.81  3/7/2011 Bicuspid aortic valve (Chronic) ICD-10-CM: Q23.1 ICD-9-CM: 746.4  3/7/2011 S/P AVR (aortic valve replacement) ICD-10-CM: L89.9 ICD-9-CM: V43.3  3/7/2011 Acute on chronic diastolic heart failure (HCC) ICD-10-CM: I50.33 ICD-9-CM: 428.33  2/28/2011 CAD (coronary artery disease) (Chronic) ICD-10-CM: I25.10 ICD-9-CM: 414.00  2/28/2011 Diabetes mellitus (HCC) (Chronic) ICD-10-CM: E11.9 ICD-9-CM: 250.00  2/28/2011 Depression (Chronic) ICD-10-CM: F32.9 ICD-9-CM: 789  2/28/2011 Dyslipidemia (Chronic) ICD-10-CM: T21.9 ICD-9-CM: 272.4  2/28/2011 Acute on chronic renal failure (HCC) (Chronic) ICD-10-CM: N17.9, N18.9 ICD-9-CM: 584.9, 585.9  2/28/2011 Active Problems: * No active hospital problems. * Any procedures done today in the wound center are documented in a separate note in connect care made part of this record by reference Wound Care Orders Placed This Encounter  WOUND CARE, DRESSING CHANGE Left Medial Heel: 
Cleanse wound and periwound with wound cleanser or normal saline. Hydrofera Blue: Cut to wound size, moisten with saline, and apply to wound bed. Cover with ABD, wrap with Rolled Gauze. Dressing change 3x week. Minimize ambulation to offload pressure on heel. Wear Rooke Boot at all times and Bra[employer]uticals with [employer] on. Continue use of knee scooter. Standing Status:   Standing Number of Occurrences:   1 Follow-up Information Follow up With Specialties Details Why Contact Info [facility] Wound Care In 2 weeks  Elie Mcmullen [place] [address] [address] [id number] [phone] Signed:  Ruperto Bumpers, MD,  FACS

## 2019-01-10 NOTE — WOUND CARE
01/10/19 1456 Wound Heel Left;Medial;Posterior Date First Assessed/Time First Assessed: 12/13/18 1415   POA: Yes  Wound Type: Diabetic  Location: Heel  Wound Description (Optional): Diabetic Foot Ulcer  Orientation: Left;Medial;Posterior DRESSING STATUS Clean, dry, and intact DRESSING TYPE (hydrofera blue, abd, rolled gauze) Non-Pressure Injury Full thickness (subcut/muscle) Wound Length (cm) 1 cm Wound Width (cm) 1 cm Wound Depth (cm) 0.1 Wound Surface area (cm^2) 1 cm^2 Change in Wound Size % 92.48 Condition of Base Granulation Condition of Edges Open Tissue Type Red Tissue Type Percent Pink 100 Drainage Amount  Small Drainage Color Serosanguinous Wound Odor None Periwound Skin Condition Intact Cleansing and Cleansing Agents  Normal saline

## 2019-01-10 NOTE — DISCHARGE INSTRUCTIONS
Left Medial Heel:  Cleanse wound and periwound with wound cleanser or normal saline. Hydrofera Blue: Cut to wound size, moisten with saline, and apply to wound bed. Cover with ABD, wrap with Rolled Gauze. Dressing change 3x week. Minimize ambulation to offload pressure on heel. Wear [employer] Boot at all times and Percolate with [employer] on. Continue use of knee scooter.

## 2019-01-10 NOTE — WOUND CARE
[address] [place] [address] [place], [address] [address] Phone: [phone] Fax: [phone] Patient: Bladimir Flores MRN: [id number]  SSN: [id number] YOB: 1960  Age: 66 y.o. Sex: male Return Appointment: 2 weeks with Singh Mejía MD 
 
Instructions:  
Left Medial Heel: 
Cleanse wound and periwound with wound cleanser or normal saline. Hydrofera Blue: Cut to wound size, moisten with saline, and apply to wound bed. Cover with ABD, wrap with Rolled Gauze. Dressing change 3x week. Minimize ambulation to offload pressure on heel. Continue use of knee scooter. Wear Cenoplexoke Boot at all times and [employer] with [employer] on. Should you experience increased redness, swelling, pain, foul odor, size of wound(s), or have a temperature over 101 degrees please contact the [address] [place] [address] at [phone] or if after hours contact your primary care physician or go to the hospital emergency department. Signed By: Timbo Hanley RN   
 January 10, 2019

## 2019-01-14 ENCOUNTER — HOSPITAL ENCOUNTER (OUTPATIENT)
Dept: SURGERY | Age: 59
Discharge: HOME | End: 2019-01-14
Payer: COMMERCIAL

## 2019-01-14 VITALS
RESPIRATION RATE: 16 BRPM | WEIGHT: 203.44 LBS | SYSTOLIC BLOOD PRESSURE: 115 MMHG | BODY MASS INDEX: 30.13 KG/M2 | HEART RATE: 82 BPM | HEIGHT: 69 IN | DIASTOLIC BLOOD PRESSURE: 69 MMHG | OXYGEN SATURATION: 98 % | TEMPERATURE: 97.7 F

## 2019-01-14 LAB
ANION GAP SERPL CALC-SCNC: 7 MMOL/L (ref 7–16)
BASOPHILS # BLD: 0.1 K/UL (ref 0–0.2)
BASOPHILS NFR BLD: 1 % (ref 0–2)
BUN SERPL-MCNC: 28 MG/DL (ref 6–23)
CALCIUM SERPL-MCNC: 9.1 MG/DL (ref 8.3–10.4)
CHLORIDE SERPL-SCNC: 100 MMOL/L (ref 98–107)
CO2 SERPL-SCNC: 26 MMOL/L (ref 21–32)
CREAT SERPL-MCNC: 1.82 MG/DL (ref 0.8–1.5)
DIFFERENTIAL METHOD BLD: ABNORMAL
EOSINOPHIL # BLD: 0.6 K/UL (ref 0–0.8)
EOSINOPHIL NFR BLD: 6 % (ref 0.5–7.8)
ERYTHROCYTE [DISTWIDTH] IN BLOOD BY AUTOMATED COUNT: 13.2 % (ref 11.9–14.6)
GLUCOSE BLD STRIP.AUTO-MCNC: 276 MG/DL (ref 65–100)
GLUCOSE SERPL-MCNC: 278 MG/DL (ref 65–100)
HCT VFR BLD AUTO: 40.8 % (ref 41.1–50.3)
HGB BLD-MCNC: 13.7 G/DL (ref 13.6–17.2)
IMM GRANULOCYTES # BLD AUTO: 0 K/UL (ref 0–0.5)
IMM GRANULOCYTES NFR BLD AUTO: 0 % (ref 0–5)
LYMPHOCYTES # BLD: 2.1 K/UL (ref 0.5–4.6)
LYMPHOCYTES NFR BLD: 21 % (ref 13–44)
MCH RBC QN AUTO: 30.2 PG (ref 26.1–32.9)
MCHC RBC AUTO-ENTMCNC: 33.6 G/DL (ref 31.4–35)
MCV RBC AUTO: 89.9 FL (ref 79.6–97.8)
MONOCYTES # BLD: 0.7 K/UL (ref 0.1–1.3)
MONOCYTES NFR BLD: 7 % (ref 4–12)
NEUTS SEG # BLD: 6.4 K/UL (ref 1.7–8.2)
NEUTS SEG NFR BLD: 65 % (ref 43–78)
NRBC # BLD: 0 K/UL (ref 0–0.2)
PLATELET # BLD AUTO: 197 K/UL (ref 150–450)
PMV BLD AUTO: 11.9 FL (ref 9.4–12.3)
POTASSIUM SERPL-SCNC: 5.1 MMOL/L (ref 3.5–5.1)
RBC # BLD AUTO: 4.54 M/UL (ref 4.23–5.6)
SODIUM SERPL-SCNC: 133 MMOL/L (ref 136–145)
WBC # BLD AUTO: 9.9 K/UL (ref 4.3–11.1)

## 2019-01-14 PROCEDURE — 80048 BASIC METABOLIC PNL TOTAL CA: CPT

## 2019-01-14 PROCEDURE — 85025 COMPLETE CBC W/AUTO DIFF WBC: CPT

## 2019-01-14 PROCEDURE — 82962 GLUCOSE BLOOD TEST: CPT

## 2019-01-14 NOTE — PERIOP NOTES
Patient verified name and . Patient provided medical/health information and PTA medications to the best of their ability. TYPE  CASE:2 
Order for consent Orders found in EHR and matches case posting. Labs per surgeon:CBC and BMP. Results: pending Labs per anesthesia protocol: POC glucose. Results  EKG  :  See all records in [facility] POC glucose 276  . Instructed Patient that if blood sugar 300 or > , surgery may be cancelled. Pt voice understanding. SN instruct pt to contact [phone] for any complications. Patient provided with and instructed on education handouts including Guide to Surgery, blood transfusions, pain management, and hand hygiene for the family and community, and [facility] brochure. Hibiclens and instructions given per hospital policy. Instructed patient to continue previous medications as prescribed prior to surgery unless otherwise directed and to take the following medications the day of surgery according to anesthesia guidelines : Norvasc,Aspirin 81mg, Cymbalta,Toprol . Instructed patient to hold  the following medications: All vitamins, supplements, and NSAIDs. Original medication prescription bottles were not visualized during patient appointment. Medication profile updated and reviewed with patient. Patient teach back successful and patient demonstrates knowledge of instruction.

## 2019-01-14 NOTE — PERIOP NOTES
Recent Results (from the past 12 hour(s)) GLUCOSE, POC Collection Time: 01/14/19 12:51 PM  
Result Value Ref Range Glucose (POC) 276 (H) 65 - 100 mg/dL CBC WITH AUTOMATED DIFF Collection Time: 01/14/19 12:52 PM  
Result Value Ref Range WBC 9.9 4.3 - 11.1 K/uL  
 RBC 4.54 4.23 - 5.6 M/uL  
 HGB 13.7 13.6 - 17.2 g/dL HCT 40.8 (L) 41.1 - 50.3 % MCV 89.9 79.6 - 97.8 FL  
 MCH 30.2 26.1 - 32.9 PG  
 MCHC 33.6 31.4 - 35.0 g/dL  
 RDW 13.2 11.9 - 14.6 % PLATELET 974 075 - 461 K/uL MPV 11.9 9.4 - 12.3 FL ABSOLUTE NRBC 0.00 0.0 - 0.2 K/uL  
 DF AUTOMATED NEUTROPHILS 65 43 - 78 % LYMPHOCYTES 21 13 - 44 % MONOCYTES 7 4.0 - 12.0 % EOSINOPHILS 6 0.5 - 7.8 % BASOPHILS 1 0.0 - 2.0 % IMMATURE GRANULOCYTES 0 0.0 - 5.0 %  
 ABS. NEUTROPHILS 6.4 1.7 - 8.2 K/UL  
 ABS. LYMPHOCYTES 2.1 0.5 - 4.6 K/UL  
 ABS. MONOCYTES 0.7 0.1 - 1.3 K/UL  
 ABS. EOSINOPHILS 0.6 0.0 - 0.8 K/UL  
 ABS. BASOPHILS 0.1 0.0 - 0.2 K/UL  
 ABS. IMM. GRANS. 0.0 0.0 - 0.5 K/UL METABOLIC PANEL, BASIC Collection Time: 01/14/19 12:52 PM  
Result Value Ref Range Sodium 133 (L) 136 - 145 mmol/L Potassium 5.1 3.5 - 5.1 mmol/L Chloride 100 98 - 107 mmol/L  
 CO2 26 21 - 32 mmol/L Anion gap 7 7 - 16 mmol/L Glucose 278 (H) 65 - 100 mg/dL BUN 28 (H) 6 - 23 MG/DL Creatinine 1.82 (H) 0.8 - 1.5 MG/DL  
 GFR est AA 50 (L) >60 ml/min/1.73m2 GFR est non-AA 41 (L) >60 ml/min/1.73m2  Calcium 9.1 8.3 - 10.4 MG/DL

## 2019-01-14 NOTE — PERIOP NOTES
All labs reviewed. Reported Creatinine 1.82 to Dr Romero Stage. No new orders. Labs routed to Dr Fredy Whitley office. Chart placed in chart room, to verbally report GFR to Dr. Fredy Whitley office.

## 2019-01-14 NOTE — PERIOP NOTES
Dr Manjula Tate:      Patient: John Arreaga, DOS 1.17.19 During a recent visit to the surgical preadmission testing center, the above mentioned patient was found to have a non-fasting blood glucose level of 276 mg/dL. This may indicate inadequate diabetic management and raises concerns that the patient is not medically optimized for surgery. It is our standard practice to postpone elective surgery for patients who present fasting blood glucose level >300 mg/dL on the day of their procedure. The patient has been advised of this policy and counseled on the importance of glucose control. We feel that this patient is at increased risk of cancellation; however, their blood glucose may be in acceptable range when they are NPO. Therefore, we will leave the decision to you whether to delay the surgery and refer the patient to their primary care provider or keep them as currently scheduled. Our goal is to prevent as many delays and cancellations as possible while ensuring patient safety. Sincerely, 
 
[facility] Anesthesia [facility]

## 2019-01-14 NOTE — PERIOP NOTES
Reviewed pt records with Dr Marcy Case. No changes noted in EKG. No pacer rep. Required DOS- [id number] notified pt with pacemaker.

## 2019-01-15 NOTE — PERIOP NOTES
Left voice mail message for [facility] at Dr. Shay Torres' office to have him review GFR.

## 2019-01-16 ENCOUNTER — ANESTHESIA EVENT (OUTPATIENT)
Dept: SURGERY | Age: 59
End: 2019-01-16
Payer: COMMERCIAL

## 2019-01-17 ENCOUNTER — APPOINTMENT (OUTPATIENT)
Dept: INTERVENTIONAL RADIOLOGY/VASCULAR | Age: 59
End: 2019-01-17
Attending: SURGERY
Payer: COMMERCIAL

## 2019-01-17 ENCOUNTER — HOSPITAL ENCOUNTER (OUTPATIENT)
Age: 59
Setting detail: OUTPATIENT SURGERY
Discharge: HOME | End: 2019-01-17
Attending: SURGERY | Admitting: SURGERY
Payer: COMMERCIAL

## 2019-01-17 ENCOUNTER — ANESTHESIA (OUTPATIENT)
Dept: SURGERY | Age: 59
End: 2019-01-17
Payer: COMMERCIAL

## 2019-01-17 VITALS
BODY MASS INDEX: 30.47 KG/M2 | SYSTOLIC BLOOD PRESSURE: 130 MMHG | WEIGHT: 205.7 LBS | OXYGEN SATURATION: 96 % | RESPIRATION RATE: 18 BRPM | DIASTOLIC BLOOD PRESSURE: 71 MMHG | HEIGHT: 69 IN | TEMPERATURE: 98.2 F | HEART RATE: 70 BPM

## 2019-01-17 LAB
GLUCOSE BLD STRIP.AUTO-MCNC: 138 MG/DL (ref 65–100)
GLUCOSE BLD STRIP.AUTO-MCNC: 174 MG/DL (ref 65–100)
GLUCOSE BLD STRIP.AUTO-MCNC: 54 MG/DL (ref 65–100)
GLUCOSE BLD STRIP.AUTO-MCNC: 63 MG/DL (ref 65–100)
GLUCOSE BLD STRIP.AUTO-MCNC: 80 MG/DL (ref 65–100)

## 2019-01-17 PROCEDURE — C1760 CLOSURE DEV, VASC: HCPCS

## 2019-01-17 PROCEDURE — 74011250636 HC RX REV CODE- 250/636: Performed by: SURGERY

## 2019-01-17 PROCEDURE — 74011000250 HC RX REV CODE- 250

## 2019-01-17 PROCEDURE — C1894 INTRO/SHEATH, NON-LASER: HCPCS

## 2019-01-17 PROCEDURE — 76060000032 HC ANESTHESIA 0.5 TO 1 HR: Performed by: SURGERY

## 2019-01-17 PROCEDURE — 76210000006 HC OR PH I REC 0.5 TO 1 HR: Performed by: SURGERY

## 2019-01-17 PROCEDURE — 74011250636 HC RX REV CODE- 250/636

## 2019-01-17 PROCEDURE — C1887 CATHETER, GUIDING: HCPCS

## 2019-01-17 PROCEDURE — C1769 GUIDE WIRE: HCPCS

## 2019-01-17 PROCEDURE — 76010000138 HC OR TIME 0.5 TO 1 HR: Performed by: SURGERY

## 2019-01-17 PROCEDURE — 74011250636 HC RX REV CODE- 250/636: Performed by: ANESTHESIOLOGY

## 2019-01-17 PROCEDURE — 76210000023 HC REC RM PH II 2 TO 2.5 HR: Performed by: SURGERY

## 2019-01-17 PROCEDURE — 82962 GLUCOSE BLOOD TEST: CPT

## 2019-01-17 PROCEDURE — 74011250637 HC RX REV CODE- 250/637: Performed by: ANESTHESIOLOGY

## 2019-01-17 PROCEDURE — 74011636320 HC RX REV CODE- 636/320: Performed by: SURGERY

## 2019-01-17 PROCEDURE — 36246 INS CATH ABD/L-EXT ART 2ND: CPT

## 2019-01-17 PROCEDURE — 76937 US GUIDE VASCULAR ACCESS: CPT

## 2019-01-17 RX ORDER — LIDOCAINE HYDROCHLORIDE 10 MG/ML
0.1 INJECTION INFILTRATION; PERINEURAL AS NEEDED
Status: DISCONTINUED | OUTPATIENT
Start: 2019-01-17 | End: 2019-01-17 | Stop reason: HOSPADM

## 2019-01-17 RX ORDER — SODIUM CHLORIDE 9 MG/ML
75 INJECTION, SOLUTION INTRAVENOUS CONTINUOUS
Status: DISCONTINUED | OUTPATIENT
Start: 2019-01-17 | End: 2019-01-17 | Stop reason: HOSPADM

## 2019-01-17 RX ORDER — SODIUM CHLORIDE 9 MG/ML
1 INJECTION, SOLUTION INTRAVENOUS AS NEEDED
Status: ACTIVE | OUTPATIENT
Start: 2019-01-17 | End: 2019-01-17

## 2019-01-17 RX ORDER — OXYCODONE AND ACETAMINOPHEN 5; 325 MG/1; MG/1
1 TABLET ORAL
Status: DISCONTINUED | OUTPATIENT
Start: 2019-01-17 | End: 2019-01-17 | Stop reason: HOSPADM

## 2019-01-17 RX ORDER — IODIXANOL 320 MG/ML
INJECTION, SOLUTION INTRAVASCULAR AS NEEDED
Status: DISCONTINUED | OUTPATIENT
Start: 2019-01-17 | End: 2019-01-17 | Stop reason: HOSPADM

## 2019-01-17 RX ORDER — SODIUM CHLORIDE 0.9 % (FLUSH) 0.9 %
5-40 SYRINGE (ML) INJECTION EVERY 8 HOURS
Status: DISCONTINUED | OUTPATIENT
Start: 2019-01-17 | End: 2019-01-17 | Stop reason: HOSPADM

## 2019-01-17 RX ORDER — CEFAZOLIN SODIUM/WATER 2 G/20 ML
2 SYRINGE (ML) INTRAVENOUS ONCE
Status: COMPLETED | OUTPATIENT
Start: 2019-01-17 | End: 2019-01-17

## 2019-01-17 RX ORDER — ACETAMINOPHEN 500 MG
1000 TABLET ORAL ONCE
Status: COMPLETED | OUTPATIENT
Start: 2019-01-17 | End: 2019-01-17

## 2019-01-17 RX ORDER — DEXTROSE 50 % IN WATER (D50W) INTRAVENOUS SYRINGE
Status: COMPLETED
Start: 2019-01-17 | End: 2019-01-17

## 2019-01-17 RX ORDER — ONDANSETRON 2 MG/ML
4 INJECTION INTRAMUSCULAR; INTRAVENOUS
Status: DISCONTINUED | OUTPATIENT
Start: 2019-01-17 | End: 2019-01-17 | Stop reason: HOSPADM

## 2019-01-17 RX ORDER — SODIUM CHLORIDE 0.9 % (FLUSH) 0.9 %
5-40 SYRINGE (ML) INJECTION AS NEEDED
Status: DISCONTINUED | OUTPATIENT
Start: 2019-01-17 | End: 2019-01-17 | Stop reason: HOSPADM

## 2019-01-17 RX ORDER — MORPHINE SULFATE 10 MG/ML
1 INJECTION, SOLUTION INTRAMUSCULAR; INTRAVENOUS
Status: DISCONTINUED | OUTPATIENT
Start: 2019-01-17 | End: 2019-01-17 | Stop reason: HOSPADM

## 2019-01-17 RX ORDER — HYDROMORPHONE HYDROCHLORIDE 2 MG/ML
0.5 INJECTION, SOLUTION INTRAMUSCULAR; INTRAVENOUS; SUBCUTANEOUS
Status: DISCONTINUED | OUTPATIENT
Start: 2019-01-17 | End: 2019-01-17 | Stop reason: HOSPADM

## 2019-01-17 RX ORDER — DEXTROSE 50 % IN WATER (D50W) INTRAVENOUS SYRINGE
25 AS NEEDED
Status: DISCONTINUED | OUTPATIENT
Start: 2019-01-17 | End: 2019-01-17 | Stop reason: HOSPADM

## 2019-01-17 RX ORDER — ALBUTEROL SULFATE 0.83 MG/ML
2.5 SOLUTION RESPIRATORY (INHALATION) AS NEEDED
Status: DISCONTINUED | OUTPATIENT
Start: 2019-01-17 | End: 2019-01-17 | Stop reason: HOSPADM

## 2019-01-17 RX ORDER — PROPOFOL 10 MG/ML
INJECTION, EMULSION INTRAVENOUS
Status: DISCONTINUED | OUTPATIENT
Start: 2019-01-17 | End: 2019-01-17 | Stop reason: HOSPADM

## 2019-01-17 RX ORDER — PROPOFOL 10 MG/ML
INJECTION, EMULSION INTRAVENOUS AS NEEDED
Status: DISCONTINUED | OUTPATIENT
Start: 2019-01-17 | End: 2019-01-17 | Stop reason: HOSPADM

## 2019-01-17 RX ORDER — SODIUM CHLORIDE, SODIUM LACTATE, POTASSIUM CHLORIDE, CALCIUM CHLORIDE 600; 310; 30; 20 MG/100ML; MG/100ML; MG/100ML; MG/100ML
1000 INJECTION, SOLUTION INTRAVENOUS CONTINUOUS
Status: DISCONTINUED | OUTPATIENT
Start: 2019-01-17 | End: 2019-01-17 | Stop reason: HOSPADM

## 2019-01-17 RX ORDER — MIDAZOLAM HYDROCHLORIDE 1 MG/ML
2 INJECTION, SOLUTION INTRAMUSCULAR; INTRAVENOUS
Status: DISCONTINUED | OUTPATIENT
Start: 2019-01-17 | End: 2019-01-17 | Stop reason: HOSPADM

## 2019-01-17 RX ADMIN — DEXTROSE 50 % IN WATER (D50W) INTRAVENOUS SYRINGE 12.5 G: at 08:26

## 2019-01-17 RX ADMIN — Medication 2 G: at 08:36

## 2019-01-17 RX ADMIN — ACETAMINOPHEN 1000 MG: 500 TABLET, FILM COATED ORAL at 06:12

## 2019-01-17 RX ADMIN — SODIUM CHLORIDE 1 ML/KG/HR: 900 INJECTION, SOLUTION INTRAVENOUS at 05:55

## 2019-01-17 RX ADMIN — PROPOFOL 50 MG: 10 INJECTION, EMULSION INTRAVENOUS at 08:37

## 2019-01-17 RX ADMIN — PROPOFOL 75 MCG/KG/MIN: 10 INJECTION, EMULSION INTRAVENOUS at 08:37

## 2019-01-17 RX ADMIN — SODIUM CHLORIDE, SODIUM LACTATE, POTASSIUM CHLORIDE, AND CALCIUM CHLORIDE 1000 ML: 600; 310; 30; 20 INJECTION, SOLUTION INTRAVENOUS at 05:55

## 2019-01-17 NOTE — BRIEF OP NOTE
[address], [address] [place] [address]. [address] [place] [address] FAX: [phone] Op Note Template Note Pre-Op Diagnosis: PAD (peripheral artery disease) ([facility] [address] [I73.9] Post-Op Diagnosis:  PAD (peripheral artery disease) ([facility] [address] [I73.9] Procedures: Procedure(s): LEFT LEG ARTERIOGRAM 
 
Surgeon: Leann Cole MD 
 
Assistants: Surgeon(s): Barbara Licea MD   
 
Anesthesia:  General  
 
Findings: Left SFA occlusion and left common femoral to above-knee popliteal artery bypass occluded reconstituted below-knee pop occlusion of the tibioperoneal trunk with highly diseased peroneal and anterior tibial band runoff down to the ankle Tourniquet Time:  * No tourniquets in log * Estimated Blood Loss:       
      
Specimens:         
Implants:  * No implants in log * Complications: None Signed: Leann Cole MD 
   
Elements of this note have been dictated using speech recognition software. As a result, errors of speech recognition may have occurred.

## 2019-01-17 NOTE — DISCHARGE INSTRUCTIONS
Your Recovery  After an angiogram, your groin may have a bruise and feel sore for a day or two. You can do light activities around the house but nothing strenuous for several days. Your doctor may give you specific instructions on when you can do your normal activities again, such as driving and going back to work. This care sheet gives you a general idea about how long it will take for you to recover. But each person recovers at a different pace. Follow the steps below to feel better as quickly as possible. How can you care for yourself at home? Activity  · Do not do strenuous exercise and do not lift, pull, or push anything heavy until your doctor says it is okay. This may be for a day or two. You can walk around the house and do light activity, such as cooking. · You may shower 24 to 48 hours after the procedure, if your doctor okays it. Pat the incision dry. Do not take a bath for 1 week, or until your doctor tells you it is okay. · The catheter was placed in your groin, try not to walk up stairs for the first couple of days. · If your doctor recommends it, get more exercise. Walking is a good choice. Bit by bit, increase the amount you walk every day. Try for at least 30 minutes on most days of the week. Diet  · Drink plenty of fluids to help your body flush out the dye. If you have kidney, heart, or liver disease and have to limit fluids, talk with your doctor before you increase the amount of fluids you drink. · You can eat your normal diet. If your stomach is upset, try bland, low-fat foods like plain rice, broiled chicken, toast, and yogurt. Medicines  · Be safe with medicines. Read and follow all instructions on the label. ¨ If the doctor gave you a prescription medicine for pain, take it as prescribed. ¨ If you are not taking a prescription pain medicine, ask your doctor if you can take an over-the-counter medicine.   · If you take blood thinners, such as warfarin (Coumadin), clopidogrel (Plavix), or aspirin, be sure to talk to your doctor. He or she will tell you if and when to start taking those medicines again. Make sure that you understand exactly what your doctor wants you to do. · Your doctor will tell you if and when you can restart your medicines. He or she will also give you instructions about taking any new medicines. Care of the catheter site  · You will have a dressing over the cut (incision). A dressing helps the incision heal and protects it. Your doctor will tell you how to take care of this. · Put ice or a cold pack on the area for 10 to 20 minutes at a time to help with soreness or swelling. Put a thin cloth between the ice and your skin. Follow-up care is a key part of your treatment and safety. Be sure to make and go to all appointments, and call your doctor if you are having problems. It's also a good idea to know your test results and keep a list of the medicines you take. When should you call for help? Call 911 anytime you think you may need emergency care. For example, call if:  · You passed out (lost consciousness). · You have severe trouble breathing. · You have sudden chest pain and shortness of breath, or you cough up blood. Call your doctor now or seek immediate medical care if:  · You are bleeding from the area where the catheter was put in your artery. · You have a fast-growing, painful lump at the catheter site. · You have signs of infection, such as:  ¨ Increased pain, swelling, warmth, or redness. ¨ Red streaks leading from the incision. ¨ Pus draining from the incision. ¨ A fever. Watch closely for any changes in your health, and be sure to contact your doctor if:  · You don't get better as expected.   After general anesthesia or intravenous sedation, for 24 hours or while taking prescription Narcotics:  · Limit your activities  · Do not drive and operate hazardous machinery  · Do not make important personal or business decisions  · Do  not drink alcoholic beverages  · If you have not urinated within 8 hours after discharge, please contact your surgeon on call. *  Please give a list of your current medications to your Primary Care Provider. *  Please update this list whenever your medications are discontinued, doses are      changed, or new medications (including over-the-counter products) are added. *  Please carry medication information at all times in case of emergency situations. These are general instructions for a healthy lifestyle:  No smoking/ No tobacco products/ Avoid exposure to second hand smoke  Surgeon General's Warning:  Quitting smoking now greatly reduces serious risk to your health. Obesity, smoking, and sedentary lifestyle greatly increases your risk for illness  A healthy diet, regular physical exercise & weight monitoring are important for maintaining a healthy lifestyle    Recognize signs and symptoms of STROKE:    F-face looks uneven  A-arms unable to move or move unevenly  S-speech slurred or non-existent  T-time-call 911 as soon as signs and symptoms begin-DO NOT go       Back to bed or wait to see if you get better-TIME IS BRAIN.

## 2019-01-17 NOTE — ANESTHESIA POSTPROCEDURE EVALUATION
Procedure(s): LEFT LEG ARTERIOGRAM/ ULTRASOUND GUIDED VASCULAR ACCESS/ABDOMINAL AORTAGRAM. Anesthesia Post Evaluation Multimodal analgesia: multimodal analgesia used between 6 hours prior to anesthesia start to PACU discharge Patient location during evaluation: bedside Patient participation: complete - patient participated Level of consciousness: awake and responsive to light touch Pain management: adequate Airway patency: patent Anesthetic complications: no 
Cardiovascular status: acceptable, hemodynamically stable, blood pressure returned to baseline and stable Respiratory status: acceptable, unassisted, spontaneous ventilation and nonlabored ventilation Hydration status: acceptable Post anesthesia nausea and vomiting:  controlled Visit Vitals /63 Pulse 71 Temp 36.8 °C (98.3 °F) Resp 18 Ht 5' 8.5\" (1.74 m) Wt 93.3 kg (205 lb 11.2 oz) SpO2 99% BMI 30.82 kg/m²

## 2019-01-17 NOTE — ANESTHESIA PREPROCEDURE EVALUATION
Anesthetic History No history of anesthetic complications Review of Systems / Medical History Patient summary reviewed and pertinent labs reviewed Pulmonary Sleep apnea Neuro/Psych Cardiovascular Hypertension: well controlled Valvular problems/murmurs (AVR) CHF Pacemaker (placed following CABG), CAD and CABG (2011) Exercise tolerance: >4 METS Comments: H/O AVR (bioprosthetic) and CABG in 2011 Extensive PVD, s/p left femoral to above-knee pop bypass in 3/2015 GI/Hepatic/Renal 
  
 
 
Renal disease: CRI Endo/Other Diabetes: well controlled, type 2 Obesity Other Findings Physical Exam 
 
Airway Mallampati: II 
TM Distance: 4 - 6 cm Neck ROM: normal range of motion Mouth opening: Normal 
 
 Cardiovascular Rhythm: regular Murmur: Grade 1, Aortic area Dental 
No notable dental hx Pulmonary Breath sounds clear to auscultation Abdominal 
 
 
 
 Other Findings Anesthetic Plan ASA: 3 Anesthesia type: total IV anesthesia Induction: Intravenous Anesthetic plan and risks discussed with: Patient

## 2019-01-18 NOTE — OP NOTES
[facility] REPORT    Nette Bartholomew  MR#: [id number]  : 1960  ACCOUNT #: [de-identified]   DATE OF SERVICE: 2019    PREOPERATIVE DIAGNOSIS:  Left leg nonhealing wound. POSTOPERATIVE DIAGNOSIS:  Left leg nonhealing wound. PROCEDURES PERFORMED:  1. Ultrasound-guided access of right common femoral artery, placement of catheter in aorta,  aortogram.  2.  Left lower extremity diagnostic arteriogram.    SURGEON:  Marcela Morales MD    ASSISTANT:     ANESTHESIA:  Sedation. ESTIMATED BLOOD LOSS:      SPECIMENS REMOVED:      COMPLICATIONS:      IMPLANTS:      OPERATIVE FINDINGS:  1. There was no evidence of any aortoiliac disease. 2.  Bilateral hypogastric arteries are patent. 3.  Left lower extremity arteriogram showed patent common femoral artery and patent profunda with a flush occlusion of the SFA and previously left common femoral to above-knee popliteal graft. The above knee popliteal artery reconstitutes but then it gets heavily diseased below the knee with the tibioperoneal trunk being occluded with the posterior tibial being occluded and a peroneal and anterior tibial band disease; however, patent down to the plantar digits. INDICATION FOR PROCEDURE:  This is a 53-year-old male with history of diabetes and peripheral vascular disease. He was status post left common femoral to above-knee popliteal artery bypass several years ago. He was lost to follow up, presented to my office with again a nonhealing wound. On ultrasound it was noted that the graft was occluded. The patient had a wound in his left heel. We then consented for an arteriogram for potential planning for our diagnostic for a bypass. PROCEDURE IN DETAIL:  After giving informed consent, the patient was brought to the operating room, general anesthesia induced. Preoperative antibiotics were given before skin incision.   Bilateral groin was all prepped and draped in normal sterile fashion. Ultrasound-guided access of the right common femoral artery using the micropuncture technique. We upsized to a 5-[place] citizen sheath over 0.035 starter wire. Omni Flush catheter was directed into the distal aorta for an aortogram.  Please see above for anatomical findings. We then used the Glidewire to get up and over to the contralateral common femoral artery, which we exchanged with a guide catheter up and over. We then did a distal traction arteriogram showing the SFA occluded with reconstitution of the above knee pop with the below knee pop highly diseased and an occlusion of the tibioperoneal trunk with the peroneal and the anterior tibial being a dominant runoff to the ankle. We then removed the sheath and the catheter did a Mynx closure device. The patient will be seen in my office in 2 weeks for vein mapping for potential left common femoral to peroneal artery bypass.       MD MAT Mejía / INDIGO  D: 01/17/2019 09:27     T: 01/17/2019 09:56  JOB #: [id number]

## 2019-01-24 ENCOUNTER — HOSPITAL ENCOUNTER (OUTPATIENT)
Dept: WOUND CARE | Age: 59
Discharge: HOME | End: 2019-01-24
Attending: SURGERY
Payer: COMMERCIAL

## 2019-01-24 VITALS
OXYGEN SATURATION: 96 % | SYSTOLIC BLOOD PRESSURE: 111 MMHG | TEMPERATURE: 98.2 F | HEART RATE: 82 BPM | DIASTOLIC BLOOD PRESSURE: 70 MMHG | RESPIRATION RATE: 18 BRPM

## 2019-01-24 DIAGNOSIS — I73.9 PVD (PERIPHERAL VASCULAR DISEASE) (HCC): ICD-10-CM

## 2019-01-24 DIAGNOSIS — I99.8 ISCHEMIA OF FOOT: ICD-10-CM

## 2019-01-24 DIAGNOSIS — L97.422 DIABETIC ULCER OF LEFT HEEL ASSOCIATED WITH TYPE 2 DIABETES MELLITUS, WITH FAT LAYER EXPOSED (HCC): ICD-10-CM

## 2019-01-24 DIAGNOSIS — E11.621 DIABETIC ULCER OF LEFT HEEL ASSOCIATED WITH TYPE 2 DIABETES MELLITUS, WITH FAT LAYER EXPOSED (HCC): ICD-10-CM

## 2019-01-24 DIAGNOSIS — Z79.01 LONG TERM CURRENT USE OF ANTICOAGULANT THERAPY: ICD-10-CM

## 2019-01-24 PROCEDURE — 99214 OFFICE O/P EST MOD 30 MIN: CPT

## 2019-01-24 PROCEDURE — 99214 OFFICE O/P EST MOD 30 MIN: CPT | Performed by: SURGERY

## 2019-01-24 NOTE — DISCHARGE INSTRUCTIONS
Left Medial Heel:  Cleanse wound and periwound with wound cleanser or normal saline. Xeroform Gauze: Cut to wound size and apply to wound bed. Cover with ABD, wrap with Rolled Gauze. Dressing change daily. Minimize ambulation to offload pressure on heel. Continue use of knee scooter.

## 2019-01-24 NOTE — PROGRESS NOTES
WOUND CENTER Consult Note/Progress Note:  
Sulma Dahl  MRN: [id number]  :1960  Age:58 y.o. 
 
HPI: Sulma Dahl is a 66 y.o. male who is referred from [facility].  He was recently discharged. He was a patient of Dr. Patricia Weiss who underwent above-knee femoropopliteal bypass with PTFE in the left leg in . He had multiple toe amputations at that time. He was lost to follow-up. He presented with cellulitis of his left foot and the medial heel region. He was treated with antibiotics. Noninvasive vascular study showed occlusion of his bypass but he has no rest pain and was felt to have adequate circulation. He has follow-up with Dr. Patricia Weiss in 2019. He is completing a course of antibiotics. Wound care has been primarily painting with Betadine. Plain films were negative for foreign body. There is no radiographic evidence of osteomyelitis. He has a pacemaker and cannot tolerate MRI. He had bone scan which showed some activity in 1 of his toes but the toes are without clinical abnormality. This information was obtained from the patient The following HPI elements were documented for the patient's wound: 
Location: L heel Duration: 12/3/2018 Severity:  5/10 Context:  Cellulitis, poss ischemia Modifying Factors:  Nothing makes better or worse Quality: burning Timing: constant and especially when manipulated Associated Signs and Symptoms: n/a Past Medical History:  
Diagnosis Date  Acute on chronic diastolic heart failure ([facility] [address] 2011  
 followed by dr Kimmy Harmon  Arrhythmia   
 pacemaker Medtronic-- followed by [facility] cardio  Atherosclerotic PVD with ulceration ([facility] [address] 2015  CAD (coronary artery disease) 2011  
  3 vessel CABG; aortic valve replacement/Bovine-- both at same time-- followed by dr Kimmy Harmon  Chronic kidney disease   
 chronic renal insuff. --- does not see a nephrologist   
 Chronic pain   
 left foot  Depression 2/28/2011  Foot amputation status, left ([facility] [address] Left foot with 2 toes amputated and portion of Left foot  GERD (gastroesophageal reflux disease)   
 managed with OTC PRN meds  H/O aortic valve replacement 2/2011 Bovine valve  Heart failure ([facility] [address]  History of penile implant   
 currently in place  History of shingles 06/2015  Hx of CABG   
 3 vessel  Hypercholesterolemia  Hypertension   
 controlled with med  Left foot pain  Murmur, cardiac 02/2011  
 aortic valve replaced with CABG, 2011------- 2/6 SALVADOR RUSB apex, 2/6 TR murmur-- ECHO 9/13/17LVEF 55-60%  Pacemaker Medtronic  PVD (peripheral vascular disease) ([facility] [address]  Type 2 diabetes mellitus (HCC)   
 insulin reliant/AVg BS: 100-120/s.s of hypoglycemia at 50/Last A1c: 8.0  Warfarin anticoagulation   
 pt no longer on Coumadin Past Surgical History:  
Procedure Laterality Date  CARDIAC SURG PROCEDURE UNLIST  2011 Dr. Peng Morales  3 vessel cabg and aortic valve replacement  COLONOSCOPY N/A 2/14/2018 COLONOSCOPY  BMI 37 performed by Neeta Benites MD at [facility] ENDOSCOPY  
 HX CATARACT REMOVAL Bilateral   
 with lens implants  HX FREE SKIN GRAFT Left 8/11/15  
 thigh to foot graft  HX HEART CATHETERIZATION  03/07/2011  HX PACEMAKER  2011  
 medtronic per pt (phone assessment)  HX UROLOGICAL  2005 Penile Impant  VASCULAR SURGERY PROCEDURE UNLIST  2/17/15 LE arteriogram  
 VASCULAR SURGERY PROCEDURE UNLIST Left 3-16-15  
 fem-tibial by-pass  VASCULAR SURGERY PROCEDURE UNLIST Left 6/2/15  
 5th toe amp  VASCULAR SURGERY PROCEDURE UNLIST Left 6/5/15  
 wound debridement  VASCULAR SURGERY PROCEDURE UNLIST Left 8/24/15  
 great toe amp Current Outpatient Medications Medication Sig  
 icosapent ethyl (VASCEPA) 1 gram capsule Take 2 Caps by mouth two (2) times daily (with meals).   
 insulin glargine U-300 conc (TOUJEO SOLOSTAR U-300 INSULIN) 300 unit/mL (1.5 mL) inpn 50 units daily (Patient taking differently: 50 Units by SubCUTAneous route nightly. 50 units daily)  pravastatin (PRAVACHOL) 80 mg tablet Take 1 Tab by mouth nightly.  amLODIPine (NORVASC) 10 mg tablet Take 1 Tab by mouth every morning.  metoprolol succinate (TOPROL-XL) 50 mg XL tablet Take 1 Tab by mouth every morning.  eszopiclone (LUNESTA) 2 mg tablet Take 1 Tab by mouth nightly. Max Daily Amount: 2 mg.  DULoxetine (CYMBALTA) 60 mg capsule Take 1 Cap by mouth daily.  insulin aspart U-100 (NOVOLOG FLEXPEN U-100 INSULIN) 100 unit/mL inpn 15 Units by SubCUTAneous route Before breakfast, lunch, and dinner.  irbesartan (AVAPRO) 150 mg tablet Take 1 Tab by mouth nightly.  aspirin 81 mg chewable tablet Take 81 mg by mouth daily. No current facility-administered medications for this encounter. ALLERGIES: Patient has no known allergies. Social History Socioeconomic History  Marital status:  Spouse name: Not on file  Number of children: Not on file  Years of education: Not on file  Highest education level: Not on file Occupational History  Occupation: works in the lab Comment: [employer] Tobacco Use  Smoking status: Never Smoker  Smokeless tobacco: Never Used Substance and Sexual Activity  Alcohol use: No  
 Drug use: No  
 
Social History Tobacco Use Smoking Status Never Smoker Smokeless Tobacco Never Used Family History Problem Relation Age of Onset  Diabetes Mother  Heart Disease Mother  Heart Surgery Mother 54  
     valve replacement Wilkinson Other Mother Shrogens  Diabetes Father  Heart Disease Father  Heart Surgery Father 61  
     cabg  Lung Disease Father  Parkinsonism Father  Other Father   
     polio as a child-affected lungs  Diabetes Brother  Thyroid Disease Brother  Diabetes Paternal Uncle x2  Stroke Brother  Diabetes Brother  Cancer Paternal Grandfather ROS: The patient has no difficulty with chest pain or shortness of breath. No fever or chills. Comprehensive review of systems was otherwise unremarkable except as noted above. Physical Exam:  
Visit Vitals /70 (BP 1 Location: Right arm, BP Patient Position: Sitting) Pulse 82 Temp 98.2 °F (36.8 °C) Resp 18 SpO2 96% Constitutional: Alert, oriented, cooperative patient in no acute distress; appears stated age;  General appearance is within normal limits for wound care patient population. See the today's recorded vitals signs and constitutional data. Eyes: Pupils equal; Sclera are clear. EOMs intact; The eyes appear to track and move normally. The sclera are not injected. The conjunctive are clear. The eyelids are normal. There is no scleral icterus. ENMT: There are no obvious external ear, nose, lip or mouth lesions. Nares normal; Neck: Overall contour of the neck is normal with no obvious neck masses. Gross hearing is within normal limits. No obvious neck masses CV: RRR;  no JVD; No evidence of cyanosis of the upper extremities. The extremities are perfused without embolic sign, splinter hemorrhages, or petechia. Resp:  Breathing is non-labored; normal rate and effort; no audible wheezing. GI: soft and non-distended without acute abnormality noted. Musculoskeletal: unremarkable with normal function. No embolic signs or cyanosis. Neuro:  Oriented; moves all 4; no focal deficits Psychiatric: Judgement and insight are within normal limits for the wound care population of patients. Patient is oriented to person, place, and time. Recent and remote memory are within normal limits. Mood and affect are within normal limits. Integumentary (Skin/Wounds) See inspection of wound(s) below. There are no other skin areas of palpable concern.   
See wound center documentation including photos in the [facility] Wound Template made part of this record by reference. Wounds: 
Wound Foot Left (Active) Number of days: 52 Wound Heel Left (Active) Number of days: 52 Wound Heel Left;Medial;Posterior (Active) DRESSING STATUS Old drainage 1/24/2019  1:54 PM  
Non-Pressure Injury Full thickness (subcut/muscle) 1/24/2019  1:54 PM  
Wound Length (cm) 0.3 cm 1/24/2019  1:54 PM  
Wound Width (cm) 0.1 cm 1/24/2019  1:54 PM  
Wound Depth (cm) 0.1 1/24/2019  1:54 PM  
Wound Surface area (cm^2) 0.03 cm^2 1/24/2019  1:54 PM  
Change in Wound Size % 99.77 1/24/2019  1:54 PM  
Condition of Base Granulation 1/24/2019  1:54 PM  
Condition of Edges Open 1/24/2019  1:54 PM  
Tissue Type Red 1/24/2019  1:54 PM  
Tissue Type Percent Pink 100 1/24/2019  1:54 PM  
Tissue Type Percent Yellow 30 1/3/2019  2:27 PM  
Drainage Amount  Moderate 1/24/2019  1:54 PM  
Drainage Color Serosanguinous 1/24/2019  1:54 PM  
Wound Odor None 1/24/2019  1:54 PM  
Periwound Skin Condition Intact 1/24/2019  1:54 PM  
Cleansing and Cleansing Agents  Normal saline 1/24/2019  1:54 PM  
Procedure Tolerated Well 1/3/2019  2:27 PM  
[place] Grading Scale 0-5  Grade 2 1/3/2019  2:27 PM  
Number of days: 42 Wound Groin Right (Active) Number of days: 7 [REMOVED] Wound Foot Right;Left (Removed) Number of days: 2967 [REMOVED] Wound Foot Plantar;Lateral (Removed) Number of days: 9798 [REMOVED] Wound Foot Left (Removed) Number of days: 1280 [REMOVED] Wound Thigh Left;Upper (Removed) Number of days: 1213 [REMOVED] Wound Foot Left (Removed) Number of days: 1213 [REMOVED] Wound Left (Removed) Number of days: 1200 [REMOVED] Wound Foot Right (Removed) Number of days: 465 [REMOVED] Wound Heel Dorsal;Left;Posterior (Removed) Number of days: 2 Recent vitals (if inpt): 
Patient Vitals for the past 24 hrs: 
 BP Temp Pulse Resp SpO2 01/24/19 1350 111/70 98.2 °F (36.8 °C) 82 18 96 % Labs: 
No results for input(s): WBC, HGB, PLT, NA, K, CL, CO2, BUN, CREA, GLU, PTP, INR, APTT, TBIL, TBILI, CBIL, SGOT, GPT, ALT, AP, AML, LPSE, LCAD, NH4, TROPT, TROIQ, PCO2, PO2, HCO3, HGBEXT, PLTEXT, HGBEXT, PLTEXT in the last 72 hours. No lab exists for component:  PH, INREXT, INREXT Lab Results Component Value Date/Time WBC 9.9 01/14/2019 12:52 PM  
 HGB 13.7 01/14/2019 12:52 PM  
 PLATELET 831 32/24/3016 12:52 PM  
 Sodium 133 (L) 01/14/2019 12:52 PM  
 Potassium 5.1 01/14/2019 12:52 PM  
 Chloride 100 01/14/2019 12:52 PM  
 CO2 26 01/14/2019 12:52 PM  
 BUN 28 (H) 01/14/2019 12:52 PM  
 Creatinine 1.82 (H) 01/14/2019 12:52 PM  
 Glucose 278 (H) 01/14/2019 12:52 PM  
 Glucose 126 (H) 02/17/2011 10:25 AM  
 INR 1.7 (H) 06/01/2016 11:26 AM  
 aPTT 45.6 (H) 08/24/2015 10:07 PM  
 Bilirubin, total 0.3 12/11/2018 05:43 AM  
 AST (SGOT) 27 12/11/2018 05:43 AM  
 ALT (SGPT) 35 12/11/2018 05:43 AM  
 Alk. phosphatase 90 12/11/2018 05:43 AM  
 Amylase 49 12/07/2016 02:49 PM  
 Lipase 36 12/07/2016 02:49 PM  
 Troponin-I <0.05 02/28/2011 12:10 PM  
 Troponin-I, Qt. <0.02 (L) 04/24/2018 04:29 PM  
 
XR Results (most recent): 
Results from Hospital Encounter encounter on 12/03/18 XR FOOT LT MIN 3 V Narrative Left foot series HISTORY: Diabetic wound. 3 views of the left foot were obtained. Comparison is made to the prior exam 
06/21/2016. FINDINGS: There are amputations of the first and fifth digits. There are 
hammertoe deformities of the second through fourth digits. There is soft tissue 
edema along the posterior/inferior margin of the calcaneus without bony 
destruction. Vascular calcifications are present. Impression IMPRESSION: 
1. Soft tissue swelling adjacent to the calcaneus without radiographic features 
to suggest osteomyelitis. 2. Amputation of the first and fifth digits. PROCEDURES PERFORMED: 1/17/2019 1.  Ultrasound-guided access of right common femoral artery, placement of catheter in aorta,  aortogram. 
2.  Left lower extremity diagnostic arteriogram. 
SURGEON:  Oliver Gonzalez MD 
  
OPERATIVE FINDINGS: 
1. There was no evidence of any aortoiliac disease. 2.  Bilateral hypogastric arteries are patent. 3.  Left lower extremity arteriogram showed patent common femoral artery and patent profunda with a flush occlusion of the SFA and previously left common femoral to above-knee popliteal graft. The above knee popliteal artery reconstitutes but then it gets heavily diseased below the knee with the tibioperoneal trunk being occluded with the posterior tibial being occluded and a peroneal and anterior tibial band disease; however, patent down to the plantar digits. I reviewed recent labs and recent radiologic studies. I independently reviewed radiology images for studies I described above or studies I have ordered. Admission date (for inpatients): 1/24/2019 * No surgery found *  * No surgery found * ASSESSMENT/PLAN: 
Patient with known vascular disease. He has failed femoropopliteal bypass from 2015. He has no rest pain or acute ischemic changes. He was admitted with cellulitis and heel wound. Wound was debrided 12/13. Devitalized tissue was removed. Wound has improved greatly with Hydrofera Blue. We will switch to Xeroform. Arteriogram showed occlusion of the above-knee femoropopliteal graft. Dr. Afia Vazquez is planning for potential common femoral to peroneal artery bypass with saphenous vein. Follow-up in 2 weeks. Problem List  Date Reviewed: 12/18/2018 Codes Class Noted Cellulitis of left foot ICD-10-CM: L03.116 ICD-9-CM: 682.7  12/7/2018 DM (diabetes mellitus), type 2, uncontrolled ([facility] [address] ICD-10-CM: E11.65 ICD-9-CM: 250.02  12/3/2018 Acute kidney injury superimposed on CKD ([facility] [address] ICD-10-CM: N17.9, N18.9 ICD-9-CM: 866.00, 585.9  12/3/2018 Diabetic ulcer of left heel ([facility] [address] ICD-10-CM: E11.621, Z62.451 ICD-9-CM: 250.80, 707.14  12/3/2018 Class 2 severe obesity due to excess calories with serious comorbidity and body mass index (BMI) of 35.0 to 35.9 in adult [facility]) ICD-10-CM: E66.01, Z68.35 ICD-9-CM: 278.01, V85.35  8/20/2018 Preop cardiovascular exam (Chronic) ICD-10-CM: F42.003 ICD-9-CM: V72.81  8/15/2017 Aortic stenosis ICD-10-CM: I35.0 ICD-9-CM: 424.1  2/8/2017 Edema ICD-10-CM: R60.9 ICD-9-CM: 782.3  2/8/2017 H/O heart valve replacement with bioprosthetic valve ICD-10-CM: Z95.3 ICD-9-CM: V42.2  2/8/2017 Overview Signed 8/15/2017 10:50 AM by Kenia Dwyer MD  
  Aortic Long term current use of anticoagulant therapy ICD-10-CM: Z79.01 
ICD-9-CM: V58.61  10/14/2015 Diabetic ulcer of left great toe (HCC) (Chronic) ICD-10-CM: E11.621, B53.572 ICD-9-CM: 250.80, 707.15  8/22/2015 Overview Signed 8/24/2015  4:00 PM by Sharlene Smith NP  
  8/24/15 (Dr Swapna Arevalo) AMPUTATION LEFT FIRST TOE, LEFT FOOT DEBRIDMENT Ischemia of foot ICD-10-CM: I99.8 ICD-9-CM: 459.9  8/21/2015 Acute renal failure (ARF) (HCC) ICD-10-CM: N17.9 ICD-9-CM: 584.9  8/21/2015 Hypertension (Chronic) ICD-10-CM: I10 
ICD-9-CM: 401.9  6/1/2015 Warfarin-induced coagulopathy (HCC) ICD-10-CM: J99.23, J75.249D ICD-9-CM: 286.7, E934.2  6/1/2015 Diabetic foot ulcer ([facility] ICD-10-CM: E11.621, J14.590 ICD-9-CM: 250.80, 707.15  6/1/2015 Overview Addendum 6/6/2015  7:04 AM by Sharlene Smith NP  
  6/5/15 (Dr Hakeem Arias) Left foot wound debridement, measuring 6 x 6 cm.  
6/2/15 (Dr Hakeem Arias) 1. Left fifth toe open amputation. 2. Drainage of the forefoot abscess. IDDM (insulin dependent diabetes mellitus) ([facility] ICD-10-CM: E11.9, Z79.4 ICD-9-CM: 250.00, V58.67  6/1/2015 PAD (peripheral artery disease) ([facility]) ICD-10-CM: I73.9 ICD-9-CM: 443.9  6/1/2015 CKD (chronic kidney disease), stage III (HCC) ICD-10-CM: N18.3 ICD-9-CM: 585.3  6/1/2015 Shingles ICD-10-CM: B02.9 ICD-9-CM: 053.9  6/1/2015 PVD (peripheral vascular disease) ([facility] ICD-10-CM: I73.9 ICD-9-CM: 443.9  3/16/2015 Overview Signed 3/18/2015  1:57 PM by Yoselin Juan, YANICK  
  3/16/15 (Dr Patricia Weiss) 1. Left superficial femoral artery endarterectomy. 2. Common femoral to above knee popliteal bypass with PTFE graft. 3. Left lower extremity arteriogram. 
  
  
   
 Atherosclerotic PVD with ulceration (HCC) (Chronic) ICD-10-CM: I70.209, Z48.129 ICD-9-CM: 440.23, 707.9  2/2/2015 Abscess or cellulitis of back ICD-10-CM: SZH6144 ICD-9-CM: 682.2  12/26/2014 Other and unspecified hyperlipidemia ICD-10-CM: E78.5 ICD-9-CM: 272.4  6/24/2013 Thrombocytopenia ([facility] ICD-10-CM: D69.6 ICD-9-CM: 287.5  3/10/2011 Postsurgical aortocoronary bypass status ICD-10-CM: Z95.1 ICD-9-CM: V45.81  3/7/2011 Bicuspid aortic valve (Chronic) ICD-10-CM: Q23.1 ICD-9-CM: 746.4  3/7/2011 S/P AVR (aortic valve replacement) ICD-10-CM: K82.8 ICD-9-CM: V43.3  3/7/2011 Acute on chronic diastolic heart failure (HCC) ICD-10-CM: I50.33 ICD-9-CM: 428.33  2/28/2011 CAD (coronary artery disease) (Chronic) ICD-10-CM: I25.10 ICD-9-CM: 414.00  2/28/2011 Diabetes mellitus (HCC) (Chronic) ICD-10-CM: E11.9 ICD-9-CM: 250.00  2/28/2011 Depression (Chronic) ICD-10-CM: F32.9 ICD-9-CM: 223  2/28/2011 Dyslipidemia (Chronic) ICD-10-CM: K91.8 ICD-9-CM: 272.4  2/28/2011 Acute on chronic renal failure (HCC) (Chronic) ICD-10-CM: N17.9, N18.9 ICD-9-CM: 584.9, 585.9  2/28/2011 Active Problems: * No active hospital problems. * Any procedures done today in the wound center are documented in a separate note in connect care made part of this record by reference Wound Care Orders Placed This Encounter  WOUND CARE, DRESSING CHANGE Left Medial Heel: 
Cleanse wound and periwound with wound cleanser or normal saline. Xeroform Gauze: Cut to wound size and apply to wound bed. Cover with ABD, wrap with Rolled Gauze. Dressing change daily. Minimize ambulation to offload pressure on heel. Continue use of knee scooter. Wear i.am.plus electronicsoke Boot at all times and FLENS with [employer] on. 
   
  Standing Status:   Standing Number of Occurrences:   1 Follow-up Information Follow up With Specialties Details Why Contact Info [facility] Wound Care In 2 weeks  [facility]  [place] [address] [phone] 
[phone] Signed:  Carey Cheney MD,  FACS

## 2019-01-24 NOTE — WOUND CARE
01/24/19 1354 Wound Heel Left;Medial;Posterior Date First Assessed/Time First Assessed: 12/13/18 1415   POA: Yes  Wound Type: Diabetic  Location: Heel  Wound Description (Optional): Diabetic Foot Ulcer  Orientation: Left;Medial;Posterior DRESSING STATUS Old drainage DRESSING TYPE (hydrofera blue, abd, rolled gauze) Non-Pressure Injury Full thickness (subcut/muscle) Wound Length (cm) 0.3 cm Wound Width (cm) 0.1 cm Wound Depth (cm) 0.1 Wound Surface area (cm^2) 0.03 cm^2 Change in Wound Size % 99.77 Condition of Base Granulation Condition of Edges Open Tissue Type Red Tissue Type Percent Pink 100 Drainage Amount  Moderate Drainage Color Serosanguinous Wound Odor None Periwound Skin Condition Intact Cleansing and Cleansing Agents  Normal saline **ADDENDUM: PATIENT IS ON AN ANTICOAGULANT ASPIRIN 81MG**

## 2019-01-24 NOTE — WOUND CARE
[address] [place] [address] [address] Phone: [phone] Fax: [phone] Patient: Quinton Roberts MRN: [id number]  SSN: [id number] YOB: 1960  Age: 66 y.o. Sex: male Return Appointment: 2 weeks with Judy Metz MD 
 
Instructions:  
Left Medial Heel: 
Cleanse wound and periwound with wound cleanser or normal saline. Xeroform Gauze: Cut to wound size and apply to wound bed. Cover with ABD, wrap with Rolled Gauze. Dressing change daily. Minimize ambulation to offload pressure on heel. Continue use of knee scooter. Should you experience increased redness, swelling, pain, foul odor, size of wound(s), or have a temperature over 101 degrees please contact the [address] [place] [address] Road at [phone] or if after hours contact your primary care physician or go to the hospital emergency department. Signed By: Dana Beatty RN   
 January 24, 2019

## 2019-02-05 PROBLEM — R60.9 EDEMA: Status: RESOLVED | Noted: 2017-02-08 | Resolved: 2019-02-05

## 2019-02-07 ENCOUNTER — PATIENT OUTREACH (OUTPATIENT)
Dept: CASE MANAGEMENT | Age: 59
End: 2019-02-07

## 2019-02-07 ENCOUNTER — HOSPITAL ENCOUNTER (OUTPATIENT)
Dept: WOUND CARE | Age: 59
Discharge: HOME | End: 2019-02-07
Attending: SURGERY
Payer: MEDICARE

## 2019-02-07 VITALS
HEART RATE: 92 BPM | RESPIRATION RATE: 18 BRPM | TEMPERATURE: 98.8 F | OXYGEN SATURATION: 98 % | HEIGHT: 68 IN | SYSTOLIC BLOOD PRESSURE: 132 MMHG | WEIGHT: 202.6 LBS | BODY MASS INDEX: 30.71 KG/M2 | DIASTOLIC BLOOD PRESSURE: 93 MMHG

## 2019-02-07 DIAGNOSIS — Z79.01 LONG TERM CURRENT USE OF ANTICOAGULANT THERAPY: ICD-10-CM

## 2019-02-07 DIAGNOSIS — I73.9 PAD (PERIPHERAL ARTERY DISEASE) (HCC): ICD-10-CM

## 2019-02-07 DIAGNOSIS — L97.422 DIABETIC ULCER OF LEFT HEEL ASSOCIATED WITH TYPE 2 DIABETES MELLITUS, WITH FAT LAYER EXPOSED (HCC): ICD-10-CM

## 2019-02-07 DIAGNOSIS — E11.621 DIABETIC ULCER OF LEFT HEEL ASSOCIATED WITH TYPE 2 DIABETES MELLITUS, WITH FAT LAYER EXPOSED (HCC): ICD-10-CM

## 2019-02-07 PROCEDURE — 99214 OFFICE O/P EST MOD 30 MIN: CPT | Performed by: SURGERY

## 2019-02-07 PROCEDURE — 99214 OFFICE O/P EST MOD 30 MIN: CPT

## 2019-02-07 NOTE — ACP (ADVANCE CARE PLANNING)
Patient not sure if these documents are done or on file. Educated him about how to get this completed.

## 2019-02-07 NOTE — PROGRESS NOTES
Complex Case Management  Initial Assessment  Addendum to Connect Care Note  Utilize questions pertinent to patient Referral Source & Reason PCP for assistance with some social /medical needs Primary concerns per patient and/or pts family/caregiver Patient has no electricity in home and financially needs help Recent Hospitalization(s)/ED Visits Yes 12/12/18; 1/19/19 Current with Home Health? 
- Agency? no  
Social Needs: - Able to afford medications? - Financial assessment? 
- Access to care? Transportation? Not able to afford medicines, utilities, rent and sometimes transportation Nutritional Assessment - Appetite? - Obesity? 
- Failure to Thrive? - Bowels ? Good appetite. At risk for complications from diabetes Cognitive Assessment 
- History of dementia 
- Health literacy No dementia. May need education about diet and management of chronic disease Mobility/Activity Assessment - Bed/chair bound? - Make use of assistive devices? - Does patient still drive? Not bed bound. Does sometimes use cane or walker Patient and wife drive Plan/Interventions/Education - Follow up Appointments - Referrals Referral to  for assistance with section 8 housing and utilities help. I will work with patient on plan for medications and education

## 2019-02-07 NOTE — PROGRESS NOTES
ISAURA CASTRO called and spoke with pt and let him know that RN CM and SW CM felt like the best way to be able to help him and his wife is to meet in person and have a conversation. Pt stated that he didn't feel comfortable having SW CM and RN CM come over to his home but that we would like to meet with SW CM and RN CM at the hospital if possible. ISAURA CASTRO let pt know that they could meet at the hospital and asked him what day and time worked best for him he said next Thursday 2-14 at 130 would work best for them to meet with RN CM and SW CM. PLAN: 
ISAURA CASTRO will message RN CM about meeting time and follow up with pt next week. This note will not be viewable in [address]

## 2019-02-07 NOTE — PROGRESS NOTES
WOUND CENTER Consult Note/Progress Note:  
Guillaume Ortiz  MRN: [id number]  :1960  Age:58 y.o. 
 
HPI: Guillaume Ortiz is a 66 y.o. male who is referred from [facility].  He was recently discharged. He was a patient of Dr. German Ellis who underwent above-knee femoropopliteal bypass with PTFE in the left leg in . He had multiple toe amputations at that time. He was lost to follow-up. He presented with cellulitis of his left foot and the medial heel region. He was treated with antibiotics. Noninvasive vascular study showed occlusion of his bypass but he has no rest pain and was felt to have adequate circulation. He has follow-up with Dr. German Ellis in 2019. He is completing a course of antibiotics. Wound care has been primarily painting with Betadine. Plain films were negative for foreign body. There is no radiographic evidence of osteomyelitis. He has a pacemaker and cannot tolerate MRI. He had bone scan which showed some activity in 1 of his toes but the toes are without clinical abnormality. This information was obtained from the patient The following HPI elements were documented for the patient's wound: 
Location: L heel Duration: 12/3/2018 Severity:  5/10 Context:  Cellulitis, poss ischemia Modifying Factors:  Nothing makes better or worse Quality: burning Timing: constant and especially when manipulated Associated Signs and Symptoms: n/a Past Medical History:  
Diagnosis Date  Acute on chronic diastolic heart failure ([facility] [address] 2011  
 followed by dr Frankey Romney  2011  
 pacemaker Medtronic-- followed by [facility] cardio  Atherosclerotic PVD with ulceration ([facility] [address] 2015  CAD (coronary artery disease) 2011  
  3 vessel CABG; aortic valve replacement/Bovine-- both at same time-- followed by dr Frankey Romney  Chronic kidney disease   
 chronic renal insuff. --- does not see a nephrologist   
 Chronic pain   
 left foot  Depression 2/28/2011  Foot amputation status, left ([facility] [address] Left foot with 2 toes amputated and portion of Left foot  GERD (gastroesophageal reflux disease)   
 managed with OTC PRN meds  H/O aortic valve replacement 2/2011 Bovine valve  Heart failure ([facility] [address]  History of penile implant   
 currently in place  History of shingles 06/2015  Hx of CABG   
 3 vessel  Hypercholesterolemia  Hypertension   
 controlled with med  Left foot pain  Murmur, cardiac 02/2011  
 aortic valve replaced with CABG, 2011------- 2/6 SALVADOR RUSB apex, 2/6 TR murmur-- ECHO 9/13/17LVEF 55-60%  Pacemaker Medtronic  PVD (peripheral vascular disease) ([facility] [address]  Type 2 diabetes mellitus (HCC)   
 insulin reliant/AVg BS: 100-120/s.s of hypoglycemia at 50/Last A1c: 8.0  Warfarin anticoagulation   
 pt no longer on Coumadin Past Surgical History:  
Procedure Laterality Date  CARDIAC SURG PROCEDURE UNLIST  2011 Dr. Osmin Banerjee  3 vessel cabg and aortic valve replacement  COLONOSCOPY N/A 2/14/2018 COLONOSCOPY  BMI 37 performed by Tea King MD at [facility] ENDOSCOPY  
 HX CATARACT REMOVAL Bilateral   
 with lens implants  HX FREE SKIN GRAFT Left 8/11/15  
 thigh to foot graft  HX HEART CATHETERIZATION  03/07/2011  HX PACEMAKER  2011  
 medtronic per pt (phone assessment)  HX UROLOGICAL  2005 Penile Impant  VASCULAR SURGERY PROCEDURE UNLIST  2/17/15 LE arteriogram  
 VASCULAR SURGERY PROCEDURE UNLIST Left 3-16-15  
 fem-tibial by-pass  VASCULAR SURGERY PROCEDURE UNLIST Left 6/2/15  
 5th toe amp  VASCULAR SURGERY PROCEDURE UNLIST Left 6/5/15  
 wound debridement  VASCULAR SURGERY PROCEDURE UNLIST Left 8/24/15  
 great toe amp Current Outpatient Medications Medication Sig  pravastatin (PRAVACHOL) 80 mg tablet Take 1 Tab by mouth nightly.  metoprolol succinate (TOPROL-XL) 50 mg XL tablet Take 1 Tab by mouth every morning.  irbesartan (AVAPRO) 150 mg tablet Take 1 Tab by mouth nightly.  icosapent ethyl (VASCEPA) 1 gram capsule Take 2 Caps by mouth two (2) times daily (with meals).  DULoxetine (CYMBALTA) 60 mg capsule Take 1 Cap by mouth daily.  amLODIPine (NORVASC) 10 mg tablet Take 1 Tab by mouth every morning.  zolpidem (AMBIEN) 10 mg tablet Take 1 Tab by mouth nightly as needed for Sleep. Max Daily Amount: 10 mg.  
 insulin glargine U-300 conc (TOUJEO SOLOSTAR U-300 INSULIN) 300 unit/mL (1.5 mL) inpn 50 units daily (Patient taking differently: 50 Units by SubCUTAneous route nightly. 50 units daily)  insulin aspart U-100 (NOVOLOG FLEXPEN U-100 INSULIN) 100 unit/mL inpn 15 Units by SubCUTAneous route Before breakfast, lunch, and dinner.  aspirin 81 mg chewable tablet Take 81 mg by mouth daily. No current facility-administered medications for this encounter. ALLERGIES: Patient has no known allergies. Social History Socioeconomic History  Marital status:  Spouse name: Not on file  Number of children: Not on file  Years of education: Not on file  Highest education level: Not on file Occupational History  Occupation: works in the lab Comment: [employer] Tobacco Use  Smoking status: Never Smoker  Smokeless tobacco: Never Used Substance and Sexual Activity  Alcohol use: No  
 Drug use: No  
 
Social History Tobacco Use Smoking Status Never Smoker Smokeless Tobacco Never Used Family History Problem Relation Age of Onset  Diabetes Mother  Heart Disease Mother  Heart Surgery Mother 54  
     valve replacement WilsonBrianna Other Mother Shrogens  Diabetes Father  Heart Disease Father  Heart Surgery Father 61  
     cabg  Lung Disease Father  Parkinsonism Father  Other Father   
     polio as a child-affected lungs  Diabetes Brother  Thyroid Disease Brother  Diabetes Paternal Uncle x2  Stroke Brother  Diabetes Brother  Cancer Paternal Grandfather ROS: The patient has no difficulty with chest pain or shortness of breath. No fever or chills. Comprehensive review of systems was otherwise unremarkable except as noted above. Physical Exam:  
Visit Vitals BP (!) 132/93 (BP 1 Location: Right arm) Pulse 92 Temp 98.8 °F (37.1 °C) Resp 18 Ht 5' 8\" (1.727 m) Wt 202 lb 9.6 oz (91.9 kg) SpO2 98% BMI 30.81 kg/m² Constitutional: Alert, oriented, cooperative patient in no acute distress; appears stated age;  General appearance is within normal limits for wound care patient population. See the today's recorded vitals signs and constitutional data. Eyes: Pupils equal; Sclera are clear. EOMs intact; The eyes appear to track and move normally. The sclera are not injected. The conjunctive are clear. The eyelids are normal. There is no scleral icterus. ENMT: There are no obvious external ear, nose, lip or mouth lesions. Nares normal; Neck: Overall contour of the neck is normal with no obvious neck masses. Gross hearing is within normal limits. No obvious neck masses CV: RRR;  no JVD; No evidence of cyanosis of the upper extremities. The extremities are perfused without embolic sign, splinter hemorrhages, or petechia. Resp:  Breathing is non-labored; normal rate and effort; no audible wheezing. GI: soft and non-distended without acute abnormality noted. Musculoskeletal: unremarkable with normal function. No embolic signs or cyanosis. Neuro:  Oriented; moves all 4; no focal deficits Psychiatric: Judgement and insight are within normal limits for the wound care population of patients. Patient is oriented to person, place, and time. Recent and remote memory are within normal limits. Mood and affect are within normal limits. Integumentary (Skin/Wounds) See inspection of wound(s) below. There are no other skin areas of palpable concern. See wound center documentation including photos in the [facility] [address] Wound Template made part of this record by reference. Wounds: 
Wound Foot Left (Active) Number of days: 61 Wound Heel Left (Active) Number of days: 61 Wound Heel Left;Medial;Posterior (Active) Dressing Status  Old drainage 2/7/2019  3:25 PM  
Non-Pressure Injury Full thickness (subcut/muscle) 2/7/2019  3:25 PM  
[place] Grading Scale 0-5  Grade 2 1/3/2019  2:27 PM  
Wound Length (cm) 0 cm 2/7/2019  3:25 PM  
Wound Width (cm) 0 cm 2/7/2019  3:25 PM  
Wound Depth (cm) 0 2/7/2019  3:25 PM  
Wound Surface area (cm^2) 0 cm^2 2/7/2019  3:25 PM  
Change in Wound Size % 100 2/7/2019  3:25 PM  
Condition of Base Granulation 2/7/2019  3:25 PM  
Condition of Edges Open 1/24/2019  1:54 PM  
Tissue Type Red 1/24/2019  1:54 PM  
Tissue Type Percent Pink 100 1/24/2019  1:54 PM  
Tissue Type Percent Red 100 2/7/2019  3:25 PM  
Tissue Type Percent Yellow 30 1/3/2019  2:27 PM  
Drainage Amount  None 2/7/2019  3:25 PM  
Drainage Color Serosanguinous 1/24/2019  1:54 PM  
Wound Odor None 2/7/2019  3:25 PM  
Periwound Skin Condition Intact 1/24/2019  1:54 PM  
Cleansing and Cleansing Agents  Soap and water 2/7/2019  3:25 PM  
Procedure Tolerated Well 2/7/2019  3:25 PM  
Number of days: 56 Wound Groin Right (Active) Number of days: 21  
   
[REMOVED] Wound Foot Right;Left (Removed) Number of days: 6076 [REMOVED] Wound Foot Plantar;Lateral (Removed) Number of days: 2126 [REMOVED] Wound Foot Left (Removed) Number of days: 1280 [REMOVED] Wound Thigh Left;Upper (Removed) Number of days: 1213 [REMOVED] Wound Foot Left (Removed) Number of days: 1213 [REMOVED] Wound Left (Removed) Number of days: 1200 [REMOVED] Wound Foot Right (Removed) Number of days: 357 [REMOVED] Wound Heel Dorsal;Left;Posterior (Removed) Number of days: 2 Recent vitals (if inpt): 
 Patient Vitals for the past 24 hrs: 
 BP Temp Pulse Resp SpO2 Height Weight  
02/07/19 1509 (!) 132/93 98.8 °F (37.1 °C) 92 18 98 % 5' 8\" (1.727 m) 202 lb 9.6 oz (91.9 kg) Labs: 
No results for input(s): WBC, HGB, PLT, NA, K, CL, CO2, BUN, CREA, GLU, PTP, INR, APTT, TBIL, TBILI, CBIL, SGOT, GPT, ALT, AP, AML, LPSE, LCAD, NH4, TROPT, TROIQ, PCO2, PO2, HCO3, HGBEXT, PLTEXT, HGBEXT, PLTEXT in the last 72 hours. No lab exists for component:  PH, INREXT, INREXT Lab Results Component Value Date/Time WBC 9.9 01/14/2019 12:52 PM  
 HGB 13.7 01/14/2019 12:52 PM  
 PLATELET 212 40/74/6716 12:52 PM  
 Sodium 133 (L) 01/14/2019 12:52 PM  
 Potassium 5.1 01/14/2019 12:52 PM  
 Chloride 100 01/14/2019 12:52 PM  
 CO2 26 01/14/2019 12:52 PM  
 BUN 28 (H) 01/14/2019 12:52 PM  
 Creatinine 1.82 (H) 01/14/2019 12:52 PM  
 Glucose 278 (H) 01/14/2019 12:52 PM  
 Glucose 126 (H) 02/17/2011 10:25 AM  
 INR 1.7 (H) 06/01/2016 11:26 AM  
 aPTT 45.6 (H) 08/24/2015 10:07 PM  
 Bilirubin, total 0.3 12/11/2018 05:43 AM  
 AST (SGOT) 27 12/11/2018 05:43 AM  
 ALT (SGPT) 35 12/11/2018 05:43 AM  
 Alk. phosphatase 90 12/11/2018 05:43 AM  
 Amylase 49 12/07/2016 02:49 PM  
 Lipase 36 12/07/2016 02:49 PM  
 Troponin-I <0.05 02/28/2011 12:10 PM  
 Troponin-I, Qt. <0.02 (L) 04/24/2018 04:29 PM  
 
XR Results (most recent): 
Results from Hospital Encounter encounter on 12/03/18 XR FOOT LT MIN 3 V Narrative Left foot series HISTORY: Diabetic wound. 3 views of the left foot were obtained. Comparison is made to the prior exam 
06/21/2016. FINDINGS: There are amputations of the first and fifth digits. There are 
hammertoe deformities of the second through fourth digits. There is soft tissue 
edema along the posterior/inferior margin of the calcaneus without bony 
destruction. Vascular calcifications are present. Impression IMPRESSION: 
1. Soft tissue swelling adjacent to the calcaneus without radiographic features to suggest osteomyelitis. 2. Amputation of the first and fifth digits. PROCEDURES PERFORMED: 1/17/2019 1. Ultrasound-guided access of right common femoral artery, placement of catheter in aorta,  aortogram. 
2.  Left lower extremity diagnostic arteriogram. 
SURGEON:  Barbara Booth MD 
  
OPERATIVE FINDINGS: 
1. There was no evidence of any aortoiliac disease. 2.  Bilateral hypogastric arteries are patent. 3.  Left lower extremity arteriogram showed patent common femoral artery and patent profunda with a flush occlusion of the SFA and previously left common femoral to above-knee popliteal graft. The above knee popliteal artery reconstitutes but then it gets heavily diseased below the knee with the tibioperoneal trunk being occluded with the posterior tibial being occluded and a peroneal and anterior tibial band disease; however, patent down to the plantar digits. I reviewed recent labs and recent radiologic studies. I independently reviewed radiology images for studies I described above or studies I have ordered. Admission date (for inpatients): 2/7/2019 * No surgery found *  * No surgery found * ASSESSMENT/PLAN: 
Patient with known vascular disease. He has failed femoropopliteal bypass from 2015. He has no rest pain or acute ischemic changes. He was admitted with cellulitis and heel wound. Wound was debrided 12/13. Devitalized tissue was removed. Wound improved greatly with Hydrofera Blue then Xeroform. Wound has closed. Small area has epithelialized into a small crevice. It is however closed. Arteriogram showed occlusion of the above-knee femoropopliteal graft. Dr. Gabbi Roe is planning for potential common femoral to peroneal artery bypass with saphenous vein. He sees Dr. Gabbi Roe soon. I will see him back in 1 month as a follow-up.   He will protect the newly healed area with border foam for 2 weeks and then can transition to using moleskin. As far as I am concerned he may resume using his diabetic shoes, but will keep him in his work boot until he sees Dr. Donna Martínez. Problem List  Date Reviewed: 2/5/2019 Codes Class Noted Cellulitis of left foot ICD-10-CM: L03.116 ICD-9-CM: 682.7  12/7/2018 DM (diabetes mellitus), type 2, uncontrolled ([facility] ICD-10-CM: E11.65 ICD-9-CM: 250.02  12/3/2018 Acute kidney injury superimposed on CKD ([facility] ICD-10-CM: N17.9, N18.9 ICD-9-CM: 866.00, 585.9  12/3/2018 Diabetic ulcer of left heel ([facility] ICD-10-CM: E11.621, G13.437 ICD-9-CM: 250.80, 707.14  12/3/2018 Class 2 severe obesity due to excess calories with serious comorbidity and body mass index (BMI) of 35.0 to 35.9 in adult [facility]) ICD-10-CM: E66.01, Z68.35 ICD-9-CM: 278.01, V85.35  8/20/2018 Preop cardiovascular exam (Chronic) ICD-10-CM: J50.503 ICD-9-CM: V72.81  8/15/2017 Aortic stenosis ICD-10-CM: I35.0 ICD-9-CM: 424.1  2/8/2017 H/O heart valve replacement with bioprosthetic valve ICD-10-CM: Z95.3 ICD-9-CM: V42.2  2/8/2017 Overview Signed 8/15/2017 10:50 AM by Sis Martines MD  
  Aortic Long term current use of anticoagulant therapy ICD-10-CM: Z79.01 
ICD-9-CM: V58.61  10/14/2015 Diabetic ulcer of left great toe (HCC) (Chronic) ICD-10-CM: E11.621, U71.228 ICD-9-CM: 250.80, 707.15  8/22/2015 Overview Signed 8/24/2015  4:00 PM by Raul Lane NP  
  8/24/15 (Dr Leslye Wan) AMPUTATION LEFT FIRST TOE, LEFT FOOT DEBRIDMENT Acute renal failure (ARF) (HCC) ICD-10-CM: N17.9 ICD-9-CM: 584.9  8/21/2015 Hypertension (Chronic) ICD-10-CM: I10 
ICD-9-CM: 401.9  6/1/2015 IDDM (insulin dependent diabetes mellitus) ([facility] ICD-10-CM: E11.9, Z79.4 ICD-9-CM: 250.00, V58.67  6/1/2015 PAD (peripheral artery disease) ([facility]) ICD-10-CM: I73.9 ICD-9-CM: 443.9  6/1/2015 CKD (chronic kidney disease), stage III (HCC) ICD-10-CM: N18.3 ICD-9-CM: 585.3  6/1/2015 Shingles ICD-10-CM: B02.9 ICD-9-CM: 053.9  6/1/2015 PVD (peripheral vascular disease) ([facility] ICD-10-CM: I73.9 ICD-9-CM: 443.9  3/16/2015 Overview Signed 3/18/2015  1:57 PM by Carina Lopez, YANICK  
  3/16/15 (Dr Juany Palomino) 1. Left superficial femoral artery endarterectomy. 2. Common femoral to above knee popliteal bypass with PTFE graft. 3. Left lower extremity arteriogram. 
  
  
   
 Atherosclerotic PVD with ulceration (HCC) (Chronic) ICD-10-CM: I70.209, V03.643 ICD-9-CM: 440.23, 707.9  2/2/2015 Thrombocytopenia ([facility] ICD-10-CM: D69.6 ICD-9-CM: 287.5  3/10/2011 Postsurgical aortocoronary bypass status ICD-10-CM: Z95.1 ICD-9-CM: V45.81  3/7/2011 Bicuspid aortic valve (Chronic) ICD-10-CM: Q23.1 ICD-9-CM: 746.4  3/7/2011 Acute on chronic diastolic heart failure (HCC) ICD-10-CM: I50.33 ICD-9-CM: 428.33  2/28/2011 CAD (coronary artery disease) (Chronic) ICD-10-CM: I25.10 ICD-9-CM: 414.00  2/28/2011 Depression (Chronic) ICD-10-CM: F32.9 ICD-9-CM: 523  2/28/2011 Dyslipidemia (Chronic) ICD-10-CM: W19.2 ICD-9-CM: 272.4  2/28/2011 Active Problems: * No active hospital problems. * Any procedures done today in the wound center are documented in a separate note in connect care made part of this record by reference Wound Care Orders Placed This Encounter  WOUND CARE, DRESSING CHANGE Left posterior heel Apply small bordered foam for next 2 weeks, changing dressing weekly or more often if drainage noted. Then apply mole skin to area for protection. Patient may shower as previously. Hold wearing shoes at this time, until seen by Dr. Juany Palomino. May wear shoes per wound center if OK with Dr. Juany Palomino. Return to wound center in 1 month. Standing Status:   Standing Number of Occurrences:   1 Follow-up Information Follow up With Specialties Details Why Contact Info [facility] WOUND CARE Wound Care In 1 month  [phone] Signed:  Keaton Terrazas MD,  FACS

## 2019-02-07 NOTE — PROGRESS NOTES
02/07/19 1525 Wound Heel Left;Medial;Posterior Date First Assessed/Time First Assessed: 12/13/18 1415   POA: Yes  Wound Type: Diabetic  Location: Heel  Wound Description (Optional): Diabetic Foot Ulcer  Orientation: Left;Medial;Posterior Dressing Status  Old drainage Dressing Type  (Xeroform, dry gauze, rolled gauze) Non-Pressure Injury Full thickness (subcut/muscle) Wound Length (cm) 0 cm Wound Width (cm) 0 cm Wound Depth (cm) 0 Wound Surface area (cm^2) 0 cm^2 Change in Wound Size % 100 Condition of Base Granulation Tissue Type Percent Red 100 Drainage Amount  None Wound Odor None Cleansing and Cleansing Agents  Soap and water Dressing Type Applied (small bordered foam) Procedure Tolerated Well Patient is currently taking Aspirin 81 mg.

## 2019-02-07 NOTE — Clinical Note
Hey,He wanted to meet at the hospital at 130. I figured we could always talk in the main lobby area or go downstairs to the cafeteria if needed. Just let me know if you need something else from me on him before then. [facility]

## 2019-02-07 NOTE — PROGRESS NOTES
02/07/19 1525 Wound Heel Left;Medial;Posterior Date First Assessed/Time First Assessed: 12/13/18 1415   POA: Yes  Wound Type: Diabetic  Location: Heel  Wound Description (Optional): Diabetic Foot Ulcer  Orientation: Left;Medial;Posterior Dressing Status  Old drainage Dressing Type  (Xeroform, dry gauze, rolled gauze) Non-Pressure Injury Full thickness (subcut/muscle) Wound Length (cm) 0.3 cm Wound Width (cm) 0.4 cm Wound Depth (cm) 0.3 Wound Surface area (cm^2) 0.12 cm^2 Change in Wound Size % 99.1 Condition of Base Granulation Tissue Type Percent Red 100 Drainage Amount  Scant Drainage Color Serosanguinous Wound Odor None Cleansing and Cleansing Agents  Soap and water Patient is currently taking Aspirin 81 mg.

## 2019-02-07 NOTE — WOUND CARE
[address] [place] [address], [facility] [address] Phone: [phone] Fax: [phone] Patient: Benito Faustin MRN: [id number]  SSN: [id number] YOB: 1960  Age: 66 y.o. Sex: male Return Appointment: 1 month with Tobi Michaels MD 
 
Instructions: Left posterior heel Apply small bordered foam for next 2 weeks, changing dressing weekly or more often if drainage noted. Then apply mole skin to area for protection. Patient may shower as previously. Hold wearing shoes at this time, until seen by Dr. Zoe Severe. May wear shoes per wound center if OK with Dr. Zoe Severe. Return to wound center in 1 month. Should you experience increased redness, swelling, pain, foul odor, size of wound(s), or have a temperature over 101 degrees please contact the [address] [place] [address] Road at [phone] or if after hours contact your primary care physician or go to the hospital emergency department. Signed By: Concepcion Linder, PT, [address] [facility] February 7, 2019

## 2019-02-13 ENCOUNTER — PATIENT OUTREACH (OUTPATIENT)
Dept: CASE MANAGEMENT | Age: 59
End: 2019-02-13

## 2019-02-13 NOTE — PROGRESS NOTES
Reviewed case with  and she will plan to confirm tomorrow's appointment in the morning with the patient for our hospital meeting. This note will not be viewable in [address]

## 2019-02-14 ENCOUNTER — PATIENT OUTREACH (OUTPATIENT)
Dept: CASE MANAGEMENT | Age: 59
End: 2019-02-14

## 2019-02-14 NOTE — PROGRESS NOTES
Community Care Management  Follow up Outreach Note Outreach type: Phone call In Person Visit at hospital:  
Date/Time of Outreach: 2/14/19 @ 1:30pm  
 
Reason for follow-up: 
 Face to face meeting with myself and social work to assist with financial resources, housing, [place], and medication compliance Disease specific complaints/issues:   
DM; HTN Patient progress towards goals set from last contact: No progress yet, patient working on goals Has patient attended any PCP or specialist follow-up appointments since last contact? What was outcome of appointment? When is next follow-up scheduled? Yes, saw vascular, scheduled for surgery on 2/25/19. Will see PCP on 3/12/19 Review medications. Any medication changes since last outreach? Does patient have any questions or issues related to their medications? Reviewed medications, had a long discussion about how to monitor blood sugars and how often. Seems patient may be having a lot of fluctuations in blood sugars, especially at night so we talked about ways to avoid this and how to recognize symptoms Home health active? If yes  any issue? Progress? No home health Referrals needed? 
(, Diabetes education, HH, etc. )  already helping; declined diabetes educator referral  
Other issues/Miscellaneous? (Transportation, access to meals, ability to perform ADLs, adequate caregiver support, etc.) Currently housing is a concern, patient reports they have no electricity in the home but we have previously provided him contacts to go to for assistance and he admits he has not done this until recently. Next Outreach Scheduled 2/22/19 Next Steps Goals:  Check in with him weekly to obtain blood sugars from 3 x daily; work on help for [place] housing with social work; help give suggestions on diet and prevention of hypoglycemia Community Care Manager: Addie Mohamud

## 2019-02-14 NOTE — PROGRESS NOTES
ISAURA CASTRO called and spoke with pt to confirm appointment this afternoon with ISAURA CASTRO and PEG CASTRO. Pt stated that he and his wife are planning on being there and will bring their financial information and medications for ISAURA CASTRO and RN CM to review with them. PLAN: 
ISAURA CASTRO and PEG CASTRO will meet with pt and his wife this afternoon. This note will not be viewable in [address]

## 2019-02-15 NOTE — PROGRESS NOTES
Late entry from 1/14/19 Ambulatory Care Coordination  Social Work Assessment Referral from: [facility] Previously referred? If so, reason and brief outcome Yes, Utility assistance needs Reason for current referral: Utility assistance/community resources Income information (if needed): PT stated that his wife has lost her job and they only have his income of about 1500 right now Sources of Support: Family ACP set up? Needs information? Didn't discuss at this time Medication Cost assistance needed? Referred pt's wife to [facility] team   
Referral to CLTC/Medicaid needed? NA Referral to Medicare Extra Help/LIS program needed? NA Small home repair needed? NA  
MOW referral? Declined Any other concerns/questions? NA Next steps: ISAURA CASTRO [facility] for him to get in touch with for housing issues ISAURA CASTRO provided pt and family with information on Utility and rent assistance ISAURA CASTRO will follow up in two weeks  
  
Pt stated that they should be getting about 2000 back from their income tax returns and that they would be able to use that money to get their electricity cut 
 back on.  ISAURA CASTRO also provided pt's wife with information on [employer] team to help her now that she's uninsured.    
 
 
This note will not be viewable in [address]

## 2019-02-18 ENCOUNTER — HOSPITAL ENCOUNTER (OUTPATIENT)
Dept: SURGERY | Age: 59
Discharge: HOME | End: 2019-02-18
Payer: MEDICARE

## 2019-02-18 ENCOUNTER — ANESTHESIA EVENT (OUTPATIENT)
Dept: SURGERY | Age: 59
End: 2019-02-18
Payer: MEDICARE

## 2019-02-18 VITALS
TEMPERATURE: 98.1 F | RESPIRATION RATE: 16 BRPM | HEIGHT: 66 IN | OXYGEN SATURATION: 97 % | BODY MASS INDEX: 33.23 KG/M2 | WEIGHT: 206.8 LBS | SYSTOLIC BLOOD PRESSURE: 144 MMHG | HEART RATE: 87 BPM | DIASTOLIC BLOOD PRESSURE: 89 MMHG

## 2019-02-18 LAB
ANION GAP SERPL CALC-SCNC: 9 MMOL/L (ref 7–16)
ATRIAL RATE: 74 BPM
BUN SERPL-MCNC: 22 MG/DL (ref 6–23)
CALCIUM SERPL-MCNC: 9.2 MG/DL (ref 8.3–10.4)
CALCULATED P AXIS, ECG09: 7 DEGREES
CALCULATED R AXIS, ECG10: -15 DEGREES
CALCULATED T AXIS, ECG11: 29 DEGREES
CHLORIDE SERPL-SCNC: 103 MMOL/L (ref 98–107)
CO2 SERPL-SCNC: 26 MMOL/L (ref 21–32)
CREAT SERPL-MCNC: 1.41 MG/DL (ref 0.8–1.5)
DIAGNOSIS, 93000: NORMAL
ERYTHROCYTE [DISTWIDTH] IN BLOOD BY AUTOMATED COUNT: 13.2 % (ref 11.9–14.6)
GLUCOSE BLD STRIP.AUTO-MCNC: 100 MG/DL (ref 65–100)
GLUCOSE SERPL-MCNC: 80 MG/DL (ref 65–100)
HCT VFR BLD AUTO: 45.8 % (ref 41.1–50.3)
HGB BLD-MCNC: 14.7 G/DL (ref 13.6–17.2)
MCH RBC QN AUTO: 30.3 PG (ref 26.1–32.9)
MCHC RBC AUTO-ENTMCNC: 32.1 G/DL (ref 31.4–35)
MCV RBC AUTO: 94.4 FL (ref 79.6–97.8)
NRBC # BLD: 0 K/UL (ref 0–0.2)
P-R INTERVAL, ECG05: 206 MS
PLATELET # BLD AUTO: 231 K/UL (ref 150–450)
PMV BLD AUTO: 12.1 FL (ref 9.4–12.3)
POTASSIUM SERPL-SCNC: 3.8 MMOL/L (ref 3.5–5.1)
Q-T INTERVAL, ECG07: 424 MS
QRS DURATION, ECG06: 110 MS
QTC CALCULATION (BEZET), ECG08: 470 MS
RBC # BLD AUTO: 4.85 M/UL (ref 4.23–5.6)
SODIUM SERPL-SCNC: 138 MMOL/L (ref 136–145)
VENTRICULAR RATE, ECG03: 74 BPM
WBC # BLD AUTO: 8.7 K/UL (ref 4.3–11.1)

## 2019-02-18 PROCEDURE — 85027 COMPLETE CBC AUTOMATED: CPT

## 2019-02-18 PROCEDURE — 82962 GLUCOSE BLOOD TEST: CPT

## 2019-02-18 PROCEDURE — 93005 ELECTROCARDIOGRAM TRACING: CPT | Performed by: ANESTHESIOLOGY

## 2019-02-18 PROCEDURE — 80048 BASIC METABOLIC PNL TOTAL CA: CPT

## 2019-02-18 PROCEDURE — 36415 COLL VENOUS BLD VENIPUNCTURE: CPT

## 2019-02-18 RX ORDER — OMEPRAZOLE 20 MG/1
20 CAPSULE, DELAYED RELEASE ORAL AS NEEDED
COMMUNITY

## 2019-02-18 NOTE — PERIOP NOTES
Recent Results (from the past 12 hour(s)) CBC W/O DIFF Collection Time: 02/18/19 11:18 AM  
Result Value Ref Range WBC 8.7 4.3 - 11.1 K/uL  
 RBC 4.85 4.23 - 5.6 M/uL  
 HGB 14.7 13.6 - 17.2 g/dL HCT 45.8 41.1 - 50.3 % MCV 94.4 79.6 - 97.8 FL  
 MCH 30.3 26.1 - 32.9 PG  
 MCHC 32.1 31.4 - 35.0 g/dL  
 RDW 13.2 11.9 - 14.6 % PLATELET 188 566 - 177 K/uL MPV 12.1 9.4 - 12.3 FL ABSOLUTE NRBC 0.00 0.0 - 0.2 K/uL METABOLIC PANEL, BASIC Collection Time: 02/18/19 11:18 AM  
Result Value Ref Range Sodium 138 136 - 145 mmol/L Potassium 3.8 3.5 - 5.1 mmol/L Chloride 103 98 - 107 mmol/L  
 CO2 26 21 - 32 mmol/L Anion gap 9 7 - 16 mmol/L Glucose 80 65 - 100 mg/dL BUN 22 6 - 23 MG/DL Creatinine 1.41 0.8 - 1.5 MG/DL  
 GFR est AA >60 >60 ml/min/1.73m2 GFR est non-AA 55 (L) >60 ml/min/1.73m2 Calcium 9.2 8.3 - 10.4 MG/DL  
GLUCOSE, POC Collection Time: 02/18/19 11:56 AM  
Result Value Ref Range Glucose (POC) 100 65 - 100 mg/dL Lab results reviewed. No further action required.

## 2019-02-18 NOTE — PERIOP NOTES
Patient confirms name and . Order to obtain consent found in EHR and matches case posting. Type 3 surgery,  assessment complete. Labs per surgeon: none Labs per anesthesia protocol: CBC, BMP today and T&S on DOS signed and held [id number] approved by Dr Albert Finch while in to assess the patient. Last pacer interrogation 18 also reviewed. Per Dr Albert Finch, rep is not required in house DOS. Glucose:100 Hibiclens and instructions given per hospital policy. Patient provided with and instructed on educational handouts including Guide to Surgery, Pain Management, Hand Hygiene, Blood Transfusion Education, and [place] Anesthesia Brochure. Patient answered medical/surgical history questions at their best of ability. All prior to admission medications documented in [facility]. Patient instructed to continue previous medications as prescribed prior to surgery and to take the following medications the day of surgery according to anesthesia guidelines with a small sip of water: Norvasc, Duloxetine, metoprolol, omeprazole and aspirin 81 mg. Patient instructed to hold all vitamins 7 days prior to surgery and NSAIDS 5 days prior to surgery, patient verbalized understanding. Medications to be held: none Patient instructed on the following: 
Arrive at MAIN Entrance, time of arrival to be called the day before by 1700 NPO after midnight including gum, mints, and ice chips. Responsible adult must drive patient to the hospital, stay during surgery, and patient will require supervision 24 hours after anesthesia. Use hibiclens in shower the night before surgery and on the morning of surgery. Leave all valuables (money and jewelry) at home but bring insurance card and ID on       DOS. Do not wear make-up, nail polish, lotions, cologne, perfumes, powders, or oil on skin. Patient teach back successful and patient demonstrates knowledge of instruction.

## 2019-02-18 NOTE — ANESTHESIA PREPROCEDURE EVALUATION
Anesthetic History Review of Systems / Medical History Pulmonary Neuro/Psych Cardiovascular Hypertension Valvular problems/murmurs (s/p AVR 2012) CAD and CABG (2012) Pertinent negatives: No past MI Comments: Echo 1/21/2019: Bioprosthetic AV 
EF 60-65% GI/Hepatic/Renal 
  
 
 
Renal disease: CRI Endo/Other Diabetes: using insulin Other Findings Physical Exam 
 
Airway Mallampati: II 
 
Neck ROM: decreased range of motion, short neck Mouth opening: Diminished (comment) Cardiovascular Regular rate and rhythm,  S1 and S2 normal,  no murmur, click, rub, or gallop Dental 
No notable dental hx Pulmonary Breath sounds clear to auscultation Abdominal 
 
 
 
 Other Findings Anesthetic Plan ASA: 3 Anesthesia type: general 
 
Monitoring Plan: Arterial line Anesthetic plan and risks discussed with: Patient

## 2019-02-22 ENCOUNTER — PATIENT OUTREACH (OUTPATIENT)
Dept: CASE MANAGEMENT | Age: 59
End: 2019-02-22

## 2019-02-22 NOTE — PROGRESS NOTES
Follow up call to Clarita Danni, he reports is average blood sugars over this past week have averaged between . He says he feels like he is managing diet a little better. He and I reviewed medications, he does have all the correct ones and verbalizes knowledge of how to take them. He says surgery is still planned for 2/25 and he has called the [place] and supposed to go today sometime to see about the housing situation. He will keep me posted. I will update social work as well. This note will not be viewable in [address]

## 2019-02-28 ENCOUNTER — PATIENT OUTREACH (OUTPATIENT)
Dept: CASE MANAGEMENT | Age: 59
End: 2019-02-28

## 2019-02-28 NOTE — PROGRESS NOTES
Follow up call to Mr. Razo, he reports he is doing ok, his blood sugars have been good averaging around 100-110 fasting. He has a new glucometer and supplies through the [employer] he has. He also has transportation available through [facility] [employer] for 24 one way trips per month to and from medical appointments. He says he has been to the [place] about housing and given some forms to complete and take back and he plans to go back tomorrow to finish handing in information. Patient says he is taking his medications as instructed and doing better with his diet. He tells me his surgery was post phoned to 3/11/19. I will follow up with him again in about 2 weeks. This note will not be viewable in [address]

## 2019-03-01 ENCOUNTER — PATIENT OUTREACH (OUTPATIENT)
Dept: CASE MANAGEMENT | Age: 59
End: 2019-03-01

## 2019-03-01 NOTE — PROGRESS NOTES
ISAURA CASTRO called and spoke with pt who said that he's going this morning to the [place] to re-fill out some of the paperwork he filled out incorrectly. Pt said that getting more help through this [facility] [employer] plan is really been a plus for them recently. Pt said that his wife got the information from the [facility] office yesterday and they are working on getting her to fill that information out. PLAN: 
ISAURA CASTRO will follow up with pt in two weeks to see if he was able to get all paperwork for the [place] filled out correctly and to see how his surgery goes on 3/11 This note will not be viewable in [address]

## 2019-03-07 ENCOUNTER — HOSPITAL ENCOUNTER (OUTPATIENT)
Dept: WOUND CARE | Age: 59
End: 2019-03-07
Attending: SURGERY
Payer: MEDICARE

## 2019-03-10 RX ORDER — SODIUM CHLORIDE 0.9 % (FLUSH) 0.9 %
5-40 SYRINGE (ML) INJECTION AS NEEDED
Status: CANCELLED | OUTPATIENT
Start: 2019-03-10

## 2019-03-10 RX ORDER — OXYCODONE AND ACETAMINOPHEN 5; 325 MG/1; MG/1
1 TABLET ORAL AS NEEDED
Status: CANCELLED | OUTPATIENT
Start: 2019-03-10

## 2019-03-10 RX ORDER — HYDROMORPHONE HYDROCHLORIDE 2 MG/ML
0.5 INJECTION, SOLUTION INTRAMUSCULAR; INTRAVENOUS; SUBCUTANEOUS
Status: CANCELLED | OUTPATIENT
Start: 2019-03-10

## 2019-03-10 RX ORDER — SODIUM CHLORIDE 0.9 % (FLUSH) 0.9 %
5-40 SYRINGE (ML) INJECTION EVERY 8 HOURS
Status: CANCELLED | OUTPATIENT
Start: 2019-03-10

## 2019-03-10 RX ORDER — DIPHENHYDRAMINE HYDROCHLORIDE 50 MG/ML
12.5 INJECTION, SOLUTION INTRAMUSCULAR; INTRAVENOUS ONCE
Status: CANCELLED | OUTPATIENT
Start: 2019-03-10 | End: 2019-03-10

## 2019-03-10 RX ORDER — OXYCODONE HYDROCHLORIDE 5 MG/1
5 TABLET ORAL
Status: CANCELLED | OUTPATIENT
Start: 2019-03-10 | End: 2019-03-11

## 2019-03-10 RX ORDER — SODIUM CHLORIDE, SODIUM LACTATE, POTASSIUM CHLORIDE, CALCIUM CHLORIDE 600; 310; 30; 20 MG/100ML; MG/100ML; MG/100ML; MG/100ML
100 INJECTION, SOLUTION INTRAVENOUS CONTINUOUS
Status: CANCELLED | OUTPATIENT
Start: 2019-03-10

## 2019-03-11 ENCOUNTER — ANESTHESIA (OUTPATIENT)
Dept: SURGERY | Age: 59
End: 2019-03-11
Payer: MEDICARE

## 2019-03-11 ENCOUNTER — HOSPITAL ENCOUNTER (OUTPATIENT)
Age: 59
LOS: 1 days | Discharge: HOME | End: 2019-03-11
Attending: SURGERY | Admitting: SURGERY
Payer: MEDICARE

## 2019-03-11 VITALS
HEIGHT: 68 IN | RESPIRATION RATE: 16 BRPM | WEIGHT: 204.1 LBS | TEMPERATURE: 98.1 F | BODY MASS INDEX: 30.93 KG/M2 | DIASTOLIC BLOOD PRESSURE: 76 MMHG | OXYGEN SATURATION: 99 % | SYSTOLIC BLOOD PRESSURE: 143 MMHG | HEART RATE: 78 BPM

## 2019-03-11 LAB
ABO + RH BLD: NORMAL
BLOOD GROUP ANTIBODIES SERPL: NORMAL
GLUCOSE BLD STRIP.AUTO-MCNC: 166 MG/DL (ref 65–100)
SPECIMEN EXP DATE BLD: NORMAL

## 2019-03-11 PROCEDURE — 65270000029 HC RM PRIVATE

## 2019-03-11 PROCEDURE — 74011250637 HC RX REV CODE- 250/637: Performed by: ANESTHESIOLOGY

## 2019-03-11 PROCEDURE — 82962 GLUCOSE BLOOD TEST: CPT

## 2019-03-11 PROCEDURE — 74011250636 HC RX REV CODE- 250/636: Performed by: ANESTHESIOLOGY

## 2019-03-11 PROCEDURE — 74011250636 HC RX REV CODE- 250/636

## 2019-03-11 PROCEDURE — 86900 BLOOD TYPING SEROLOGIC ABO: CPT

## 2019-03-11 RX ORDER — SODIUM CHLORIDE 9 MG/ML
50 INJECTION, SOLUTION INTRAVENOUS CONTINUOUS
Status: DISCONTINUED | OUTPATIENT
Start: 2019-03-11 | End: 2019-03-11 | Stop reason: HOSPADM

## 2019-03-11 RX ORDER — SODIUM CHLORIDE 0.9 % (FLUSH) 0.9 %
5-40 SYRINGE (ML) INJECTION EVERY 8 HOURS
Status: DISCONTINUED | OUTPATIENT
Start: 2019-03-11 | End: 2019-03-11 | Stop reason: HOSPADM

## 2019-03-11 RX ORDER — SODIUM CHLORIDE 0.9 % (FLUSH) 0.9 %
5-40 SYRINGE (ML) INJECTION AS NEEDED
Status: DISCONTINUED | OUTPATIENT
Start: 2019-03-11 | End: 2019-03-11 | Stop reason: HOSPADM

## 2019-03-11 RX ORDER — FAMOTIDINE 20 MG/1
20 TABLET, FILM COATED ORAL ONCE
Status: COMPLETED | OUTPATIENT
Start: 2019-03-11 | End: 2019-03-11

## 2019-03-11 RX ORDER — SODIUM CHLORIDE, SODIUM LACTATE, POTASSIUM CHLORIDE, CALCIUM CHLORIDE 600; 310; 30; 20 MG/100ML; MG/100ML; MG/100ML; MG/100ML
100 INJECTION, SOLUTION INTRAVENOUS CONTINUOUS
Status: DISCONTINUED | OUTPATIENT
Start: 2019-03-11 | End: 2019-03-11 | Stop reason: HOSPADM

## 2019-03-11 RX ORDER — FENTANYL CITRATE 50 UG/ML
25 INJECTION, SOLUTION INTRAMUSCULAR; INTRAVENOUS ONCE
Status: DISCONTINUED | OUTPATIENT
Start: 2019-03-11 | End: 2019-03-11 | Stop reason: HOSPADM

## 2019-03-11 RX ORDER — LIDOCAINE HYDROCHLORIDE 10 MG/ML
0.1 INJECTION INFILTRATION; PERINEURAL AS NEEDED
Status: DISCONTINUED | OUTPATIENT
Start: 2019-03-11 | End: 2019-03-11 | Stop reason: HOSPADM

## 2019-03-11 RX ORDER — MIDAZOLAM HYDROCHLORIDE 1 MG/ML
2 INJECTION, SOLUTION INTRAMUSCULAR; INTRAVENOUS ONCE
Status: DISCONTINUED | OUTPATIENT
Start: 2019-03-11 | End: 2019-03-11 | Stop reason: HOSPADM

## 2019-03-11 RX ORDER — MIDAZOLAM HYDROCHLORIDE 1 MG/ML
2 INJECTION, SOLUTION INTRAMUSCULAR; INTRAVENOUS
Status: DISCONTINUED | OUTPATIENT
Start: 2019-03-11 | End: 2019-03-11 | Stop reason: HOSPADM

## 2019-03-11 RX ADMIN — FAMOTIDINE 20 MG: 20 TABLET ORAL at 06:01

## 2019-03-11 RX ADMIN — SODIUM CHLORIDE, SODIUM LACTATE, POTASSIUM CHLORIDE, AND CALCIUM CHLORIDE 100 ML/HR: 600; 310; 30; 20 INJECTION, SOLUTION INTRAVENOUS at 06:20

## 2019-03-11 NOTE — PROGRESS NOTES
Patient seen and examined in the preop area. Patient was initially scheduled for left common femoral to distal peroneal bypass for nonhealing left foot wound. On examination this morning patient wound is 90% healed with just a small abrasion on the heel. There is no evidence of any drainage or infection. I long discussion with patient and wife in detail about surgical options. Right now patient does not have any rest pain with the wound and surprisingly healed over the last month I do not recommend to do the bypass at this time. Today I also did a vein mapping which again showed that his greater saphenous vein below his knee on the right leg is unusable so potentially patient would be a left common femoral to peroneal bypass with PTFE graft. Patient will now continue wound care at home will cancel the case we will follow-up in 1 month for wound check. Discussed with patient and family that if wound were to reoccur contact the office for another evaluation.       Leslye Wan

## 2019-03-11 NOTE — PERIOP NOTES
Procedure canceled in preop per Dr. Shayne Delgadillo. PIV removed, patient discharged home from preop; pt ambulatory and accompanied with his wife.

## 2019-03-14 ENCOUNTER — HOSPITAL ENCOUNTER (OUTPATIENT)
Dept: WOUND CARE | Age: 59
Discharge: HOME | End: 2019-03-14
Attending: SURGERY
Payer: MEDICARE

## 2019-03-14 VITALS
SYSTOLIC BLOOD PRESSURE: 101 MMHG | OXYGEN SATURATION: 97 % | DIASTOLIC BLOOD PRESSURE: 66 MMHG | WEIGHT: 200.4 LBS | RESPIRATION RATE: 18 BRPM | HEART RATE: 93 BPM | BODY MASS INDEX: 30.37 KG/M2 | TEMPERATURE: 98.1 F | HEIGHT: 68 IN

## 2019-03-14 DIAGNOSIS — Z79.01 LONG TERM CURRENT USE OF ANTICOAGULANT THERAPY: ICD-10-CM

## 2019-03-14 DIAGNOSIS — E11.621 DIABETIC ULCER OF LEFT HEEL ASSOCIATED WITH TYPE 2 DIABETES MELLITUS, WITH FAT LAYER EXPOSED (HCC): ICD-10-CM

## 2019-03-14 DIAGNOSIS — L97.422 DIABETIC ULCER OF LEFT HEEL ASSOCIATED WITH TYPE 2 DIABETES MELLITUS, WITH FAT LAYER EXPOSED (HCC): ICD-10-CM

## 2019-03-14 DIAGNOSIS — I73.9 PVD (PERIPHERAL VASCULAR DISEASE) (HCC): ICD-10-CM

## 2019-03-14 PROCEDURE — 99214 OFFICE O/P EST MOD 30 MIN: CPT | Performed by: SURGERY

## 2019-03-14 PROCEDURE — 99214 OFFICE O/P EST MOD 30 MIN: CPT

## 2019-03-14 NOTE — WOUND CARE
[address]  [address], [address]  Phone: [phone]  Fax: [phone]    Patient: Latanya Rosenberg MRN: [id number]  SSN: [id number]    YOB: 1960  Age: 66 y.o. Sex: male       Return Appointment: Discharged with Melissa Lynch MD    Instructions:   Wound healed per MD.  Discharged from wound center. Obtain new diabetic inserts and shoes. Should you experience increased redness, swelling, pain, foul odor, size of wound(s), or have a temperature over 101 degrees please contact the [address] [place] [address] Road at [phone] or if after hours contact your primary care physician or go to the hospital emergency department.     Signed By: Shandra Urena RN     March 14, 2019

## 2019-03-14 NOTE — WOUND CARE
03/14/19 1517   Wound Heel Left;Medial;Posterior   Date First Assessed/Time First Assessed: 12/13/18 1415   POA: Yes  Wound Type: Diabetic  Location: Heel  Wound Description (Optional): Diabetic Foot Ulcer  Orientation: Left;Medial;Posterior   Dressing Status  Old drainage   Dressing Type  (bordered foam)   Wound Length (cm) 0 cm   Wound Width (cm) 0 cm   Wound Depth (cm) 0   Wound Surface area (cm^2) 0 cm^2   Change in Wound Size % 100   Condition of Base Epithelializing   Tissue Type Pink   Tissue Type Percent Pink 100   Drainage Amount  None   Wound Odor None   Periwound Skin Condition Intact   Cleansing and Cleansing Agents  Normal saline     Patient is taking an aspirin daily.

## 2019-03-14 NOTE — PROGRESS NOTES
WOUND CENTER Consult Note/Progress Note:   Quinton Roberts  MRN: [id number]  :1960  Age:58 y.o.    HPI: Quinton Roberts is a 66 y.o. male who is referred from [facility].  He was recently discharged. He was a patient of Dr. Fredy Whitley who underwent above-knee femoropopliteal bypass with PTFE in the left leg in . He had multiple toe amputations at that time. He was lost to follow-up. He presented with cellulitis of his left foot and the medial heel region. He was treated with antibiotics. Noninvasive vascular study showed occlusion of his bypass but he has no rest pain and was felt to have adequate circulation. He has follow-up with Dr. Fredy Whitley in 2019. He is completing a course of antibiotics. Wound care has been primarily painting with Betadine. Plain films were negative for foreign body. There is no radiographic evidence of osteomyelitis. He has a pacemaker and cannot tolerate MRI. He had bone scan which showed some activity in 1 of his toes but the toes are without clinical abnormality. He returns today. He was scheduled for thin distal bypass with reverse saphenous vein from the contralateral leg. During the preop evaluation, Dr. Fredy Whitley was impressed with his wound healing and felt that bypass was not needed at this time. He comes in today wearing moccasin slippers.     This information was obtained from the patient  The following HPI elements were documented for the patient's wound:  Location: L heel  Duration: 12/3/2018  Severity:  5/10  Context:  Cellulitis, poss ischemia  Modifying Factors:  Nothing makes better or worse  Quality: burning  Timing: constant and especially when manipulated  Associated Signs and Symptoms: n/a    Past Medical History:   Diagnosis Date    Acute on chronic diastolic heart failure ([facility] 2011    followed by dr Jacque Gardiner     pacemaker Medtronic-- followed by [facility]    Atherosclerotic PVD with ulceration ([facility] 2/2/2015    CAD (coronary artery disease) 02/28/2011     3 v CABG; aortic valve replacement/Bovine-- both at same time-- followed by dr Nae Groves Chronic kidney disease     chronic renal insuff. --- does not see a nephrologist     Chronic pain     left foot / PVD and partial amputation    Depression 2/28/2011    Diabetic neuropathy ([facility]     Foot amputation status, left ([facility])     Left foot with 2 toes amputated and portion of Left foot    GERD (gastroesophageal reflux disease)     controlled with medication    H/O aortic valve replacement 2/2011    Bovine valve    Heart failure ([facility]     Acute on Chronic HF- 1/21/19 Echo LVEF 60-65%    History of penile implant     currently in place    History of shingles 06/2015    Hx of CABG     3 vessel    Hypercholesterolemia     Hypertension     controlled with med    Left foot pain     Murmur, cardiac 02/2011    aortic valve replaced with CABG, 2011------- 2/6 SALVADOR RUSB apex, 2/6 TR murmur-- ECHO 9/13/17LVEF 55-60%     Obesity (BMI 30.0-34.9) 02/18/2019    BMI 33.38    Obesity (BMI 30.0-34.9) 02/18/2019    BMI 33.38    Pacemaker     Medtronic    PAD (peripheral artery disease) ([facility])     PVD (peripheral vascular disease) ([facility]     PVD (peripheral vascular disease) ([facility])     Thrombocytopenia (HCC)     Type 2 diabetes mellitus (HCC)     insulin reliant/ FBS avg- 105- A1C 8.4 -- hypo s/s below 50    Warfarin anticoagulation     pt no longer on Coumadin     Past Surgical History:   Procedure Laterality Date    CABG, ARTERY-VEIN, THREE  2011    Dr. Jon Foley  3 vessel cabg and (bovine) aortic valve replacement    COLONOSCOPY N/A 2/14/2018    COLONOSCOPY  BMI 37 performed by Susana Li MD at [facility]    HX AMPUTATION Left 2016    amputation/ parial foot / Left-- required 2 surgeries    HX AORTIC VALVE REPLACEMENT  2011    HX CATARACT REMOVAL Bilateral 2018    with lens implants    HX FREE SKIN GRAFT Left 8/11/15    thigh to foot graft  HX HEART CATHETERIZATION  03/07/2011    HX PACEMAKER  03/11/2011    Medtronic dual-chamber per cath report L chest- does not have card    HX UROLOGICAL  2005    Penile Impant    VASCULAR SURGERY PROCEDURE UNLIST  2/17/15    LE arteriogram    VASCULAR SURGERY PROCEDURE UNLIST Left 3-16-15    fem-tibial by-pass    VASCULAR SURGERY PROCEDURE UNLIST Left 6/2/15    5th toe amp    VASCULAR SURGERY PROCEDURE UNLIST Left 6/5/15    wound debridement & drainage of forefoot abscess    VASCULAR SURGERY PROCEDURE UNLIST  01/17/2019    arteriogram     Current Outpatient Medications   Medication Sig    omeprazole (PRILOSEC) 20 mg capsule Take 20 mg by mouth as needed.  pravastatin (PRAVACHOL) 80 mg tablet Take 1 Tab by mouth nightly.  metoprolol succinate (TOPROL-XL) 50 mg XL tablet Take 1 Tab by mouth every morning.  irbesartan (AVAPRO) 150 mg tablet Take 1 Tab by mouth nightly.  DULoxetine (CYMBALTA) 60 mg capsule Take 1 Cap by mouth daily. (Patient taking differently: Take 60 mg by mouth every morning.)    amLODIPine (NORVASC) 10 mg tablet Take 1 Tab by mouth every morning.  zolpidem (AMBIEN) 10 mg tablet Take 1 Tab by mouth nightly as needed for Sleep. Max Daily Amount: 10 mg.    insulin glargine U-300 conc (TOUJEO SOLOSTAR U-300 INSULIN) 300 unit/mL (1.5 mL) inpn 50 units daily (Patient taking differently: 50 Units by SubCUTAneous route nightly. 50 units daily)    insulin aspart U-100 (NOVOLOG FLEXPEN U-100 INSULIN) 100 unit/mL inpn 15 Units by SubCUTAneous route Before breakfast, lunch, and dinner.  aspirin 81 mg chewable tablet Take 81 mg by mouth every morning. No current facility-administered medications for this encounter. ALLERGIES: Patient has no known allergies.   Social History     Socioeconomic History    Marital status:      Spouse name: Not on file    Number of children: Not on file    Years of education: Not on file    Highest education level: Not on file Occupational History    Occupation: works in the lab     Comment: [employer]   Tobacco Use    Smoking status: Never Smoker    Smokeless tobacco: Never Used   Substance and Sexual Activity    Alcohol use: No    Drug use: No     Social History     Tobacco Use   Smoking Status Never Smoker   Smokeless Tobacco Never Used     Family History   Problem Relation Age of Onset    Diabetes Mother     Heart Disease Mother     Heart Surgery Mother 54        valve replacement    Other Mother         Shrogens    Diabetes Father     Heart Disease Father     Heart Surgery Father 61        cabg    Lung Disease Father     Parkinsonism Father     Other Father         polio as a child-affected lungs    Diabetes Brother     Thyroid Disease Brother     Diabetes Paternal Uncle         x2    Stroke Brother     Diabetes Brother     Cancer Paternal Grandfather        ROS: The patient has no difficulty with chest pain or shortness of breath. No fever or chills. Comprehensive review of systems was otherwise unremarkable except as noted above. Physical Exam:   Visit Vitals  /66 (BP 1 Location: Left arm)   Pulse 93   Temp 98.1 °F (36.7 °C)   Resp 18   Ht 5' 8\" (1.727 m)   Wt 200 lb 6.4 oz (90.9 kg)   SpO2 97%   BMI 30.47 kg/m²     Constitutional: Alert, oriented, cooperative patient in no acute distress; appears stated age;  General appearance is within normal limits for wound care patient population. See the today's recorded vitals signs and constitutional data. Eyes: Pupils equal; Sclera are clear. EOMs intact; The eyes appear to track and move normally. The sclera are not injected. The conjunctive are clear. The eyelids are normal. There is no scleral icterus. ENMT: There are no obvious external ear, nose, lip or mouth lesions. Nares normal; Neck: Overall contour of the neck is normal with no obvious neck masses. Gross hearing is within normal limits. No obvious neck masses  CV: RRR;  no JVD;  No evidence of cyanosis of the upper extremities. The extremities are perfused without embolic sign, splinter hemorrhages, or petechia. Resp:  Breathing is non-labored; normal rate and effort; no audible wheezing. GI: soft and non-distended without acute abnormality noted. Musculoskeletal: unremarkable with normal function. No embolic signs or cyanosis. Neuro:  Oriented; moves all 4; no focal deficits  Psychiatric: Judgement and insight are within normal limits for the wound care population of patients. Patient is oriented to person, place, and time. Recent and remote memory are within normal limits. Mood and affect are within normal limits. Integumentary (Skin/Wounds)  See inspection of wound(s) below. There are no other skin areas of palpable concern. See wound center documentation including photos in the [address] Wound Template made part of this record by reference.      Wounds:  Wound Foot Left (Active)   Number of days: 98       Wound Groin Right (Active)   Number of days: 56       [REMOVED] Wound Foot Right;Left (Removed)   Number of days: 7637       [REMOVED] Wound Foot Plantar;Lateral (Removed)   Number of days: 7633       [REMOVED] Wound Foot Left (Removed)   Number of days: 1280       [REMOVED] Wound Thigh Left;Upper (Removed)   Number of days: 1837       [REMOVED] Wound Foot Left (Removed)   Number of days: 4876       [REMOVED] Wound Left (Removed)   Number of days: 1200       [REMOVED] Wound Foot Right (Removed)   Number of days: 497       [REMOVED] Wound Heel Dorsal;Left;Posterior (Removed)   Number of days: 2       [REMOVED] Wound Heel Left (Removed)   Number of days: 98       [REMOVED] Wound Heel Left;Medial;Posterior (Removed)   Dressing Status  Old drainage 3/14/2019  3:17 PM   Non-Pressure Injury Full thickness (subcut/muscle) 2/7/2019  3:25 PM   [place] Grading Scale 0-5  Grade 2 1/3/2019  2:27 PM   Wound Length (cm) 0 cm 3/14/2019  3:17 PM   Wound Width (cm) 0 cm 3/14/2019  3:17 PM Wound Depth (cm) 0 3/14/2019  3:17 PM   Wound Surface area (cm^2) 0 cm^2 3/14/2019  3:17 PM   Change in Wound Size % 100 3/14/2019  3:17 PM   Condition of Base Epithelializing 3/14/2019  3:17 PM   Condition of Edges Open 1/24/2019  1:54 PM   Tissue Type Pink 3/14/2019  3:17 PM   Tissue Type Percent Pink 100 3/14/2019  3:17 PM   Tissue Type Percent Red 100 2/7/2019  3:25 PM   Tissue Type Percent Yellow 30 1/3/2019  2:27 PM   Drainage Amount  None 3/14/2019  3:17 PM   Drainage Color Serosanguinous 1/24/2019  1:54 PM   Wound Odor None 3/14/2019  3:17 PM   Periwound Skin Condition Intact 3/14/2019  3:17 PM   Cleansing and Cleansing Agents  Normal saline 3/14/2019  3:17 PM   Procedure Tolerated Well 2/7/2019  3:25 PM   Number of days: 91                     Recent vitals (if inpt):  Patient Vitals for the past 24 hrs:   BP Temp Pulse Resp SpO2 Height Weight   03/14/19 1459 101/66 98.1 °F (36.7 °C) 93 18 97 % 5' 8\" (1.727 m) 200 lb 6.4 oz (90.9 kg)       Labs:  No results for input(s): WBC, HGB, PLT, NA, K, CL, CO2, BUN, CREA, GLU, PTP, INR, APTT, TBIL, TBILI, CBIL, SGOT, GPT, ALT, AP, AML, LPSE, LCAD, NH4, TROPT, TROIQ, PCO2, PO2, HCO3, HGBEXT, PLTEXT, HGBEXT, PLTEXT in the last 72 hours. No lab exists for component:  PH, INREXT, INREXT    Lab Results   Component Value Date/Time    WBC 8.7 02/18/2019 11:18 AM    HGB 14.7 02/18/2019 11:18 AM    PLATELET 748 11/07/5886 11:18 AM    Sodium 138 02/18/2019 11:18 AM    Potassium 3.8 02/18/2019 11:18 AM    Chloride 103 02/18/2019 11:18 AM    CO2 26 02/18/2019 11:18 AM    BUN 22 02/18/2019 11:18 AM    Creatinine 1.41 02/18/2019 11:18 AM    Glucose 80 02/18/2019 11:18 AM    Glucose 126 (H) 02/17/2011 10:25 AM    INR 1.7 (H) 06/01/2016 11:26 AM    aPTT 45.6 (H) 08/24/2015 10:07 PM    Bilirubin, total 0.3 12/11/2018 05:43 AM    AST (SGOT) 27 12/11/2018 05:43 AM    ALT (SGPT) 35 12/11/2018 05:43 AM    Alk.  phosphatase 90 12/11/2018 05:43 AM    Amylase 49 12/07/2016 02:49 PM Lipase 36 12/07/2016 02:49 PM    Troponin-I <0.05 02/28/2011 12:10 PM    Troponin-I, Qt. <0.02 (L) 04/24/2018 04:29 PM     XR Results (most recent):  Results from Hospital Encounter encounter on 12/03/18   XR FOOT LT MIN 3 V    Narrative Left foot series    HISTORY: Diabetic wound. 3 views of the left foot were obtained. Comparison is made to the prior exam  06/21/2016. FINDINGS: There are amputations of the first and fifth digits. There are  hammertoe deformities of the second through fourth digits. There is soft tissue  edema along the posterior/inferior margin of the calcaneus without bony  destruction. Vascular calcifications are present. Impression IMPRESSION:  1. Soft tissue swelling adjacent to the calcaneus without radiographic features  to suggest osteomyelitis. 2. Amputation of the first and fifth digits. PROCEDURES PERFORMED: 1/17/2019  1. Ultrasound-guided access of right common femoral artery, placement of catheter in aorta,  aortogram.  2.  Left lower extremity diagnostic arteriogram.  SURGEON:  Brennon Lopez MD     OPERATIVE FINDINGS:  1. There was no evidence of any aortoiliac disease. 2.  Bilateral hypogastric arteries are patent. 3.  Left lower extremity arteriogram showed patent common femoral artery and patent profunda with a flush occlusion of the SFA and previously left common femoral to above-knee popliteal graft. The above knee popliteal artery reconstitutes but then it gets heavily diseased below the knee with the tibioperoneal trunk being occluded with the posterior tibial being occluded and a peroneal and anterior tibial band disease; however, patent down to the plantar digits. I reviewed recent labs and recent radiologic studies. I independently reviewed radiology images for studies I described above or studies I have ordered.    Admission date (for inpatients): 3/14/2019   * No surgery found *  * No surgery found *      ASSESSMENT/PLAN:  Patient with known vascular disease. He has failed femoropopliteal bypass from 2015. He has no rest pain or acute ischemic changes. He was admitted with cellulitis and heel wound. Wound was debrided 12/13. Devitalized tissue was removed. Wound improved greatly with Hydrofera Blue then Xeroform. Wound has closed. Small area has epithelialized into a small crevice. It is however closed. Arteriogram showed occlusion of the above-knee femoropopliteal graft. Dr. Ish Camp is planning for potential common femoral to peroneal artery bypass with saphenous vein. Dr. Ish Camp was impressed with his wound healing and felt that the high risk bypass was not needed at this time. He will continue to follow him. We again discussed the need for protection of his feet. He has diabetic neuropathy. He has had amputation. He has had limb threatening wounds and infections. We placed referral to [facility] for insert for his deformed foot. These would be appropriate in diabetic foot wear. He will obtain paperwork for diabetic foot wear through his PCP. Prevention of wounds is critical.  I will see him back as needed.     Problem List  Date Reviewed: 2/5/2019          Codes Class Noted    Cellulitis of left foot ICD-10-CM: L03.116  ICD-9-CM: 682.7  12/7/2018        DM (diabetes mellitus), type 2, uncontrolled ([facility] ICD-10-CM: E11.65  ICD-9-CM: 250.02  12/3/2018        Acute kidney injury superimposed on CKD [facility]) ICD-10-CM: N17.9, N18.9  ICD-9-CM: 866.00, 585.9  12/3/2018        Diabetic ulcer of left heel [facility]) ICD-10-CM: E11.621, L97.429  ICD-9-CM: 250.80, 707.14  12/3/2018        Class 2 severe obesity due to excess calories with serious comorbidity and body mass index (BMI) of 35.0 to 35.9 in adult [facility]) ICD-10-CM: E66.01, Z68.35  ICD-9-CM: 278.01, V85.35  8/20/2018        Preop cardiovascular exam (Chronic) ICD-10-CM: Z01.810  ICD-9-CM: V72.81  8/15/2017        Aortic stenosis ICD-10-CM: I35.0  ICD-9-CM: 424.1  2/8/2017        H/O heart valve replacement with bioprosthetic valve ICD-10-CM: Z95.3  ICD-9-CM: V42.2  2/8/2017    Overview Signed 8/15/2017 10:50 AM by Sis Martines MD     Aortic              Long term current use of anticoagulant therapy ICD-10-CM: Z79.01  ICD-9-CM: V58.61  10/14/2015        Diabetic ulcer of left great toe (HCC) (Chronic) ICD-10-CM: E11.621, A21.728  ICD-9-CM: 250.80, 707.15  8/22/2015    Overview Signed 8/24/2015  4:00 PM by Raul Lane NP     8/24/15 (Dr Leslye Wan) AMPUTATION LEFT FIRST TOE, LEFT FOOT DEBRIDMENT             Acute renal failure (ARF) ([facility] ICD-10-CM: N17.9  ICD-9-CM: 584.9  8/21/2015        Hypertension (Chronic) ICD-10-CM: I10  ICD-9-CM: 401.9  6/1/2015        IDDM (insulin dependent diabetes mellitus) ([facility] ICD-10-CM: E11.9, Z79.4  ICD-9-CM: 250.00, V58.67  6/1/2015        PAD (peripheral artery disease) ([facility] ICD-10-CM: I73.9  ICD-9-CM: 443.9  6/1/2015        CKD (chronic kidney disease), stage III ([facility] ICD-10-CM: N18.3  ICD-9-CM: 585.3  6/1/2015        Shingles ICD-10-CM: B02.9  ICD-9-CM: 053.9  6/1/2015        PVD (peripheral vascular disease) ([facility] ICD-10-CM: I73.9  ICD-9-CM: 443.9  3/16/2015    Overview Signed 3/18/2015  1:57 PM by Raul Lane NP     3/16/15 (Dr Donna Martínez) 1. Left superficial femoral artery endarterectomy. 2. Common femoral to above knee popliteal bypass with PTFE graft.   3. Left lower extremity arteriogram.             Atherosclerotic PVD with ulceration (HCC) (Chronic) ICD-10-CM: B45.641, L98.499  ICD-9-CM: 440.23, 707.9  2/2/2015        Thrombocytopenia (HCC) ICD-10-CM: D69.6  ICD-9-CM: 287.5  3/10/2011        Postsurgical aortocoronary bypass status ICD-10-CM: Z95.1  ICD-9-CM: V45.81  3/7/2011        Bicuspid aortic valve (Chronic) ICD-10-CM: Q23.1  ICD-9-CM: 746.4  3/7/2011        Acute on chronic diastolic heart failure [facility]) ICD-10-CM: I50.33  ICD-9-CM: 428.33  2/28/2011        CAD (coronary artery disease) (Chronic) ICD-10-CM: I25.10  ICD-9-CM: 414.00  2/28/2011        Depression (Chronic) ICD-10-CM: F32.9  ICD-9-CM: 289  2/28/2011        Dyslipidemia (Chronic) ICD-10-CM: C87.4  ICD-9-CM: 272.4  2/28/2011            Active Problems:    * No active hospital problems. *       Any procedures done today in the wound center are documented in a separate note in connect care made part of this record by reference     Wound Care  Orders Placed This Encounter    WOUND CARE, DRESSING CHANGE     Wound healed per MD.  Discharged from wound center. Obtain new diabetic inserts and shoes.      Standing Status:   Standing     Number of Occurrences:   1             Follow-up Information     Follow up With Specialties Details Why Contact Info    [address] [place] [address]          Signed:  Ry Staley MD,  FACS

## 2019-03-15 ENCOUNTER — PATIENT OUTREACH (OUTPATIENT)
Dept: CASE MANAGEMENT | Age: 59
End: 2019-03-15

## 2019-03-15 NOTE — PROGRESS NOTES
ISAURA CASTRO called and spoke with pt who said that his wife has been able to find a new job and that's a good thing. He said that he didn't actually have to have surgery so that was a big relief. Pt said that they did the application with the [place] housing program and they are just waiting to hear back from them. PLAN:  ISAURA CASTRO will follow up with pt in three weeks to see if he's heard from [facility]       This note will not be viewable in [address]

## 2019-04-05 ENCOUNTER — PATIENT OUTREACH (OUTPATIENT)
Dept: CASE MANAGEMENT | Age: 59
End: 2019-04-05

## 2019-04-05 NOTE — PROGRESS NOTES
ISAURA CASTRO called to follow up with pt and see how things are going. ISAURA CASTRO got pt's voice mail and had to leave a message for him to call ISAURA CASTRO back. PLAN: 
ISAURA CASTRO will follow up with pt in a week if ISAURA CASTRO doesn't hear from pt before then. This note will not be viewable in [address]

## 2019-04-12 ENCOUNTER — PATIENT OUTREACH (OUTPATIENT)
Dept: CASE MANAGEMENT | Age: 59
End: 2019-04-12

## 2019-04-12 NOTE — PROGRESS NOTES
ISAURA CASTRO called and spoke with both pt and his wife. Pt said that its been a big relief for both of them that his wife has a new job now and will have benefits soon. Pt said that [facility] and he's really hopefull about that. Pt said that they did get their income tax returns back and should be getting their power cut back on by the end of this month. PLAN: 
ISAURA CASTRO will follow up in two weeks to see how pt's appointment with the [place] goes and to see if he's gotten his power cut back on. This note will not be viewable in [address]

## 2019-04-12 NOTE — PROGRESS NOTES
Community Care Management  Follow up Outreach Note Outreach type: Phone call:xx Home visit:  
Date/Time of Outreach: 4/12/19 Reason for follow-up: 
 Assess ongoing need for RN CCM Disease specific complaints/issues: DM Patient progress towards goals set from last contact: 
 Patient reports he is still doing well with managing blood sugars and medications and is taking all as directed Has patient attended any PCP or specialist follow-up appointments since last contact? What was outcome of appointment? When is next follow-up scheduled? Cardiology and PCP all coming up in May and June Review medications. Any medication changes since last outreach? Does patient have any questions or issues related to their medications? Medications all still working and he has no changes since last call. Home health active? If yes  any issue? Progress? no  
Referrals needed? 
(SW, Diabetes education, HH, etc. ) no Other issues/Miscellaneous? (Transportation, access to meals, ability to perform ADLs, adequate caregiver support, etc.) Patient working on housing with [place] and has support with local [facility] group. Has my contact info for any medical questions Next Outreach Scheduled: None. Will close file for RN Case Management Next Steps/Goals: 
 Patient will remain independent with monitoring blood sugars and call with any major changes Community Care Manager: Laura Rosa

## 2019-04-29 ENCOUNTER — PATIENT OUTREACH (OUTPATIENT)
Dept: CASE MANAGEMENT | Age: 59
End: 2019-04-29

## 2019-04-29 NOTE — PROGRESS NOTES
ISAURA CASTRO called to follow up with pt and see if they've been able to get the power cut back on and/or if they've heard from the [address] [facility]. However, ISAURA CASTRO got pt's voice mail and had to leave a message. PLAN: 
ISAURA CASTRO will attempt to reach pt again in a week if ISAURA CASTRO doesn't hear from him before then. This note will not be viewable in [address]

## 2019-05-06 ENCOUNTER — PATIENT OUTREACH (OUTPATIENT)
Dept: CASE MANAGEMENT | Age: 59
End: 2019-05-06

## 2019-05-06 NOTE — PROGRESS NOTES
ISAURA CASTRO called and spoke with pt who said that his power has been cut back on and he's supposed to have a meeting with the [place] and he's hopeful about how that will go. Pt said that his wife is still working full time and that seems to be going well. PLAN: 
ISAURA CASTRO will follow up with pt in a week to see how his meeting with the [place] goes. This note will not be viewable in [address]

## 2019-05-13 ENCOUNTER — PATIENT OUTREACH (OUTPATIENT)
Dept: CASE MANAGEMENT | Age: 59
End: 2019-05-13

## 2019-05-13 NOTE — PROGRESS NOTES
ISAURA CASTRO called and spoke with pt who said that he feels like things continue to get better a little bit at at time. He said it's been nice to have the power back on. Pt stated that he is waiting on some information from the [place] to come in the mail. PLAN: 
ISAURA CASTRO will follow up with pt in two weeks to see how things are going and if he got info from the [place] in the mail This note will not be viewable in [employer].

## 2019-05-28 ENCOUNTER — PATIENT OUTREACH (OUTPATIENT)
Dept: CASE MANAGEMENT | Age: 59
End: 2019-05-28

## 2019-05-28 NOTE — PROGRESS NOTES
ISAURA CASRTO called and spoke with pt who said that he hasn't heard back from the [place] yet but he's hopeful he'll hear from them soon. Pt said that their power is still on and that he feels like things are going well otherwise. Pt did ask if ISAURA CASTRO will call back in a few weeks to see if they've heard from the [place] and to be able to help them navigate supports if they do hear from the [place]. PLAN:  ISAURA CASTRO will follow up with pt in three weeks if ISAURA CASTRO doesn't hear from him before then. This note will not be viewable in [address]

## 2019-06-18 ENCOUNTER — PATIENT OUTREACH (OUTPATIENT)
Dept: CASE MANAGEMENT | Age: 59
End: 2019-06-18

## 2019-06-18 NOTE — PROGRESS NOTES
ISAURA CASTRO called and spoke with pt who said that they are doing good and he doesn't have any more concerns for ISAURA CASTRO. Pt said that he still hasn't heard from the [place] but they're doing okay so he's not as worried any more. PLAN:  ISAURA CASTRO will close CCM episode and remove herself from pt's care team at this time. This note will not be viewable in [address]

## 2019-07-09 PROBLEM — N18.9 ACUTE KIDNEY INJURY SUPERIMPOSED ON CKD (HCC): Status: RESOLVED | Noted: 2018-12-03 | Resolved: 2019-07-09

## 2019-07-09 PROBLEM — N17.9 ACUTE KIDNEY INJURY SUPERIMPOSED ON CKD (HCC): Status: RESOLVED | Noted: 2018-12-03 | Resolved: 2019-07-09

## 2019-07-09 PROBLEM — E11.621 DIABETIC ULCER OF LEFT HEEL (HCC): Status: RESOLVED | Noted: 2018-12-03 | Resolved: 2019-07-09

## 2019-07-09 PROBLEM — L03.116 CELLULITIS OF LEFT FOOT: Status: RESOLVED | Noted: 2018-12-07 | Resolved: 2019-07-09

## 2019-07-09 PROBLEM — L97.429 DIABETIC ULCER OF LEFT HEEL (HCC): Status: RESOLVED | Noted: 2018-12-03 | Resolved: 2019-07-09

## 2019-08-14 ENCOUNTER — HOSPITAL ENCOUNTER (OUTPATIENT)
Dept: LAB | Age: 59
Discharge: HOME | End: 2019-08-14
Payer: MEDICARE

## 2019-08-14 DIAGNOSIS — I25.810 CORONARY ARTERY DISEASE INVOLVING CORONARY BYPASS GRAFT OF NATIVE HEART WITHOUT ANGINA PECTORIS: ICD-10-CM

## 2019-08-14 LAB
ANION GAP SERPL CALC-SCNC: 6 MMOL/L (ref 7–16)
BUN SERPL-MCNC: 33 MG/DL (ref 6–23)
CALCIUM SERPL-MCNC: 9.8 MG/DL (ref 8.3–10.4)
CHLORIDE SERPL-SCNC: 104 MMOL/L (ref 98–107)
CO2 SERPL-SCNC: 29 MMOL/L (ref 21–32)
CREAT SERPL-MCNC: 1.7 MG/DL (ref 0.8–1.5)
GLUCOSE SERPL-MCNC: 89 MG/DL (ref 65–100)
MAGNESIUM SERPL-MCNC: 1.9 MG/DL (ref 1.8–2.4)
POTASSIUM SERPL-SCNC: 4.5 MMOL/L (ref 3.5–5.1)
SODIUM SERPL-SCNC: 139 MMOL/L (ref 136–145)
TSH SERPL DL<=0.005 MIU/L-ACNC: 4.03 UIU/ML (ref 0.36–3.74)

## 2019-08-14 PROCEDURE — 83735 ASSAY OF MAGNESIUM: CPT

## 2019-08-14 PROCEDURE — 80048 BASIC METABOLIC PNL TOTAL CA: CPT

## 2019-08-14 PROCEDURE — 84443 ASSAY THYROID STIM HORMONE: CPT

## 2019-08-14 PROCEDURE — 36415 COLL VENOUS BLD VENIPUNCTURE: CPT

## 2019-10-09 PROBLEM — E11.21 TYPE 2 DIABETES WITH NEPHROPATHY (HCC): Status: ACTIVE | Noted: 2019-10-09

## 2020-02-24 ENCOUNTER — HOSPITAL ENCOUNTER (OUTPATIENT)
Dept: LAB | Age: 60
Discharge: HOME | End: 2020-02-24
Payer: MEDICARE

## 2020-02-24 DIAGNOSIS — E78.5 DYSLIPIDEMIA: Chronic | ICD-10-CM

## 2020-02-24 LAB
CHOLEST SERPL-MCNC: 192 MG/DL
HDLC SERPL-MCNC: 49 MG/DL (ref 40–60)
HDLC SERPL: 3.9 {RATIO}
LDLC SERPL CALC-MCNC: 124.6 MG/DL
LIPID PROFILE,FLP: NORMAL
TRIGL SERPL-MCNC: 92 MG/DL (ref 35–150)
VLDLC SERPL CALC-MCNC: 18.4 MG/DL (ref 6–23)

## 2020-02-24 PROCEDURE — 36415 COLL VENOUS BLD VENIPUNCTURE: CPT

## 2020-02-24 PROCEDURE — 80061 LIPID PANEL: CPT

## 2020-02-24 NOTE — PROGRESS NOTES
LDL STILL A BIT TOO HIGH. IF WILLING, STOP PRAVASTATIN AND PUT ON 40MG ATORVASTATIN AND 10MG ZETIA AND RECHECK IN 3 MOS (LIVER AND LIPID).

## 2020-08-26 ENCOUNTER — HOSPITAL ENCOUNTER (OUTPATIENT)
Dept: GENERAL RADIOLOGY | Age: 60
Discharge: HOME | End: 2020-08-26

## 2020-08-26 DIAGNOSIS — M25.551 RIGHT HIP PAIN: ICD-10-CM

## 2020-09-02 ENCOUNTER — HOSPITAL ENCOUNTER (OUTPATIENT)
Dept: PHYSICAL THERAPY | Age: 60
Discharge: HOME | End: 2020-09-02
Attending: NURSE PRACTITIONER
Payer: MEDICARE

## 2020-09-02 DIAGNOSIS — M25.551 RIGHT HIP PAIN: ICD-10-CM

## 2020-09-02 PROCEDURE — 97110 THERAPEUTIC EXERCISES: CPT

## 2020-09-02 PROCEDURE — 97161 PT EVAL LOW COMPLEX 20 MIN: CPT

## 2020-09-02 NOTE — PROGRESS NOTES
Tyrone Yip  : 1960  Payor: Karine Bernardo / Plan: [address] HMO / Product Type: Managed Care Medicare /  [address] [place] [address]  at 614 [facility] 68, [address] [facility], [place] [address] [address] [facility]  [phone]   [phone]      OUTPATIENT PHYSICAL THERAPY: Daily Treatment Note 2020  Visit Count:  1    ICD-10: Treatment Diagnosis:   M54.5 Low Back Pain   M54.16 Lumbar Radiculopathy  R26.2 Difficulty in Walking, Not Elsewhere Classified  M25.551 Pain in Right Hip  M25.651 Stiffness in Right Hip  M62.81 Muscle Weakness Generalized    Precautions/Allergies:   Patient has no known allergies. TREATMENT PLAN:  Effective Dates: 2020 TO 2020 (90 days). Frequency/Duration: 2 times a week for 90 Days        PRE-TREATMENT SYMPTOMS/COMPLAINTS:  See Eval    MEDICATIONS REVIEWED:  2020   TREATMENT:   (In addition to Assessment/Re-Assessment sessions the following treatments were rendered)    THERAPEUTIC EXERCISE: (25 minutes):  Exercises per grid below to improve mobility, strength and balance. Required minimal visual and verbal cues to promote proper body alignment and promote proper body posture. Progressed resistance, range and complexity of movement as indicated. Date:  2020 Date:   Date:     Activity/Exercise Parameters Parameters Parameters   Piriformis Stretch 2t51sbr/side     Figure 4 stretch 0s94qon/side     Nu-Step 3 min level 4     Hip Abd x12/side     Hip Ext x12/side     SKTC 0o58trs/side             MANUAL THERAPY: (0 minutes): Joint mobilization, Soft tissue mobilization and Manipulation was utilized and necessary because of the patient's restricted joint motion, painful spasm, loss of articular motion and restricted motion of soft tissue.      (Used abbreviations: MET - muscle energy technique; PNF - proprioceptive neuromuscular facilitation; NMR - neuromuscular re-education; AP - anterior to posterior; PA - posterior to anterior)    MODALITIES: (0 minutes):      Not Today      TREATMENT/SESSION ASSESSMENT:  Anderson Gibson verbalized understanding of role of PT and POC. Pt did not have increased symptoms following ambulating greater than 100ft following treatment. Stretches and bike caused minor increases in pain that decreased shortly after rest.     Education: All therapeutic Ex for HEP. RECOMMENDATIONS/INTENT FOR NEXT TREATMENT SESSION: \"Next visit will focus on advancements to more challenging activities\".     PAIN: Initial: 1/10 Post Session:  2/10     [facility] Portal    Total Treatment Billable Duration: 25 min   PT Patient Time In/Time Out  Time In: 1300  Time Out: [address], PT, DPT    Future Appointments   Date Time Provider Shade Dodson   9/9/2020  3:15 PM [facility]   9/10/2020  1:45 PM [facility]   12/16/2020  9:40 AM YANICK Rees PRE PRE   12/28/2020  1:15 PM Rudolm Angelucci, Lane Negro MD [facility]   2/25/2021  2:00 PM CHELLLE DEVICE [address] [facility]   8/24/2021  2:00 PM Jessica Herndon NP [facility] PRE PRE

## 2020-09-02 NOTE — THERAPY EVALUATION
Navid Kumari : 1960 Payor: Robbin Alcaraz / Plan: [address] HMO / Product Type: Managed Care Medicare /  [address]  at [facility] [address], 101 [facility], [place] [address] [address] [facility] [phone]   [phone] OUTPATIENT PHYSICAL THERAPY:Initial Assessment and AM 2020 ICD-10: Treatment Diagnosis:  
M54.5 Low Back Pain M54.16 Lumbar Radiculopathy R26.2 Difficulty in Walking, Not Elsewhere Classified M25.551 Pain in Right Hip M25.651 Stiffness in Right Hip 
M62.81 Muscle Weakness Generalized Precautions/Allergies:  
Patient has no known allergies. Fall Risk Score: 7 (? 5 = High Risk) MD Orders: Referral to Physical Therapy MEDICAL/REFERRING DIAGNOSIS: 
Right hip pain [M25.551] DATE OF ONSET: ~3 months ago REFERRING PHYSICIAN: Jennifer Duggan NP 
RETURN PHYSICIAN APPOINTMENT: Tbd By patient ASSESSMENT:  Mr. Moni Peguero has attended 1 physical therapy session including the initial evaluation. Pt present with pain, weakness, and decreased ROM of RT hip and Low back. Radicular pain from lumbar spine and increased RT hip dysfunction probably due to increased gait deviations that resulted from LT foot partial amputation. Pt has multiple gait deviations and increased fall risk per fall risk assessment. Operating below prior level of function and requires skilled physical therapy. Recommending skilled PT: manual therapeutic techniques (as appropriate), therapeutic exercises and activities, balance interventions, and a comprehensive home exercise program to address the current impairments, as listed above. Navid Kumari will benefit from skilled PT (medically necessary) to address above deficits affecting participation in basic ADLs and overall functional tolerance. PROBLEM LIST (Impacting functional limitations): 1. Decreased Strength 2. Decreased ADL/Functional Activities 3. Decreased Transfer Abilities 4. Decreased Ambulation Ability/Technique 5. Decreased Balance 6. Increased Pain 7. Decreased Flexibility/Joint Mobility 8. Decreased [place] with Home Exercise Program INTERVENTIONS PLANNED: 
1. Balance Exercise 2. Bed Mobility 3. Cold 4. Cryotherapy 5. Electrical Stimulation 6. Family Education 7. Gait Training 8. Heat 9. Home Exercise Program (HEP) 10. Manual Therapy 11. Neuromuscular Re-education/Strengthening 12. Range of Motion (ROM) 13. Therapeutic Activites 14. Therapeutic Exercise/Strengthening 15. Transcutaneous Electrical Nerve Stimulation (TENS) 16. Transfer Training TREATMENT PLAN: 
TREATMENT PLAN: 
Effective Dates: 9/2/2020 TO 12/1/2020 (90 days). Frequency/Duration: 2 times a week for 90 Days Short-Term Functional Goals: Time Frame: 3 weeks 1. Pt will be compliant with HEP. 2. Pt will decreased worst pain to 4/10 or less in order to improve ambulation 3. Pt will navigate 4 steps reciprocally with BUE use in order to enter home 4. Pt will improve LEFS to 40 or greater in order to improve car transfers 5. Pt will improve BLE strength to 4/5 in order to improve stair navigation Discharge Goals: Time Frame: 6 weeks 1. Pt will be independent with HEP. 2. Pt will decreased worst pain to 2/10 or less in order to improve ambulation 3. Pt will navigate 3 steps reciprocally with single UE use in order to enter home 4. Pt will improve LEFS to 45 or greater in order to improve car transfers 5. Pt will improve BLE strength to 4+/5 or greater in order to improve stair navigation 6. Pt will ambulate without antalgia Rehabilitation Potential For Stated Goals: Fair Regarding Domitila Razo's therapy, I certify that the treatment plan above will be carried out by a therapist or under their direction. Thank you for this referral, 
Bobby Felty, PT, DPT Referring Physician Signature: Jairo Mc NP              Date The information in this section was collected on 09/02/2020 (except where otherwise noted). HISTORY:  
History of Present Injury/Illness (Reason for Referral): Here for right hip pain starts in right groin and goes into right lower back. \"Tightness from RT groin to low back. He is having trouble walking due to the pain. Few hundred feet and then has to sit down. THe pain does not bother him at night. It hurts with walking and standing. It started 2 months ago and has gotten progressively worse. He has tried not taken any meds for the hip as he can not take NSAId due to kidney issues. NO fever no weight loss. 1 fall months ago. CC/Primary Concern: 
      RT hip/leg pain that spreads to back. Treatment Side: Bilateral 
 
Hospitalization and time spent in care: None Past Medical History/Comorbidities: Mr. Jose Oconnell  has a past medical history of Acute on chronic diastolic heart failure ([address] (2/28/2011), Arrhythmia (2011), Atherosclerotic PVD with ulceration ([address] (2/2/2015), CAD (coronary artery disease) (02/28/2011), Chronic kidney disease, Chronic pain, Depression (2/28/2011), Diabetic neuropathy ([address] Foot amputation status, left, GERD (gastroesophageal reflux disease), H/O aortic valve replacement (2/2011), Heart failure ([address] History of penile implant, History of shingles (06/2015), CABG, Hypercholesterolemia, Hypertension, Left foot pain, Murmur, cardiac (02/2011), Obesity (BMI 30.0-34.9) (02/18/2019), Obesity (BMI 30.0-34.9) (02/18/2019), Pacemaker, PAD (peripheral artery disease) ([address] PVD (peripheral vascular disease) ([address] PVD (peripheral vascular disease) ([address] Thrombocytopenia ([address] Type 2 diabetes mellitus ([address] and Warfarin anticoagulation. He also has no past medical history of Dementia, Gastrointestinal disorder, Neurological disorder, Sleep disorder, or Unspecified adverse effect of anesthesia.   Mr. Jose Oconnell  has a past surgical history that includes hx free skin graft (Left, 8/11/15); colonoscopy (N/A, 2/14/2018); pr cabg, artery-vein, three (2011); hx heart catheterization (03/07/2011); vascular surgery procedure unlist (2/17/15); vascular surgery procedure unlist (Left, 3-16-15); vascular surgery procedure unlist (Left, 6/2/15); vascular surgery procedure unlist (Left, 6/5/15); vascular surgery procedure unlist (01/17/2019); hx pacemaker (03/11/2011); hx urological (2005); hx amputation (Left, 2016); hx aortic valve replacement (2011); and hx cataract removal (Bilateral, 2018). Social History/Living Environment:  
  Lives with wife, 3 steps bilat railing Pain/Symptom Location: RT hip and low back Worst Pain: 8/10 Current Pain: 1/10 Aggravating Factors: Sitting, Standing, Walking, Stairs Up and Stairs Down Alleviating Factors/Positions/Motions: Sitting General Health: Michael Doss Diagnostic Imaging: X-Ray on RT hip Occupation: unemployed, disabled Unexplained Weight Loss: No 
 
 
Prior Level of Function/Work/Activity: 
Walking with no impairments, no Assistive device currently. Patient Goals: Return to household task, ambulate around park, navigate stairs, return to hike. Current Medications:   
  
Current Outpatient Medications:  
  varicella-zoster recombinant, PF, (Shingrix, PF,) 50 mcg/0.5 mL susr injection, One today and repeat within 6 months, Disp: 0.5 mL, Rfl: 1   insulin NPH (NOVOLIN N, HUMULIN N) 100 unit/mL injection, 35 units in the am and 25 with supper, Disp: 1 Vial, Rfl: 5 
  insulin regular (NOVOLIN R, HUMULIN R) 100 unit/mL injection, 15 units twice daily before meals, Disp: 1 Vial, Rfl: 11   Blood-Glucose Meter (Accu-Chek Luann Plus Meter) misc, Accu Chek Luann Plus Meter  Testing 4 times daily .2, Disp: 1 Each, Rfl: 0 
  lancets (Accu-Chek Softclix Lancets) misc, 1 Package by Does Not Apply route four (4) times daily.  .2, Disp: 3 Package, Rfl: 11 
   zolpidem (AMBIEN) 10 mg tablet, Take 1 Tab by mouth nightly as needed for Sleep. Max Daily Amount: 10 mg., Disp: 30 Tab, Rfl: 2 
  amLODIPine (NORVASC) 10 mg tablet, Take 1 Tab by mouth every morning. Indications: high blood pressure, Disp: 90 Tab, Rfl: 1   DULoxetine (CYMBALTA) 60 mg capsule, Take 1 Cap by mouth daily. , Disp: 90 Cap, Rfl: 1   irbesartan (AVAPRO) 150 mg tablet, Take 1 Tab by mouth nightly., Disp: 90 Tab, Rfl: 1 
  metoprolol succinate (TOPROL-XL) 50 mg XL tablet, Take 1 Tab by mouth every morning., Disp: 90 Tab, Rfl: 1   pravastatin (PRAVACHOL) 80 mg tablet, Take 1 Tab by mouth nightly. Indications: treatment to prevent a heart attack, Disp: 90 Tab, Rfl: 1 
  glucose blood VI test strips (ACCU-CHEK DUDLEY PLUS TEST STRP) strip, 4 times daily  250.2, Disp: 400 Strip, Rfl: 1 
  omeprazole (PRILOSEC) 20 mg capsule, Take 20 mg by mouth as needed. , Disp: , Rfl:  
  aspirin 81 mg chewable tablet, Take 81 mg by mouth every morning., Disp: , Rfl:   
Date Last Reviewed:  9/2/2020 Ambulatory/Rehab Services H2 Model Falls Risk Assessment Risk Factors: 
     (1)  Gender [Male] 
     (5)  History of Recent Falls [w/in 3 months] Ability to Rise from Chair: 
     (1)  Pushes up, successful in one attempt Falls Prevention Plan: No modifications necessary Total: (5 or greater = High Risk): 7  
 ©2010 [facility] [place] [address]. [facility] [place] Patent #[id number]. Federal Law prohibits the replication, distribution or use without written permission from [facility] Number of Personal Factors/Comorbidities that affect the Plan of Care: 3+: HIGH COMPLEXITY EXAMINATION:  
Inspection Patient Consent: Yes Iliac Crest: Level PSIS: Level ASIS: Level Additional Comments:  
________________________________________________________________________________________________ Observation Vitals: Not observed Transfers: Sit to stand: Increased time, use of BUE and Supine to sit: Incresaed time Posture: Increased lumbar lordosis, LT Trunk rotation at rest 
 
 
    Gait: Antalgic, Lacks Full Knee Ext at [place] & Minor, Lacks Proper Foot Strike/Toe Off, Decreased Arm Swing, Decreased Stride Length, Decreased Step Length and LT [place] Assistive Device: 
      Type: None Hand Use: N/A Stairs: Step to Pattern Railing: both Edema: No Edema, Erythema or Ecchymosis noted Pitting: No 
 
________________________________________________________________________________________________ Range of Motion Lumbar Joint:    
 Right (Degrees/Percent) Left (Degrees/Percent) Flexion Hands to reach shins Extension 12 deg (Hinge at L2) Side Bending WNL WNL Rotation 40% 60% Lower: Hip ROM WNL except RT abduction 15 deg 
________________________________________________________________________________________________ Strength Lower Extremity Joint:    
 RIGHT LEFT Hip Flexion 4-/5 5/5 Hip Extension 3+/5 4/5 Hip Internal Rotation 5/5 NT Hip External Rotation 5/5 NT Hip Abduction 4-/5 NT  
 
________________________________________________________________________________________________ Neruo-Vascular C/O Radicular Symptoms: Yes, Pain from RT groin to mid lower back. LE Neurologic Screen LE NEUROLOGICAL SCREEN: Dermatomes and Myotomes WNL except where noted below. -MYOTOMES  Date:   
 Right Left L1 & L2 (Hip Flexion) L3  
(Knee Ext) L4 (Dorsiflexion) L5 
(Great Toe Ext) S1 (Plantarflexion) S2 (Knee Flex)    
 
 
-Dermatomoes  Date:   
 Right Left L1/2 L3 L4 L5    
S1  Diminshed to light touch S2  Diminished to light touch  
 
  
-REFLEXES Date:   
 Right Left L4 
(Quadricep) 2+ 2+  
S1 (Gastroc) Unable to Elicit Unable to Elicit Aquinos Babinski Clonus Romberg ________________________________________________________________________________________________ Special Tests Lumbar: 
      Slump: Positive RT, SLR: Positive RT, Passive Vertebral Mobility: Hypomobility grossly, no radicular pain and Sudhir: Positive RT Hip: 
      Long Axis Distraction: No change 
       
________________________________________________________________________________________________ Palpation: Increased tenderness in RT glute max, RT greater trochanter tender to palpation Body Structures Involved: 1. Joints 2. Muscles Body Functions Affected: 1. Sensory/Pain 2. Neuromusculoskeletal 
3. Movement Related Activities and Participation Affected: 1. Mobility 2. Community, Social and [place] Number of elements (examined above) that affect the Plan of Care: 4+: HIGH COMPLEXITY CLINICAL PRESENTATION:  
Presentation: Stable and uncomplicated: LOW COMPLEXITY CLINICAL DECISION MAKING:  
Outcome Measure: Tool Used: Lower Extremity Functional Scale (LEFS) Score:  Initial: 30/80 Most Recent: X/80 (Date: -- ) Interpretation of Score: 20 questions each scored on a 5 point scale with 0 representing \"extreme difficulty or unable to perform\" and 4 representing \"no difficulty\". The lower the score, the greater the functional disability. 80/80 represents no disability. Minimal detectable change is 9 points. Medical Necessity:  
· Patient is expected to demonstrate progress in strength, range of motion, balance and coordination to increase independence with functional mobility and ambulation . Reason for Services/Other Comments: 
· Patient continues to require skilled intervention due to pain, weakness, decreased ROM and increased fall risk. Use of outcome tool(s) and clinical judgement create a POC that gives a: Clear prediction of patient's progress: LOW COMPLEXITY

## 2020-09-09 ENCOUNTER — HOSPITAL ENCOUNTER (OUTPATIENT)
Dept: PHYSICAL THERAPY | Age: 60
Discharge: HOME | End: 2020-09-09
Attending: NURSE PRACTITIONER
Payer: MEDICARE

## 2020-09-09 PROCEDURE — 97140 MANUAL THERAPY 1/> REGIONS: CPT

## 2020-09-09 PROCEDURE — 97110 THERAPEUTIC EXERCISES: CPT

## 2020-09-09 NOTE — PROGRESS NOTES
Milagros Razo  : 1960  Payor: Lisette Buerger / Plan: [address] HMO / Product Type: Managed Care Medicare /  [address] [place] [address]  at 614 [facility] 68, 101 [facility], [place] [address] [address] [facility]  [phone]   [phone]      OUTPATIENT PHYSICAL THERAPY: Daily Treatment Note 2020  Visit Count:  2    ICD-10: Treatment Diagnosis:   M54.5 Low Back Pain   M54.16 Lumbar Radiculopathy  R26.2 Difficulty in Walking, Not Elsewhere Classified  M25.551 Pain in Right Hip  M25.651 Stiffness in Right Hip  M62.81 Muscle Weakness Generalized    Precautions/Allergies:   Patient has no known allergies. TREATMENT PLAN:  Effective Dates: 2020 TO 2020 (90 days). Frequency/Duration: 2 times a week for 90 Days        PRE-TREATMENT SYMPTOMS/COMPLAINTS: \"I have been pleasantly surprised with how well this has worked. \" Reports he has not tried to stand or walk a long time, but has been hurting less. No pain today in RT hip or RT low back. Some mild pain in LT low back. MEDICATIONS REVIEWED:  2020   TREATMENT:       THERAPEUTIC EXERCISE: (13 minutes):  Exercises per grid below to improve mobility, strength and balance. Required minimal visual and verbal cues to promote proper body alignment and promote proper body posture. Progressed resistance, range and complexity of movement as indicated.      Date:  2020 Date:   Date:     Activity/Exercise Parameters Parameters Parameters   Piriformis Stretch 7p97rft/side     Figure 4 stretch [id number]/side     Nu-Step 3 min level 4 10 min level 4    Hip Abd x12/side     Hip Ext x12/side     SKTC 7v53hir/side     Standing Lumbar Flexion with shoulder flexion stretch  Hands against half wall hold 5x01hpd      MANUAL THERAPY: (28 minutes): Joint mobilization, Soft tissue mobilization and Manipulation was utilized and necessary because of the patient's restricted joint motion, painful spasm, loss of articular motion and restricted motion of soft tissue. +STM to lumbar paraspinals  +Glute and piriformis stretch  +Lumbar Roll mobilization in RT side lying  +RT LAD Grade II/III    (Used abbreviations: MET - muscle energy technique; PNF - proprioceptive neuromuscular facilitation; NMR - neuromuscular re-education; AP - anterior to posterior; PA - posterior to anterior)    MODALITIES: (0 minutes):      Not Today      TREATMENT/SESSION ASSESSMENT:  Mane Frost with decreased symptoms per self reports. Initial complaints are getting better. Still noted to have decreased stance time bilaterally and increase tone in lumbar paraspinals. Education: All therapeutic Ex for HEP. RECOMMENDATIONS/INTENT FOR NEXT TREATMENT SESSION: \"Next visit will focus on advancements to more challenging activities\".     PAIN: Initial: 1/10 Post Session:  2/10     [place] Portal    Total Treatment Billable Duration: 41 min   PT Patient Time In/Time Out  Time In: 3306  Time Out: [address], PT, DPT    Future Appointments   Date Time Provider Shade Dodson   9/10/2020  1:45 PM [facility]   12/16/2020  9:40 AM Rosa Albert NP [facility] PRE PRE   12/28/2020  1:15 PM [facility]   2/25/2021  2:00 PM [facility]   8/24/2021  2:00 PM Rosa Albert NP [facility] PRE PRE

## 2020-09-10 ENCOUNTER — HOSPITAL ENCOUNTER (OUTPATIENT)
Dept: PHYSICAL THERAPY | Age: 60
Discharge: HOME | End: 2020-09-10
Attending: NURSE PRACTITIONER
Payer: MEDICARE

## 2020-09-10 PROCEDURE — 97110 THERAPEUTIC EXERCISES: CPT

## 2020-09-10 NOTE — PROGRESS NOTES
Yvrose Razo  : 1960  Payor: Anoop Pierre / Plan: [address] HMO / Product Type: Managed Care Medicare /  [address] [place] [address]  at [facility]  [address], [address] [facility] [address], [place] [address] [address] [facility]  [phone]   [phone]      OUTPATIENT PHYSICAL THERAPY: Daily Treatment Note 9/10/2020  Visit Count:  3    ICD-10: Treatment Diagnosis:   M54.5 Low Back Pain   M54.16 Lumbar Radiculopathy  R26.2 Difficulty in Walking, Not Elsewhere Classified  M25.551 Pain in Right Hip  M25.651 Stiffness in Right Hip  M62.81 Muscle Weakness Generalized    Precautions/Allergies:   Patient has no known allergies. TREATMENT PLAN:  Effective Dates: 2020 TO 2020 (90 days). Frequency/Duration: 2 times a week for 90 Days        PRE-TREATMENT SYMPTOMS/COMPLAINTS: Reports he was able to make shopping trip with only mild pain/tenderness. Happy so far with treatmetn. MEDICATIONS REVIEWED:  9/10/2020   TREATMENT:       THERAPEUTIC EXERCISE: (40 minutes):  Exercises per grid below to improve mobility, strength and balance. Required minimal visual and verbal cues to promote proper body alignment and promote proper body posture. Progressed resistance, range and complexity of movement as indicated.      Date:  2020 Date:   Date:  09/10   Activity/Exercise Parameters Parameters Parameters   Piriformis Stretch [id number]/side     Figure 4 stretch 5f43qci/side     Nu-Step 3 min level 4 10 min level 4 10 min level 4.5   Hip Abd x12/side     Hip Ext x12/side     SKTC [id number]/side     Standing Lumbar Flexion with shoulder flexion stretch  Hands against half wall hold 3x98luw Hands against half wall hold [id number]   Hip Abd   4x12 2#   Squat   10# 5x3   Glute Ext   4x12 2#   Lumbar Flexion    Lumbar spine against green physioball 7x10 sec hold           MANUAL THERAPY: (0 minutes): Joint mobilization, Soft tissue mobilization and Manipulation was utilized and necessary because of the patient's restricted joint motion, painful spasm, loss of articular motion and restricted motion of soft tissue. +STM to lumbar paraspinals  +Glute and piriformis stretch  +Lumbar Roll mobilization in RT side lying  +RT LAD Grade II/III    (Used abbreviations: MET - muscle energy technique; PNF - proprioceptive neuromuscular facilitation; NMR - neuromuscular re-education; AP - anterior to posterior; PA - posterior to anterior)    MODALITIES: (0 minutes):      Not Today      TREATMENT/SESSION ASSESSMENT:  Loren Edge continues to exhibit mild trendelenburg, but ambulation distance is improve per self reports. Has difficulty with lumbar flexion mobility. Some fatigue and soreness following session. Incorporating higher level, load and complexity motions today. Education: All therapeutic Ex for HEP. RECOMMENDATIONS/INTENT FOR NEXT TREATMENT SESSION: \"Next visit will focus on advancements to more challenging activities\".     PAIN: Initial: 1/10 Post Session:  2/10     [facility] Portal    Total Treatment Billable Duration: 40 min   PT Patient Time In/Time Out  Time In: 5789  Time Out: [address] [facility], PT, DPT    Future Appointments   Date Time Provider Shdae Villanuevaisti   12/16/2020  9:40 AM YANICK Nugent PRE PRE   12/28/2020  1:15 PM Annita Walters MD [facility]   2/25/2021  2:00 PM CHELLLE DEVICE 44 [facility]   8/24/2021  2:00 PM Kathy Kolb NP [facility] PRE PRE

## 2020-09-18 ENCOUNTER — HOSPITAL ENCOUNTER (OUTPATIENT)
Dept: PHYSICAL THERAPY | Age: 60
Discharge: HOME | End: 2020-09-18
Attending: NURSE PRACTITIONER
Payer: MEDICARE

## 2020-09-18 NOTE — PROGRESS NOTES
Neymar Rodas Danni  : 1960  Primary: [employer]ava[employer] Hmo  Secondary:  [address] [place]  at [facility], [facility], [address]  [phone]   [phone]      OUTPATIENT DAILY NOTE    NAME/AGE/GENDER: Hever Tinoco is a 64 y.o. male. DATE: 2020    SUBJECTIVE:  Patient called to cancel appointment today due to being too busy today. Will plan to follow up on next scheduled visit.     Casimiro Odonnell, PT, DPT

## 2020-11-03 NOTE — PROGRESS NOTES
Hospitalist Progress Note 12/10/2018 Admit Date: 2018  9:28 AM  
NAME: Darryle Martin :  1960 MRN:  [id number] Attending: Codey Kaur MD 
PCP:  Haritha Barragan NP 
 
SUBJECTIVE:  
Julián Maria is a 65 yo M with history of L leg PAD s/p remote L fem-tib bypass in , who was admitted 18 for L heel ulcer/infection. He was discharged on  for similar issue, but foot started looking worse. He reports mild pain in the area. Seen by ID and started on Ceftaroline. - denies new complaints. Still with some pain around L heel. 12/10- foot feels about the same. Awaiting 2nd part of bone scan of L foot. Review of Systems negative with exception of pertinent positives noted above PHYSICAL EXAM  
 
Visit Vitals /76 (BP 1 Location: Right arm, BP Patient Position: Head of bed elevated (Comment degrees)) Pulse 75 Temp 98.2 °F (36.8 °C) Resp 18 Wt 93.3 kg (205 lb 11.2 oz) SpO2 96% BMI 31.28 kg/m² Temp (24hrs), Av.1 °F (36.7 °C), Min:97.4 °F (36.3 °C), Max:98.7 °F (37.1 °C) Patient Vitals for the past 24 hrs: 
 Temp Pulse Resp BP SpO2  
12/10/18 1456 98.2 °F (36.8 °C) 75 18 116/76 96 % 12/10/18 1143 98.7 °F (37.1 °C) 71 18 124/75 96 % 12/10/18 0736 98.1 °F (36.7 °C) 72 18 110/68 94 % 12/10/18 0328 98 °F (36.7 °C) 74 16 97/62 96 % 18 2324 97.4 °F (36.3 °C) 82 18 126/71 94 % 18 1953 98.1 °F (36.7 °C) 74 18 144/79 94 % Oxygen Therapy O2 Sat (%): 96 % (12/10/18 1456) Intake/Output Summary (Last 24 hours) at 12/10/2018 1604 Last data filed at 12/10/2018 1343 Gross per 24 hour Intake 1078 ml Output 700 ml Net 378 ml General: No acute distress, obese  
Lungs:  CTA Bilaterally. Heart:  Regular rate and rhythm,  No murmur, rub, or gallop Abdomen: Soft, Non distended, Non tender, Positive bowel sounds Extremities: No cyanosis, L foot with 6 x 3 wound with 3 cm ulcerated area and no purulent drainage noted; surrounding erythema resolved, L great toe and 5th toe amputated Neurologic:  No focal deficits Recent Results (from the past 24 hour(s)) GLUCOSE, POC Collection Time: 12/09/18  8:47 PM  
Result Value Ref Range Glucose (POC) 196 (H) 65 - 100 mg/dL GLUCOSE, POC Collection Time: 12/10/18  5:13 AM  
Result Value Ref Range Glucose (POC) 102 (H) 65 - 100 mg/dL CBC WITH AUTOMATED DIFF Collection Time: 12/10/18  6:22 AM  
Result Value Ref Range WBC 6.9 4.3 - 11.1 K/uL  
 RBC 3.97 (L) 4.23 - 5.6 M/uL  
 HGB 11.7 (L) 13.6 - 17.2 g/dL HCT 35.2 (L) 41.1 - 50.3 % MCV 88.7 79.6 - 97.8 FL  
 MCH 29.5 26.1 - 32.9 PG  
 MCHC 33.2 31.4 - 35.0 g/dL  
 RDW 13.3 11.9 - 14.6 % PLATELET 115 978 - 295 K/uL MPV 11.6 9.4 - 12.3 FL ABSOLUTE NRBC 0.00 0.0 - 0.2 K/uL  
 DF AUTOMATED NEUTROPHILS 47 43 - 78 % LYMPHOCYTES 30 13 - 44 % MONOCYTES 14 (H) 4.0 - 12.0 % EOSINOPHILS 7 0.5 - 7.8 % BASOPHILS 1 0.0 - 2.0 % IMMATURE GRANULOCYTES 1 0.0 - 5.0 %  
 ABS. NEUTROPHILS 3.2 1.7 - 8.2 K/UL  
 ABS. LYMPHOCYTES 2.1 0.5 - 4.6 K/UL  
 ABS. MONOCYTES 1.0 0.1 - 1.3 K/UL  
 ABS. EOSINOPHILS 0.5 0.0 - 0.8 K/UL  
 ABS. BASOPHILS 0.1 0.0 - 0.2 K/UL  
 ABS. IMM. GRANS. 0.1 0.0 - 0.5 K/UL METABOLIC PANEL, COMPREHENSIVE Collection Time: 12/10/18  6:22 AM  
Result Value Ref Range Sodium 139 136 - 145 mmol/L Potassium 3.7 3.5 - 5.1 mmol/L Chloride 106 98 - 107 mmol/L  
 CO2 26 21 - 32 mmol/L Anion gap 7 7 - 16 mmol/L Glucose 96 65 - 100 mg/dL BUN 12 6 - 23 MG/DL Creatinine 1.58 (H) 0.8 - 1.5 MG/DL  
 GFR est AA 59 (L) >60 ml/min/1.73m2 GFR est non-AA 48 (L) >60 ml/min/1.73m2 Calcium 8.7 8.3 - 10.4 MG/DL Bilirubin, total 0.3 0.2 - 1.1 MG/DL  
 ALT (SGPT) 30 12 - 65 U/L  
 AST (SGOT) 22 15 - 37 U/L Alk. phosphatase 87 50 - 136 U/L Protein, total 6.3 6.3 - 8.2 g/dL Albumin 2.7 (L) 3.5 - 5.0 g/dL Globulin 3.6 (H) 2.3 - 3.5 g/dL A-G Ratio 0.8 (L) 1.2 - 3.5 GLUCOSE, POC Collection Time: 12/10/18 11:38 AM  
Result Value Ref Range Glucose (POC) 190 (H) 65 - 100 mg/dL All Micro Results Procedure Component Value Units Date/Time Nubia Matos STAIN [[id number]] Collected:  12/10/18 1250 Order Status:  Completed Specimen:  Wound from Heel Updated:  12/10/18 1347 CULTURE, ANAEROBIC [[id number]] Collected:  12/10/18 1250 Order Status:  Completed Specimen:  Foot, left Updated:  12/10/18 1347 CULTURE, BLOOD [[id number]] Collected:  12/07/18 1059 Order Status:  Completed Specimen:  Blood Updated:  12/10/18 0734 Special Requests: --     
  LEFT Antecubital 
  
  Culture result: NO GROWTH 3 DAYS     
 CULTURE, BLOOD [[id number]] Collected:  12/07/18 1101 Order Status:  Completed Specimen:  Blood Updated:  12/10/18 0734 Special Requests: --     
  RIGHT Antecubital 
  
  Culture result: NO GROWTH 3 DAYS     
  
 
 
 
ASSESSMENT Hospital Problems as of 12/10/2018 Date Reviewed: 12/6/2018 Codes Class Noted - Resolved POA * (Principal) Cellulitis of left foot ICD-10-CM: L03.116 ICD-9-CM: 682.7  12/7/2018 - Present Unknown DM (diabetes mellitus), type 2, uncontrolled ([facility] ICD-10-CM: E11.65 ICD-9-CM: 250.02  12/3/2018 - Present Diabetic ulcer of left heel ([facility] ICD-10-CM: E11.621, J86.236 ICD-9-CM: 250.80, 707.14  12/3/2018 - Present Yes Hypertension (Chronic) ICD-10-CM: I10 
ICD-9-CM: 401.9  6/1/2015 - Present Yes IDDM (insulin dependent diabetes mellitus) ([facility] ICD-10-CM: E11.9, Z79.4 ICD-9-CM: 250.00, V58.67  6/1/2015 - Present Yes  
   
 CKD (chronic kidney disease), stage III (HCC) ICD-10-CM: N18.3 ICD-9-CM: 585.3  6/1/2015 - Present Yes PVD (peripheral vascular disease) ([facility] ICD-10-CM: I73.9 ICD-9-CM: 443.9  3/16/2015 - Present Yes  Overview Signed 3/18/2015  1:57 PM by Jonas Sosa NP  
 3/16/15 (Dr Lennox Doom) 1. Left superficial femoral artery endarterectomy. 2. Common femoral to above knee popliteal bypass with PTFE graft. 3. Left lower extremity arteriogram. 
  
  
   
 S/P AVR (aortic valve replacement) ICD-10-CM: Q52.3 ICD-9-CM: V43.3  3/7/2011 - Present Yes Diabetes mellitus (HCC) (Chronic) ICD-10-CM: E11.9 ICD-9-CM: 250.00  2/28/2011 - Present Yes Plan: · L heel ulcer/cellulitis- continue abx Ceftaroline (day 4) per ID recs. · Continue wound care. · Will need to re-establish with [facility] on discharge. · Follow up bone scan · T2DM- blood sugars controlled- continue current insulin regimen · HTN- continue current treatment. · CAD- aspirin, statin. DVT Prophylaxis: Heparin Signed By: Yas Lewis MD   
 December 10, 2018 Eye Shield Text: Given the treatment area eye shields were inserted prior to treatment.

## 2021-08-12 ENCOUNTER — HOSPITAL ENCOUNTER (OUTPATIENT)
Dept: DIABETES SERVICES | Age: 61
Discharge: HOME | End: 2021-08-12
Payer: MEDICARE

## 2021-08-12 PROCEDURE — G0108 DIAB MANAGE TRN  PER INDIV: HCPCS

## 2021-08-12 NOTE — PROGRESS NOTES
This is a one on one appointment. Due to being during [facility]-40 public health emergency, social distancing and mandatory precautions are in place and utilized Came for diabetes educational assessment today. Provided basic information on carbohydrates, proteins and fats. Educational need/plan: Will attend 2 nutrition/2 diabetes sessions to address the following: diabetes disease process, nutritional management, physical activity, using medications, preventing complications, psychosocial adjustment, goal setting, problem solving, monitoring, behavior change strategies. Hopes to gain the following from this educational program:  Being active, healthy coping, healthy eating, monitoring blood sugars, problem solving, taking medications and reducing risk of complications. Issues Identified:He has half of a left foot that limits his mobility at times. He is aware if he does not take his medication for depression, in a few days he will be in a downward spiral.  He knows to take his medications for good mental health. If his blood sugar is under 120 mg/dl at night when taking his Lantus, he eats a Toni's Cup to prevent low blood sugar. He says he will go low if blood sugar before 100 mg/dl at bedtime. Instructed his blood sugar does need to be above 100 mg/dl before bed. His wife is his support system and drives for him. He currently is not driving a car. She will come with him to education. Medication Reconciliation completed at today's visit.

## 2021-08-26 ENCOUNTER — HOSPITAL ENCOUNTER (OUTPATIENT)
Dept: DIABETES SERVICES | Age: 61
Discharge: HOME | End: 2021-08-26
Payer: MEDICARE

## 2021-08-26 PROCEDURE — G0109 DIAB MANAGE TRN IND/GROUP: HCPCS

## 2021-08-26 NOTE — PROGRESS NOTES
This is a class  appointment with limited persons allowed in class due to [facility]-80 public health emergency. Social distancing and mandatory precautions are in place and utilized. Pt and wife attended nutrition diabetes #1 group session today. Came a half hour late to class. Topics included: disease process and treatment; diet factors impacting blood sugars including food choices, meal timing and portions, carbohydrate choices (emphasizing high fiber carbohydrates); proteins (emphasizing heart healthy choices) and fat food choices (emphasizing unsaturated fats); free foods; combination food choices; nutrition tips for persons with diabetes; snack ideas; resources for diabetes management. Two methods of meal planning were reviewed: carbohydrate choices and carbohydrate counting. Basics of exercise discussed. Voiced /demonstrated understanding of material covered. Anticipated adherence is good. Problems/barriers may be: none anticipated  Pt wears a Evelyn CGM and finds it very helpful. No medication changes since last visit. No new procedure or surgery since last visit.

## 2021-09-03 ENCOUNTER — HOSPITAL ENCOUNTER (OUTPATIENT)
Dept: DIABETES SERVICES | Age: 61
Discharge: HOME | End: 2021-09-03
Payer: MEDICARE

## 2021-09-03 PROCEDURE — G0109 DIAB MANAGE TRN IND/GROUP: HCPCS

## 2021-09-03 NOTE — PROGRESS NOTES
This is a class  appointment with limited persons allowed in class due to [facility]-34 public health emergency. Social distancing and mandatory precautions are in place and utilized. Medication Reconciliation completed at today's visit. Participant attended Diabetes #1 session today. Topics included: Characteristics/pathophysiology type 1/type 2 diabetes; Goal/acceptable blood glucose ranges/Hgb A1C/interpreting/using results;meters, continuous glucose monitors and insulin pumps. Using medications safely; Sick day management; Prevention/detection/treatment of acute complications. - Verbalized understanding of material covered.  -Anticipated adherence is good   -Problems/barriers may be  none anticipated   Personal    He was engaged in class. He wears the [place]. Enjoys the  [place] because he can see blood sugar trends. Medication Reconciliation Completed. No surgery or procedure.

## 2021-09-07 ENCOUNTER — HOSPITAL ENCOUNTER (OUTPATIENT)
Dept: DIABETES SERVICES | Age: 61
Discharge: HOME | End: 2021-09-07
Payer: MEDICARE

## 2021-09-07 PROCEDURE — G0109 DIAB MANAGE TRN IND/GROUP: HCPCS

## 2021-09-07 NOTE — PROGRESS NOTES
This is a class  appointment with limited persons allowed in class due to [facility]- public health emergency. Social distancing and mandatory precautions are in place and utilized. Pt and wife attended nutrition diabetes #2 group session today. Topics included: plate method for portion control; fiber and sodium guidelines; sugar substitutes; alcohol; eating out; recipe modification; label reading. Participant's goal: To lower A1C he will eat less than 60 grams of carbohydrates and have a protein with meals and re-evaluate in 3-6 weeks. Participant's diabetes support plan: Use the [employer] juan and menu planning handout. Barriers identified: Limited mobility to exercise per pt. Voiced/demonstrated understanding of material covered. Anticipated adherence is good. No medication changes, procedures or surgeries since last visit. Plan for follow up is: will attend diabetes class.

## 2021-09-15 ENCOUNTER — HOSPITAL ENCOUNTER (OUTPATIENT)
Dept: DIABETES SERVICES | Age: 61
Discharge: HOME | End: 2021-09-15
Payer: MEDICARE

## 2021-09-15 PROCEDURE — G0109 DIAB MANAGE TRN IND/GROUP: HCPCS

## 2021-09-15 NOTE — PROGRESS NOTES
This is a class  appointment with limited persons allowed in class due to [facility]-97 public health emergency. Social distancing and mandatory precautions are in place and utilized. Participant attended Diabetes #2 session today. Topics included: Prevention/detection/treatment of chronic complications; sleep apnea; Developing strategies to promote health/change behavior/recommended screenings; Developing strategies to address psychosocial issues; Goal setting. Participants goal/support plan includes:        Diabetes Goal: For blood sugar control and weight loss, I will exercise more. I will walk on a treadmill 3-5 times a week at the gym from 9-- 12- ( 12 weeks) . I will reevaluate January 2022 and increase as tolerated. Diabetes Support Plan: Refer to Diabetes Education Material         Problems/barriers may be:none anticipated         Plan for follow up/Recommendations: mail follow up survey at 6 months and one year. Medication Reconciliation Completed. No new surgery or procedures.

## 2021-09-17 ENCOUNTER — HOSPITAL ENCOUNTER (OUTPATIENT)
Dept: LAB | Age: 61
Discharge: HOME | End: 2021-09-17
Payer: MEDICARE

## 2021-09-17 DIAGNOSIS — E11.65 UNCONTROLLED TYPE 2 DIABETES MELLITUS WITH HYPERGLYCEMIA (HCC): ICD-10-CM

## 2021-09-17 LAB
ALBUMIN SERPL-MCNC: 3.6 G/DL (ref 3.5–5)
ALBUMIN/GLOB SERPL: 1 {RATIO} (ref 1.2–3.5)
ALP SERPL-CCNC: 110 U/L (ref 50–136)
ALT SERPL-CCNC: 23 U/L (ref 12–65)
ANION GAP SERPL CALC-SCNC: 8 MMOL/L (ref 7–16)
AST SERPL-CCNC: 12 U/L (ref 15–37)
BILIRUB SERPL-MCNC: 0.4 MG/DL (ref 0.2–1.1)
BUN SERPL-MCNC: 33 MG/DL (ref 6–23)
CALCIUM SERPL-MCNC: 9.3 MG/DL (ref 8.3–10.4)
CHLORIDE SERPL-SCNC: 103 MMOL/L (ref 98–107)
CHOLEST SERPL-MCNC: 94 MG/DL
CO2 SERPL-SCNC: 27 MMOL/L (ref 21–32)
CREAT SERPL-MCNC: 1.74 MG/DL (ref 0.8–1.5)
GLOBULIN SER CALC-MCNC: 3.5 G/DL (ref 2.3–3.5)
GLUCOSE SERPL-MCNC: 120 MG/DL (ref 65–100)
HDLC SERPL-MCNC: 37 MG/DL (ref 40–60)
HDLC SERPL: 2.5 {RATIO}
LDLC SERPL CALC-MCNC: 13.2 MG/DL
POTASSIUM SERPL-SCNC: 4.4 MMOL/L (ref 3.5–5.1)
PROT SERPL-MCNC: 7.1 G/DL (ref 6.3–8.2)
SODIUM SERPL-SCNC: 138 MMOL/L (ref 138–145)
TRIGL SERPL-MCNC: 219 MG/DL (ref 35–150)
VLDLC SERPL CALC-MCNC: 43.8 MG/DL (ref 6–23)

## 2021-09-17 PROCEDURE — 80053 COMPREHEN METABOLIC PANEL: CPT

## 2021-09-17 PROCEDURE — 80061 LIPID PANEL: CPT

## 2021-09-17 PROCEDURE — 36415 COLL VENOUS BLD VENIPUNCTURE: CPT

## 2021-09-17 PROCEDURE — 84681 ASSAY OF C-PEPTIDE: CPT

## 2021-09-18 LAB — C PEPTIDE SERPL-MCNC: 2.9 NG/ML (ref 1.1–4.4)

## 2021-09-23 ENCOUNTER — HOSPITAL ENCOUNTER (OUTPATIENT)
Dept: LAB | Age: 61
Discharge: HOME | End: 2021-09-23
Payer: MEDICARE

## 2021-09-23 LAB — TSH W FREE THYROID I,TSHELE: 1.65 UIU/ML (ref 0.36–3.74)

## 2021-09-23 PROCEDURE — 84443 ASSAY THYROID STIM HORMONE: CPT

## 2021-10-01 NOTE — PROGRESS NOTES
Our most recent appointment may have been affected by my absence. Please notify patient that labs look good and stable. BUN is slightly elevated and I recommend that patient continues to stay well-hydrated by drinking water. Please see if patient requires any refills as it looks like our office visit was pushed out to January.   Please see if patient would like to be placed on a waiting list due to my absence

## 2022-03-18 PROBLEM — Z01.810 PREOP CARDIOVASCULAR EXAM: Status: ACTIVE | Noted: 2017-08-15

## 2022-03-18 PROBLEM — Z95.3 H/O HEART VALVE REPLACEMENT WITH BIOPROSTHETIC VALVE: Status: ACTIVE | Noted: 2017-02-08

## 2022-03-20 PROBLEM — E66.812 CLASS 2 SEVERE OBESITY DUE TO EXCESS CALORIES WITH SERIOUS COMORBIDITY AND BODY MASS INDEX (BMI) OF 35.0 TO 35.9 IN ADULT: Status: ACTIVE | Noted: 2018-08-20

## 2022-03-20 PROBLEM — E11.21 TYPE 2 DIABETES WITH NEPHROPATHY (HCC): Status: ACTIVE | Noted: 2019-10-09

## 2022-03-20 PROBLEM — E66.01 CLASS 2 SEVERE OBESITY DUE TO EXCESS CALORIES WITH SERIOUS COMORBIDITY AND BODY MASS INDEX (BMI) OF 35.0 TO 35.9 IN ADULT (HCC): Status: ACTIVE | Noted: 2018-08-20

## 2022-03-20 PROBLEM — I35.0 AORTIC STENOSIS: Status: ACTIVE | Noted: 2017-02-08

## 2022-05-24 ENCOUNTER — TELEPHONE (OUTPATIENT)
Dept: ENDOCRINOLOGY | Age: 62
End: 2022-05-24

## 2022-05-24 NOTE — TELEPHONE ENCOUNTER
Patient called and needed a refill request sent to his [facility]. Patient states they should be sending a request. Patient advised this may take a couple of days. Patient verbalized understanding.

## 2022-06-01 ENCOUNTER — PATIENT MESSAGE (OUTPATIENT)
Dept: ENDOCRINOLOGY | Age: 62
End: 2022-06-01

## 2022-06-01 DIAGNOSIS — E11.21 TYPE 2 DIABETES WITH NEPHROPATHY (HCC): Primary | ICD-10-CM

## 2022-06-01 RX ORDER — INSULIN GLARGINE 100 [IU]/ML
40 INJECTION, SOLUTION SUBCUTANEOUS DAILY
Qty: 36 ML | Refills: 3 | Status: SHIPPED | OUTPATIENT
Start: 2022-06-01 | End: 2022-08-30

## 2022-06-01 RX ORDER — INSULIN GLARGINE 100 [IU]/ML
INJECTION, SOLUTION SUBCUTANEOUS
Qty: 15 ML | Refills: 0 | OUTPATIENT
Start: 2022-06-01

## 2022-06-01 NOTE — TELEPHONE ENCOUNTER
From: Dedrick Boateng  To: Hilary Suarez  Sent: 6/1/2022 7:49 AM EDT  Subject: Insulin    Sorry I know I'm being a pain. But it seems I need the Humalog and Lantus prescriptions refilled.    Still using the [facility] in [facility]  Thank you for all you do

## 2022-06-09 PROBLEM — S98.132A AMPUTATION OF TOE OF LEFT FOOT (HCC): Status: ACTIVE | Noted: 2022-06-09

## 2022-06-09 PROBLEM — Z89.422 STATUS POST AMPUTATION OF TOE OF LEFT FOOT (HCC): Status: ACTIVE | Noted: 2022-06-09

## 2022-06-28 ENCOUNTER — OFFICE VISIT (OUTPATIENT)
Dept: FAMILY MEDICINE CLINIC | Facility: CLINIC | Age: 62
End: 2022-06-28
Payer: MEDICARE

## 2022-06-28 VITALS
SYSTOLIC BLOOD PRESSURE: 128 MMHG | BODY MASS INDEX: 33.95 KG/M2 | WEIGHT: 224 LBS | HEIGHT: 68 IN | TEMPERATURE: 98.2 F | DIASTOLIC BLOOD PRESSURE: 80 MMHG | HEART RATE: 73 BPM

## 2022-06-28 DIAGNOSIS — I25.10 CORONARY ARTERY DISEASE INVOLVING NATIVE HEART WITHOUT ANGINA PECTORIS, UNSPECIFIED VESSEL OR LESION TYPE: Primary | ICD-10-CM

## 2022-06-28 DIAGNOSIS — N18.31 STAGE 3A CHRONIC KIDNEY DISEASE (HCC): ICD-10-CM

## 2022-06-28 DIAGNOSIS — I10 PRIMARY HYPERTENSION: ICD-10-CM

## 2022-06-28 DIAGNOSIS — E78.5 DYSLIPIDEMIA: ICD-10-CM

## 2022-06-28 DIAGNOSIS — F51.01 PRIMARY INSOMNIA: ICD-10-CM

## 2022-06-28 DIAGNOSIS — F33.42 RECURRENT MAJOR DEPRESSIVE DISORDER, IN FULL REMISSION (HCC): ICD-10-CM

## 2022-06-28 DIAGNOSIS — E11.21 TYPE 2 DIABETES WITH NEPHROPATHY (HCC): ICD-10-CM

## 2022-06-28 PROBLEM — R80.1 PERSISTENT PROTEINURIA: Status: ACTIVE | Noted: 2022-04-07

## 2022-06-28 PROCEDURE — 3017F COLORECTAL CA SCREEN DOC REV: CPT | Performed by: NURSE PRACTITIONER

## 2022-06-28 PROCEDURE — 3046F HEMOGLOBIN A1C LEVEL >9.0%: CPT | Performed by: NURSE PRACTITIONER

## 2022-06-28 PROCEDURE — G8417 CALC BMI ABV UP PARAM F/U: HCPCS | Performed by: NURSE PRACTITIONER

## 2022-06-28 PROCEDURE — 1036F TOBACCO NON-USER: CPT | Performed by: NURSE PRACTITIONER

## 2022-06-28 PROCEDURE — 99214 OFFICE O/P EST MOD 30 MIN: CPT | Performed by: NURSE PRACTITIONER

## 2022-06-28 PROCEDURE — 2022F DILAT RTA XM EVC RTNOPTHY: CPT | Performed by: NURSE PRACTITIONER

## 2022-06-28 PROCEDURE — G8427 DOCREV CUR MEDS BY ELIG CLIN: HCPCS | Performed by: NURSE PRACTITIONER

## 2022-06-28 RX ORDER — AMLODIPINE BESYLATE 10 MG/1
10 TABLET ORAL DAILY
Qty: 90 TABLET | Refills: 1 | Status: SHIPPED | OUTPATIENT
Start: 2022-06-28

## 2022-06-28 RX ORDER — ZOLPIDEM TARTRATE 10 MG/1
10 TABLET ORAL NIGHTLY PRN
Qty: 30 TABLET | Refills: 5 | Status: SHIPPED | OUTPATIENT
Start: 2022-06-28 | End: 2023-06-28

## 2022-06-28 RX ORDER — DULOXETIN HYDROCHLORIDE 60 MG/1
60 CAPSULE, DELAYED RELEASE ORAL DAILY
Qty: 90 CAPSULE | Refills: 1 | Status: SHIPPED | OUTPATIENT
Start: 2022-06-28

## 2022-06-28 RX ORDER — METOPROLOL SUCCINATE 50 MG/1
50 TABLET, EXTENDED RELEASE ORAL DAILY
Qty: 90 TABLET | Refills: 1 | Status: SHIPPED | OUTPATIENT
Start: 2022-06-28

## 2022-06-28 ASSESSMENT — ENCOUNTER SYMPTOMS: CHEST TIGHTNESS: 0

## 2022-06-28 NOTE — LETTER
CONTROLLED SUBSTANCE MEDICATION AGREEMENT     Patient Name: Tania Ricardo  Patient YOB: 1960   I understand, that controlled substance medications may be used to help better manage my symptoms and to improve my ability to function at home, work and in social settings. However, I also understand that these medications do have risks, which have been discussed with me, including possible development of physical or psychological dependence. I understand that successful treatment requires mutual trust and honesty between me and my provider. I understand and agree that following this Medication Agreement is necessary in continuing my provider-patient relationship and the success of my treatment plan. Explanation from my Provider: Benefits and Goals of Controlled Substance Medications: There are two potential goals for your treatment: (1) decreased pain and suffering (2) improved daily life functions. There are many possible treatments for your chronic condition(s). Alternatives such as physical therapy, yoga, massage, home daily exercise, meditation, relaxation techniques, injections, chiropractic manipulations, surgery, cognitive therapy, hypnosis and many medications that are not habit-forming may be used. Use of controlled substance medications may be helpful, but they are unlikely to resolve all symptoms or restore all function. Explanation from my Provider: Risks of Controlled Substance Medications:  Opioid pain medications: These medications can lead to problems such as addiction/dependence, sedation, lightheadedness/dizziness, memory issues, falls, constipation, nausea, or vomiting. They may also impair the ability to drive or operate machinery. Additionally, these medications may lower testosterone levels, leading to loss of bone strength, stamina and sex drive.   They may cause problems with breathing, sleep apnea and reduced coughing, which is especially dangerous for patients with lung disease. Overdose or dangerous interactions with alcohol and other medications may occur, leading to death. Hyperalgesia may develop, which means patients receiving opioids for the treatment of pain may become more sensitive to certain painful stimuli, and in some cases, experience pain from ordinarily non-painful stimuli. Women between the ages of 14-53 who could become pregnant should carefully weigh the risks and benefits of opioids with their physicians, as these medications increase the risk of pregnancy complications, including miscarriage,  delivery and stillbirth. It is also possible for babies to be born addicted to opioids. Opioid dependence withdrawal symptoms may include; feelings of uneasiness, increased pain, irritability, belly pain, diarrhea, sweats and goose-flesh. Benzodiazepines and non-benzodiazepine sleep medications: These medications can lead to problems such as addiction/dependence, sedation, fatigue, lightheadedness, dizziness, incoordination, falls, depression, hallucinations, and impaired judgment, memory and concentration. The ability to drive and operate machinery may also be affected. Abnormal sleep-related behaviors have been reported, including sleepwalking, driving, making telephone calls, eating, or having sex while not fully awake. These medications can suppress breathing and worsen sleep apnea, particularly when combined with alcohol or other sedating medications, potentially leading to death. Dependence withdrawal symptoms may include tremors, anxiety, hallucinations and seizures. Stimulants:  Common adverse effects include addiction/dependence, increased blood  pressure and heart rate, decreased appetite, nausea, involuntary weight loss, insomnia,                                                                                                                     Initials:_______   irritability, and headaches.   These risks may increase when these medications are combined with other stimulants, such as caffeine pills or energy drinks, certain weight loss supplements and oral decongestants. Dependence withdrawal symptoms may include depressed mood, loss of interest, suicidal thoughts, anxiety, fatigue, appetite changes and agitation. Testosterone replacement therapy:  Potential side effects include increased risk of stroke and heart attack, blood clots, increased blood pressure, increased cholesterol, enlarged prostate, sleep apnea, irritability/aggression and other mood disorders, and decreased fertility. I agree and understand that I and my prescriber have the following rights and responsibilities regarding my treatment plan:     1. MY RIGHTS:  To be informed of my treatment and medication plan. To be an active participant in my health and wellbeing. 2. MY RESPONSIBILITY AND UNDERSTANDING FOR USE OF MEDICATIONS   I will take medications at the dose and frequency as directed. For my safety, I will not increase or change how I take my medications without the recommendation of my healthcare provider.  I will actively participate in any program recommended by my provider which may improve function, including social, physical, psychological programs.  I will not take my medications with alcohol or other drugs not prescribed to me. I understand that drinking alcohol with my medications increases the chances of side effects, including reduced breathing rate and could lead to personal injury when operating machinery.  I understand that if I have a history of substance use disorders, including alcohol or other illicit drugs, that I may be at increased risk of addiction to my medications.  I agree to notify my provider immediately if I should become pregnant so that my treatment plan can be adjusted.    I agree and understand that I shall only receive controlled substance medications from the prescriber that signed this agreement unless there is written agreement among other prescribers of controlled substances outlining the responsibility of the medications being prescribed.  I understand that the if the controlled medication is not helping to achieve goals, the dosage may be tapered and no longer prescribed. 3. MY RESPONSIBILITY FOR COMMUNICATION / PRESCRIPTION RENEWALS   I agree that all controlled substance medications that I take will be prescribed only by my provider. If another healthcare provider prescribes me medication in an emergency, I will notify my provider within seventy-two (72) hours.  I will arrange for refills at the prescribed interval ONLY during regular office hours. I will not ask for refills earlier than agreed, after-hours, on holidays or weekends. Refills may take up to 72 hours for processing and prescriptions to reach the pharmacy.  I will inform my other health care providers that I am taking these medications and of the existence of this [address] [id number]. In the event of an emergency, I will provide the same information to the emergency department prescribers.  I will keep my provider updated on the pharmacy I am using for controlled medication prescription filling. Initials:_______  4. MY RESPONSIBILITY FOR PROTECTING MEDICATIONS   I will protect my prescriptions and medications. I understand that lost or misplaced prescriptions will not be replaced.  I will keep medications only for my own use and will not share them with others. I will keep all medications away from children.  I agree that if my medications are adjusted or discontinued, I will properly dispose of any remaining medications. I understand that I will be required to dispose of any remaining controlled medications as, directed by my prescriber, prior to being provided with any prescriptions for other controlled medications.   Medication drop box locations can be found at: [email or web address]    5. MY RESPONSIBILITY WITH ILLEGAL DRUGS    I will not use illegal or street drugs or another person's prescription medications not prescribed to me.  If there are identified addiction type symptoms, then referral to a program may be provided by my provider and I agree to follow through with this recommendation. 6. MY RESPONSIBILITY FOR COOPERATION WITH INVESTIGATIONS   I understand that my provider will comply with any applicable law and may discuss my use and/or possible misuse/abuse of controlled substances and alcohol, as appropriate, with any health care provider involved in my care, pharmacist, or legal authority.  I authorize my provider and pharmacy to cooperate fully with law enforcement agencies (as permitted by law) in the investigation of any possible misuse, sale, or other diversion of my controlled substances.  I agree to waive any applicable privilege or right of privacy or confidentiality with respect to these authorizations. 7. PROVIDERS RIGHT TO MONITOR FOR SAFETY: PRESCRIPTION MONITORING / DRUG TESTING   I consent to drug/toxicology screening and will submit to a drug screen upon my providers request to assure I am only taking the prescribed drugs for my safety monitoring. I understand that a drug screen is a laboratory test in which a sample of my urine, blood or saliva is checked to see what drugs I have been taking. This may entail an observed urine specimen, which means that a nurse or other health care provider may watch me provide urine, and I will cooperate if I am asked to provide an observed specimen.  I understand that my provider will check a copy of my State Prescription Monitoring Program () Report in order to safely prescribe medications.  Pill Counts: I consent to pill counts when requested.   I may be asked to bring all my prescribed controlled substance medications, in their original bottles, to all of my scheduled appointments. In addition, my provider may ask me to come to the practice at any time for a random pill count. 8. TERMINATION OF THIS AGREEMENT  For my safety, my prescriber has the right to stop prescribing controlled substance medications and may end this agreement. Initials:_______   Conditions that may result in termination of this agreement:  a. I do not show any improvement in pain, or my activity has not improved. b. I develop rapid tolerance or loss of improvement, as described in my treatment plan.  c. I develop significant side effects from the medication. d. My behavior is not consistent with the responsibilities outlined above, thereby causing safety concerns to continue prescribing controlled substance medications. e. I fail to follow the terms of this agreement. f. Other:____________________________       UNDERSTANDING THIS MEDICATION AGREEMENT:    I have read the above and have had all my questions answered. For chronic disease management, I know that my symptoms can be managed with many types of treatments. A chronic medication trial may be part of my treatment, but I must be an active participant in my care. Medication therapy is only one part of my symptom management plan. In some cases, there may be limited scientific evidence to support the chronic use of certain medications to improve symptoms and daily function. Furthermore, in certain circumstances, there may be scientific information that suggests that the use of chronic controlled substances may worsen my symptoms and increase my risk of unintentional death directly related to this medication therapy. I know that if my provider feels my risk from controlled medications is greater than my benefit, I will have my controlled substance medication(s) compassionately lowered or removed altogether.      I further agree to allow this office to contact my HIPAA contact if there are concerns about my safety and use of the controlled medications. I have agreed to use the prescribed controlled substance medications to me as instructed by my provider and as stated in this Medication Agreement. My initial on each page and my signature below shows that I have read each page and I have had the opportunity to ask questions with answers provided by my provider.     Patient Name (Printed): _____________________________________  Patient Signature:  ______________________   Date: _____________    Prescriber Name (Printed): ___________________________________  Prescriber Signature: _____________________  Date: _____________

## 2022-06-28 NOTE — PROGRESS NOTES
Subjective:      Patient ID: Dora Abad is a 63 y.o. male. He is here for follow up for    depression doing well on current med's attending [facility] connections meeting every week and recently completed a Peer to Peer program. Is thinking about becoming a facilitator. HTN controlled on current med's Is on an additional med from nephrology but uncertain of name or dose will message us back ? Irbesartan Seeing nephrologist at frequent intervals for renal disease continue to have proteinuria but improved  Insomnia still dependent  On Ambien for sleep   seeing cardiology at intervals for his heart disease no chest pain no shortness of breath  Sees podiatry at 3 month intervals for DM foot care  HPI    Review of Systems   Respiratory: Negative for chest tightness. Cardiovascular: Negative for chest pain and leg swelling. Objective:   Physical Exam  Vitals and nursing note reviewed. Constitutional:       Appearance: Normal appearance. HENT:      Head: Normocephalic. Cardiovascular:      Rate and Rhythm: Normal rate and regular rhythm. Pulses: Normal pulses. Heart sounds: Murmur (2/6 chantelle) heard. Pulmonary:      Effort: Pulmonary effort is normal.      Breath sounds: Normal breath sounds. Musculoskeletal:      Cervical back: Normal range of motion. Comments: Walks without assistive devices   Neurological:      General: No focal deficit present. Mental Status: He is alert. Psychiatric:         Mood and Affect: Mood normal.         Behavior: Behavior normal.         Thought Content:  Thought content normal.         Judgment: Judgment normal.         Assessment:      /80 (Site: Left Upper Arm, Position: Sitting, Cuff Size: Large Adult)   Pulse 73   Temp 98.2 °F (36.8 °C) (Temporal)   Ht 5' 8\" (1.727 m)   Wt 224 lb (101.6 kg)   BMI 34.06 kg/m²       Plan:        Coronary artery disease involving native heart without angina pectoris, unspecified vessel or lesion type  Primary hypertension  -     amLODIPine (NORVASC) 10 MG tablet; Take 1 tablet by mouth daily, Disp-90 tablet, R-1Normal  -     metoprolol succinate (TOPROL XL) 50 MG extended release tablet; Take 1 tablet by mouth daily, Disp-90 tablet, R-1Normal  Type 2 diabetes with nephropathy (HCC)  Stage 3a chronic kidney disease (HCC)  Recurrent major depressive disorder, in full remission ([facility].)  -     DULoxetine (CYMBALTA) 60 MG extended release capsule; Take 1 capsule by mouth daily, Disp-90 capsule, R-1Normal  Primary insomnia  -     zolpidem (AMBIEN) 10 MG tablet;  Take 1 tablet by mouth nightly as needed for Sleep., Disp-30 tablet, R-5Normal  Dyslipidemia   Refilled meds Praised involvement with [facility] follow up in 6 months    JHON Hardwick - NP

## 2022-10-20 DIAGNOSIS — E11.21 TYPE 2 DIABETES WITH NEPHROPATHY (HCC): Primary | ICD-10-CM

## 2022-10-20 RX ORDER — INSULIN LISPRO 100 [IU]/ML
INJECTION, SOLUTION INTRAVENOUS; SUBCUTANEOUS
Qty: 75 ML | Refills: 3 | Status: SHIPPED | OUTPATIENT
Start: 2022-10-20

## 2022-10-20 NOTE — TELEPHONE ENCOUNTER
1. Type [facility]  Sent to [facility] in [facility].   - HUMALOG KWIKPEN 100 UNIT/ML SOPN; 15 units before breakfast and lunch, 25 units before supper plus correction 1/25 >150, max daily dose 80 units  Dispense: 75 mL; Refill: 3

## 2022-10-20 NOTE — TELEPHONE ENCOUNTER
Patient left a message requesting a refill on Humalog to [facility] on [address] in [facility].

## 2022-11-10 RX ORDER — ROSUVASTATIN CALCIUM 20 MG/1
TABLET, COATED ORAL
Qty: 90 TABLET | Refills: 3 | Status: SHIPPED | OUTPATIENT
Start: 2022-11-10

## 2022-11-10 RX ORDER — EZETIMIBE 10 MG/1
TABLET ORAL
Qty: 90 TABLET | Refills: 3 | Status: SHIPPED | OUTPATIENT
Start: 2022-11-10

## 2022-12-21 ENCOUNTER — OFFICE VISIT (OUTPATIENT)
Dept: ENDOCRINOLOGY | Age: 62
End: 2022-12-21
Payer: MEDICARE

## 2022-12-21 VITALS
BODY MASS INDEX: 34.36 KG/M2 | HEART RATE: 84 BPM | OXYGEN SATURATION: 96 % | WEIGHT: 226 LBS | DIASTOLIC BLOOD PRESSURE: 78 MMHG | SYSTOLIC BLOOD PRESSURE: 130 MMHG

## 2022-12-21 DIAGNOSIS — E11.69 HYPERLIPIDEMIA ASSOCIATED WITH TYPE 2 DIABETES MELLITUS (HCC): ICD-10-CM

## 2022-12-21 DIAGNOSIS — I25.10 ATHEROSCLEROSIS OF NATIVE CORONARY ARTERY OF NATIVE HEART WITHOUT ANGINA PECTORIS: ICD-10-CM

## 2022-12-21 DIAGNOSIS — E78.5 HYPERLIPIDEMIA ASSOCIATED WITH TYPE 2 DIABETES MELLITUS (HCC): ICD-10-CM

## 2022-12-21 DIAGNOSIS — Z89.422 ACQUIRED ABSENCE OF OTHER LEFT TOE(S) (HCC): ICD-10-CM

## 2022-12-21 DIAGNOSIS — I10 ESSENTIAL (PRIMARY) HYPERTENSION: ICD-10-CM

## 2022-12-21 DIAGNOSIS — N18.30 STAGE 3 CHRONIC KIDNEY DISEASE, UNSPECIFIED WHETHER STAGE 3A OR 3B CKD (HCC): ICD-10-CM

## 2022-12-21 DIAGNOSIS — E11.21 TYPE 2 DIABETES WITH NEPHROPATHY (HCC): Primary | ICD-10-CM

## 2022-12-21 LAB — HBA1C MFR BLD: 7.1 %

## 2022-12-21 PROCEDURE — 83036 HEMOGLOBIN GLYCOSYLATED A1C: CPT | Performed by: PHYSICIAN ASSISTANT

## 2022-12-21 PROCEDURE — 3074F SYST BP LT 130 MM HG: CPT | Performed by: PHYSICIAN ASSISTANT

## 2022-12-21 PROCEDURE — 3078F DIAST BP <80 MM HG: CPT | Performed by: PHYSICIAN ASSISTANT

## 2022-12-21 PROCEDURE — G8417 CALC BMI ABV UP PARAM F/U: HCPCS | Performed by: PHYSICIAN ASSISTANT

## 2022-12-21 PROCEDURE — 3046F HEMOGLOBIN A1C LEVEL >9.0%: CPT | Performed by: PHYSICIAN ASSISTANT

## 2022-12-21 PROCEDURE — 3017F COLORECTAL CA SCREEN DOC REV: CPT | Performed by: PHYSICIAN ASSISTANT

## 2022-12-21 PROCEDURE — 99214 OFFICE O/P EST MOD 30 MIN: CPT | Performed by: PHYSICIAN ASSISTANT

## 2022-12-21 PROCEDURE — 95251 CONT GLUC MNTR ANALYSIS I&R: CPT | Performed by: PHYSICIAN ASSISTANT

## 2022-12-21 PROCEDURE — G8484 FLU IMMUNIZE NO ADMIN: HCPCS | Performed by: PHYSICIAN ASSISTANT

## 2022-12-21 PROCEDURE — 2022F DILAT RTA XM EVC RTNOPTHY: CPT | Performed by: PHYSICIAN ASSISTANT

## 2022-12-21 PROCEDURE — 1036F TOBACCO NON-USER: CPT | Performed by: PHYSICIAN ASSISTANT

## 2022-12-21 PROCEDURE — G8427 DOCREV CUR MEDS BY ELIG CLIN: HCPCS | Performed by: PHYSICIAN ASSISTANT

## 2022-12-21 RX ORDER — IRBESARTAN 300 MG/1
TABLET ORAL
COMMUNITY
Start: 2022-12-12

## 2022-12-21 RX ORDER — INSULIN GLARGINE 100 [IU]/ML
40 INJECTION, SOLUTION SUBCUTANEOUS DAILY
Qty: 36 ML | Refills: 3 | Status: SHIPPED | OUTPATIENT
Start: 2022-12-21 | End: 2023-03-21

## 2022-12-21 RX ORDER — INSULIN LISPRO 100 [IU]/ML
INJECTION, SOLUTION INTRAVENOUS; SUBCUTANEOUS
Qty: 75 ML | Refills: 3 | Status: SHIPPED | OUTPATIENT
Start: 2022-12-21

## 2022-12-21 NOTE — PROGRESS NOTES
[facility] ENDOCRINOLOGY   AND   THYROID NODULE CLINIC    Julieta Rivera PA-C  [facility] Endocrinology and Thyroid Nodule Clinic  [address], [address]  [place], [place]  Phone [phone]  Facsimile [phone]          Merari Chawla is a 63 y.o. male seen 12/21/2022 for follow up evaluation of type 2 diabetes on basal bolus insulin        Assessment and Plan:    In office COVID-19 PPE worn and precautions taken    Interpretation of 72 hour glucose monitor: At least 72 hours of data were reviewed. The patient utilizes a Project Manager 2 continuous glucose monitoring system. The average glucose during the reviewed timeframe was 140 . There is a pattern of intermittent postprandial hypoglycemia after meals and frequent rebound hyperglycemia    1. Type 2 diabetes with nephropathy ([facility]  Patient with type 2 diabetes and basal bolus insulin regimen. Patient has variable glycemic control. Greater than 7% hypoglycemia. Recurrently encouraged to down titrate basal insulin. Patient appears to ha frequent postprandial hypoglycemia after breakfast or lunch. He has uptitrated his insulin from 15 units up to 20 units at these meals. Of asked him to decrease this to 17 units before breakfast and lunch, strive for early bolusing. Continue current correction scale at this time. Majority of hyperglycemia appears rebound to hypoglycemia. Best practices reviewed at length      At today's visit we discussed sequela associated with uncontrolled diabetes including increased risk of stroke, heart attack, kidney failure, amputation, retinopathy, neuropathy, and nephropathy. Patient was strongly encouraged to comply with treatment regimen as well as dietary and exercise recommendations to aid in control of this chronic disease to help prevent complications associated with uncontrolled diabetes.     - PA CONTINUOUS GLUCOSE MONITORING ANALYSIS I&R  - irbesartan (AVAPRO) 300 MG tablet  - HUMALOG KWIKPEN 100 UNIT/ML SOPN; 17 units before breakfast and lunch, 25 units before supper plus correction 1/25 >150, max daily dose 80 units  Dispense: 75 mL; Refill: 3  - Comprehensive Metabolic Panel; Future  - CBC with Auto Differential; Future  - Microalbumin / Creatinine Urine Ratio; Future  - Lipid Panel; Future  - Hemoglobin A1C; Future  - TSH with Reflex; Future  - LANTUS SOLOSTAR 100 UNIT/ML injection pen; Inject 40 Units into the skin daily  Dispense: 36 mL; Refill: 3  - AMB POC HEMOGLOBIN A1C    2. Essential (primary) hypertension  BP Readings from Last 3 Encounters:   12/21/22 130/78   06/28/22 128/80   04/14/22 (!) 142/66       - irbesartan (AVAPRO) 300 MG tablet    3. Hyperlipidemia associated with type 2 diabetes mellitus ([facility]  Last LDL at goal Sept 2021 on rosuvastatin 20mg and zetia, update fasting labs prior to next visit    - Comprehensive Metabolic Panel; Future  - Lipid Panel; Future    4. Stage 3 chronic kidney disease, unspecified whether stage 3a or 3b CKD ([facility]  Follow up with nephro. Reviewed benefit of SNVC7U, pt is certain this remains cost prohibitive   - irbesartan (AVAPRO) 300 MG tablet  - Microalbumin / Creatinine Urine Ratio; Future    5. Atherosclerosis of native coronary artery of native heart without angina pectoris  Would benefit from agents with cardiovascular endpoint data      6. Acquired absence of other left toe(s) ([facility]  History of toe amputation with chronic deformity feet. Acute foot ulcer under care of podiatry. Has already had changes made to his orthopedic shoe inserts. Patient instructed have low threshold to seek further evaluation treatment if any change in condition. aNel Bateman was seen today for diabetes.     Diagnoses and all orders for this visit:    Type 2 diabetes with nephropathy (HCC)  -     [facility] CONTINUOUS GLUCOSE MONITORING ANALYSIS I&R  -     HUMALOG KWIKPEN 100 UNIT/ML SOPN; 17 units before breakfast and lunch, 25 units before supper plus correction 1/25 >150, max daily dose 80 units  -     Comprehensive Metabolic Panel; Future  -     CBC with Auto Differential; Future  -     Microalbumin / Creatinine Urine Ratio; Future  -     Lipid Panel; Future  -     Hemoglobin A1C; Future  -     TSH with Reflex; Future  -     LANTUS SOLOSTAR 100 UNIT/ML injection pen; Inject 40 Units into the skin daily  -     AMB POC HEMOGLOBIN A1C    Essential (primary) hypertension    Hyperlipidemia associated with type 2 diabetes mellitus ([facility]  -     Comprehensive Metabolic Panel; Future  -     Lipid Panel; Future    Stage 3 chronic kidney disease, unspecified whether stage 3a or 3b CKD (HCC)  -     Microalbumin / Creatinine Urine Ratio; Future    Atherosclerosis of native coronary artery of native heart without angina pectoris    Acquired absence of other left toe(s) ([facility]          History of Present Illness:      12/21/2022   Interim diabetes HPI:  Reports variable control. Remains on higher dose of lantus despite recurrent urge to down titrate. Appears to have also increase prandial insulin. Admits that he sometimes overcorrects    Interim medical history changes:   Left foot ulcer under care of podiatry     Lifestyle Update: Foot ulcer impacting activity    Current Regimen:  Lantus 45 units daily, Humalog 20 units AC breakfast/lunch, 25 units AC supper plus correction 1/25>150 AC/HS.     Glucose data:    Home blood glucose monitoring frequency:   By review of CGM download over past 28 days  Average blood glucose 140  Time in range 73%  High 17%, Very High 3%  Low 7%, Very Low 0%     Average    Fasting 117    AC lunch 117    AC supper 160    Bedtime 173      Blood glucose levels are uncontrolled, most significant elevations are rebound high      Failed past therapies:       glucophage at onset   Off for a time   Restarted with GI upset       Started insulin early 2000  Relevant co morbidities:   Left foot wound with ulceration leading to femoral artery bypass   Left Partial foot amputation    Denies  HX pancreatitis, DKA, gastroparesis    Optho: Last eye exam was ~April 2022 and demonstrated no diabetic retinopathy. Eye care specialist is Dr. Fannie Downey. Obesity:         Body mass index is 34.36 kg/m².        stable      Wt Readings from Last 3 Encounters:   12/21/22 226 lb (102.5 kg)   06/28/22 224 lb (101.6 kg)   04/14/22 223 lb (101.2 kg)         CardioVascular:    Dr. Street Query    CABG x3     CHF and PVD    Aortic valve replacement       Renal:    Under care of nephro? yes    On ARB/ACE-I  irbesartan - 300 MG     01/13/2021      Cr 1.70, GFR 43, microalbumin/Cr ratio 154                           05/28/2021      Cr 2.03, GFR 35                                      09/17/2021      Cr 1.74, GFR 43                                  01/26/2022      Cr 1.71, GFR 43, microalbumin/Cr ratio 229                         avapro increased by nephro, would like pt on SGLT2i but this is cost prohibitive                                     Lipids:     Current therapy ezetimibe - 10 MG  rosuvastatin - 20 MG with good compliance    01/13/2021  TC- 182, LDL- 119, VLDL- 21,  HDL- 42, TG- 113                           09/17/2021  TC- 94, LDL- 13.2, VLDL- 43.8,  HDL- 37, TG- 219        Lab Results   Component Value Date    CHOL 94 09/17/2021    CHOL 114 05/28/2021    CHOL 182 01/13/2021     Lab Results   Component Value Date    LDLCALC 13.2 09/17/2021    LDLCALC 52 05/28/2021    LDLCALC 119 (H) 01/13/2021      Lab Results   Component Value Date    LABVLDL 43.8 (H) 09/17/2021    LABVLDL 20 06/22/2020    LABVLDL 18.4 02/24/2020    VLDL 21 05/28/2021    VLDL 21 01/13/2021      Lab Results   Component Value Date    HDL 37 (L) 09/17/2021    HDL 41 05/28/2021    HDL 42 01/13/2021      Lab Results   Component Value Date    HDL 37 (L) 09/17/2021    HDL 41 05/28/2021    HDL 42 01/13/2021      Lab Results   Component Value Date    TRIG 219 (H) 09/17/2021    TRIG 114 05/28/2021    TRIG 113 01/13/2021     Lab Results Component Value Date    CHOLHDLRATIO 2.5 09/17/2021    CHOLHDLRATIO 3.9 02/24/2020         Hemoglobin A1c:               06/22/2020      8.2%               01/13/2021      9.6%               04/07/2021      6.9%               10/14/2021      6.9%            01/26/2022      7.2%      12/21/2022 7.1%  Hemoglobin A1C, POC   Date Value Ref Range Status   12/21/2022 7.1 % Final   05/03/2022 7.0 % Final   01/26/2022 7.2 % Final        Thyroid:     08/14/2019 4.030   05/28/2021 4.750, free T4 1.20   09/23/2021 1.65   01/26/2022 4.650, free T4 1.25     Lab Results   Component Value Date/Time    TSH 4.650 01/26/2022 10:50 AM    TSH 4.750 05/28/2021 09:58 AM    TSH 4.030 08/14/2019 10:10 AM                      Allergies & Medications:  Reviewed in chart. Review of Systems    Vital Signs:  /78 (Site: Right Upper Arm, Position: Sitting)   Pulse 84   Wt 226 lb (102.5 kg)   SpO2 96%   BMI 34.36 kg/m²       Physical Exam  Constitutional:       Appearance: Normal appearance. He is obese. Neck:      Thyroid: No thyromegaly. Cardiovascular:      Rate and Rhythm: Normal rate and regular rhythm. Pulmonary:      Effort: Pulmonary effort is normal.      Breath sounds: Normal breath sounds. Abdominal:      Palpations: Abdomen is soft. Feet:      Right foot:      Protective Sensation: 3 sites tested. 3 sites sensed. Left foot:      Protective Sensation: 3 sites tested. 3 sites sensed. Comments: S/p amputation of left hallux and 5th toe, well healed  Hammer toe deformities of left 2nd, 3rd, ant 4th toe    Slowly healing ulcer of plantar aspect of lateral distal MTP near amputation. no heat, erythema, purulence, or sign of infection    Lymphadenopathy:      Cervical: No cervical adenopathy. Skin:     General: Skin is warm and dry. Neurological:      General: No focal deficit present. Mental Status: He is alert.    Psychiatric:         Mood and Affect: Mood normal.         Behavior: Behavior normal.         Thought Content: Thought content normal.         Judgment: Judgment normal.           Return april or may 2023, for Diabetes DM2 Follow-Up. Portions of this note were generated with the assistance of voice recogniton software. As such, some errors in transcription may be present.

## 2022-12-28 ENCOUNTER — OFFICE VISIT (OUTPATIENT)
Dept: FAMILY MEDICINE CLINIC | Facility: CLINIC | Age: 62
End: 2022-12-28
Payer: MEDICARE

## 2022-12-28 VITALS
HEIGHT: 68 IN | DIASTOLIC BLOOD PRESSURE: 75 MMHG | HEART RATE: 76 BPM | WEIGHT: 227 LBS | SYSTOLIC BLOOD PRESSURE: 124 MMHG | BODY MASS INDEX: 34.4 KG/M2 | TEMPERATURE: 98.2 F

## 2022-12-28 DIAGNOSIS — N18.30 STAGE 3 CHRONIC KIDNEY DISEASE, UNSPECIFIED WHETHER STAGE 3A OR 3B CKD (HCC): ICD-10-CM

## 2022-12-28 DIAGNOSIS — F33.42 RECURRENT MAJOR DEPRESSIVE DISORDER, IN FULL REMISSION (HCC): ICD-10-CM

## 2022-12-28 DIAGNOSIS — I10 ESSENTIAL (PRIMARY) HYPERTENSION: ICD-10-CM

## 2022-12-28 DIAGNOSIS — F51.01 PRIMARY INSOMNIA: ICD-10-CM

## 2022-12-28 DIAGNOSIS — E11.21 TYPE 2 DIABETES WITH NEPHROPATHY (HCC): ICD-10-CM

## 2022-12-28 DIAGNOSIS — I10 PRIMARY HYPERTENSION: ICD-10-CM

## 2022-12-28 PROCEDURE — 3017F COLORECTAL CA SCREEN DOC REV: CPT | Performed by: NURSE PRACTITIONER

## 2022-12-28 PROCEDURE — 3074F SYST BP LT 130 MM HG: CPT | Performed by: NURSE PRACTITIONER

## 2022-12-28 PROCEDURE — 3046F HEMOGLOBIN A1C LEVEL >9.0%: CPT | Performed by: NURSE PRACTITIONER

## 2022-12-28 PROCEDURE — 99214 OFFICE O/P EST MOD 30 MIN: CPT | Performed by: NURSE PRACTITIONER

## 2022-12-28 PROCEDURE — G8417 CALC BMI ABV UP PARAM F/U: HCPCS | Performed by: NURSE PRACTITIONER

## 2022-12-28 PROCEDURE — 1036F TOBACCO NON-USER: CPT | Performed by: NURSE PRACTITIONER

## 2022-12-28 PROCEDURE — 3078F DIAST BP <80 MM HG: CPT | Performed by: NURSE PRACTITIONER

## 2022-12-28 PROCEDURE — G8427 DOCREV CUR MEDS BY ELIG CLIN: HCPCS | Performed by: NURSE PRACTITIONER

## 2022-12-28 PROCEDURE — G8484 FLU IMMUNIZE NO ADMIN: HCPCS | Performed by: NURSE PRACTITIONER

## 2022-12-28 PROCEDURE — 2022F DILAT RTA XM EVC RTNOPTHY: CPT | Performed by: NURSE PRACTITIONER

## 2022-12-28 RX ORDER — METOPROLOL SUCCINATE 50 MG/1
50 TABLET, EXTENDED RELEASE ORAL DAILY
Qty: 90 TABLET | Refills: 1 | Status: SHIPPED | OUTPATIENT
Start: 2022-12-28

## 2022-12-28 RX ORDER — AMLODIPINE BESYLATE 10 MG/1
10 TABLET ORAL DAILY
Qty: 90 TABLET | Refills: 1 | Status: SHIPPED | OUTPATIENT
Start: 2022-12-28

## 2022-12-28 RX ORDER — IRBESARTAN 300 MG/1
300 TABLET ORAL DAILY
Qty: 90 TABLET | Refills: 1 | Status: SHIPPED | OUTPATIENT
Start: 2022-12-28

## 2022-12-28 RX ORDER — DULOXETIN HYDROCHLORIDE 60 MG/1
60 CAPSULE, DELAYED RELEASE ORAL DAILY
Qty: 90 CAPSULE | Refills: 1 | Status: SHIPPED | OUTPATIENT
Start: 2022-12-28

## 2022-12-28 RX ORDER — ZOLPIDEM TARTRATE 10 MG/1
10 TABLET ORAL NIGHTLY PRN
Qty: 30 TABLET | Refills: 5 | Status: SHIPPED | OUTPATIENT
Start: 2022-12-28 | End: 2023-12-28

## 2022-12-28 SDOH — ECONOMIC STABILITY: FOOD INSECURITY: WITHIN THE PAST 12 MONTHS, THE FOOD YOU BOUGHT JUST DIDN'T LAST AND YOU DIDN'T HAVE MONEY TO GET MORE.: NEVER TRUE

## 2022-12-28 SDOH — ECONOMIC STABILITY: FOOD INSECURITY: WITHIN THE PAST 12 MONTHS, YOU WORRIED THAT YOUR FOOD WOULD RUN OUT BEFORE YOU GOT MONEY TO BUY MORE.: NEVER TRUE

## 2022-12-28 ASSESSMENT — PATIENT HEALTH QUESTIONNAIRE - PHQ9
10. IF YOU CHECKED OFF ANY PROBLEMS, HOW DIFFICULT HAVE THESE PROBLEMS MADE IT FOR YOU TO DO YOUR WORK, TAKE CARE OF THINGS AT HOME, OR GET ALONG WITH OTHER PEOPLE: 1
3. TROUBLE FALLING OR STAYING ASLEEP: 0
4. FEELING TIRED OR HAVING LITTLE ENERGY: 0
7. TROUBLE CONCENTRATING ON THINGS, SUCH AS READING THE NEWSPAPER OR WATCHING TELEVISION: 0
6. FEELING BAD ABOUT YOURSELF - OR THAT YOU ARE A FAILURE OR HAVE LET YOURSELF OR YOUR FAMILY DOWN: 1
SUM OF ALL RESPONSES TO PHQ QUESTIONS 1-9: 2
SUM OF ALL RESPONSES TO PHQ QUESTIONS 1-9: 2
5. POOR APPETITE OR OVEREATING: 0
9. THOUGHTS THAT YOU WOULD BE BETTER OFF DEAD, OR OF HURTING YOURSELF: 0
SUM OF ALL RESPONSES TO PHQ QUESTIONS 1-9: 2
2. FEELING DOWN, DEPRESSED OR HOPELESS: 1
SUM OF ALL RESPONSES TO PHQ9 QUESTIONS 1 & 2: 1
8. MOVING OR SPEAKING SO SLOWLY THAT OTHER PEOPLE COULD HAVE NOTICED. OR THE OPPOSITE, BEING SO FIGETY OR RESTLESS THAT YOU HAVE BEEN MOVING AROUND A LOT MORE THAN USUAL: 0
SUM OF ALL RESPONSES TO PHQ QUESTIONS 1-9: 2
1. LITTLE INTEREST OR PLEASURE IN DOING THINGS: 0

## 2022-12-28 ASSESSMENT — SOCIAL DETERMINANTS OF HEALTH (SDOH): HOW HARD IS IT FOR YOU TO PAY FOR THE VERY BASICS LIKE FOOD, HOUSING, MEDICAL CARE, AND HEATING?: NOT HARD AT ALL

## 2022-12-28 NOTE — PROGRESS NOTES
Subjective:      Patient ID: Casey Galeazzi is a 63 y.o. male. He is here for refills of med's for   HTN well controlled  Depression doing well on current med's  Insomnia needs refills sleeping well  DM has new DM ulcer on left foot is seeing podiatry for this. DM has been well controlled Ha1c is 7.0  HPI    Review of Systems   Constitutional:  Negative for chills, fatigue and fever. No chest pain no shortness of breath  Objective:   Physical Exam  Vitals reviewed. Constitutional:       Appearance: Normal appearance. He is obese. HENT:      Head: Normocephalic. Nose: Nose normal.      Mouth/Throat:      Mouth: Mucous membranes are moist.   Eyes:      Comments: glasses   Cardiovascular:      Rate and Rhythm: Normal rate. Heart sounds: Murmur heard. Pulmonary:      Effort: Pulmonary effort is normal.      Breath sounds: Normal breath sounds. Skin:     General: Skin is warm and dry. Neurological:      General: No focal deficit present. Mental Status: He is alert. Psychiatric:         Mood and Affect: Mood normal.         Behavior: Behavior normal.         Thought Content: Thought content normal.         Judgment: Judgment normal.   Ulcer on left inner baltazar foot no drainage pen size diameter with 5 mm depth    Assessment:      /75 (Site: Left Upper Arm, Position: Sitting, Cuff Size: Large Adult)   Pulse 76   Temp 98.2 °F (36.8 °C) (Temporal)   Ht 5' 8\" (1.727 m)   Wt 227 lb (103 kg)   BMI 34.52 kg/m²        Plan:      1. Primary hypertension  -     amLODIPine (NORVASC) 10 MG tablet; Take 1 tablet by mouth daily, Disp-90 tablet, R-1Normal  -     metoprolol succinate (TOPROL XL) 50 MG extended release tablet; Take 1 tablet by mouth daily, Disp-90 tablet, R-1Normal  2. Recurrent major depressive disorder, in full remission ([facility]  -     DULoxetine (CYMBALTA) 60 MG extended release capsule; Take 1 capsule by mouth daily, Disp-90 capsule, R-1Normal  3.  Primary insomnia  -

## 2022-12-29 ENCOUNTER — TELEPHONE (OUTPATIENT)
Dept: FAMILY MEDICINE CLINIC | Facility: CLINIC | Age: 62
End: 2022-12-29

## 2022-12-29 LAB
ALBUMIN SERPL-MCNC: 3.9 G/DL (ref 3.2–4.6)
ALBUMIN/GLOB SERPL: 1.3 (ref 0.4–1.6)
ALP SERPL-CCNC: 102 U/L (ref 50–136)
ALT SERPL-CCNC: 22 U/L (ref 12–65)
ANION GAP SERPL CALC-SCNC: 8 MMOL/L (ref 2–11)
AST SERPL-CCNC: 16 U/L (ref 15–37)
BILIRUB SERPL-MCNC: 0.5 MG/DL (ref 0.2–1.1)
BUN SERPL-MCNC: 22 MG/DL (ref 8–23)
CALCIUM SERPL-MCNC: 8.9 MG/DL (ref 8.3–10.4)
CHLORIDE SERPL-SCNC: 104 MMOL/L (ref 101–110)
CO2 SERPL-SCNC: 24 MMOL/L (ref 21–32)
CREAT SERPL-MCNC: 1.8 MG/DL (ref 0.8–1.5)
GLOBULIN SER CALC-MCNC: 2.9 G/DL (ref 2.8–4.5)
GLUCOSE SERPL-MCNC: 241 MG/DL (ref 65–100)
POTASSIUM SERPL-SCNC: 5 MMOL/L (ref 3.5–5.1)
PROT SERPL-MCNC: 6.8 G/DL (ref 6.3–8.2)
SODIUM SERPL-SCNC: 136 MMOL/L (ref 133–143)

## 2022-12-29 NOTE — TELEPHONE ENCOUNTER
----- Message from [facility], APRN - NP sent at 12/29/2022  7:40 AM EST -----  Labs are stable

## 2023-01-16 ENCOUNTER — TELEMEDICINE (OUTPATIENT)
Dept: FAMILY MEDICINE CLINIC | Facility: CLINIC | Age: 63
End: 2023-01-16
Payer: MEDICARE

## 2023-01-16 VITALS — BODY MASS INDEX: 34.52 KG/M2 | WEIGHT: 227 LBS | HEART RATE: 77 BPM

## 2023-01-16 DIAGNOSIS — Z89.422 ACQUIRED ABSENCE OF OTHER LEFT TOE(S) (HCC): ICD-10-CM

## 2023-01-16 DIAGNOSIS — Z12.11 COLON CANCER SCREENING: ICD-10-CM

## 2023-01-16 DIAGNOSIS — F33.42 RECURRENT MAJOR DEPRESSIVE DISORDER, IN FULL REMISSION (HCC): ICD-10-CM

## 2023-01-16 DIAGNOSIS — E11.21 TYPE 2 DIABETES WITH NEPHROPATHY (HCC): ICD-10-CM

## 2023-01-16 DIAGNOSIS — N18.30 STAGE 3 CHRONIC KIDNEY DISEASE, UNSPECIFIED WHETHER STAGE 3A OR 3B CKD (HCC): ICD-10-CM

## 2023-01-16 DIAGNOSIS — I73.9 PERIPHERAL VASCULAR DISEASE, UNSPECIFIED (HCC): ICD-10-CM

## 2023-01-16 DIAGNOSIS — Z00.00 MEDICARE ANNUAL WELLNESS VISIT, SUBSEQUENT: Primary | ICD-10-CM

## 2023-01-16 PROCEDURE — G8484 FLU IMMUNIZE NO ADMIN: HCPCS | Performed by: NURSE PRACTITIONER

## 2023-01-16 PROCEDURE — 3017F COLORECTAL CA SCREEN DOC REV: CPT | Performed by: NURSE PRACTITIONER

## 2023-01-16 PROCEDURE — G0439 PPPS, SUBSEQ VISIT: HCPCS | Performed by: NURSE PRACTITIONER

## 2023-01-16 PROCEDURE — 3046F HEMOGLOBIN A1C LEVEL >9.0%: CPT | Performed by: NURSE PRACTITIONER

## 2023-01-16 ASSESSMENT — PATIENT HEALTH QUESTIONNAIRE - PHQ9
5. POOR APPETITE OR OVEREATING: 0
10. IF YOU CHECKED OFF ANY PROBLEMS, HOW DIFFICULT HAVE THESE PROBLEMS MADE IT FOR YOU TO DO YOUR WORK, TAKE CARE OF THINGS AT HOME, OR GET ALONG WITH OTHER PEOPLE: 0
7. TROUBLE CONCENTRATING ON THINGS, SUCH AS READING THE NEWSPAPER OR WATCHING TELEVISION: 0
SUM OF ALL RESPONSES TO PHQ QUESTIONS 1-9: 1
6. FEELING BAD ABOUT YOURSELF - OR THAT YOU ARE A FAILURE OR HAVE LET YOURSELF OR YOUR FAMILY DOWN: 0
3. TROUBLE FALLING OR STAYING ASLEEP: 0
8. MOVING OR SPEAKING SO SLOWLY THAT OTHER PEOPLE COULD HAVE NOTICED. OR THE OPPOSITE, BEING SO FIGETY OR RESTLESS THAT YOU HAVE BEEN MOVING AROUND A LOT MORE THAN USUAL: 0
1. LITTLE INTEREST OR PLEASURE IN DOING THINGS: 1
9. THOUGHTS THAT YOU WOULD BE BETTER OFF DEAD, OR OF HURTING YOURSELF: 0
SUM OF ALL RESPONSES TO PHQ QUESTIONS 1-9: 1
SUM OF ALL RESPONSES TO PHQ9 QUESTIONS 1 & 2: 1
4. FEELING TIRED OR HAVING LITTLE ENERGY: 0
2. FEELING DOWN, DEPRESSED OR HOPELESS: 0
SUM OF ALL RESPONSES TO PHQ QUESTIONS 1-9: 1
SUM OF ALL RESPONSES TO PHQ QUESTIONS 1-9: 1

## 2023-01-16 ASSESSMENT — LIFESTYLE VARIABLES
HOW MANY STANDARD DRINKS CONTAINING ALCOHOL DO YOU HAVE ON A TYPICAL DAY: PATIENT DOES NOT DRINK
HOW OFTEN DO YOU HAVE A DRINK CONTAINING ALCOHOL: NEVER

## 2023-01-16 NOTE — PROGRESS NOTES
Lencho Bueno (:  1960) is a Established patient, here for evaluation of the following:    Assessment & Plan   Below is the assessment and plan developed based on review of pertinent history, physical exam, labs, studies, and medications. 1. Medicare annual wellness visit, subsequent  Return in 1 year (on 2024) for Medicare Annual Wellness Visit in 1 year. Subjective   HPI  Review of Systems       Objective   Patient-Reported Vitals  Patient-Reported Pulse: 77  Patient-Reported Weight: 227lb       Physical Exam     Looks well smiling on video        Lencho Bueno, was evaluated through a synchronous (real-time) audio-video encounter. The patient (or guardian if applicable) is aware that this is a billable service, which includes applicable co-pays. This Virtual Visit was conducted with patient's (and/or legal guardian's) consent. The visit was conducted pursuant to the emergency declaration under the [address] [place] [address], [address] [place] [address] authority and the [employer] and [employer] General Act. Patient identification was verified, and a caregiver was present when appropriate. The patient was located at Home: [address] [place] [address]. Provider was located at [place] [address] (Appt Dept): [id number] [address] [facility]. --JHON Rizzo NP       Medicare Annual Wellness Visit    Lencho Bueno is here for Medicare AWV (AWV)    Assessment & Plan   Medicare annual wellness visit, subsequent    Recommendations for Preventive Services Due: see orders and patient instructions/AVS.  Recommended screening schedule for the next 5-10 years is provided to the patient in written form: see Patient Instructions/AVS.     Return in 1 year (on 2024) for Medicare Annual Wellness Visit in 1 year. Subjective   Referral to gi for consideration of colonoscopy.    Sees nephrology at intervals for ckd  Cardiology t inervals for heart disease pacemaker checks  Urged to get a dental cleaning  Has health car [employer] just needs to bring in to office to be scanned    Patient's complete Health Risk Assessment and screening values have been reviewed and are found in [address] [facility] [address]. The following problems were reviewed today and where indicated follow up appointments were made and/or referrals ordered. Positive Risk Factor Screenings with Interventions:                 Weight and Activity:  Physical Activity: Insufficiently Active    Days of Exercise per Week: 4 days    Minutes of Exercise per Session: 30 min     On average, how many days per week do you engage in moderate to strenuous exercise (like a brisk walk)?: 4 days  Have you lost any weight without trying in the past 3 months?: No     Obesity Interventions:  Patient comments: trying to be more active        Dentist Screen:  Have you seen the dentist within the past year?: (!) No    Intervention:  Patient comments: has not seen a dentist told to call [employer] for free cleaning      Safety:  Do you have working smoke detectors?: (!) No  Interventions:  Go to fire department for free smoke detectors                     Objective      Patient-Reported Vitals  Patient-Reported Pulse: 77  Patient-Reported Weight: 227lb            No Known Allergies  Prior to Visit Medications    Medication Sig Taking? Authorizing Provider   amLODIPine (NORVASC) 10 MG tablet Take 1 tablet by mouth daily Yes JHON Palacio NP   DULoxetine (CYMBALTA) 60 MG extended release capsule Take 1 capsule by mouth daily Yes JHON Palacio NP   metoprolol succinate (TOPROL XL) 50 MG extended release tablet Take 1 tablet by mouth daily Yes JHON Palacio NP   zolpidem (AMBIEN) 10 MG tablet Take 1 tablet by mouth nightly as needed for Sleep.  Yes JHON Palacio NP   irbesartan (AVAPRO) 300 MG tablet Take 1 tablet by mouth daily Yes JHON Palacio NP   mupirocin (BACTROBAN) 2 % ointment APPLY A SMALL AMOUNT TO THE AFFECTED AREA BY TOPICAL ROUTE EVERY DAY. Yes MD PATRICIA Garza 100 UNIT/ML SOPN 17 units before breakfast and lunch, 25 units before supper plus correction 1/25 >150, max daily dose 80 units Yes Antoine Dilshelton Galo PA-C   LANTUS SOLOSTAR 100 UNIT/ML injection pen Inject 40 Units into the skin daily Yes Antoine Dilling EMMA Galo   ezetimibe (ZETIA) 10 MG tablet TAKE 1 TABLET BY MOUTH  DAILY Yes Ernestina Montano MD   rosuvastatin (CRESTOR) 20 MG tablet TAKE 1 TABLET BY MOUTH AT  NIGHT Yes Ernestina Montano MD   aspirin 81 MG chewable tablet Take 81 mg by mouth Yes Ar Automatic Reconciliation   omeprazole (PRILOSEC) 20 MG delayed release capsule Take 20 mg by mouth as needed Yes Ar Automatic Reconciliation       CareTeam (Including outside providers/suppliers regularly involved in providing care):   Patient Care Team:  JHON Palacio - NP as PCP - [id number] Stanley Lo APRN - NP as PCP - [address] Rodriguez Provider  Dr Davonte Webb Cardiology  DR Schmitt Nephrology  Lindsay Rodriguez. Vascular Dr Molly BROCK [facility] Dr [place] and updated this visit:  Tobacco  Allergies  Meds  Problems  Med Hx  Surg Hx  Soc Hx  Fam Hx               Azell Formica, was evaluated through a synchronous (real-time) audio-video encounter. The patient (or guardian if applicable) is aware that this is a billable service, which includes applicable co-pays. This Virtual Visit was conducted with patient's (and/or legal guardian's) consent. The visit was conducted pursuant to the emergency declaration under the [facility], [address] [place] [address] authority and the [employer] and [employer] General Act. Patient identification was verified, and a caregiver was present when appropriate.    The patient was located at Home: [address] [place] [address] [facility] [id number]. Provider was located at [facility] (Appt Dept): [address],  [place] [address].

## 2023-01-16 NOTE — PATIENT INSTRUCTIONS
Advance Directives: Care Instructions  Overview  An advance directive is a legal way to state your wishes at the end of your life. It tells your family and your doctor what to do if you can't say what you want. There are two main types of advance directives. You can change them any time your wishes change. Living will. This form tells your family and your doctor your wishes about life support and other treatment. The form is also called a declaration. Medical power of . This form lets you name a person to make treatment decisions for you when you can't speak for yourself. This person is called a health care agent (health care proxy, health care surrogate). The form is also called a durable power of  for health care. If you do not have an advance directive, decisions about your medical care may be made by a family member, or by a doctor or a  who doesn't know you. It may help to think of an advance directive as a gift to the people who care for you. If you have one, they won't have to make tough decisions by themselves. For more information, including forms for your state, see the [address] website (www.caringinfo.org/planning/advance-directives/). Follow-up care is a key part of your treatment and safety. Be sure to make and go to all appointments, and call your doctor if you are having problems. It's also a good idea to know your test results and keep a list of the medicines you take. What should you include in an advance directive? Many states have a unique advance directive form. (It may ask you to address specific issues.) Or you might use a universal form that's approved by many states. If your form doesn't tell you what to address, it may be hard to know what to include in your advance directive. Use the questions below to help you get started. Who do you want to make decisions about your medical care if you are not able to?   What life-support measures do you want if you have a serious illness that gets worse over time or can't be cured? What are you most afraid of that might happen? (Maybe you're afraid of having pain, losing your independence, or being kept alive by machines.)  Where would you prefer to die? (Your home? A hospital? A nursing home?)  Do you want to donate your organs when you die? Do you want certain Worship practices performed before you die? When should you call for help? Be sure to contact your doctor if you have any questions. Where can you learn more? Go to http://www.nichols.com/ and enter R264 to learn more about \"Advance Directives: Care Instructions. \"  Current as of: June 16, 2022               Content Version: 13.5  © 1897-0570 Healthwise, Incorporated. Care instructions adapted under license by [facility] ([facility]). If you have questions about a medical condition or this instruction, always ask your healthcare professional. [place] [address] any warranty or liability for your use of this information. Personalized Preventive Plan for Ashley Ramos - 1/16/2023  Medicare offers a range of preventive health benefits. Some of the tests and screenings are paid in full while other may be subject to a deductible, co-insurance, and/or copay. Some of these benefits include a comprehensive review of your medical history including lifestyle, illnesses that may run in your family, and various assessments and screenings as appropriate. After reviewing your medical record and screening and assessments performed today your provider may have ordered immunizations, labs, imaging, and/or referrals for you. A list of these orders (if applicable) as well as your Preventive Care list are included within your After Visit Summary for your review.     Other Preventive Recommendations:    A preventive eye exam performed by an eye specialist is recommended every 1-2 years to screen for glaucoma; cataracts, macular degeneration, and other eye disorders.  A preventive dental visit is recommended every 6 months.  Try to get at least 150 minutes of exercise per week or 10,000 steps per day on a pedometer .  Order or download the FREE \"Exercise & Physical Activity: Your Everyday Guide\" from [employer] [place] [employer]. Call 1-738.355.3653 or search [employer] online.  You need 1704-0606 mg of calcium and 1196-3584 IU of vitamin D per day. It is possible to meet your calcium requirement with diet alone, but a vitamin D supplement is usually necessary to meet this goal.  When exposed to the sun, use a sunscreen that protects against both UVA and UVB radiation with an SPF of 30 or greater. Reapply every 2 to 3 hours or after sweating, drying off with a towel, or swimming.  Always wear a seat belt when traveling in a car. Always wear a helmet when riding a bicycle or motorcycle.

## 2023-02-13 RX ORDER — EZETIMIBE 10 MG/1
10 TABLET ORAL NIGHTLY
COMMUNITY

## 2023-02-13 NOTE — PERIOP NOTE
Patient verified name and . Order for consent not found in EHR; patient verifies procedure. Type 1B surgery, Phone assessment complete. Orders not received. Labs per surgeon: none  Labs per anesthesia protocol: POC glucose    Chart flagged to have anesthesia determine if pacer rep. Required DOS and obtain recent pacer interrogation. Patient answered medical/surgical history questions at their best of ability. All prior to admission medications documented in [facility]. Patient instructed to take the following medications the day of surgery according to anesthesia guidelines with a small sip of water: Norvasc, Aspirin 81mg, Cymbalta,Toprol. Pt instructed to take 32Units of Lantus insulin the night before surgery and no humalog insulin the morning of surgery. Hold all vitamins 7 days prior to surgery and NSAIDS 5 days prior to surgery. Prescription meds to hold:no additional.    Patient instructed on the following:    > Arrive at [facility], time of arrival to be called the day before by 1700  > NPO after midnight, unless otherwise indicated, including gum, mints, and ice chips  > Responsible adult must drive patient to the hospital, stay during surgery, and patient will need supervision 24 hours after anesthesia  > Use antibacterial Soap in shower the night before surgery and on the morning of surgery  > All piercings must be removed prior to arrival.    > Leave all valuables (money and jewelry) at home but bring insurance card and ID on DOS.   > You may be required to pay a deductible or co-pay on the day of your procedure. You can pre-pay by calling [phone] if your surgery is at the [facility] or [phone] if your surgery is at the [facility]. > Do not wear make-up, nail polish, lotions, cologne, perfumes, powders, or oil on skin. Artificial nails are not permitted.

## 2023-02-18 ASSESSMENT — ENCOUNTER SYMPTOMS
SORE THROAT: 0
DIARRHEA: 0
PHOTOPHOBIA: 0
ABDOMINAL PAIN: 0
COUGH: 0
SHORTNESS OF BREATH: 0
ABDOMINAL DISTENTION: 0
CONSTIPATION: 0

## 2023-02-18 NOTE — PROGRESS NOTES
[facility] CARDIOLOGY  [address], [address]  PHONE: [phone]        NAME:  Gio Arias  : 1960  MRN: [id number]     PCP:  Annie Chacon, APRN - NP      SUBJECTIVE:   Gio Arias is a 63 y.o. male seen for a follow up visit regarding the following:     Chief Complaint   Patient presents with    Coronary Artery Disease    Hypertension    Annual Exam       HPI:  Doing well since last visit without interval angina, CHF, palpitations,presyncope or syncope. He has mild night time dependent ankle edema on 10mg norvasc but no edema in AM and no other CHF symptoms. Having issues with a nonhealing ulcer on the left lower extremity and having surgery for what sounds like a tendon release soon. Echo 2021 with stable bioAVR hemodynamics and  normal LV function. Vitals controlled and tolerating meds well. Pacer interrogation and ECG stable. Emphasized increasing diet/exercise (once healed up) with goal of 15 to 20 pound weight loss by summer. Pacer site and interrogation look good today. See official report, reviewed by me this morning. Echo 21:    LA: Left Atrium volume index is 28 mL/m2. LV: Estimated LVEF is 60 - 65%. Normal cavity size, wall thickness, systolic function (ejection fraction normal) and diastolic function. RV: Pacing wire present    AV: Prosthetic aortic valve. There is a 21 mm Pereira bioprosthetic aortic valve. DI= 0.36  AT=61ms  21mm Pereira Magna Bovine, implanted     MV: Mitral annular calcification. He recently got a continuous glucose monitor with markedly improved glucose control. He is encouraged to diet, exercise, and try to lose some weight now. Creatinine was recently up from 1.7-2.0, followed by Dr. Nneka Falcon. He is encouraged to stay well-hydrated but minimize sodium.       Past Medical History, Past Surgical History, Family history, Social History, and Medications were all reviewed with the patient today and updated as necessary. Current Outpatient Medications   Medication Sig Dispense Refill    ezetimibe (ZETIA) 10 MG tablet Take 10 mg by mouth nightly      amLODIPine (NORVASC) 10 MG tablet Take 1 tablet by mouth daily 90 tablet 1    DULoxetine (CYMBALTA) 60 MG extended release capsule Take 1 capsule by mouth daily 90 capsule 1    metoprolol succinate (TOPROL XL) 50 MG extended release tablet Take 1 tablet by mouth daily 90 tablet 1    zolpidem (AMBIEN) 10 MG tablet Take 1 tablet by mouth nightly as needed for Sleep. 30 tablet 5    irbesartan (AVAPRO) 300 MG tablet Take 1 tablet by mouth daily 90 tablet 1    HUMALOG KWIKPEN 100 UNIT/ML SOPN 17 units before breakfast and lunch, 25 units before supper plus correction 1/25 >150, max daily dose 80 units 75 mL 3    LANTUS SOLOSTAR 100 UNIT/ML injection pen Inject 40 Units into the skin daily 36 mL 3    rosuvastatin (CRESTOR) 20 MG tablet TAKE 1 TABLET BY MOUTH AT  NIGHT 90 tablet 3    aspirin 81 MG chewable tablet Take 81 mg by mouth daily      omeprazole (PRILOSEC) 20 MG delayed release capsule Take 20 mg by mouth nightly as needed       No current facility-administered medications for this visit. No Known Allergies    Patient Active Problem List    Diagnosis Date Noted    Recurrent major depressive disorder, in full remission ([facility] 01/16/2023     Priority: Medium    Persistent proteinuria 04/07/2022     Priority: Medium    Status post amputation of toe of left foot ([facility] 06/09/2022     Overview Note:     This status condition was added to the problem list by Noemi SCHNEIDER of the [facility] Team, after medical record review and validation.           Type 2 diabetes with nephropathy ([facility] 10/09/2019    DM (diabetes mellitus), type 2, uncontrolled 12/03/2018    Class 2 severe obesity due to excess calories with serious comorbidity and body mass index (BMI) of 35.0 to 35.9 in adult [facility]) 08/20/2018    Preop cardiovascular exam 08/15/2017    H/O heart valve replacement with bioprosthetic valve 02/08/2017     Overview Note:     Aortic         Aortic stenosis 02/08/2017    CKD (chronic kidney disease), stage III ([facility] 06/01/2015    Hypertension 06/01/2015    PVD (peripheral vascular disease) ([facility] 03/16/2015     Overview Note:     3/16/15 (Dr Alyson Escobar) 1. Left superficial femoral artery endarterectomy. 2. Common femoral to above knee popliteal bypass with PTFE graft.   3. Left lower extremity arteriogram.        Postsurgical aortocoronary bypass status 03/07/2011    Bicuspid aortic valve 03/07/2011    CAD (coronary artery disease) 02/28/2011    Depression 02/28/2011    Dyslipidemia 02/28/2011        Past Surgical History:   Procedure Laterality Date    AMPUTATION Left 2016    amputation/ parial foot / Left-- required 2 surgeries    AORTIC VALVE REPLACEMENT  2011    CABG, ARTERY-VEIN, THREE  2011    Dr. Farideh Clinton  3 vessel cabg and (bovine) aortic valve replacement    CARDIAC CATHETERIZATION  03/07/2011    CATARACT REMOVAL Bilateral 2018    with lens implants    PACEMAKER  03/11/2011    Medtronic dual-chamber per cath report L chest- does not have card    SKIN GRAFT Left 8/11/15    thigh to foot graft    UROLOGICAL SURGERY  2005    Penile Impant    VASCULAR SURGERY  01/17/2019    arteriogram    VASCULAR SURGERY Left 6/5/15    wound debridement & drainage of forefoot abscess    VASCULAR SURGERY Left 6/2/15    5th toe amp    VASCULAR SURGERY Left 3-16-15    fem-tibial by-pass    VASCULAR SURGERY  2/17/15    LE arteriogram       Family History   Problem Relation Age of Onset    Diabetes Paternal Uncle         x2    Other Mother         Shrogens    Diabetes Brother     Other Father         polio as a child-affected lungs    Parkinsonism Father     Lung Disease Father     Heart Surgery Father 61        cabg    Heart Disease Father     Diabetes Father     Heart Disease Paternal Uncle     Stroke Brother     Diabetes Brother     Other Maternal Grandmother         mini strokes    Cancer Paternal Grandfather     Diabetes Mother     Heart Disease Mother     Heart Surgery Mother 54        valve replacement    Thyroid Disease Brother         Social History     Tobacco Use    Smoking status: Never    Smokeless tobacco: Never   Substance Use Topics    Alcohol use: No       ROS:    Review of Systems   Constitutional:  Negative for appetite change, chills, diaphoresis and fatigue. HENT:  Negative for congestion, mouth sores, nosebleeds, sore throat and tinnitus. Eyes:  Negative for photophobia and visual disturbance. Respiratory:  Negative for cough and shortness of breath. Cardiovascular:  Positive for leg swelling. Negative for chest pain and palpitations. Gastrointestinal:  Negative for abdominal distention, abdominal pain, constipation and diarrhea. Endocrine: Negative for cold intolerance, heat intolerance, polydipsia and polyuria. Genitourinary:  Negative for dysuria and hematuria. Musculoskeletal:  Negative for arthralgias, joint swelling and myalgias. Foot pain   Skin:  Negative for rash. Allergic/Immunologic: Negative for environmental allergies and food allergies. Neurological:  Negative for dizziness, seizures, syncope and light-headedness. Hematological:  Negative for adenopathy. Does not bruise/bleed easily. Psychiatric/Behavioral:  Negative for agitation, behavioral problems, dysphoric mood and hallucinations. The patient is not nervous/anxious. PHYSICAL EXAM:     Vitals:    02/22/23 1102   BP: 112/70   Pulse: 70   Weight: 225 lb (102.1 kg)   Height: 5' 8\" (1.727 m)      Wt Readings from Last 3 Encounters:   02/22/23 225 lb (102.1 kg)   01/16/23 227 lb (103 kg)   12/28/22 227 lb (103 kg)      BP Readings from Last 3 Encounters:   02/22/23 112/70   12/28/22 124/75   12/21/22 130/78        Physical Exam  Constitutional:       Appearance: Normal appearance. He is normal weight. HENT:      Head: Normocephalic and atraumatic.       Nose: Nose normal.      Mouth/Throat:      Mouth: Mucous membranes are moist.      Pharynx: Oropharynx is clear. Eyes:      Extraocular Movements: Extraocular movements intact. Pupils: Pupils are equal, round, and reactive to light. Neck:      Vascular: No carotid bruit or JVD. Cardiovascular:      Rate and Rhythm: Normal rate and regular rhythm. Heart sounds: Murmur (1/6 LOVE RUSB) heard. No friction rub. No gallop. Pulmonary:      Effort: Pulmonary effort is normal.      Breath sounds: Normal breath sounds. No wheezing or rhonchi. Abdominal:      General: Abdomen is flat. Bowel sounds are normal. There is no distension. Palpations: Abdomen is soft. Tenderness: There is no abdominal tenderness. Musculoskeletal:         General: No swelling. Normal range of motion. Cervical back: Normal range of motion and neck supple. No tenderness. Comments: Trace anterior tibial pitting   Skin:     General: Skin is warm and dry. Neurological:      General: No focal deficit present. Mental Status: He is alert and oriented to person, place, and time. Mental status is at baseline. Psychiatric:         Mood and Affect: Mood normal.         Behavior: Behavior normal.        Medical problems and test results were reviewed with the patient today.        Lab Results   Component Value Date    CHOL 94 09/17/2021    CHOL 114 05/28/2021    CHOL 182 01/13/2021     Lab Results   Component Value Date    TRIG 219 (H) 09/17/2021    TRIG 114 05/28/2021    TRIG 113 01/13/2021     Lab Results   Component Value Date    HDL 37 (L) 09/17/2021    HDL 41 05/28/2021    HDL 42 01/13/2021     Lab Results   Component Value Date    LDLCALC 13.2 09/17/2021    LDLCALC 52 05/28/2021    LDLCALC 119 (H) 01/13/2021     Lab Results   Component Value Date    LABVLDL 43.8 (H) 09/17/2021    LABVLDL 20 06/22/2020    LABVLDL 18.4 02/24/2020    VLDL 21 05/28/2021    VLDL 21 01/13/2021     Lab Results   Component Value Date CHOLHDLRATIO 2.5 09/17/2021    CHOLHDLRATIO 3.9 02/24/2020        Lab Results   Component Value Date/Time     12/28/2022 04:06 PM    K 5.0 12/28/2022 04:06 PM     12/28/2022 04:06 PM    CO2 24 12/28/2022 04:06 PM    BUN 22 12/28/2022 04:06 PM    CREATININE 1.80 12/28/2022 04:06 PM    GLUCOSE 241 12/28/2022 04:06 PM    CALCIUM 8.9 12/28/2022 04:06 PM         No results for input(s): WBC, HGB, HCT, MCV, PLT in the last 720 hours. Lab Results   Component Value Date    LABA1C 6.9 (H) 04/07/2021     Lab Results   Component Value Date     04/07/2021        No results found for: BNP     Lab Results   Component Value Date    TSH 4.650 (H) 01/26/2022        No results found for any visits on 02/22/23. ASSESSMENT and PLAN    Diagnoses and all orders for this visit:      1. H/O heart valve replacement with bioprosthetic valve- bicuspid AV with AS by history- stable, minimal LOVE today- echo in 6 months prior to next visit      2. Hyperlipidemia-unknown, compliant with statin and Zetia, continue meds and check fasting lipids today. 3. Localized edema-improved and minimal today, minimize salt, walk daily, elevate legs when resting      4. Essential hypertension- excellent, continue meds      5. Coronary artery disease involving coronary bypass graft of native heart without angina pectoris- stable, continue aspirin, statin, and beta-blocker/ARB at current doses. Healthy diet encouraged. Exercise once foot is healed. 6. Acute on chronic diastolic heart failure (HCC)-none since last visit, euvolemic today, minimize salt, call with worsening edema, SOB, [place]. Weight daily. Recheck echo prior to next visit. 7. Postsurgical aortocoronary bypass status- stable without angina, CHF, or palpitations. Fasting lipids today. Return in about 6 months (around 8/22/2023).          Glo Carter MD  2/22/2023  11:21 AM

## 2023-02-22 ENCOUNTER — OFFICE VISIT (OUTPATIENT)
Dept: CARDIOLOGY CLINIC | Age: 63
End: 2023-02-22

## 2023-02-22 ENCOUNTER — ANESTHESIA EVENT (OUTPATIENT)
Dept: SURGERY | Age: 63
End: 2023-02-22
Payer: MEDICARE

## 2023-02-22 VITALS
SYSTOLIC BLOOD PRESSURE: 112 MMHG | WEIGHT: 225 LBS | BODY MASS INDEX: 34.1 KG/M2 | HEART RATE: 70 BPM | DIASTOLIC BLOOD PRESSURE: 70 MMHG | HEIGHT: 68 IN

## 2023-02-22 DIAGNOSIS — I10 PRIMARY HYPERTENSION: ICD-10-CM

## 2023-02-22 DIAGNOSIS — Q23.1 BICUSPID AORTIC VALVE: ICD-10-CM

## 2023-02-22 DIAGNOSIS — E78.5 DYSLIPIDEMIA: ICD-10-CM

## 2023-02-22 DIAGNOSIS — Z95.1 POSTSURGICAL AORTOCORONARY BYPASS STATUS: ICD-10-CM

## 2023-02-22 DIAGNOSIS — Z95.3 H/O HEART VALVE REPLACEMENT WITH BIOPROSTHETIC VALVE: ICD-10-CM

## 2023-02-22 DIAGNOSIS — I25.10 CORONARY ARTERY DISEASE INVOLVING NATIVE CORONARY ARTERY OF NATIVE HEART WITHOUT ANGINA PECTORIS: Primary | ICD-10-CM

## 2023-02-22 LAB
CHOLEST SERPL-MCNC: 112 MG/DL
HDLC SERPL-MCNC: 41 MG/DL (ref 40–60)
HDLC SERPL: 2.7
LDLC SERPL CALC-MCNC: 51.4 MG/DL
TRIGL SERPL-MCNC: 98 MG/DL (ref 35–150)
VLDLC SERPL CALC-MCNC: 19.6 MG/DL (ref 6–23)

## 2023-02-23 ENCOUNTER — HOSPITAL ENCOUNTER (OUTPATIENT)
Age: 63
Setting detail: OUTPATIENT SURGERY
Discharge: HOME | End: 2023-02-23
Attending: PODIATRIST | Admitting: PODIATRIST
Payer: MEDICARE

## 2023-02-23 ENCOUNTER — TELEPHONE (OUTPATIENT)
Dept: CARDIOLOGY CLINIC | Age: 63
End: 2023-02-23

## 2023-02-23 ENCOUNTER — ANESTHESIA (OUTPATIENT)
Dept: SURGERY | Age: 63
End: 2023-02-23
Payer: MEDICARE

## 2023-02-23 VITALS
OXYGEN SATURATION: 95 % | BODY MASS INDEX: 37.89 KG/M2 | TEMPERATURE: 97.1 F | HEIGHT: 68 IN | WEIGHT: 250 LBS | RESPIRATION RATE: 16 BRPM | HEART RATE: 74 BPM | DIASTOLIC BLOOD PRESSURE: 67 MMHG | SYSTOLIC BLOOD PRESSURE: 113 MMHG

## 2023-02-23 DIAGNOSIS — L97.509: Primary | ICD-10-CM

## 2023-02-23 DIAGNOSIS — E11.621: Primary | ICD-10-CM

## 2023-02-23 DIAGNOSIS — Z98.890 S/P FOOT SURGERY, LEFT: ICD-10-CM

## 2023-02-23 LAB
GLUCOSE BLD STRIP.AUTO-MCNC: 174 MG/DL (ref 65–100)
GLUCOSE BLD STRIP.AUTO-MCNC: 180 MG/DL (ref 65–100)
SERVICE CMNT-IMP: ABNORMAL
SERVICE CMNT-IMP: ABNORMAL

## 2023-02-23 PROCEDURE — 7100000000 HC PACU RECOVERY - FIRST 15 MIN: Performed by: PODIATRIST

## 2023-02-23 PROCEDURE — 7100000001 HC PACU RECOVERY - ADDTL 15 MIN: Performed by: PODIATRIST

## 2023-02-23 PROCEDURE — 3600000014 HC SURGERY LEVEL 4 ADDTL 15MIN: Performed by: PODIATRIST

## 2023-02-23 PROCEDURE — 2580000003 HC RX 258: Performed by: ANESTHESIOLOGY

## 2023-02-23 PROCEDURE — 2500000003 HC RX 250 WO HCPCS: Performed by: NURSE ANESTHETIST, CERTIFIED REGISTERED

## 2023-02-23 PROCEDURE — 2500000003 HC RX 250 WO HCPCS: Performed by: PODIATRIST

## 2023-02-23 PROCEDURE — 6360000002 HC RX W HCPCS: Performed by: NURSE ANESTHETIST, CERTIFIED REGISTERED

## 2023-02-23 PROCEDURE — 6360000002 HC RX W HCPCS: Performed by: PODIATRIST

## 2023-02-23 PROCEDURE — 82962 GLUCOSE BLOOD TEST: CPT

## 2023-02-23 PROCEDURE — 3700000000 HC ANESTHESIA ATTENDED CARE: Performed by: PODIATRIST

## 2023-02-23 PROCEDURE — 3700000001 HC ADD 15 MINUTES (ANESTHESIA): Performed by: PODIATRIST

## 2023-02-23 PROCEDURE — 3600000004 HC SURGERY LEVEL 4 BASE: Performed by: PODIATRIST

## 2023-02-23 PROCEDURE — 7100000010 HC PHASE II RECOVERY - FIRST 15 MIN: Performed by: PODIATRIST

## 2023-02-23 PROCEDURE — 2709999900 HC NON-CHARGEABLE SUPPLY: Performed by: PODIATRIST

## 2023-02-23 DEVICE — PURAPLY™ ANTIMICROBIAL WOUND MATRIX CONSISTS OF A COLLAGEN SHEET COATED WITH POLYHEXAMETHYLENEBIGUANIDE HYDROCHLORIDE (PHMB) INTENDED FOR THE MANAGEMENT OF WOUNDS. PURAPLY™ ANTIMICROBIAL WOUND MATRIX IS SUPPLIED DRY IN SHEET FORM. THE DEVICE IS PACKAGED IN STERILE, SEALED SINGLE POUCHES.
Type: IMPLANTABLE DEVICE | Site: HEEL | Status: FUNCTIONAL
Brand: PURAPLY™ ANTIMICROBIAL WOUND MATRIX

## 2023-02-23 RX ORDER — SODIUM CHLORIDE 9 MG/ML
INJECTION, SOLUTION INTRAVENOUS PRN
Status: DISCONTINUED | OUTPATIENT
Start: 2023-02-23 | End: 2023-02-23 | Stop reason: HOSPADM

## 2023-02-23 RX ORDER — FENTANYL CITRATE 50 UG/ML
INJECTION, SOLUTION INTRAMUSCULAR; INTRAVENOUS PRN
Status: DISCONTINUED | OUTPATIENT
Start: 2023-02-23 | End: 2023-02-23 | Stop reason: SDUPTHER

## 2023-02-23 RX ORDER — OXYCODONE HYDROCHLORIDE 5 MG/1
5 TABLET ORAL
Status: DISCONTINUED | OUTPATIENT
Start: 2023-02-23 | End: 2023-02-23 | Stop reason: HOSPADM

## 2023-02-23 RX ORDER — HYDROMORPHONE HCL 110MG/55ML
0.5 PATIENT CONTROLLED ANALGESIA SYRINGE INTRAVENOUS EVERY 5 MIN PRN
Status: DISCONTINUED | OUTPATIENT
Start: 2023-02-23 | End: 2023-02-23 | Stop reason: HOSPADM

## 2023-02-23 RX ORDER — ROCURONIUM BROMIDE 10 MG/ML
INJECTION, SOLUTION INTRAVENOUS PRN
Status: DISCONTINUED | OUTPATIENT
Start: 2023-02-23 | End: 2023-02-23 | Stop reason: SDUPTHER

## 2023-02-23 RX ORDER — TRAMADOL HYDROCHLORIDE 50 MG/1
50 TABLET ORAL EVERY 4 HOURS PRN
Qty: 10 TABLET | Refills: 0 | Status: SHIPPED | OUTPATIENT
Start: 2023-02-23 | End: 2023-02-26

## 2023-02-23 RX ORDER — ONDANSETRON 2 MG/ML
INJECTION INTRAMUSCULAR; INTRAVENOUS PRN
Status: DISCONTINUED | OUTPATIENT
Start: 2023-02-23 | End: 2023-02-23 | Stop reason: SDUPTHER

## 2023-02-23 RX ORDER — SUCCINYLCHOLINE/SOD CL,ISO/PF 200MG/10ML
SYRINGE (ML) INTRAVENOUS PRN
Status: DISCONTINUED | OUTPATIENT
Start: 2023-02-23 | End: 2023-02-23 | Stop reason: SDUPTHER

## 2023-02-23 RX ORDER — PROCHLORPERAZINE EDISYLATE 5 MG/ML
5 INJECTION INTRAMUSCULAR; INTRAVENOUS
Status: DISCONTINUED | OUTPATIENT
Start: 2023-02-23 | End: 2023-02-23 | Stop reason: HOSPADM

## 2023-02-23 RX ORDER — DEXAMETHASONE SODIUM PHOSPHATE 4 MG/ML
INJECTION, SOLUTION INTRA-ARTICULAR; INTRALESIONAL; INTRAMUSCULAR; INTRAVENOUS; SOFT TISSUE PRN
Status: DISCONTINUED | OUTPATIENT
Start: 2023-02-23 | End: 2023-02-23 | Stop reason: SDUPTHER

## 2023-02-23 RX ORDER — SODIUM CHLORIDE, SODIUM LACTATE, POTASSIUM CHLORIDE, CALCIUM CHLORIDE 600; 310; 30; 20 MG/100ML; MG/100ML; MG/100ML; MG/100ML
INJECTION, SOLUTION INTRAVENOUS CONTINUOUS
Status: DISCONTINUED | OUTPATIENT
Start: 2023-02-23 | End: 2023-02-23 | Stop reason: HOSPADM

## 2023-02-23 RX ORDER — LIDOCAINE HYDROCHLORIDE 10 MG/ML
1 INJECTION, SOLUTION INFILTRATION; PERINEURAL
Status: DISCONTINUED | OUTPATIENT
Start: 2023-02-23 | End: 2023-02-23 | Stop reason: HOSPADM

## 2023-02-23 RX ORDER — LIDOCAINE HYDROCHLORIDE 10 MG/ML
INJECTION, SOLUTION INFILTRATION; PERINEURAL PRN
Status: DISCONTINUED | OUTPATIENT
Start: 2023-02-23 | End: 2023-02-23 | Stop reason: HOSPADM

## 2023-02-23 RX ORDER — LIDOCAINE HYDROCHLORIDE 20 MG/ML
INJECTION, SOLUTION EPIDURAL; INFILTRATION; INTRACAUDAL; PERINEURAL PRN
Status: DISCONTINUED | OUTPATIENT
Start: 2023-02-23 | End: 2023-02-23 | Stop reason: SDUPTHER

## 2023-02-23 RX ORDER — SODIUM CHLORIDE 0.9 % (FLUSH) 0.9 %
5-40 SYRINGE (ML) INJECTION PRN
Status: DISCONTINUED | OUTPATIENT
Start: 2023-02-23 | End: 2023-02-23 | Stop reason: HOSPADM

## 2023-02-23 RX ORDER — ACETAMINOPHEN 500 MG
1000 TABLET ORAL EVERY 6 HOURS PRN
COMMUNITY

## 2023-02-23 RX ORDER — EPHEDRINE SULFATE/0.9% NACL/PF 50 MG/5 ML
SYRINGE (ML) INTRAVENOUS PRN
Status: DISCONTINUED | OUTPATIENT
Start: 2023-02-23 | End: 2023-02-23 | Stop reason: SDUPTHER

## 2023-02-23 RX ORDER — PROPOFOL 10 MG/ML
INJECTION, EMULSION INTRAVENOUS PRN
Status: DISCONTINUED | OUTPATIENT
Start: 2023-02-23 | End: 2023-02-23 | Stop reason: SDUPTHER

## 2023-02-23 RX ORDER — FENTANYL CITRATE 50 UG/ML
100 INJECTION, SOLUTION INTRAMUSCULAR; INTRAVENOUS
Status: DISCONTINUED | OUTPATIENT
Start: 2023-02-23 | End: 2023-02-23 | Stop reason: HOSPADM

## 2023-02-23 RX ORDER — MIDAZOLAM HYDROCHLORIDE 2 MG/2ML
2 INJECTION, SOLUTION INTRAMUSCULAR; INTRAVENOUS
Status: DISCONTINUED | OUTPATIENT
Start: 2023-02-23 | End: 2023-02-23 | Stop reason: HOSPADM

## 2023-02-23 RX ORDER — SODIUM CHLORIDE 0.9 % (FLUSH) 0.9 %
5-40 SYRINGE (ML) INJECTION EVERY 12 HOURS SCHEDULED
Status: DISCONTINUED | OUTPATIENT
Start: 2023-02-23 | End: 2023-02-23 | Stop reason: HOSPADM

## 2023-02-23 RX ORDER — CEPHALEXIN 500 MG/1
500 CAPSULE ORAL 2 TIMES DAILY
Qty: 14 CAPSULE | Refills: 0 | Status: SHIPPED | OUTPATIENT
Start: 2023-02-23 | End: 2023-03-02

## 2023-02-23 RX ADMIN — PHENYLEPHRINE HYDROCHLORIDE 200 MCG: 0.1 INJECTION, SOLUTION INTRAVENOUS at 09:30

## 2023-02-23 RX ADMIN — PHENYLEPHRINE HYDROCHLORIDE 100 MCG: 0.1 INJECTION, SOLUTION INTRAVENOUS at 09:22

## 2023-02-23 RX ADMIN — SODIUM CHLORIDE, POTASSIUM CHLORIDE, SODIUM LACTATE AND CALCIUM CHLORIDE 100 ML/HR: 600; 310; 30; 20 INJECTION, SOLUTION INTRAVENOUS at 07:10

## 2023-02-23 RX ADMIN — Medication 10 MG: at 09:28

## 2023-02-23 RX ADMIN — Medication 2000 MG: at 09:16

## 2023-02-23 RX ADMIN — LIDOCAINE HYDROCHLORIDE 60 MG: 20 INJECTION, SOLUTION EPIDURAL; INFILTRATION; INTRACAUDAL; PERINEURAL at 09:08

## 2023-02-23 RX ADMIN — PHENYLEPHRINE HYDROCHLORIDE 200 MCG: 0.1 INJECTION, SOLUTION INTRAVENOUS at 09:42

## 2023-02-23 RX ADMIN — ROCURONIUM BROMIDE 5 MG: 10 INJECTION, SOLUTION INTRAVENOUS at 09:08

## 2023-02-23 RX ADMIN — DEXAMETHASONE SODIUM PHOSPHATE 4 MG: 4 INJECTION, SOLUTION INTRAMUSCULAR; INTRAVENOUS at 09:42

## 2023-02-23 RX ADMIN — Medication 160 MG: at 09:08

## 2023-02-23 RX ADMIN — FENTANYL CITRATE 100 MCG: 50 INJECTION, SOLUTION INTRAMUSCULAR; INTRAVENOUS at 09:08

## 2023-02-23 RX ADMIN — PROPOFOL 200 MG: 10 INJECTION, EMULSION INTRAVENOUS at 09:08

## 2023-02-23 RX ADMIN — PHENYLEPHRINE HYDROCHLORIDE 100 MCG: 0.1 INJECTION, SOLUTION INTRAVENOUS at 09:28

## 2023-02-23 RX ADMIN — ONDANSETRON 4 MG: 2 INJECTION INTRAMUSCULAR; INTRAVENOUS at 09:43

## 2023-02-23 RX ADMIN — Medication 20 MG: at 09:40

## 2023-02-23 ASSESSMENT — PAIN - FUNCTIONAL ASSESSMENT: PAIN_FUNCTIONAL_ASSESSMENT: 0-10

## 2023-02-23 NOTE — ANESTHESIA POSTPROCEDURE EVALUATION
Department of Anesthesiology  Postprocedure Note    Patient: Ramsey Mart  MRN: [id number]  YOB: 1960  Date of evaluation: 2/23/2023      Procedure Summary     Date: 02/23/23 Room / Location: [facility] OP OR 06 / [facility]    Anesthesia Start: 0856 Anesthesia Stop: 1014    Procedures:       PERCUTANEOUS ACHILLES TENOTOMY  LEFT FOOT (Left: Foot)      APPLICATION OF PURAPLY AM  pt has a pacemaker (Left: Foot) Diagnosis:       Contracture of left ankle      Flexion deformity, left ankle and toes      Other specified diabetes mellitus with foot ulcer, unspecified whether long term insulin use ([facility])      (Contracture of left ankle [M24.572])      (Flexion deformity, left ankle and toes [M21.272])      (Other specified diabetes mellitus with foot ulcer, unspecified whether long term insulin use ([facility] [I87.304, L97.509])    Surgeons: Rosanna Baker MD Responsible Provider: Noe Christianson MD    Anesthesia Type: general ASA Status: 3          Anesthesia Type: No value filed. Stanley Phase I: Stanley Score: 8    Stanley Phase II: Stanley Score: 10      Anesthesia Post Evaluation    Patient location during evaluation: PACU  Patient participation: complete - patient participated  Level of consciousness: awake and alert  Airway patency: patent  Nausea & Vomiting: no nausea and no vomiting  Complications: no  Cardiovascular status: hemodynamically stable  Respiratory status: acceptable, nonlabored ventilation and spontaneous ventilation  Hydration status: euvolemic  Comments: /67   Pulse 74   Temp 97.1 °F (36.2 °C) (Temporal)   Resp 16   Ht 5' 8\" (1.727 m)   Wt 250 lb (113.4 kg)   SpO2 95%   BMI 34.21 kg/m²   Based on chart review, not called for sign out.       Multimodal analgesia pain management approach

## 2023-02-23 NOTE — DISCHARGE INSTRUCTIONS
DIET  Clear liquids until no nausea or vomiting, then light diet for the first day. Advance to regular diet on second day, unless your doctor orders otherwise. If nausea and vomiting continues, call your doctor. After general anesthesia or intravenous sedation, for 24 hours or while taking prescription Narcotics:  Limit your activities  A responsible adult needs to be with you for the next 24 hours  Do not drive and operate hazardous machinery  Do not make important personal or business decisions  Do not drink alcoholic beverages  If you have not urinated within 8 hours after discharge, and you are experiencing discomfort from urinary retention, please go to the nearest ED. If you have sleep apnea and have a CPAP machine, please use it for all naps and sleeping. Please use caution when taking narcotics and any of your home medications that may cause drowsiness. *  Please give a list of your current medications to your Primary Care Provider. *  Please update this list whenever your medications are discontinued, doses are      changed, or new medications (including over-the-counter products) are added. *  Please carry medication information at all times in case of emergency situations. These are general instructions for a healthy lifestyle:  No smoking/ No tobacco products/ Avoid exposure to second hand smoke  Surgeon General's Warning:  Quitting smoking now greatly reduces serious risk to your health. Obesity, smoking, and sedentary lifestyle greatly increases your risk for illness  A healthy diet, regular physical exercise & weight monitoring are important for maintaining a healthy lifestyle    You may be retaining fluid if you have a history of heart failure or if you experience any of the following symptoms:  Weight gain of 3 pounds or more overnight or 5 pounds in a week, increased swelling in our hands or feet or shortness of breath while lying flat in bed.   Please call your doctor as soon as you notice any of these symptoms; do not wait until your next office visit.

## 2023-02-23 NOTE — H&P
Department of Anesthesiology  Preprocedure Note                                        Name:  Evelyn Casillas       Age:  63 y.o.  :  1960                                                 MRN:  [id number]                                                                      Date:  2023              Surgeon: Amirah Jones):  Herbie Mendez MD     Procedure: Procedure(s):  PERCUTANEOUS ACHILLES TENOTOMY  LEFT FOOT  APPLICATION OF PURAPLY AM  pt has a pacemaker     Medications prior to admission:   Home Medications           Prior to Admission medications    Medication Sig Start Date End Date Taking?  Authorizing Provider   acetaminophen (TYLENOL) 500 MG tablet Take 1,000 mg by mouth every 6 hours as needed for Pain     Yes Historical Provider, MD   ezetimibe (ZETIA) 10 MG tablet Take 10 mg by mouth nightly     Yes Historical Provider, MD   amLODIPine (NORVASC) 10 MG tablet Take 1 tablet by mouth daily 22     JHON Albarran NP   DULoxetine (CYMBALTA) 60 MG extended release capsule Take 1 capsule by mouth daily 22     JHON Albarran NP   metoprolol succinate (TOPROL XL) 50 MG extended release tablet Take 1 tablet by mouth daily 22     JHON Albarran NP   zolpidem (AMBIEN) 10 MG tablet Take 1 tablet by mouth nightly as needed for Sleep. 22   JHON Albarran NP   irbesartan (AVAPRO) 300 MG tablet Take 1 tablet by mouth daily 22     JHON Albarran NP   HUMALOG KWIKPEN 100 UNIT/ML SOPN 17 units before breakfast and lunch, 25 units before supper plus correction  >150, max daily dose 80 units 22     Kamini Bright EMMA Galo   LANTUS SOLOSTAR 100 UNIT/ML injection pen Inject 40 Units into the skin daily 12/21/22 3/21/23   [employer] A EMMA Galo   rosuvastatin (CRESTOR) 20 MG tablet TAKE 1 TABLET BY MOUTH AT  NIGHT 11/10/22     Jeannie Ambrocio MD   aspirin 81 MG chewable tablet Take 81 mg by mouth daily       Ar Automatic Reconciliation   omeprazole (PRILOSEC) 20 MG delayed release capsule Take 20 mg by mouth nightly as needed       Ar Automatic Reconciliation            Current medications:    Current Facility-Administered Medications             Current Facility-Administered Medications   Medication Dose Route Frequency Provider Last Rate Last Admin    lidocaine 1 % injection 1 mL  1 mL IntraDERmal Once PRN Keya Castle MD        fentaNYL (SUBLIMAZE) injection 100 mcg  100 mcg IntraVENous Once PRN Keya Castle MD        lactated ringers IV soln infusion   IntraVENous Continuous Keya Castle  mL/hr at 02/23/23 0710 100 mL/hr at 02/23/23 0710    sodium chloride flush 0.9 % injection 5-40 mL  5-40 mL IntraVENous 2 times per day Keya Castle MD        sodium chloride flush 0.9 % injection 5-40 mL  5-40 mL IntraVENous PRN Keya Castle MD        0.9 % sodium chloride infusion   IntraVENous PRN Keya Castle MD        midazolam PF (VERSED) injection 2 mg  2 mg IntraVENous Once PRN Keya Castle MD        ceFAZolin (ANCEF) 2000 mg in sterile water 20 mL IV syringe  2,000 mg IntraVENous Once Te Rothman MD                Allergies:  No Known Allergies     Problem List:         Patient Active Problem List   Diagnosis Code    H/O heart valve replacement with bioprosthetic valve Z95.3    PVD (peripheral vascular disease) ([facility] I73.9    Postsurgical aortocoronary bypass status Z95.1    CAD (coronary artery disease) I25.10    Preop cardiovascular exam Z01.810    CKD (chronic kidney disease), stage III ([facility] N18.30    Depression F32. A    Hypertension I10    Dyslipidemia E78.5    Type 2 diabetes with nephropathy (HCC) E11.21    Class 2 severe obesity due to excess calories with serious comorbidity and body mass index (BMI) of 35.0 to 35.9 in adult (HCC) E66.01, Z68.35    DM (diabetes mellitus), type 2, uncontrolled [id number]    Aortic stenosis I35.0    Bicuspid aortic valve Q23.1 Status post amputation of toe of left foot ([facility]) Z89.422    Persistent proteinuria R80.1    Recurrent major depressive disorder, in full remission ([facility] F33.42         Past Medical History:    Past Medical History             Diagnosis Date    Acute on chronic diastolic heart failure ([facility] 2/28/2011     followed by dr Madisyn Reno 2011     pacemaker Medtronic-- followed by [facility] cardio    Atherosclerotic PVD with ulceration ([facility] 2/2/2015    BMI 38.0-38.9,adult       BMI 38    CAD (coronary artery disease) 02/28/2011      3 v CABG; aortic valve replacement/Bovine-- both at same time-- followed by dr Altagracia Shah kidney disease       chronic renal insuff. --- does not see a nephrologist     Chronic pain       left foot / PVD and partial amputation    Contracture of left ankle      Depression 2/28/2011    Diabetic neuropathy ([facility]      Foot amputation status, left       Left foot with 2 toes amputated and portion of Left foot    GERD (gastroesophageal reflux disease)       controlled with medication    GERD (gastroesophageal reflux disease)       managed with PRN Prilosec    H/O aortic valve replacement 2/2011     Bovine valve    Heart failure ([facility]       Acute on Chronic HF- 1/21/19 Echo LVEF 60-65%    History of penile implant       currently in place    History of shingles 06/2015    Hx of CABG       3 vessel    Hypercholesterolemia      Hypertension       controlled with med    Murmur, cardiac 02/2011     aortic valve replaced with CABG, 2011------- 2/6 LOVE RUSB apex, 2/6 TR murmur-- ECHO 9/13/17LVEF 55-60%     Pacemaker       Left chest Medtronic pacemaker    PAD (peripheral artery disease) ([facility])      Type 2 diabetes mellitus (HCC)       insulin reliant/ FBS avg-:100-120/ - A1C:7.1 per pt -- hypo s/s below 50            Past Surgical History:    Past Surgical History             Procedure Laterality Date    AMPUTATION Left 2016     amputation/ parial foot / Left-- required 2 surgeries    AORTIC VALVE REPLACEMENT   2011    CABG, ARTERY-VEIN, THREE   2011     Dr. Healy Stain  3 vessel cabg and (bovine) aortic valve replacement    CARDIAC CATHETERIZATION   03/07/2011    CATARACT REMOVAL Bilateral 2018     with lens implants    PACEMAKER   03/11/2011     Medtronic dual-chamber per cath report L chest- does not have card    SKIN GRAFT Left 8/11/15     thigh to foot graft    UROLOGICAL SURGERY   2005     Penile Impant    VASCULAR SURGERY   01/17/2019     arteriogram    VASCULAR SURGERY Left 6/5/15     wound debridement & drainage of forefoot abscess    VASCULAR SURGERY Left 6/2/15     5th toe amp    VASCULAR SURGERY Left 3-16-15     fem-tibial by-pass    VASCULAR SURGERY   2/17/15     LE arteriogram            Social History:    Social History           Tobacco Use    Smoking status: Never    Smokeless tobacco: Never   Substance Use Topics    Alcohol use: No                                 Counseling given: Not Answered        Vital Signs (Current):   Vitals         Vitals:     02/13/23 0948 02/23/23 0642 02/23/23 0643   BP:   (!) 142/78     Pulse:   (!) 107     Resp:   18     Temp:   98.3 °F (36.8 °C)     TempSrc:   Oral     SpO2:   95%     Weight: 250 lb (113.4 kg)       Height: 5' 8\" (1.727 m)   5' 8\" (1.727 m)                                                     BP Readings from Last 3 Encounters:   02/23/23 (!) 142/78   02/22/23 112/70   12/28/22 124/75         NPO Status: Time of last liquid consumption: 2100                        Time of last solid consumption: 2100                        Date of last liquid consumption: 02/22/23                        Date of last solid food consumption: 02/22/23     BMI:       Wt Readings from Last 3 Encounters:   02/13/23 250 lb (113.4 kg)   02/22/23 225 lb (102.1 kg)   01/16/23 227 lb (103 kg)     Body mass index is 34.21 kg/m².      CBC: No results found for: WBC, RBC, HGB, HCT, MCV, RDW, PLT     CMP:         Lab Results   Component Value Date/Time      12/28/2022 04:06 PM     K 5.0 12/28/2022 04:06 PM      12/28/2022 04:06 PM     CO2 24 12/28/2022 04:06 PM     BUN 22 12/28/2022 04:06 PM     CREATININE 1.80 12/28/2022 04:06 PM     GFRAA 50 01/26/2022 10:50 AM     AGRATIO 1.8 01/26/2022 10:50 AM     LABGLOM 43 12/28/2022 04:06 PM     GLUCOSE 241 12/28/2022 04:06 PM     PROT 6.8 12/28/2022 04:06 PM     CALCIUM 8.9 12/28/2022 04:06 PM     BILITOT 0.5 12/28/2022 04:06 PM     ALKPHOS 102 12/28/2022 04:06 PM     ALKPHOS 118 01/26/2022 10:50 AM     AST 16 12/28/2022 04:06 PM     ALT 22 12/28/2022 04:06 PM         POC Tests:       Recent Labs     02/23/23  0706   POCGLU 180*         Coags: No results found for: PROTIME, INR, APTT     HCG (If Applicable): No results found for: PREGTESTUR, PREGSERUM, HCG, HCGQUANT      ABGs: No results found for: PHART, PO2ART, FHA7GXL, IPK4ZJX, BEART, A2OVEKSH      Type & Screen (If Applicable):  No results found for: LABABO, LABRH     Drug/Infectious Status (If Applicable):  No results found for: HIV, HEPCAB     COVID-19 Screening (If Applicable): No results found for: COVID19           Anesthesia Evaluation     Airway: Mallampati: II  TM distance: >3 FB   Neck ROM: full  Mouth opening: > = 3 FB    Dental: normal exam      Comment: Several chipped teeth    Pulmonary:normal exam  breath sounds clear to auscultation                                           Cardiovascular:  Exercise tolerance: poor (<4 METS), Ambulates unassisted  (+) hypertension:, valvular problems/murmurs:, pacemaker (not dependent, 2011): pacemaker, CAD:, CABG/stent (CABG/AVR 2011):, murmur: Grade 2, Aortic,            Rhythm: regular  Rate: normal                      ROS comment: Pacer interrogation 1/22:  Preliminary Impression: Normal dc ppm function.  No arrhythmias.  AP 26.5%     0.6%.    Echo 7/21:  LV: Estimated LVEF is 60 - 65%. Normal cavity size, wall thickness, systolic function (ejection fraction normal) and diastolic function.  · RV: Pacing wire present  · AV:  Prosthetic aortic valve. There is a 21 mm Pereira bioprosthetic aortic valve. · MV: Mitral annular calcification. Neuro/Psych:   (+) depression/anxiety                  ROS comment: neuropathy GI/Hepatic/Renal:   (+) GERD: well controlled, renal disease: CRI,              Endo/Other:    (+) DiabetesType II DM, using insulin, . Abdominal:              Vascular:   + PVD, aortic or cerebral, . Other Findings:             Anesthesia Plan        general      ASA 3      (Felt optimized per cardiologist yesterday.)  Induction: intravenous. Anesthetic plan and risks discussed with patient and spouse.                                    Rocco Hanna MD   2/23/2023                    Revision History

## 2023-02-23 NOTE — TELEPHONE ENCOUNTER
----- Message from Ana Phelps MD sent at 2/23/2023  8:15 AM EST -----  EXCELLENT, KEEP UP THE GOOD WORK AND CONTINUE CURRENT MEDS

## 2023-02-23 NOTE — OP NOTE
[facility]  OPERATIVE REPORT    Name:  Thong Aguirre  MR#:  [id number]  :  1960  ACCOUNT #:  [de-identified]  DATE OF SERVICE:  2023    PREOPERATIVE DIAGNOSES:  1. Ankle contracture, left. 2.  Diabetes with foot ulcer, left. POSTOPERATIVE DIAGNOSES:  1. Ankle contracture, left. 2.  Diabetes with foot ulcer, left. PROCEDURES PERFORMED:  1. Percutaneous Achilles lengthening, left. 2.  Application of PuraPly 33021, left diabetic foot ulcer. SURGEON:  Maynor Montgomery MD    ASSISTANT:  None. ANESTHESIA:  General with 10 mL of 1% lidocaine plain. COMPLICATIONS:  None. SPECIMENS REMOVED:  No specimens were sent to Pathology. IMPLANTS:  No implants. ESTIMATED BLOOD LOSS:  Less than 5 mL. HEMOSTASIS:  Anatomic dissection. MATERIALS:  3-0 nylon, PuraPly AM graft. INDICATIONS FOR SURGERY:  The patient is a pleasant 72-year-old diabetic male with history of multiple chronic left foot ulcerations and ray amputations. The patient presents with a chronic plantar forefoot ulcer that is unresponsive to conservative care. Risks and benefits of the Achilles percutaneous tenotomy along with ulcer debridement and graft were reviewed in detail with the patient and his wife. All questions were answered. The patient understands he is still at risk for amputation and complications. PROCEDURE:  The patient was brought to the operating room, placed on the operating table in the supine position. After general anesthesia was obtained, the patient was placed in the prone position on the operating table. The left foot was scrubbed, prepped and draped in the usual aseptic manner. Foot was elevated for exsanguination. Next, the Achilles tendon along with its insertion were outlined. The first stab incision was made 2 to 3 cm distal to the insertion of the posterior aspect of the calcaneus on the lateral aspect and began midsubstance through the lateral aspect.   The second incision proximal to the distal one was made 3 cm distal to the original incision and made medially, beginning in the intratendinous substance, completed laterally through half of the width of the tendon. A third incision was made 3 cm distal to that on the lateral side, beginning intrasubstance through the lateral aspect. Significant increase in ankle dorsiflexion was achieved. The incisions were flushed with sterile normal saline and the skin was reapproximated and coapted utilizing 3-0 nylon in a horizontal mattress in simple suture technique. Attention was then directed to the plantar aspect of the patient's left forefoot where the ulcer was sharply debrided full-thickness utilizing a 15 blade and debrided of all nonviable soft tissue. The ulcer did not probe deep to bone. The wound was measured to be 1 x 0.6 x 0.1 cm. The PuraPly graft was applied and secured in place with benzoin and Steri-Strips. Xeroform and dry sterile dressing were applied about the patient's left foot. Cast padding, a short posterior splint and Ace wraps were applied. The patient tolerated the procedure and anesthesia well. He was transferred from the operating room to PACU with vital signs stable and neurovascular status is intact to the toes of left foot. He will be discharged with both written and oral postoperative instructions. He understands that he should be nonweightbearing on the left foot for at least 6 weeks. He will follow up in the office in 1 week.       MD JERONIMO [email or web address]  D:  02/23/2023 10:04  T:  02/23/2023 13:31  JOB #:  [id number]

## 2023-02-23 NOTE — ANESTHESIA PRE PROCEDURE
Department of Anesthesiology  Preprocedure Note       Name:  Jagruti Arias   Age:  63 y.o.  :  1960                                          MRN:  [id number]         Date:  2023      Surgeon: Julia Dunbar):  Deshaun Ramos MD    Procedure: Procedure(s):  PERCUTANEOUS ACHILLES TENOTOMY  LEFT FOOT  APPLICATION OF PURAPLY AM  pt has a pacemaker    Medications prior to admission:   Prior to Admission medications    Medication Sig Start Date End Date Taking?  Authorizing Provider   acetaminophen (TYLENOL) 500 MG tablet Take 1,000 mg by mouth every 6 hours as needed for Pain   Yes Historical Provider, MD   ezetimibe (ZETIA) 10 MG tablet Take 10 mg by mouth nightly   Yes Historical Provider, MD   amLODIPine (NORVASC) 10 MG tablet Take 1 tablet by mouth daily 22   JHON Bejarano NP   DULoxetine (CYMBALTA) 60 MG extended release capsule Take 1 capsule by mouth daily 22   JHON Bejarano - ANGELLA   metoprolol succinate (TOPROL XL) 50 MG extended release tablet Take 1 tablet by mouth daily 22   JHON Bejarano - NP   zolpidem (AMBIEN) 10 MG tablet Take 1 tablet by mouth nightly as needed for Sleep. 22  JHON Bejarano NP   irbesartan (AVAPRO) 300 MG tablet Take 1 tablet by mouth daily 22   JHON Bejarano NP   HUMALOG KWIKPEN 100 UNIT/ML SOPN 17 units before breakfast and lunch, 25 units before supper plus correction  >150, max daily dose 80 units 22   Suad Galo PA-C   LANTUS SOLOSTAR 100 UNIT/ML injection pen Inject 40 Units into the skin daily 12/21/22 3/21/23  Taylor Galo PA-C   rosuvastatin (CRESTOR) 20 MG tablet TAKE 1 TABLET BY MOUTH AT  NIGHT 11/10/22   Deyvi Faye MD   aspirin 81 MG chewable tablet Take 81 mg by mouth daily    Ar Automatic Reconciliation   omeprazole (PRILOSEC) 20 MG delayed release capsule Take 20 mg by mouth nightly as needed    Ar Automatic Reconciliation       Current medications:    Current Facility-Administered Medications   Medication Dose Route Frequency Provider Last Rate Last Admin    lidocaine 1 % injection 1 mL  1 mL IntraDERmal Once PRN Leo Samuel MD        fentaNYL (SUBLIMAZE) injection 100 mcg  100 mcg IntraVENous Once PRN Leo Samuel MD        lactated ringers IV soln infusion   IntraVENous Continuous Leo Samuel  mL/hr at 02/23/23 0710 100 mL/hr at 02/23/23 0710    sodium chloride flush 0.9 % injection 5-40 mL  5-40 mL IntraVENous 2 times per day Leo Samuel MD        sodium chloride flush 0.9 % injection 5-40 mL  5-40 mL IntraVENous PRN Leo Samuel MD        0.9 % sodium chloride infusion   IntraVENous PRN Leo Samuel MD        midazolam PF (VERSED) injection 2 mg  2 mg IntraVENous Once PRN Leo Samuel MD        ceFAZolin (ANCEF) 2000 mg in sterile water 20 mL IV syringe  2,000 mg IntraVENous Once Erica Sanz MD           Allergies:  No Known Allergies    Problem List:    Patient Active Problem List   Diagnosis Code    H/O heart valve replacement with bioprosthetic valve Z95.3    PVD (peripheral vascular disease) ([facility] I73.9    Postsurgical aortocoronary bypass status Z95.1    CAD (coronary artery disease) I25.10    Preop cardiovascular exam Z01.810    CKD (chronic kidney disease), stage III ([facility] N18.30    Depression F32. A    Hypertension I10    Dyslipidemia E78.5    Type 2 diabetes with nephropathy ([facility]) E11.21    Class 2 severe obesity due to excess calories with serious comorbidity and body mass index (BMI) of 35.0 to 35.9 in adult ([facility]) E66.01, Z68.35    DM (diabetes mellitus), type 2, uncontrolled [id number]    Aortic stenosis I35.0    Bicuspid aortic valve Q23.1    Status post amputation of toe of left foot ([facility]) H78.148    Persistent proteinuria R80.1    Recurrent major depressive disorder, in full remission [facility] F33.42       Past Medical History:        Diagnosis Date    Acute on chronic diastolic heart failure ([facility] 2/28/2011    followed by dr Dolly Finnegan 2011    pacemaker Medtronic-- followed by [facility] cardio    Atherosclerotic PVD with ulceration ([facility] 2/2/2015    BMI 38.0-38.9,adult     BMI 45    CAD (coronary artery disease) 02/28/2011     3 v CABG; aortic valve replacement/Bovine-- both at same time-- followed by dr Kvng Brian Chronic kidney disease     chronic renal insuff. --- does not see a nephrologist     Chronic pain     left foot / PVD and partial amputation    Contracture of left ankle     Depression 2/28/2011    Diabetic neuropathy ([facility]     Foot amputation status, left     Left foot with 2 toes amputated and portion of Left foot    GERD (gastroesophageal reflux disease)     controlled with medication    GERD (gastroesophageal reflux disease)     managed with PRN Prilosec    H/O aortic valve replacement 2/2011    Bovine valve    Heart failure ([facility]     Acute on Chronic HF- 1/21/19 Echo LVEF 60-65%    History of penile implant     currently in place    History of shingles 06/2015    Hx of CABG     3 vessel    Hypercholesterolemia     Hypertension     controlled with med    Murmur, cardiac 02/2011    aortic valve replaced with CABG, 2011------- 2/6 LOVE RUSB apex, 2/6 TR murmur-- ECHO 9/13/17LVEF 55-60%     Pacemaker     Left chest Medtronic pacemaker    PAD (peripheral artery disease) (HCC)     Type 2 diabetes mellitus (HCC)     insulin reliant/ FBS avg-:100-120/ - A1C:7.1 per pt -- hypo s/s below 50       Past Surgical History:        Procedure Laterality Date    AMPUTATION Left 2016    amputation/ parial foot / Left-- required 2 surgeries    AORTIC VALVE REPLACEMENT  2011    CABG, ARTERY-VEIN, THREE  2011    Dr. Ana Valencia  3 vessel cabg and (bovine) aortic valve replacement    CARDIAC CATHETERIZATION  03/07/2011    CATARACT REMOVAL Bilateral 2018    with lens implants    PACEMAKER  03/11/2011    Medtronic dual-chamber per cath report L chest- does not have card    SKIN GRAFT Left 8/11/15    thigh to foot graft    UROLOGICAL SURGERY  2005    Penile Impant    VASCULAR SURGERY  01/17/2019    arteriogram    VASCULAR SURGERY Left 6/5/15    wound debridement & drainage of forefoot abscess    VASCULAR SURGERY Left 6/2/15    5th toe amp    VASCULAR SURGERY Left 3-16-15    fem-tibial by-pass    VASCULAR SURGERY  2/17/15    LE arteriogram       Social History:    Social History     Tobacco Use    Smoking status: Never    Smokeless tobacco: Never   Substance Use Topics    Alcohol use: No                                Counseling given: Not Answered      Vital Signs (Current):   Vitals:    02/13/23 0948 02/23/23 0642 02/23/23 0643   BP:  (!) 142/78    Pulse:  (!) 107    Resp:  18    Temp:  98.3 °F (36.8 °C)    TempSrc:  Oral    SpO2:  95%    Weight: 250 lb (113.4 kg)     Height: 5' 8\" (1.727 m)  5' 8\" (1.727 m)                                              BP Readings from Last 3 Encounters:   02/23/23 (!) 142/78   02/22/23 112/70   12/28/22 124/75       NPO Status: Time of last liquid consumption: 2100                        Time of last solid consumption: 2100                        Date of last liquid consumption: 02/22/23                        Date of last solid food consumption: 02/22/23    BMI:   Wt Readings from Last 3 Encounters:   02/13/23 250 lb (113.4 kg)   02/22/23 225 lb (102.1 kg)   01/16/23 227 lb (103 kg)     Body mass index is 34.21 kg/m².     CBC: No results found for: WBC, RBC, HGB, HCT, MCV, RDW, PLT    CMP:   Lab Results   Component Value Date/Time     12/28/2022 04:06 PM    K 5.0 12/28/2022 04:06 PM     12/28/2022 04:06 PM    CO2 24 12/28/2022 04:06 PM    BUN 22 12/28/2022 04:06 PM    CREATININE 1.80 12/28/2022 04:06 PM    GFRAA 50 01/26/2022 10:50 AM    AGRATIO 1.8 01/26/2022 10:50 AM    LABGLOM 43 12/28/2022 04:06 PM    GLUCOSE 241 12/28/2022 04:06 PM    PROT 6.8 12/28/2022 04:06 PM    CALCIUM 8.9 12/28/2022 04:06 PM    BILITOT 0.5 12/28/2022 04:06 PM    ALKPHOS 102 12/28/2022 04:06 PM    ALKPHOS 118 01/26/2022 10:50 AM    AST 16 12/28/2022 04:06 PM    ALT 22 12/28/2022 04:06 PM       POC Tests:   Recent Labs     02/23/23  0706   POCGLU 180*       Coags: No results found for: PROTIME, INR, APTT    HCG (If Applicable): No results found for: PREGTESTUR, PREGSERUM, HCG, HCGQUANT     ABGs: No results found for: PHART, PO2ART, BNW0TUN, RUS4VMG, BEART, B6GRFING     Type & Screen (If Applicable):  No results found for: LABABO, LABRH    Drug/Infectious Status (If Applicable):  No results found for: HIV, HEPCAB    COVID-19 Screening (If Applicable): No results found for: COVID19        Anesthesia Evaluation    Airway: Mallampati: II  TM distance: >3 FB   Neck ROM: full  Mouth opening: > = 3 FB   Dental: normal exam     Comment: Several chipped teeth    Pulmonary:normal exam  breath sounds clear to auscultation                             Cardiovascular:  Exercise tolerance: poor (<4 METS), Ambulates unassisted  (+) hypertension:, valvular problems/murmurs:, pacemaker (not dependent, 2011): pacemaker, CAD:, CABG/stent (CABG/AVR 2011):, murmur: Grade 2, Aortic,         Rhythm: regular  Rate: normal                 ROS comment: Pacer interrogation 1/22:  Preliminary Impression: Normal dc ppm function. No arrhythmias. AP 26.5%     0.6%. Echo 7/21:  LV: Estimated LVEF is 60 - 65%. Normal cavity size, wall thickness, systolic function (ejection fraction normal) and diastolic function. · RV: Pacing wire present  · AV: Prosthetic aortic valve. There is a 21 mm Pereira bioprosthetic aortic valve. · MV: Mitral annular calcification. Neuro/Psych:   (+) depression/anxiety              ROS comment: neuropathy GI/Hepatic/Renal:   (+) GERD: well controlled, renal disease: CRI,           Endo/Other:    (+) DiabetesType II DM, using insulin, .                  Abdominal:             Vascular:   + PVD, aortic or cerebral, .       Other Findings:           Anesthesia Plan      general     ASA 3     (Felt optimized per cardiologist yesterday.)  Induction: intravenous. Anesthetic plan and risks discussed with patient and spouse.                         Jimmy Connell MD   2/23/2023

## 2023-03-23 DIAGNOSIS — Z95.0 PACEMAKER: Primary | ICD-10-CM

## 2023-03-23 DIAGNOSIS — R00.1 BRADYCARDIA: ICD-10-CM

## 2023-04-18 ENCOUNTER — OFFICE VISIT (OUTPATIENT)
Dept: ENDOCRINOLOGY | Age: 63
End: 2023-04-18

## 2023-04-18 VITALS
BODY MASS INDEX: 34.97 KG/M2 | DIASTOLIC BLOOD PRESSURE: 78 MMHG | SYSTOLIC BLOOD PRESSURE: 122 MMHG | HEART RATE: 79 BPM | OXYGEN SATURATION: 98 % | WEIGHT: 230 LBS

## 2023-04-18 DIAGNOSIS — Z89.422 ACQUIRED ABSENCE OF OTHER LEFT TOE(S) (HCC): ICD-10-CM

## 2023-04-18 DIAGNOSIS — E78.5 HYPERLIPIDEMIA ASSOCIATED WITH TYPE 2 DIABETES MELLITUS (HCC): ICD-10-CM

## 2023-04-18 DIAGNOSIS — I10 ESSENTIAL (PRIMARY) HYPERTENSION: ICD-10-CM

## 2023-04-18 DIAGNOSIS — N18.30 STAGE 3 CHRONIC KIDNEY DISEASE, UNSPECIFIED WHETHER STAGE 3A OR 3B CKD (HCC): ICD-10-CM

## 2023-04-18 DIAGNOSIS — E11.69 HYPERLIPIDEMIA ASSOCIATED WITH TYPE 2 DIABETES MELLITUS (HCC): ICD-10-CM

## 2023-04-18 DIAGNOSIS — I25.10 ATHEROSCLEROSIS OF NATIVE CORONARY ARTERY OF NATIVE HEART WITHOUT ANGINA PECTORIS: ICD-10-CM

## 2023-04-18 DIAGNOSIS — E11.21 TYPE 2 DIABETES WITH NEPHROPATHY (HCC): Primary | ICD-10-CM

## 2023-04-18 LAB — HBA1C MFR BLD: 7.1 %

## 2023-04-18 RX ORDER — INSULIN GLARGINE 100 [IU]/ML
45 INJECTION, SOLUTION SUBCUTANEOUS DAILY
Qty: 36 ML | Refills: 3 | Status: SHIPPED | OUTPATIENT
Start: 2023-04-18 | End: 2023-07-17

## 2023-04-18 RX ORDER — INSULIN LISPRO 100 [IU]/ML
INJECTION, SOLUTION INTRAVENOUS; SUBCUTANEOUS
Qty: 75 ML | Refills: 3 | Status: SHIPPED | OUTPATIENT
Start: 2023-04-18

## 2023-04-18 RX ORDER — DAPAGLIFLOZIN 10 MG/1
10 TABLET, FILM COATED ORAL EVERY MORNING
Qty: 90 TABLET | Refills: 3 | Status: SHIPPED | OUTPATIENT
Start: 2023-04-18

## 2023-04-18 NOTE — PROGRESS NOTES
09/17/2021      Cr 1.74, GFR 43                                  01/26/2022      Cr 1.71, GFR 43, microalbumin/Cr ratio 229                         avapro increased by nephro, would like pt on SGLT2i but this is cost prohibitive                                     Lipids:     Current therapy ezetimibe - 10 MG  rosuvastatin - 20 MG with good compliance    01/13/2021  TC- 182, LDL- 119, VLDL- 21,  HDL- 42, TG- 113                           09/17/2021  TC- 94, LDL- 13.2, VLDL- 43.8,  HDL- 37, TG- 219    02/22/2023  TC- 112, LDL- 51.4, VLDL- 19.6,  HDL- 41, TG- 98          Lab Results   Component Value Date    CHOL 112 02/22/2023    CHOL 94 09/17/2021    CHOL 114 05/28/2021     Lab Results   Component Value Date    LDLCALC 51.4 02/22/2023    LDLCALC 13.2 09/17/2021    LDLCALC 52 05/28/2021      Lab Results   Component Value Date    LABVLDL 19.6 02/22/2023    LABVLDL 43.8 (H) 09/17/2021    LABVLDL 20 06/22/2020    VLDL 21 05/28/2021    VLDL 21 01/13/2021      Lab Results   Component Value Date    HDL 41 02/22/2023    HDL 37 (L) 09/17/2021    HDL 41 05/28/2021      Lab Results   Component Value Date    HDL 41 02/22/2023    HDL 37 (L) 09/17/2021    HDL 41 05/28/2021      Lab Results   Component Value Date    TRIG 98 02/22/2023    TRIG 219 (H) 09/17/2021    TRIG 114 05/28/2021     Lab Results   Component Value Date    CHOLHDLRATIO 2.7 02/22/2023    CHOLHDLRATIO 2.5 09/17/2021    CHOLHDLRATIO 3.9 02/24/2020         Hemoglobin A1c:               06/22/2020      8.2%               01/13/2021      9.6%               04/07/2021      6.9%               10/14/2021      6.9%            01/26/2022      7.2%      12/21/2022 7.1%    04/18/2023 7.1%  Hemoglobin A1C, POC   Date Value Ref Range Status   04/18/2023 7.1 % Final   12/21/2022 7.1 % Final   05/03/2022 7.0 % Final        Thyroid:     08/14/2019 4.030   05/28/2021 4.750, free T4 1.20   09/23/2021 1.65   01/26/2022 4.650, free T4 1.25     Lab Results

## 2023-06-28 ENCOUNTER — OFFICE VISIT (OUTPATIENT)
Dept: FAMILY MEDICINE CLINIC | Facility: CLINIC | Age: 63
End: 2023-06-28
Payer: MEDICARE

## 2023-06-28 VITALS
WEIGHT: 234 LBS | HEIGHT: 68 IN | DIASTOLIC BLOOD PRESSURE: 80 MMHG | BODY MASS INDEX: 35.46 KG/M2 | TEMPERATURE: 97.2 F | HEART RATE: 76 BPM | SYSTOLIC BLOOD PRESSURE: 113 MMHG | OXYGEN SATURATION: 97 %

## 2023-06-28 DIAGNOSIS — F51.01 PRIMARY INSOMNIA: ICD-10-CM

## 2023-06-28 DIAGNOSIS — E66.01 SEVERE OBESITY (BMI 35.0-39.9) WITH COMORBIDITY (HCC): ICD-10-CM

## 2023-06-28 DIAGNOSIS — E11.21 TYPE 2 DIABETES WITH NEPHROPATHY (HCC): ICD-10-CM

## 2023-06-28 DIAGNOSIS — F41.9 ANXIETY: ICD-10-CM

## 2023-06-28 DIAGNOSIS — N18.30 STAGE 3 CHRONIC KIDNEY DISEASE, UNSPECIFIED WHETHER STAGE 3A OR 3B CKD (HCC): ICD-10-CM

## 2023-06-28 DIAGNOSIS — F41.9 ANXIETY: Primary | ICD-10-CM

## 2023-06-28 DIAGNOSIS — I10 PRIMARY HYPERTENSION: ICD-10-CM

## 2023-06-28 DIAGNOSIS — F33.42 RECURRENT MAJOR DEPRESSIVE DISORDER, IN FULL REMISSION (HCC): ICD-10-CM

## 2023-06-28 DIAGNOSIS — I10 ESSENTIAL (PRIMARY) HYPERTENSION: ICD-10-CM

## 2023-06-28 PROCEDURE — 3074F SYST BP LT 130 MM HG: CPT | Performed by: NURSE PRACTITIONER

## 2023-06-28 PROCEDURE — 99214 OFFICE O/P EST MOD 30 MIN: CPT | Performed by: NURSE PRACTITIONER

## 2023-06-28 PROCEDURE — 3079F DIAST BP 80-89 MM HG: CPT | Performed by: NURSE PRACTITIONER

## 2023-06-28 RX ORDER — AMLODIPINE BESYLATE 10 MG/1
10 TABLET ORAL DAILY
Qty: 90 TABLET | Refills: 1 | Status: SHIPPED | OUTPATIENT
Start: 2023-06-28

## 2023-06-28 RX ORDER — DULOXETIN HYDROCHLORIDE 60 MG/1
60 CAPSULE, DELAYED RELEASE ORAL DAILY
Qty: 90 CAPSULE | Refills: 1 | Status: SHIPPED | OUTPATIENT
Start: 2023-06-28

## 2023-06-28 RX ORDER — ZOLPIDEM TARTRATE 10 MG/1
10 TABLET ORAL NIGHTLY PRN
Qty: 30 TABLET | Refills: 5 | Status: SHIPPED | OUTPATIENT
Start: 2023-06-28 | End: 2024-06-27

## 2023-06-28 RX ORDER — BUSPIRONE HYDROCHLORIDE 5 MG/1
5 TABLET ORAL 2 TIMES DAILY
Qty: 60 TABLET | Refills: 0 | Status: SHIPPED | OUTPATIENT
Start: 2023-06-28 | End: 2023-07-28

## 2023-06-28 RX ORDER — METOPROLOL SUCCINATE 50 MG/1
50 TABLET, EXTENDED RELEASE ORAL DAILY
Qty: 90 TABLET | Refills: 1 | Status: SHIPPED | OUTPATIENT
Start: 2023-06-28

## 2023-06-28 RX ORDER — IRBESARTAN 300 MG/1
300 TABLET ORAL DAILY
Qty: 90 TABLET | Refills: 1 | Status: SHIPPED | OUTPATIENT
Start: 2023-06-28

## 2023-06-28 SDOH — ECONOMIC STABILITY: HOUSING INSECURITY
IN THE LAST 12 MONTHS, WAS THERE A TIME WHEN YOU DID NOT HAVE A STEADY PLACE TO SLEEP OR SLEPT IN A SHELTER (INCLUDING NOW)?: NO

## 2023-06-28 SDOH — ECONOMIC STABILITY: FOOD INSECURITY: WITHIN THE PAST 12 MONTHS, THE FOOD YOU BOUGHT JUST DIDN'T LAST AND YOU DIDN'T HAVE MONEY TO GET MORE.: NEVER TRUE

## 2023-06-28 SDOH — ECONOMIC STABILITY: FOOD INSECURITY: WITHIN THE PAST 12 MONTHS, YOU WORRIED THAT YOUR FOOD WOULD RUN OUT BEFORE YOU GOT MONEY TO BUY MORE.: NEVER TRUE

## 2023-06-28 SDOH — ECONOMIC STABILITY: INCOME INSECURITY: HOW HARD IS IT FOR YOU TO PAY FOR THE VERY BASICS LIKE FOOD, HOUSING, MEDICAL CARE, AND HEATING?: NOT HARD AT ALL

## 2023-06-28 ASSESSMENT — PATIENT HEALTH QUESTIONNAIRE - PHQ9
1. LITTLE INTEREST OR PLEASURE IN DOING THINGS: 1
6. FEELING BAD ABOUT YOURSELF - OR THAT YOU ARE A FAILURE OR HAVE LET YOURSELF OR YOUR FAMILY DOWN: 3
9. THOUGHTS THAT YOU WOULD BE BETTER OFF DEAD, OR OF HURTING YOURSELF: 0
10. IF YOU CHECKED OFF ANY PROBLEMS, HOW DIFFICULT HAVE THESE PROBLEMS MADE IT FOR YOU TO DO YOUR WORK, TAKE CARE OF THINGS AT HOME, OR GET ALONG WITH OTHER PEOPLE: 1
SUM OF ALL RESPONSES TO PHQ9 QUESTIONS 1 & 2: 3
7. TROUBLE CONCENTRATING ON THINGS, SUCH AS READING THE NEWSPAPER OR WATCHING TELEVISION: 0
SUM OF ALL RESPONSES TO PHQ QUESTIONS 1-9: 7
SUM OF ALL RESPONSES TO PHQ QUESTIONS 1-9: 7
5. POOR APPETITE OR OVEREATING: 1
SUM OF ALL RESPONSES TO PHQ QUESTIONS 1-9: 7
2. FEELING DOWN, DEPRESSED OR HOPELESS: 2
3. TROUBLE FALLING OR STAYING ASLEEP: 0
8. MOVING OR SPEAKING SO SLOWLY THAT OTHER PEOPLE COULD HAVE NOTICED. OR THE OPPOSITE, BEING SO FIGETY OR RESTLESS THAT YOU HAVE BEEN MOVING AROUND A LOT MORE THAN USUAL: 0
4. FEELING TIRED OR HAVING LITTLE ENERGY: 0
SUM OF ALL RESPONSES TO PHQ QUESTIONS 1-9: 7

## 2023-06-28 ASSESSMENT — ENCOUNTER SYMPTOMS: SHORTNESS OF BREATH: 0

## 2023-06-29 ENCOUNTER — TELEPHONE (OUTPATIENT)
Dept: FAMILY MEDICINE CLINIC | Facility: CLINIC | Age: 63
End: 2023-06-29

## 2023-06-29 LAB
ALBUMIN SERPL-MCNC: 3.7 G/DL (ref 3.2–4.6)
ALBUMIN/GLOB SERPL: 1.1 (ref 0.4–1.6)
ALP SERPL-CCNC: 118 U/L (ref 50–136)
ALT SERPL-CCNC: 24 U/L (ref 12–65)
ANION GAP SERPL CALC-SCNC: 9 MMOL/L (ref 2–11)
AST SERPL-CCNC: 14 U/L (ref 15–37)
BILIRUB SERPL-MCNC: 0.5 MG/DL (ref 0.2–1.1)
BUN SERPL-MCNC: 35 MG/DL (ref 8–23)
CALCIUM SERPL-MCNC: 9 MG/DL (ref 8.3–10.4)
CHLORIDE SERPL-SCNC: 103 MMOL/L (ref 101–110)
CO2 SERPL-SCNC: 21 MMOL/L (ref 21–32)
CREAT SERPL-MCNC: 2 MG/DL (ref 0.8–1.5)
GLOBULIN SER CALC-MCNC: 3.4 G/DL (ref 2.8–4.5)
GLUCOSE SERPL-MCNC: 166 MG/DL (ref 65–100)
POTASSIUM SERPL-SCNC: 4.8 MMOL/L (ref 3.5–5.1)
PROT SERPL-MCNC: 7.1 G/DL (ref 6.3–8.2)
SODIUM SERPL-SCNC: 133 MMOL/L (ref 133–143)
TSH, 3RD GENERATION: 1.48 UIU/ML (ref 0.36–3.74)

## 2023-07-19 LAB — DIABETIC RETINOPATHY: POSITIVE

## 2023-07-24 ENCOUNTER — TELEMEDICINE (OUTPATIENT)
Dept: FAMILY MEDICINE CLINIC | Facility: CLINIC | Age: 63
End: 2023-07-24
Payer: MEDICARE

## 2023-07-24 DIAGNOSIS — F41.9 ANXIETY: ICD-10-CM

## 2023-07-24 PROCEDURE — 99213 OFFICE O/P EST LOW 20 MIN: CPT | Performed by: NURSE PRACTITIONER

## 2023-07-24 RX ORDER — BUSPIRONE HYDROCHLORIDE 5 MG/1
5 TABLET ORAL 2 TIMES DAILY
Qty: 180 TABLET | Refills: 1 | Status: SHIPPED | OUTPATIENT
Start: 2023-07-24 | End: 2024-01-20

## 2023-07-24 NOTE — PROGRESS NOTES
Drake Dailey (:  1960) is a Established patient, presenting virtually for evaluation of the following:  anxiety  Assessment & Plan   Below is the assessment and plan developed based on review of pertinent history, physical exam, labs, studies, and medications. 1. Anxiety  The following orders have not been finalized:  -     busPIRone (BUSPAR) 5 MG tablet  Refilled med as is effective . Urged to follow up with his nephrologist for his renal function. Follow u pin 6 months sooner if needed  No follow-ups on file. Subjective   HPI  Review of Systems   He s doing well the buspar has really helped his anxiety. HE is getting out now. He has gone back to the [employer] classes    Objective   Patient-Reported Vitals  No data recorded     Physical Exam     He is doing well the CGM is helping him keep his DM better controlled    Mood much better socializing again    Drake Dailey, was evaluated through a synchronous (real-time) audio-video encounter. The patient (or guardian if applicable) is aware that this is a billable service, which includes applicable co-pays. This Virtual Visit was conducted with patient's (and/or legal guardian's) consent. Patient identification was verified, and a caregiver was present when appropriate.    The patient was located at Home: [address] [place] [address] [address] [facility]  Provider was located at [facility] (Appt Dept): [address] [facility],  [address] [place] [address]         --JHON Varma NP

## 2023-08-28 DIAGNOSIS — E11.69 HYPERLIPIDEMIA ASSOCIATED WITH TYPE 2 DIABETES MELLITUS (HCC): ICD-10-CM

## 2023-08-28 DIAGNOSIS — E11.21 TYPE 2 DIABETES WITH NEPHROPATHY (HCC): ICD-10-CM

## 2023-08-28 DIAGNOSIS — N18.30 STAGE 3 CHRONIC KIDNEY DISEASE, UNSPECIFIED WHETHER STAGE 3A OR 3B CKD (HCC): ICD-10-CM

## 2023-08-28 DIAGNOSIS — E78.5 HYPERLIPIDEMIA ASSOCIATED WITH TYPE 2 DIABETES MELLITUS (HCC): ICD-10-CM

## 2023-08-28 LAB
BASOPHILS # BLD: 0.1 K/UL (ref 0–0.2)
BASOPHILS NFR BLD: 1 % (ref 0–2)
DIFFERENTIAL METHOD BLD: NORMAL
EOSINOPHIL # BLD: 0.5 K/UL (ref 0–0.8)
EOSINOPHIL NFR BLD: 6 % (ref 0.5–7.8)
ERYTHROCYTE [DISTWIDTH] IN BLOOD BY AUTOMATED COUNT: 13.1 % (ref 11.9–14.6)
HCT VFR BLD AUTO: 44.9 % (ref 41.1–50.3)
HGB BLD-MCNC: 14.9 G/DL (ref 13.6–17.2)
IMM GRANULOCYTES # BLD AUTO: 0 K/UL (ref 0–0.5)
IMM GRANULOCYTES NFR BLD AUTO: 1 % (ref 0–5)
LYMPHOCYTES # BLD: 2.3 K/UL (ref 0.5–4.6)
LYMPHOCYTES NFR BLD: 27 % (ref 13–44)
MCH RBC QN AUTO: 30.1 PG (ref 26.1–32.9)
MCHC RBC AUTO-ENTMCNC: 33.2 G/DL (ref 31.4–35)
MCV RBC AUTO: 90.7 FL (ref 82–102)
MONOCYTES # BLD: 0.7 K/UL (ref 0.1–1.3)
MONOCYTES NFR BLD: 8 % (ref 4–12)
NEUTS SEG # BLD: 4.9 K/UL (ref 1.7–8.2)
NEUTS SEG NFR BLD: 57 % (ref 43–78)
NRBC # BLD: 0 K/UL (ref 0–0.2)
PLATELET # BLD AUTO: 174 K/UL (ref 150–450)
PMV BLD AUTO: 11.6 FL (ref 9.4–12.3)
RBC # BLD AUTO: 4.95 M/UL (ref 4.23–5.6)
WBC # BLD AUTO: 8.6 K/UL (ref 4.3–11.1)

## 2023-08-29 ENCOUNTER — OFFICE VISIT (OUTPATIENT)
Dept: ENDOCRINOLOGY | Age: 63
End: 2023-08-29
Payer: MEDICARE

## 2023-08-29 VITALS
HEART RATE: 86 BPM | SYSTOLIC BLOOD PRESSURE: 111 MMHG | WEIGHT: 227.6 LBS | OXYGEN SATURATION: 98 % | BODY MASS INDEX: 34.61 KG/M2 | DIASTOLIC BLOOD PRESSURE: 65 MMHG

## 2023-08-29 DIAGNOSIS — I10 ESSENTIAL (PRIMARY) HYPERTENSION: ICD-10-CM

## 2023-08-29 DIAGNOSIS — Z89.422 ACQUIRED ABSENCE OF OTHER LEFT TOE(S) (HCC): ICD-10-CM

## 2023-08-29 DIAGNOSIS — I25.10 ATHEROSCLEROSIS OF NATIVE CORONARY ARTERY OF NATIVE HEART WITHOUT ANGINA PECTORIS: ICD-10-CM

## 2023-08-29 DIAGNOSIS — E11.21 TYPE 2 DIABETES WITH NEPHROPATHY (HCC): Primary | ICD-10-CM

## 2023-08-29 DIAGNOSIS — E11.69 HYPERLIPIDEMIA ASSOCIATED WITH TYPE 2 DIABETES MELLITUS (HCC): ICD-10-CM

## 2023-08-29 DIAGNOSIS — N18.30 STAGE 3 CHRONIC KIDNEY DISEASE, UNSPECIFIED WHETHER STAGE 3A OR 3B CKD (HCC): ICD-10-CM

## 2023-08-29 DIAGNOSIS — E78.5 HYPERLIPIDEMIA ASSOCIATED WITH TYPE 2 DIABETES MELLITUS (HCC): ICD-10-CM

## 2023-08-29 LAB
ALBUMIN SERPL-MCNC: 3.9 G/DL (ref 3.2–4.6)
ALBUMIN/GLOB SERPL: 1.3 (ref 0.4–1.6)
ALP SERPL-CCNC: 127 U/L (ref 50–136)
ALT SERPL-CCNC: 18 U/L (ref 12–65)
ANION GAP SERPL CALC-SCNC: 9 MMOL/L (ref 2–11)
AST SERPL-CCNC: 14 U/L (ref 15–37)
BILIRUB SERPL-MCNC: 0.7 MG/DL (ref 0.2–1.1)
BUN SERPL-MCNC: 26 MG/DL (ref 8–23)
CALCIUM SERPL-MCNC: 9.2 MG/DL (ref 8.3–10.4)
CHLORIDE SERPL-SCNC: 103 MMOL/L (ref 101–110)
CHOLEST SERPL-MCNC: 101 MG/DL
CO2 SERPL-SCNC: 22 MMOL/L (ref 21–32)
CREAT SERPL-MCNC: 1.9 MG/DL (ref 0.8–1.5)
CREAT UR-MCNC: 94 MG/DL
EST. AVERAGE GLUCOSE BLD GHB EST-MCNC: 160 MG/DL
GLOBULIN SER CALC-MCNC: 3 G/DL (ref 2.8–4.5)
GLUCOSE SERPL-MCNC: 177 MG/DL (ref 65–100)
HBA1C MFR BLD: 7.2 % (ref 4.8–5.6)
HDLC SERPL-MCNC: 38 MG/DL (ref 40–60)
HDLC SERPL: 2.7
LDLC SERPL CALC-MCNC: 40 MG/DL
MICROALBUMIN UR-MCNC: 19.8 MG/DL
MICROALBUMIN/CREAT UR-RTO: 211 MG/G (ref 0–30)
POTASSIUM SERPL-SCNC: 4.9 MMOL/L (ref 3.5–5.1)
PROT SERPL-MCNC: 6.9 G/DL (ref 6.3–8.2)
SODIUM SERPL-SCNC: 134 MMOL/L (ref 133–143)
TRIGL SERPL-MCNC: 115 MG/DL (ref 35–150)
TSH W FREE THYROID IF ABNORMAL: 2.08 UIU/ML (ref 0.36–3.74)
VLDLC SERPL CALC-MCNC: 23 MG/DL (ref 6–23)

## 2023-08-29 PROCEDURE — 95251 CONT GLUC MNTR ANALYSIS I&R: CPT | Performed by: PHYSICIAN ASSISTANT

## 2023-08-29 PROCEDURE — 99215 OFFICE O/P EST HI 40 MIN: CPT | Performed by: PHYSICIAN ASSISTANT

## 2023-08-29 PROCEDURE — 3074F SYST BP LT 130 MM HG: CPT | Performed by: PHYSICIAN ASSISTANT

## 2023-08-29 PROCEDURE — 3078F DIAST BP <80 MM HG: CPT | Performed by: PHYSICIAN ASSISTANT

## 2023-08-29 PROCEDURE — 3051F HG A1C>EQUAL 7.0%<8.0%: CPT | Performed by: PHYSICIAN ASSISTANT

## 2023-08-29 RX ORDER — INSULIN LISPRO 100 [IU]/ML
INJECTION, SOLUTION INTRAVENOUS; SUBCUTANEOUS
Qty: 75 ML | Refills: 3 | Status: SHIPPED | OUTPATIENT
Start: 2023-08-29

## 2023-08-29 RX ORDER — GLUCAGON INJECTION, SOLUTION 1 MG/.2ML
1 INJECTION, SOLUTION SUBCUTANEOUS PRN
Qty: 2 EACH | Refills: 1 | Status: SHIPPED | OUTPATIENT
Start: 2023-08-29

## 2023-08-29 RX ORDER — INSULIN GLARGINE 100 [IU]/ML
45 INJECTION, SOLUTION SUBCUTANEOUS DAILY
Qty: 40.5 ML | Refills: 0 | Status: SHIPPED | OUTPATIENT
Start: 2023-08-29 | End: 2023-11-27

## 2023-08-30 ENCOUNTER — TELEPHONE (OUTPATIENT)
Dept: DIABETES SERVICES | Age: 63
End: 2023-08-30

## 2023-08-30 ENCOUNTER — NURSE ONLY (OUTPATIENT)
Age: 63
End: 2023-08-30

## 2023-08-30 DIAGNOSIS — I44.2 CHB (COMPLETE HEART BLOCK) (HCC): Primary | ICD-10-CM

## 2023-08-30 NOTE — TELEPHONE ENCOUNTER
Called about Diabetes Education. He wants me to call him back this afternoon. Asked him to call us back when convenient and provided phone number.   Let him know it is at no cost.

## 2023-09-10 ASSESSMENT — ENCOUNTER SYMPTOMS
SORE THROAT: 0
ABDOMINAL DISTENTION: 0
ABDOMINAL PAIN: 0
PHOTOPHOBIA: 0
DIARRHEA: 0
SHORTNESS OF BREATH: 0
COUGH: 0
CONSTIPATION: 0

## 2023-09-10 NOTE — PROGRESS NOTES
[facility] CARDIOLOGY  [address] [address]  PHONE: [phone]        NAME:  Elizabeth Cameron  : 1960  MRN: [id number]     PCP:  JHON Swanson NP      SUBJECTIVE:   Elizabeth Cameron is a 63 y.o. male seen for a follow up visit regarding the following:     Chief Complaint   Patient presents with    Follow-up     6 months    Results     echo       HPI:  Doing well since last visit without interval angina, CHF, palpitations,presyncope or syncope. He has mild night time dependent ankle edema on 10mg norvasc but no edema in AM and no other CHF symptoms. BP is low today but he is clinically asymptomatic. Adjusting meds, see below. Pacer interrogation and ECG stable. Emphasized increasing diet/exercise (once healed up) with goal of 15 to 20 pound weight loss by summer. Pacer site and interrogation look good today. See official report, reviewed by me this morning. Echo 2023:  Left Ventricle Normal left ventricular systolic function with a visually estimated EF of 55 - 60%. Left ventricle size is normal. Moderately increased wall thickness. Normal wall motion. Abnormal diastolic function. Left Atrium Left atrium is moderately dilated. Right Ventricle Right ventricle size is normal. RV basal diameter is 3.8 cm. RV mid diameter is 3.2 cm. Normal systolic function. Right Atrium Right atrium is mildly dilated. Aortic Valve Not well visualized. No regurgitation. AV mean gradient is 17 mmHg. AV peak gradient is 29 mmHg. AV peak velocity is 2.7 m/s. AV AT is 92.00 ms. LVOT:AV VTI Index is 0.39. AV area by continuity VTI is 1.2 cm2. AV Stroke Volume index is 36.0 mL/m2. 21 mm Pereira Magna Bovine 2011   Mitral Valve Mild annular calcification of the mitral valve. Mild regurgitation. Mild stenosis noted. Tricuspid Valve Not well visualized. Mild regurgitation. No stenosis noted. The estimated RVSP is 26 mmHg.    Pulmonic Valve The pulmonic

## 2023-09-11 ENCOUNTER — FOLLOWUP TELEPHONE ENCOUNTER (OUTPATIENT)
Dept: DIABETES SERVICES | Age: 63
End: 2023-09-11

## 2023-09-11 NOTE — TELEPHONE ENCOUNTER
Called and spoke with pt today. Pt wants full diabetes program. Scheduled for diabetes medical 1 class on 9/26/23. Will schedule other classes as he completes the first class.

## 2023-09-13 ENCOUNTER — OFFICE VISIT (OUTPATIENT)
Age: 63
End: 2023-09-13

## 2023-09-13 VITALS
HEART RATE: 80 BPM | SYSTOLIC BLOOD PRESSURE: 99 MMHG | DIASTOLIC BLOOD PRESSURE: 74 MMHG | BODY MASS INDEX: 34.36 KG/M2 | WEIGHT: 226 LBS

## 2023-09-13 DIAGNOSIS — E78.5 DYSLIPIDEMIA: ICD-10-CM

## 2023-09-13 DIAGNOSIS — Z95.1 POSTSURGICAL AORTOCORONARY BYPASS STATUS: ICD-10-CM

## 2023-09-13 DIAGNOSIS — I10 PRIMARY HYPERTENSION: ICD-10-CM

## 2023-09-13 DIAGNOSIS — Z95.3 H/O HEART VALVE REPLACEMENT WITH BIOPROSTHETIC VALVE: Primary | ICD-10-CM

## 2023-09-13 DIAGNOSIS — N18.30 STAGE 3 CHRONIC KIDNEY DISEASE, UNSPECIFIED WHETHER STAGE 3A OR 3B CKD (HCC): ICD-10-CM

## 2023-09-13 DIAGNOSIS — I73.9 PVD (PERIPHERAL VASCULAR DISEASE) (HCC): ICD-10-CM

## 2023-09-13 DIAGNOSIS — I25.10 CORONARY ARTERY DISEASE INVOLVING NATIVE CORONARY ARTERY OF NATIVE HEART WITHOUT ANGINA PECTORIS: ICD-10-CM

## 2023-10-26 RX ORDER — ROSUVASTATIN CALCIUM 20 MG/1
TABLET, COATED ORAL
Qty: 90 TABLET | Refills: 3 | Status: SHIPPED | OUTPATIENT
Start: 2023-10-26

## 2023-10-26 RX ORDER — EZETIMIBE 10 MG/1
10 TABLET ORAL DAILY
Qty: 90 TABLET | Refills: 3 | Status: SHIPPED | OUTPATIENT
Start: 2023-10-26

## 2023-12-11 ENCOUNTER — OFFICE VISIT (OUTPATIENT)
Dept: FAMILY MEDICINE CLINIC | Facility: CLINIC | Age: 63
End: 2023-12-11
Payer: MEDICARE

## 2023-12-11 VITALS
BODY MASS INDEX: 34.71 KG/M2 | DIASTOLIC BLOOD PRESSURE: 70 MMHG | SYSTOLIC BLOOD PRESSURE: 113 MMHG | WEIGHT: 229 LBS | TEMPERATURE: 98 F | HEIGHT: 68 IN | HEART RATE: 81 BPM

## 2023-12-11 DIAGNOSIS — I10 PRIMARY HYPERTENSION: ICD-10-CM

## 2023-12-11 DIAGNOSIS — F41.9 ANXIETY: ICD-10-CM

## 2023-12-11 DIAGNOSIS — E11.21 TYPE 2 DIABETES WITH NEPHROPATHY (HCC): ICD-10-CM

## 2023-12-11 DIAGNOSIS — N18.30 STAGE 3 CHRONIC KIDNEY DISEASE, UNSPECIFIED WHETHER STAGE 3A OR 3B CKD (HCC): ICD-10-CM

## 2023-12-11 DIAGNOSIS — F33.42 RECURRENT MAJOR DEPRESSIVE DISORDER, IN FULL REMISSION (HCC): ICD-10-CM

## 2023-12-11 DIAGNOSIS — F51.01 PRIMARY INSOMNIA: ICD-10-CM

## 2023-12-11 DIAGNOSIS — I10 ESSENTIAL (PRIMARY) HYPERTENSION: ICD-10-CM

## 2023-12-11 LAB
ALBUMIN SERPL-MCNC: 3.7 G/DL (ref 3.2–4.6)
ALBUMIN/GLOB SERPL: 1.1 (ref 0.4–1.6)
ALP SERPL-CCNC: 107 U/L (ref 50–136)
ALT SERPL-CCNC: 23 U/L (ref 12–65)
ANION GAP SERPL CALC-SCNC: 4 MMOL/L (ref 2–11)
AST SERPL-CCNC: 16 U/L (ref 15–37)
BILIRUB SERPL-MCNC: 0.7 MG/DL (ref 0.2–1.1)
BUN SERPL-MCNC: 25 MG/DL (ref 8–23)
CALCIUM SERPL-MCNC: 9.4 MG/DL (ref 8.3–10.4)
CHLORIDE SERPL-SCNC: 104 MMOL/L (ref 103–113)
CHOLEST SERPL-MCNC: 93 MG/DL
CO2 SERPL-SCNC: 26 MMOL/L (ref 21–32)
CREAT SERPL-MCNC: 2 MG/DL (ref 0.8–1.5)
GLOBULIN SER CALC-MCNC: 3.3 G/DL (ref 2.8–4.5)
GLUCOSE SERPL-MCNC: 284 MG/DL (ref 65–100)
HDLC SERPL-MCNC: 33 MG/DL (ref 40–60)
HDLC SERPL: 2.8
LDLC SERPL CALC-MCNC: 35.4 MG/DL
POTASSIUM SERPL-SCNC: 5 MMOL/L (ref 3.5–5.1)
PROT SERPL-MCNC: 7 G/DL (ref 6.3–8.2)
SODIUM SERPL-SCNC: 134 MMOL/L (ref 136–146)
TRIGL SERPL-MCNC: 123 MG/DL (ref 35–150)
VLDLC SERPL CALC-MCNC: 24.6 MG/DL (ref 6–23)

## 2023-12-11 PROCEDURE — 3051F HG A1C>EQUAL 7.0%<8.0%: CPT | Performed by: NURSE PRACTITIONER

## 2023-12-11 PROCEDURE — 3078F DIAST BP <80 MM HG: CPT | Performed by: NURSE PRACTITIONER

## 2023-12-11 PROCEDURE — 3074F SYST BP LT 130 MM HG: CPT | Performed by: NURSE PRACTITIONER

## 2023-12-11 PROCEDURE — 99214 OFFICE O/P EST MOD 30 MIN: CPT | Performed by: NURSE PRACTITIONER

## 2023-12-11 RX ORDER — ZOLPIDEM TARTRATE 10 MG/1
10 TABLET ORAL NIGHTLY PRN
Qty: 30 TABLET | Refills: 5 | Status: SHIPPED | OUTPATIENT
Start: 2023-12-11 | End: 2024-12-10

## 2023-12-11 RX ORDER — BUSPIRONE HYDROCHLORIDE 5 MG/1
5 TABLET ORAL 2 TIMES DAILY
Qty: 180 TABLET | Refills: 1 | Status: SHIPPED | OUTPATIENT
Start: 2023-12-11 | End: 2024-06-08

## 2023-12-11 RX ORDER — METOPROLOL SUCCINATE 50 MG/1
50 TABLET, EXTENDED RELEASE ORAL DAILY
Qty: 90 TABLET | Refills: 1 | Status: SHIPPED | OUTPATIENT
Start: 2023-12-11

## 2023-12-11 RX ORDER — AMLODIPINE BESYLATE 10 MG/1
10 TABLET ORAL DAILY
Qty: 90 TABLET | Refills: 1 | Status: SHIPPED | OUTPATIENT
Start: 2023-12-11

## 2023-12-11 RX ORDER — DULOXETIN HYDROCHLORIDE 60 MG/1
60 CAPSULE, DELAYED RELEASE ORAL DAILY
Qty: 90 CAPSULE | Refills: 1 | Status: SHIPPED | OUTPATIENT
Start: 2023-12-11

## 2023-12-11 RX ORDER — IRBESARTAN 300 MG/1
300 TABLET ORAL DAILY
Qty: 90 TABLET | Refills: 1 | Status: SHIPPED | OUTPATIENT
Start: 2023-12-11

## 2023-12-11 ASSESSMENT — ENCOUNTER SYMPTOMS
CHEST TIGHTNESS: 0
SHORTNESS OF BREATH: 0

## 2023-12-12 ENCOUNTER — TELEPHONE (OUTPATIENT)
Dept: FAMILY MEDICINE CLINIC | Facility: CLINIC | Age: 63
End: 2023-12-12

## 2023-12-12 NOTE — TELEPHONE ENCOUNTER
----- Message from JHON Reilly NP sent at 12/12/2023  7:52 AM EST -----  Tell him kidney function a bit worse than last check make an apt with his nephrologist

## 2024-01-02 ENCOUNTER — OFFICE VISIT (OUTPATIENT)
Dept: ENDOCRINOLOGY | Age: 64
End: 2024-01-02
Payer: MEDICARE

## 2024-01-02 ENCOUNTER — TELEPHONE (OUTPATIENT)
Dept: DIABETES SERVICES | Age: 64
End: 2024-01-02

## 2024-01-02 VITALS
DIASTOLIC BLOOD PRESSURE: 62 MMHG | SYSTOLIC BLOOD PRESSURE: 114 MMHG | WEIGHT: 228.4 LBS | BODY MASS INDEX: 34.73 KG/M2 | OXYGEN SATURATION: 98 % | HEART RATE: 65 BPM

## 2024-01-02 DIAGNOSIS — I25.10 ATHEROSCLEROSIS OF NATIVE CORONARY ARTERY OF NATIVE HEART WITHOUT ANGINA PECTORIS: ICD-10-CM

## 2024-01-02 DIAGNOSIS — I10 PRIMARY HYPERTENSION: ICD-10-CM

## 2024-01-02 DIAGNOSIS — N18.30 STAGE 3 CHRONIC KIDNEY DISEASE, UNSPECIFIED WHETHER STAGE 3A OR 3B CKD (HCC): ICD-10-CM

## 2024-01-02 DIAGNOSIS — E11.69 HYPERLIPIDEMIA ASSOCIATED WITH TYPE 2 DIABETES MELLITUS (HCC): ICD-10-CM

## 2024-01-02 DIAGNOSIS — E11.21 TYPE 2 DIABETES WITH NEPHROPATHY (HCC): Primary | ICD-10-CM

## 2024-01-02 DIAGNOSIS — E78.5 HYPERLIPIDEMIA ASSOCIATED WITH TYPE 2 DIABETES MELLITUS (HCC): ICD-10-CM

## 2024-01-02 DIAGNOSIS — Z89.422 ACQUIRED ABSENCE OF OTHER LEFT TOE(S) (HCC): ICD-10-CM

## 2024-01-02 LAB — HBA1C MFR BLD: 8 %

## 2024-01-02 PROCEDURE — 99214 OFFICE O/P EST MOD 30 MIN: CPT | Performed by: PHYSICIAN ASSISTANT

## 2024-01-02 PROCEDURE — 3078F DIAST BP <80 MM HG: CPT | Performed by: PHYSICIAN ASSISTANT

## 2024-01-02 PROCEDURE — 3074F SYST BP LT 130 MM HG: CPT | Performed by: PHYSICIAN ASSISTANT

## 2024-01-02 PROCEDURE — 95251 CONT GLUC MNTR ANALYSIS I&R: CPT | Performed by: PHYSICIAN ASSISTANT

## 2024-01-02 PROCEDURE — 83036 HEMOGLOBIN GLYCOSYLATED A1C: CPT | Performed by: PHYSICIAN ASSISTANT

## 2024-01-02 RX ORDER — INSULIN PMP CART,AUT,G6/7,CNTR
EACH SUBCUTANEOUS
Qty: 1 KIT | Refills: 0 | Status: SHIPPED | OUTPATIENT
Start: 2024-01-02

## 2024-01-02 RX ORDER — PROCHLORPERAZINE 25 MG/1
SUPPOSITORY RECTAL
Qty: 1 EACH | Refills: 3
Start: 2024-01-02

## 2024-01-02 RX ORDER — INSULIN PMP CART,AUT,G6/7,CNTR
EACH SUBCUTANEOUS
Qty: 30 EACH | Refills: 3 | Status: SHIPPED | OUTPATIENT
Start: 2024-01-02

## 2024-01-02 RX ORDER — PROCHLORPERAZINE 25 MG/1
SUPPOSITORY RECTAL
Qty: 9 EACH | Refills: 3
Start: 2024-01-02

## 2024-01-02 RX ORDER — INSULIN LISPRO 100 [IU]/ML
INJECTION, SOLUTION INTRAVENOUS; SUBCUTANEOUS
Qty: 75 ML | Refills: 3 | Status: SHIPPED | OUTPATIENT
Start: 2024-01-02

## 2024-01-02 NOTE — PROGRESS NOTES
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  As such, some errors in transcription may be present.  1/2/2024   Interim diabetes HPI:    Reports better compliance to basal insulin dose and correction scale  Focused on early bolus  Better site rotation  ~30% bolus at meal time  More rare post prandial bolus  Eats 2 \"reeses\" at night        Lab Results   Component Value Date    LABA1C 7.2 (H) 08/28/2023    LABA1C 6.9 (H) 04/07/2021    LABA1C 9.6 (H) 01/13/2021     Lab Results   Component Value Date    MALBCR 211 (H) 08/28/2023    LDLCALC 35.4 12/11/2023    CREATININE 2.00 (H) 12/11/2023            Current Regimen:  Lantus 45 units in pm, Humalog 15/15/20 units AC plus correction 1/25 >150 AC/HS      Glucose data:    Home blood glucose monitoring frequency:   By review of CGM download over past 28 days  Average blood glucose 184  Time in range 47%  High 33%, Very High 18%  Low 2%, Very Low 0%     Average    Fasting 157    AC lunch 181    AC supper 190    Bedtime 210      Blood glucose levels are uncontrolled, most significant elevations are after lunch and after bedtime snack        Failed past therapies:       glucophage at onset   Off for a time   Restarted with GI upset       Started insulin early 2000    Relevant co morbidities:   Left foot wound with ulceration leading to femoral artery bypass   Left Partial foot amputation    Denies HX pancreatitis, DKA, gastroparesis    Optho: Last eye exam was Summer 2023 and demonstrated no diabetic retinopathy.   Eye care specialist is Dr. Kenny.       Obesity:         Body mass index is 34.73 kg/m².       stable      Wt Readings from Last 3 Encounters:   01/02/24 103.6 kg (228 lb 6.4 oz)   12/11/23 103.9 kg (229 lb)   09/13/23 102.5 kg (226 lb)         Card[facility]:    Dr. Vega    CABG x3     CHF and

## 2024-01-02 NOTE — TELEPHONE ENCOUNTER
Type 2, He completed [employer] .  Called and he does want to complete education .  He is in the Endo office right now and will call me back to schedule.

## 2024-01-03 ENCOUNTER — TELEPHONE (OUTPATIENT)
Dept: DIABETES SERVICES | Age: 64
End: 2024-01-03

## 2024-01-03 NOTE — TELEPHONE ENCOUNTER
Called him back about the DM education since he was in the Endo office yesterday when we called.  Left message, free, and call back number.

## 2024-01-08 ENCOUNTER — TELEPHONE (OUTPATIENT)
Dept: ENDOCRINOLOGY | Age: 64
End: 2024-01-08

## 2024-01-09 ENCOUNTER — TELEPHONE (OUTPATIENT)
Dept: DIABETES SERVICES | Age: 64
End: 2024-01-09

## 2024-01-17 ENCOUNTER — TELEMEDICINE (OUTPATIENT)
Dept: FAMILY MEDICINE CLINIC | Facility: CLINIC | Age: 64
End: 2024-01-17
Payer: MEDICARE

## 2024-01-17 VITALS — WEIGHT: 240 LBS | BODY MASS INDEX: 36.49 KG/M2

## 2024-01-17 DIAGNOSIS — Z00.00 MEDICARE ANNUAL WELLNESS VISIT, SUBSEQUENT: Primary | ICD-10-CM

## 2024-01-17 DIAGNOSIS — I73.9 PVD (PERIPHERAL VASCULAR DISEASE) (HCC): ICD-10-CM

## 2024-01-17 DIAGNOSIS — E11.21 TYPE 2 DIABETES WITH NEPHROPATHY (HCC): Primary | ICD-10-CM

## 2024-01-17 DIAGNOSIS — F33.42 RECURRENT MAJOR DEPRESSIVE DISORDER, IN FULL REMISSION (HCC): ICD-10-CM

## 2024-01-17 DIAGNOSIS — Z98.890 HISTORY OF COLONOSCOPY: ICD-10-CM

## 2024-01-17 PROBLEM — F33.1 MAJOR DEPRESSIVE DISORDER, RECURRENT, MODERATE (HCC): Status: ACTIVE | Noted: 2024-01-17

## 2024-01-17 PROCEDURE — G0439 PPPS, SUBSEQ VISIT: HCPCS | Performed by: NURSE PRACTITIONER

## 2024-01-17 RX ORDER — INSULIN LISPRO 100 [IU]/ML
INJECTION, SOLUTION INTRAVENOUS; SUBCUTANEOUS
Qty: 70 ML | Refills: 3 | Status: SHIPPED | OUTPATIENT
Start: 2024-01-17

## 2024-01-17 ASSESSMENT — PATIENT HEALTH QUESTIONNAIRE - PHQ9
SUM OF ALL RESPONSES TO PHQ QUESTIONS 1-9: 0
3. TROUBLE FALLING OR STAYING ASLEEP: 0
10. IF YOU CHECKED OFF ANY PROBLEMS, HOW DIFFICULT HAVE THESE PROBLEMS MADE IT FOR YOU TO DO YOUR WORK, TAKE CARE OF THINGS AT HOME, OR GET ALONG WITH OTHER PEOPLE: 0
SUM OF ALL RESPONSES TO PHQ QUESTIONS 1-9: 0
1. LITTLE INTEREST OR PLEASURE IN DOING THINGS: 0
6. FEELING BAD ABOUT YOURSELF - OR THAT YOU ARE A FAILURE OR HAVE LET YOURSELF OR YOUR FAMILY DOWN: 0
SUM OF ALL RESPONSES TO PHQ QUESTIONS 1-9: 0
SUM OF ALL RESPONSES TO PHQ QUESTIONS 1-9: 0
5. POOR APPETITE OR OVEREATING: 0
4. FEELING TIRED OR HAVING LITTLE ENERGY: 0
8. MOVING OR SPEAKING SO SLOWLY THAT OTHER PEOPLE COULD HAVE NOTICED. OR THE OPPOSITE, BEING SO FIGETY OR RESTLESS THAT YOU HAVE BEEN MOVING AROUND A LOT MORE THAN USUAL: 0
9. THOUGHTS THAT YOU WOULD BE BETTER OFF DEAD, OR OF HURTING YOURSELF: 0
7. TROUBLE CONCENTRATING ON THINGS, SUCH AS READING THE NEWSPAPER OR WATCHING TELEVISION: 0
2. FEELING DOWN, DEPRESSED OR HOPELESS: 0
SUM OF ALL RESPONSES TO PHQ9 QUESTIONS 1 & 2: 0

## 2024-01-17 ASSESSMENT — LIFESTYLE VARIABLES
HOW OFTEN DO YOU HAVE A DRINK CONTAINING ALCOHOL: NEVER
HOW MANY STANDARD DRINKS CONTAINING ALCOHOL DO YOU HAVE ON A TYPICAL DAY: PATIENT DOES NOT DRINK

## 2024-01-17 NOTE — PROGRESS NOTES
Medicare Annual Wellness Visit    Jaret Valentin is here for Medicare AWV (AWV)    Assessment & Plan   Medicare annual wellness visit, subsequent  Recurrent major depressive disorder, in full remission (HCC)  PVD (peripheral vascular disease) (HCC)  History of colonoscopy  -     External Referral To Gastroenterology    Recommendations for Preventive Services Due: see orders and patient instructions/AVS.  Recommended screening schedule for the next 5-10 years is provided to the patient in written form: see Patient Instructions/AVS.     Return in 1 year (on 1/17/2025).     Subjective       Patient's complete Health Risk Assessment and screening values have been reviewed and are found in Flowsheets. The following problems were reviewed today and where indicated follow up appointments were made and/or referrals ordered.    Positive Risk Factor Screenings with Interventions:                Activity, Diet, and Weight:  On average, how many days per week do you engage in moderate to strenuous exercise (like a brisk walk)?: 3 days  On average, how many minutes do you engage in exercise at this level?: 30 min    Do you eat balanced/healthy meals regularly?: Yes    Body mass index is 36.49 kg/m². (!) Abnormal    Obesity Interventions:  He is working with the endocrinologist to get down        Dentist Screen:  Have you seen the dentist within the past year?: (!) No    Intervention:  Instructed to call [facility] or [facility] the [facility]      Safety:  Do you have working smoke detectors?: (!) No  Interventions:  GO by local fire department for these     Advanced Directives:  Do you have a Living Will?: (!) No    Intervention:    Mailed forms to pt to be completed               Objective      Patient-Reported Vitals  Patient-Reported Weight: 240lb            No Known Allergies  Prior to Visit Medications    Medication Sig Taking? Authorizing Provider   HUMALOG KWIKPEN 100 UNIT/ML SOPN 15 units before breakfast, 20 units

## 2024-01-26 DIAGNOSIS — E11.21 TYPE 2 DIABETES WITH NEPHROPATHY (HCC): ICD-10-CM

## 2024-01-26 NOTE — PROGRESS NOTES
Pt estimating carbs, previously using 15-20 units for meals, adjusted carb ratio from 14 to 10 at start, bolusing for 100 carbs    Low 2 nights of 60s    Will lower basal from 1.2 to 1.15, aware to lower to 1.1 if lows persist    Discussed rule of 15    Discussed carb counting resources

## 2024-01-29 ENCOUNTER — PATIENT MESSAGE (OUTPATIENT)
Dept: ENDOCRINOLOGY | Age: 64
End: 2024-01-29

## 2024-01-29 DIAGNOSIS — E11.21 TYPE 2 DIABETES WITH NEPHROPATHY (HCC): ICD-10-CM

## 2024-01-29 NOTE — TELEPHONE ENCOUNTER
From: Jaret Valentin  To: Yari Galo  Sent: 1/29/2024 10:39 AM EST  Subject: Omnipod    I really like the omnipof. I do wonder if my basil rate needs to be adjusted I wake up fine in the morning but after lunch and dinner I am well over 200 and it will usually come down about bedtime

## 2024-01-29 NOTE — TELEPHONE ENCOUNTER
Catia response:      Hi,  It looks like it is after you eat. You are not getting much insulin for your bolusing.     I know you were going to practice carb counting.     It seems you are only bolusing once or twice a day for carbs. Place focus on early bolusing for the correct amount of carbs with every meal and snack.    If you feel you have been accurately counting your carbs, please let me know and we will make a bolus adjustment. If you are guessing your carbs, it seems that you are not estimating enough carbs per meal.     Can you call [phone] and participate in the carb counting portion of the diabetes class please?    ~Yari

## 2024-01-30 NOTE — TELEPHONE ENCOUNTER
Catia response:      Hi,    You should tell the pump two things repeatedly....    How many carbs you are about to eat. I think you wanted to go to the diabetes carb counting class for this. Call [phone] to schedule. We discussed you entering about 100 carbs for a high carb/normal meal  Both at the time you enter the carb and at other times you need to tell the pump what your blood sugar reading is, using the darryl data is ok.    You should engage with your pump 6-8 times a day. If about to eat carbs plus glucose. If not eating, leave the carb at zero and enter your glucose, then take a bolus    Telling the pump you are eating 100 carbs will give you the equivalent of 15 or 20 units of mealtime insulin     However, you will have the best success if you can watch some videos and use some free apps like \"my fitness pal\" or \"calorie freida\" to help you carb count accurately.     Let me know if this causes any low sugars     Any questions?    ~Yari

## 2024-01-31 RX ORDER — INSULIN PMP CART,AUT,G6/7,CNTR
EACH SUBCUTANEOUS
Refills: 0 | OUTPATIENT
Start: 2024-01-31

## 2024-02-13 ENCOUNTER — TELEPHONE (OUTPATIENT)
Age: 64
End: 2024-02-13

## 2024-02-13 NOTE — TELEPHONE ENCOUNTER
Mr. Valentin called into the office today regarding a recent referral (January). He is wanting to schedule an appointment if possible.    Please call Mr. Valentin at [phone] to schedule. I see where he was referred to Dr. Nolasco.    Thank you!

## 2024-02-19 DIAGNOSIS — E11.21 TYPE 2 DIABETES WITH NEPHROPATHY (HCC): ICD-10-CM

## 2024-02-19 RX ORDER — INSULIN PMP CART,AUT,G6/7,CNTR
EACH SUBCUTANEOUS
Refills: 0 | OUTPATIENT
Start: 2024-02-19

## 2024-02-22 ENCOUNTER — TELEPHONE (OUTPATIENT)
Age: 64
End: 2024-02-22

## 2024-02-22 NOTE — TELEPHONE ENCOUNTER
Left voicemail for patient to see if he wanted to move his upcoming pacemaker check to next month when he comes in for MD followup.

## 2024-03-16 NOTE — PROGRESS NOTES
[facility] CARDIOLOGY  [address] [place] [address], [address]  [place] [address]  PHONE: [phone]        NAME:  Jaret Valentin  : 1960  MRN: [id number]     PCP:  Masha Macedo APRN - NP      SUBJECTIVE:   Jaret Valentin is a 63 y.o. male seen for a follow up visit regarding the following:     Chief Complaint   Patient presents with    Follow-up    Coronary Artery Disease    Hypertension       HPI:  Doing well since last visit without interval angina, CHF, palpitations,presyncope or syncope but pacer interrogation today shows WILMER for the past 3 months, needs semiurgent generator change out.  He has mild night time dependent ankle edema on 10mg norvasc but no edema in AM and no other CHF symptoms.  BP is low today but he is clinically asymptomatic.        Echo 2023:  Left Ventricle Normal left ventricular systolic function with a visually estimated EF of 55 - 60%. Left ventricle size is normal. Moderately increased wall thickness. Normal wall motion. Abnormal diastolic function.   Left Atrium Left atrium is moderately dilated.   Right Ventricle Right ventricle size is normal. RV basal diameter is 3.8 cm. RV mid diameter is 3.2 cm. Normal systolic function.   Right Atrium Right atrium is mildly dilated.   Aortic Valve Not well visualized. No regurgitation. AV mean gradient is 17 mmHg. AV peak gradient is 29 mmHg. AV peak velocity is 2.7 m/s. AV AT is 92.00 ms. LVOT:AV VTI Index is 0.39. AV area by continuity VTI is 1.2 cm2. AV Stroke Volume index is 36.0 mL/m2. 21 mm Pereira Magna Bovine 2011   Mitral Valve Mild annular calcification of the mitral valve. Mild regurgitation. Mild stenosis noted.   Tricuspid Valve Not well visualized. Mild regurgitation. No stenosis noted. The estimated RVSP is 26 mmHg.   Pulmonic Valve The pulmonic valve was not well visualized.   Aorta Normal sized sinus of Valsalva.   Pericardium No pericardial effusion.     Pacer interrogation at our last visit

## 2024-03-21 ENCOUNTER — OFFICE VISIT (OUTPATIENT)
Age: 64
End: 2024-03-21
Payer: MEDICARE

## 2024-03-21 ENCOUNTER — NURSE ONLY (OUTPATIENT)
Age: 64
End: 2024-03-21

## 2024-03-21 VITALS
SYSTOLIC BLOOD PRESSURE: 118 MMHG | WEIGHT: 223 LBS | BODY MASS INDEX: 33.91 KG/M2 | HEART RATE: 64 BPM | DIASTOLIC BLOOD PRESSURE: 78 MMHG

## 2024-03-21 DIAGNOSIS — Z95.0 PACEMAKER: Primary | ICD-10-CM

## 2024-03-21 DIAGNOSIS — I44.2 COMPLETE HEART BLOCK (HCC): ICD-10-CM

## 2024-03-21 DIAGNOSIS — Z95.3 H/O HEART VALVE REPLACEMENT WITH BIOPROSTHETIC VALVE: ICD-10-CM

## 2024-03-21 DIAGNOSIS — I10 PRIMARY HYPERTENSION: ICD-10-CM

## 2024-03-21 DIAGNOSIS — I35.0 NONRHEUMATIC AORTIC VALVE STENOSIS: ICD-10-CM

## 2024-03-21 DIAGNOSIS — Z95.0 PACEMAKER: ICD-10-CM

## 2024-03-21 DIAGNOSIS — Q23.1 BICUSPID AORTIC VALVE: ICD-10-CM

## 2024-03-21 DIAGNOSIS — I25.10 CORONARY ARTERY DISEASE INVOLVING NATIVE CORONARY ARTERY OF NATIVE HEART WITHOUT ANGINA PECTORIS: ICD-10-CM

## 2024-03-21 DIAGNOSIS — E78.5 DYSLIPIDEMIA: ICD-10-CM

## 2024-03-21 DIAGNOSIS — I44.2 CHB (COMPLETE HEART BLOCK) (HCC): Primary | ICD-10-CM

## 2024-03-21 LAB
ANION GAP SERPL CALC-SCNC: 6 MMOL/L (ref 2–11)
BASOPHILS # BLD: 0.1 K/UL (ref 0–0.2)
BASOPHILS NFR BLD: 1 % (ref 0–2)
BUN SERPL-MCNC: 28 MG/DL (ref 8–23)
CALCIUM SERPL-MCNC: 9 MG/DL (ref 8.3–10.4)
CHLORIDE SERPL-SCNC: 103 MMOL/L (ref 103–113)
CO2 SERPL-SCNC: 25 MMOL/L (ref 21–32)
CREAT SERPL-MCNC: 1.8 MG/DL (ref 0.8–1.5)
DIFFERENTIAL METHOD BLD: ABNORMAL
EOSINOPHIL # BLD: 0.6 K/UL (ref 0–0.8)
EOSINOPHIL NFR BLD: 8 % (ref 0.5–7.8)
ERYTHROCYTE [DISTWIDTH] IN BLOOD BY AUTOMATED COUNT: 13.2 % (ref 11.9–14.6)
GLUCOSE SERPL-MCNC: 226 MG/DL (ref 65–100)
HCT VFR BLD AUTO: 42.8 % (ref 41.1–50.3)
HGB BLD-MCNC: 14.1 G/DL (ref 13.6–17.2)
IMM GRANULOCYTES # BLD AUTO: 0 K/UL (ref 0–0.5)
IMM GRANULOCYTES NFR BLD AUTO: 0 % (ref 0–5)
LYMPHOCYTES # BLD: 2.2 K/UL (ref 0.5–4.6)
LYMPHOCYTES NFR BLD: 27 % (ref 13–44)
MCH RBC QN AUTO: 30.1 PG (ref 26.1–32.9)
MCHC RBC AUTO-ENTMCNC: 32.9 G/DL (ref 31.4–35)
MCV RBC AUTO: 91.5 FL (ref 82–102)
MONOCYTES # BLD: 0.8 K/UL (ref 0.1–1.3)
MONOCYTES NFR BLD: 10 % (ref 4–12)
NEUTS SEG # BLD: 4.4 K/UL (ref 1.7–8.2)
NEUTS SEG NFR BLD: 54 % (ref 43–78)
NRBC # BLD: 0 K/UL (ref 0–0.2)
PLATELET # BLD AUTO: 141 K/UL (ref 150–450)
PMV BLD AUTO: 11.7 FL (ref 9.4–12.3)
POTASSIUM SERPL-SCNC: 4.7 MMOL/L (ref 3.5–5.1)
RBC # BLD AUTO: 4.68 M/UL (ref 4.23–5.6)
SODIUM SERPL-SCNC: 134 MMOL/L (ref 136–146)
WBC # BLD AUTO: 8.2 K/UL (ref 4.3–11.1)

## 2024-03-21 PROCEDURE — 3074F SYST BP LT 130 MM HG: CPT | Performed by: INTERNAL MEDICINE

## 2024-03-21 PROCEDURE — 3078F DIAST BP <80 MM HG: CPT | Performed by: INTERNAL MEDICINE

## 2024-03-21 PROCEDURE — 99214 OFFICE O/P EST MOD 30 MIN: CPT | Performed by: INTERNAL MEDICINE

## 2024-03-21 RX ORDER — SODIUM CHLORIDE 9 MG/ML
INJECTION, SOLUTION INTRAVENOUS CONTINUOUS
Status: CANCELLED | OUTPATIENT
Start: 2024-03-21

## 2024-03-21 RX ORDER — SODIUM CHLORIDE 9 MG/ML
INJECTION, SOLUTION INTRAVENOUS PRN
Status: CANCELLED | OUTPATIENT
Start: 2024-03-21

## 2024-03-21 RX ORDER — SODIUM CHLORIDE 0.9 % (FLUSH) 0.9 %
5-40 SYRINGE (ML) INJECTION PRN
Status: CANCELLED | OUTPATIENT
Start: 2024-03-21

## 2024-03-21 RX ORDER — SODIUM CHLORIDE 0.9 % (FLUSH) 0.9 %
5-40 SYRINGE (ML) INJECTION EVERY 12 HOURS SCHEDULED
Status: CANCELLED | OUTPATIENT
Start: 2024-03-21

## 2024-03-21 NOTE — PROGRESS NOTES
Patient pre-assessment complete for Gen change with Dr Vega scheduled for 3/22/24, arrival time 10:30am. Patient verified using . Patient instructed to bring a list of all home medications on the day of procedure. NPO status reinforced.  Patient instructed to HOLD insulin pump dosing but continue basal rate unless blood sugar begins to drop. Instructed they can take all other medications excluding vitamins & supplements. Patient verbalizes understanding of all instructions & denies any questions at this time.

## 2024-03-22 ENCOUNTER — HOSPITAL ENCOUNTER (OUTPATIENT)
Age: 64
Setting detail: OUTPATIENT SURGERY
Discharge: HOME | End: 2024-03-22
Attending: INTERNAL MEDICINE
Payer: MEDICARE

## 2024-03-22 VITALS
HEIGHT: 68 IN | HEART RATE: 65 BPM | OXYGEN SATURATION: 99 % | WEIGHT: 223 LBS | TEMPERATURE: 98.1 F | DIASTOLIC BLOOD PRESSURE: 64 MMHG | BODY MASS INDEX: 33.8 KG/M2 | SYSTOLIC BLOOD PRESSURE: 120 MMHG

## 2024-03-22 DIAGNOSIS — Z45.010 ENCOUNTER FOR PACEMAKER AT END OF BATTERY LIFE: Primary | ICD-10-CM

## 2024-03-22 DIAGNOSIS — I44.2 COMPLETE HEART BLOCK (HCC): ICD-10-CM

## 2024-03-22 LAB
ECHO BSA: 2.2 M2
EKG ATRIAL RATE: 84 BPM
EKG DIAGNOSIS: NORMAL
EKG P AXIS: 29 DEGREES
EKG Q-T INTERVAL: 466 MS
EKG QRS DURATION: 152 MS
EKG QTC CALCULATION (BAZETT): 484 MS
EKG R AXIS: -57 DEGREES
EKG T AXIS: 105 DEGREES
EKG VENTRICULAR RATE: 65 BPM

## 2024-03-22 PROCEDURE — 33222 RELOCATION POCKET PACEMAKER: CPT | Performed by: INTERNAL MEDICINE

## 2024-03-22 PROCEDURE — C1785 PMKR, DUAL, RATE-RESP: HCPCS | Performed by: INTERNAL MEDICINE

## 2024-03-22 PROCEDURE — 99152 MOD SED SAME PHYS/QHP 5/>YRS: CPT | Performed by: INTERNAL MEDICINE

## 2024-03-22 PROCEDURE — 33229 REMV&REPLC PM GEN MULT LEADS: CPT | Performed by: INTERNAL MEDICINE

## 2024-03-22 PROCEDURE — 6360000002 HC RX W HCPCS: Performed by: INTERNAL MEDICINE

## 2024-03-22 PROCEDURE — 99153 MOD SED SAME PHYS/QHP EA: CPT | Performed by: INTERNAL MEDICINE

## 2024-03-22 PROCEDURE — 33228 REMV&REPLC PM GEN DUAL LEAD: CPT | Performed by: INTERNAL MEDICINE

## 2024-03-22 PROCEDURE — 2720000010 HC SURG SUPPLY STERILE: Performed by: INTERNAL MEDICINE

## 2024-03-22 PROCEDURE — 2500000003 HC RX 250 WO HCPCS: Performed by: INTERNAL MEDICINE

## 2024-03-22 PROCEDURE — A4217 STERILE WATER/SALINE, 500 ML: HCPCS | Performed by: INTERNAL MEDICINE

## 2024-03-22 PROCEDURE — 2580000003 HC RX 258: Performed by: INTERNAL MEDICINE

## 2024-03-22 PROCEDURE — 93005 ELECTROCARDIOGRAM TRACING: CPT | Performed by: INTERNAL MEDICINE

## 2024-03-22 PROCEDURE — 93010 ELECTROCARDIOGRAM REPORT: CPT | Performed by: INTERNAL MEDICINE

## 2024-03-22 PROCEDURE — 2709999900 HC NON-CHARGEABLE SUPPLY: Performed by: INTERNAL MEDICINE

## 2024-03-22 DEVICE — IPG W1DR01 AZURE XT DR MRI USA
Type: IMPLANTABLE DEVICE | Status: FUNCTIONAL
Brand: AZURE™ XT DR MRI SURESCAN™

## 2024-03-22 RX ORDER — HYDROCODONE BITARTRATE AND ACETAMINOPHEN 5; 325 MG/1; MG/1
1 TABLET ORAL EVERY 6 HOURS PRN
Qty: 20 TABLET | Refills: 0 | Status: SHIPPED | OUTPATIENT
Start: 2024-03-22 | End: 2024-03-27

## 2024-03-22 RX ORDER — SODIUM CHLORIDE 0.9 % (FLUSH) 0.9 %
5-40 SYRINGE (ML) INJECTION EVERY 12 HOURS SCHEDULED
OUTPATIENT
Start: 2024-03-22

## 2024-03-22 RX ORDER — LIDOCAINE HYDROCHLORIDE 10 MG/ML
INJECTION, SOLUTION INFILTRATION; PERINEURAL PRN
Status: DISCONTINUED | OUTPATIENT
Start: 2024-03-22 | End: 2024-03-22 | Stop reason: HOSPADM

## 2024-03-22 RX ORDER — LIDOCAINE HYDROCHLORIDE AND EPINEPHRINE 10; 10 MG/ML; UG/ML
INJECTION, SOLUTION INFILTRATION; PERINEURAL PRN
Status: DISCONTINUED | OUTPATIENT
Start: 2024-03-22 | End: 2024-03-22 | Stop reason: HOSPADM

## 2024-03-22 RX ORDER — FENTANYL CITRATE 50 UG/ML
INJECTION, SOLUTION INTRAMUSCULAR; INTRAVENOUS PRN
Status: DISCONTINUED | OUTPATIENT
Start: 2024-03-22 | End: 2024-03-22 | Stop reason: HOSPADM

## 2024-03-22 RX ORDER — SODIUM CHLORIDE 9 MG/ML
INJECTION, SOLUTION INTRAVENOUS PRN
OUTPATIENT
Start: 2024-03-22

## 2024-03-22 RX ORDER — CEPHALEXIN 500 MG/1
500 CAPSULE ORAL 4 TIMES DAILY
Qty: 20 CAPSULE | Refills: 0 | Status: SHIPPED | OUTPATIENT
Start: 2024-03-22 | End: 2024-03-27

## 2024-03-22 RX ORDER — MIDAZOLAM HYDROCHLORIDE 1 MG/ML
INJECTION INTRAMUSCULAR; INTRAVENOUS PRN
Status: DISCONTINUED | OUTPATIENT
Start: 2024-03-22 | End: 2024-03-22 | Stop reason: HOSPADM

## 2024-03-22 RX ORDER — SODIUM CHLORIDE 0.9 % (FLUSH) 0.9 %
5-40 SYRINGE (ML) INJECTION PRN
OUTPATIENT
Start: 2024-03-22

## 2024-03-22 RX ORDER — SODIUM CHLORIDE 9 MG/ML
INJECTION, SOLUTION INTRAVENOUS CONTINUOUS
Status: DISCONTINUED | OUTPATIENT
Start: 2024-03-22 | End: 2024-03-22 | Stop reason: HOSPADM

## 2024-03-22 NOTE — PROGRESS NOTES
Discharge instructions given. Pressure dsg removed from left chest. Aquaseal dsg intact with no bleeding or hematoma noted to site. No complaints of pain at present. Wife at bedside.

## 2024-03-22 NOTE — PROGRESS NOTES
Pt arrived, ambulated to room with no visible problems, planned Gen Change for Dr Vega.  Consent signed, Procedure discussed with pt all questions answered voiced understanding.     Medications and history discussed with pt.    Pt prepped per ordersThe patient has a fraility score of 4-VULNERABLE, based on may need some assistance

## 2024-03-22 NOTE — PROGRESS NOTES
Report received from Bi Cath Lab RN. Procedural findings communicated. Intra procedural  medication administration reviewed. Progression of care discussed.     Patient received into [facility]U [place] 3 post PPM generator change    Access site without bleeding or swelling yes    Dressing dry and intact yes    Patient instructed to limit movement to left upper extremity    Routine post procedural vital signs and site assessment initiated yes

## 2024-03-22 NOTE — DISCHARGE INSTRUCTIONS
Post Op Device Instructions        Incision & Dressing Care   Please keep Aquacel dressing on wound until your 2 week follow up in device clinic. The dressing promotes healing and is meant to stay on the wound. Keep incision site completely dry for 48 hours. During this time you may take a sponge bath, but no shower. After 48 hours you may shower with your back to the water letting the water run over the dressing. Do not let the water directly hit the dressing, it is water resistant no water proof. Do not use any lotions, creams, or ointments on the incision.    Avoid manipulating the device or leads with your fingers. Do not massage or excessively rub the implant site. This could displace the leads      For four weeks after your implant   Do not lift anything heavier than a gallon of milk.   Do not raise your elbow above the level of your shoulder on the Left shoulder implant side.   Avoid excessive pushing, pulling, or twisting.     Call you doctor for any of the following:   Any signs of infection, including redness, swelling or pain at the incision site, or a   temperature of 101° F or greater.   If you have twitching chest muscles, hiccups that won't stop, or a swollen arm on the   side of the incision.   Any feelings of light-headedness, chest pain, or shortness of breath.   Please notify your doctors and dentists that you have an ICD.       You should not drive until 1 week after your implant or after your first follow-up appointment, whichever comes first. At that time, you can discuss when you may return to your regular driving routine.       About 1 month after implant, you will receive a card by mail from the company who manufactured your ICD. Your device was manufactured by [employer]. You should carry this card with you at all times.            If you receive one shock from your device:   and you feel ok, you may call to let your physician know.   but if you are feeling poorly, please notify your

## 2024-03-22 NOTE — PROGRESS NOTES
TRANSFER - OUT REPORT:    Generator change with Dr. Vega  [employer]  DDDR     Left chest closed with sutures, steri-strips, and aquaseal  Pressure dressing for 2 hours post procedure    Versed 2mg IV  Fentanyl 25mcg IV    Verbal report given to ALISSA Berg on Jaret Valentin  being transferred to CPRU for routine progression of patient care       Report consisted of patient's Situation, Background, Assessment and   Recommendations(SBAR).     Information from the following report(s) Nurse Handoff Report, Surgery Report, and MAR was reviewed with the receiving nurse.

## 2024-04-05 ENCOUNTER — NURSE ONLY (OUTPATIENT)
Age: 64
End: 2024-04-05

## 2024-04-05 DIAGNOSIS — I44.2 COMPLETE HEART BLOCK (HCC): Primary | ICD-10-CM

## 2024-04-15 ASSESSMENT — ENCOUNTER SYMPTOMS
PHOTOPHOBIA: 0
SORE THROAT: 0
ABDOMINAL DISTENTION: 0
SHORTNESS OF BREATH: 0
COUGH: 0
ABDOMINAL PAIN: 0
CONSTIPATION: 0
DIARRHEA: 0

## 2024-04-15 NOTE — PROGRESS NOTES
Component Value Date    CHOLHDLRATIO 2.8 12/11/2023    CHOLHDLRATIO 2.7 08/28/2023    CHOLHDLRATIO 2.7 02/22/2023        Lab Results   Component Value Date/Time     03/21/2024 11:21 AM    K 4.7 03/21/2024 11:21 AM     03/21/2024 11:21 AM    CO2 25 03/21/2024 11:21 AM    BUN 28 03/21/2024 11:21 AM    CREATININE 1.80 03/21/2024 11:21 AM    GLUCOSE 226 03/21/2024 11:21 AM    CALCIUM 9.0 03/21/2024 11:21 AM         Recent Labs     03/21/24  1121   WBC 8.2   HGB 14.1   HCT 42.8   MCV 91.5   *          Lab Results   Component Value Date    LABA1C 7.2 (H) 08/28/2023     Lab Results   Component Value Date     08/28/2023        No results found for: \"BNP\"     Lab Results   Component Value Date    TSH 4.650 (H) 01/26/2022        No results found for any visits on 04/16/24.     Patient is being seen today within a 90-day global period, but is being seen for a separately identifiable E/M service and is not related to the post-operative care of the procedure.  Bioprosthetic aortic valve with dyslipidemia and hypertension with chronic lower extremity edema.    ASSESSMENT and PLAN    Diagnoses and all orders for this visit:      1. H/O heart valve replacement with bioprosthetic valve- bicuspid AV with AS by history- stable, soft LOVE today- echo and recheck in 6 months.      2. Hyperlipidemia-excellent, see above.  Continue statin and Zetia and healthy diet and lifestyle as tolerated.      3. Localized edema-improved and minimal today, minimize salt, walk daily, elevate legs when resting      4. Essential hypertension- controlled, continue Irbesartan, amlodipine, and Toprol-XL as currently taking      5. Coronary artery disease involving coronary bypass graft of native heart without angina pectoris- stable, continue aspirin, statin, and beta-blocker/ARB at current doses.  Healthy diet encouraged.        6. Acute on chronic diastolic heart failure (HCC)-none since last visit, euvolemic today, minimize

## 2024-04-16 ENCOUNTER — OFFICE VISIT (OUTPATIENT)
Age: 64
End: 2024-04-16
Payer: MEDICARE

## 2024-04-16 VITALS
SYSTOLIC BLOOD PRESSURE: 122 MMHG | BODY MASS INDEX: 32.29 KG/M2 | HEART RATE: 70 BPM | WEIGHT: 218 LBS | DIASTOLIC BLOOD PRESSURE: 82 MMHG | HEIGHT: 69 IN

## 2024-04-16 DIAGNOSIS — I35.0 NONRHEUMATIC AORTIC VALVE STENOSIS: ICD-10-CM

## 2024-04-16 DIAGNOSIS — I10 PRIMARY HYPERTENSION: ICD-10-CM

## 2024-04-16 DIAGNOSIS — I25.10 CORONARY ARTERY DISEASE INVOLVING NATIVE CORONARY ARTERY OF NATIVE HEART WITHOUT ANGINA PECTORIS: Primary | ICD-10-CM

## 2024-04-16 DIAGNOSIS — Q23.1 BICUSPID AORTIC VALVE: ICD-10-CM

## 2024-04-16 DIAGNOSIS — I44.2 COMPLETE HEART BLOCK (HCC): ICD-10-CM

## 2024-04-16 DIAGNOSIS — Z95.3 H/O HEART VALVE REPLACEMENT WITH BIOPROSTHETIC VALVE: ICD-10-CM

## 2024-04-16 DIAGNOSIS — E78.5 DYSLIPIDEMIA: ICD-10-CM

## 2024-04-16 PROCEDURE — 99214 OFFICE O/P EST MOD 30 MIN: CPT | Performed by: INTERNAL MEDICINE

## 2024-04-16 PROCEDURE — 3079F DIAST BP 80-89 MM HG: CPT | Performed by: INTERNAL MEDICINE

## 2024-04-16 PROCEDURE — 3074F SYST BP LT 130 MM HG: CPT | Performed by: INTERNAL MEDICINE

## 2024-05-15 ENCOUNTER — TELEPHONE (OUTPATIENT)
Dept: ENDOCRINOLOGY | Age: 64
End: 2024-05-15

## 2024-06-12 ENCOUNTER — OFFICE VISIT (OUTPATIENT)
Dept: FAMILY MEDICINE CLINIC | Facility: CLINIC | Age: 64
End: 2024-06-12
Payer: MEDICARE

## 2024-06-12 VITALS
TEMPERATURE: 97.6 F | SYSTOLIC BLOOD PRESSURE: 100 MMHG | HEIGHT: 69 IN | WEIGHT: 221 LBS | BODY MASS INDEX: 32.73 KG/M2 | DIASTOLIC BLOOD PRESSURE: 65 MMHG | HEART RATE: 102 BPM

## 2024-06-12 DIAGNOSIS — I10 PRIMARY HYPERTENSION: ICD-10-CM

## 2024-06-12 DIAGNOSIS — F33.42 RECURRENT MAJOR DEPRESSIVE DISORDER, IN FULL REMISSION (HCC): ICD-10-CM

## 2024-06-12 DIAGNOSIS — F41.9 ANXIETY: Primary | ICD-10-CM

## 2024-06-12 DIAGNOSIS — M25.552 LEFT HIP PAIN: ICD-10-CM

## 2024-06-12 DIAGNOSIS — N18.30 STAGE 3 CHRONIC KIDNEY DISEASE, UNSPECIFIED WHETHER STAGE 3A OR 3B CKD (HCC): ICD-10-CM

## 2024-06-12 DIAGNOSIS — F51.01 PRIMARY INSOMNIA: ICD-10-CM

## 2024-06-12 DIAGNOSIS — I10 ESSENTIAL (PRIMARY) HYPERTENSION: ICD-10-CM

## 2024-06-12 DIAGNOSIS — E11.21 TYPE 2 DIABETES WITH NEPHROPATHY (HCC): ICD-10-CM

## 2024-06-12 PROCEDURE — 3078F DIAST BP <80 MM HG: CPT | Performed by: NURSE PRACTITIONER

## 2024-06-12 PROCEDURE — 99214 OFFICE O/P EST MOD 30 MIN: CPT | Performed by: NURSE PRACTITIONER

## 2024-06-12 PROCEDURE — 3074F SYST BP LT 130 MM HG: CPT | Performed by: NURSE PRACTITIONER

## 2024-06-12 RX ORDER — METOPROLOL SUCCINATE 50 MG/1
50 TABLET, EXTENDED RELEASE ORAL DAILY
Qty: 90 TABLET | Refills: 1 | Status: SHIPPED | OUTPATIENT
Start: 2024-06-12

## 2024-06-12 RX ORDER — IRBESARTAN 300 MG/1
300 TABLET ORAL DAILY
Qty: 90 TABLET | Refills: 1 | Status: SHIPPED | OUTPATIENT
Start: 2024-06-12

## 2024-06-12 RX ORDER — ZOLPIDEM TARTRATE 10 MG/1
10 TABLET ORAL NIGHTLY PRN
Qty: 30 TABLET | Refills: 5 | Status: SHIPPED | OUTPATIENT
Start: 2024-06-12 | End: 2025-06-12

## 2024-06-12 RX ORDER — DULOXETIN HYDROCHLORIDE 60 MG/1
60 CAPSULE, DELAYED RELEASE ORAL DAILY
Qty: 90 CAPSULE | Refills: 1 | Status: SHIPPED | OUTPATIENT
Start: 2024-06-12

## 2024-06-12 RX ORDER — AMLODIPINE BESYLATE 10 MG/1
10 TABLET ORAL DAILY
Qty: 90 TABLET | Refills: 1 | Status: SHIPPED | OUTPATIENT
Start: 2024-06-12

## 2024-06-12 RX ORDER — BUSPIRONE HYDROCHLORIDE 5 MG/1
5 TABLET ORAL 2 TIMES DAILY
Qty: 180 TABLET | Refills: 1 | Status: SHIPPED | OUTPATIENT
Start: 2024-06-12 | End: 2024-12-09

## 2024-06-12 ASSESSMENT — ENCOUNTER SYMPTOMS
SHORTNESS OF BREATH: 0
CHEST TIGHTNESS: 0
WHEEZING: 0

## 2024-06-12 NOTE — PROGRESS NOTES
Jaret Valentin (:  1960) is a 63 y.o. male,Established patient, here for evaluation of the following chief complaint(S):  Follow-up Chronic Condition ((Rm 11) 6 mo fu/labs/med refills -last filled Ambien 24)      Assessment & Plan   1. Anxiety  -     busPIRone (BUSPAR) 5 MG tablet; Take 1 tablet by mouth 2 times daily, Disp-180 tablet, R-1Normal  2. Type 2 diabetes with nephropathy (HCC)  -     irbesartan (AVAPRO) 300 MG tablet; Take 1 tablet by mouth daily, Disp-90 tablet, R-1Normal  3. Essential (primary) hypertension  -     irbesartan (AVAPRO) 300 MG tablet; Take 1 tablet by mouth daily, Disp-90 tablet, R-1Normal  4. Stage 3 chronic kidney disease, unspecified whether stage 3a or 3b CKD ([facility])  -     irbesartan (AVAPRO) 300 MG tablet; Take 1 tablet by mouth daily, Disp-90 tablet, R-1Normal  5. Primary hypertension  -     metoprolol succinate (TOPROL XL) 50 MG extended release tablet; Take 1 tablet by mouth daily, Disp-90 tablet, R-1Normal  -     amLODIPine (NORVASC) 10 MG tablet; Take 1 tablet by mouth daily, Disp-90 tablet, R-1Normal  6. Primary insomnia  -     zolpidem (AMBIEN) 10 MG tablet; Take 1 tablet by mouth nightly as needed for Sleep., Disp-30 tablet, R-5Normal  7. Recurrent major depressive disorder, in full remission ([facility])  -     DULoxetine (CYMBALTA) 60 MG extended release capsule; Take 1 capsule by mouth daily, Disp-90 capsule, R-1Normal  8. Left hip pain  -     XR HIP LEFT (2-3 VIEWS); Future  Anxiety depression controlled on current med's refilled  Dm sees endo at regular intervals and last Ha1c was 8 which is improved for him  HTN controlled on current med pulse up today states has not missed any metoprolol regular just fast.   Insomnia refilled Ambien  CKD urged to make an annual apt with nephrology and to make apt for DM eye exam  Hip pain may be due to altered gait from partial foot amputation will check xray and if not arthritic leija end to PT for gait assistance  Return in

## 2024-06-20 ENCOUNTER — OFFICE VISIT (OUTPATIENT)
Dept: ENDOCRINOLOGY | Age: 64
End: 2024-06-20
Payer: MEDICARE

## 2024-06-20 ENCOUNTER — TELEPHONE (OUTPATIENT)
Dept: FAMILY MEDICINE CLINIC | Facility: CLINIC | Age: 64
End: 2024-06-20

## 2024-06-20 VITALS
HEIGHT: 69 IN | DIASTOLIC BLOOD PRESSURE: 74 MMHG | HEART RATE: 81 BPM | SYSTOLIC BLOOD PRESSURE: 112 MMHG | WEIGHT: 217.8 LBS | BODY MASS INDEX: 32.26 KG/M2 | OXYGEN SATURATION: 98 %

## 2024-06-20 DIAGNOSIS — E11.21 TYPE 2 DIABETES WITH NEPHROPATHY (HCC): Primary | ICD-10-CM

## 2024-06-20 DIAGNOSIS — Z96.41 INSULIN PUMP STATUS: ICD-10-CM

## 2024-06-20 DIAGNOSIS — I10 PRIMARY HYPERTENSION: ICD-10-CM

## 2024-06-20 DIAGNOSIS — Z89.422 STATUS POST AMPUTATION OF TOE OF LEFT FOOT (HCC): ICD-10-CM

## 2024-06-20 DIAGNOSIS — E11.69 HYPERLIPIDEMIA ASSOCIATED WITH TYPE 2 DIABETES MELLITUS (HCC): ICD-10-CM

## 2024-06-20 DIAGNOSIS — L84 CALLUS OF HEEL: ICD-10-CM

## 2024-06-20 DIAGNOSIS — E78.5 HYPERLIPIDEMIA ASSOCIATED WITH TYPE 2 DIABETES MELLITUS (HCC): ICD-10-CM

## 2024-06-20 LAB — HBA1C MFR BLD: 7.2 %

## 2024-06-20 PROCEDURE — 95251 CONT GLUC MNTR ANALYSIS I&R: CPT | Performed by: PHYSICIAN ASSISTANT

## 2024-06-20 PROCEDURE — 3074F SYST BP LT 130 MM HG: CPT | Performed by: PHYSICIAN ASSISTANT

## 2024-06-20 PROCEDURE — 99214 OFFICE O/P EST MOD 30 MIN: CPT | Performed by: PHYSICIAN ASSISTANT

## 2024-06-20 PROCEDURE — 83036 HEMOGLOBIN GLYCOSYLATED A1C: CPT | Performed by: PHYSICIAN ASSISTANT

## 2024-06-20 PROCEDURE — 3078F DIAST BP <80 MM HG: CPT | Performed by: PHYSICIAN ASSISTANT

## 2024-06-20 RX ORDER — INSULIN LISPRO 100 [IU]/ML
INJECTION, SOLUTION INTRAVENOUS; SUBCUTANEOUS
Qty: 70 ML | Refills: 3 | Status: SHIPPED | OUTPATIENT
Start: 2024-06-20

## 2024-06-20 NOTE — TELEPHONE ENCOUNTER
----- Message from JHON Tiwari NP sent at 6/20/2024  1:34 PM EDT -----  Is good but low back has lots of arthritis. Do you want to see pt to see if they can help?

## 2024-06-20 NOTE — PROGRESS NOTES
toe      Lymphadenopathy:      Cervical: No cervical adenopathy.   Skin:     General: Skin is warm and dry.   Neurological:      General: No focal deficit present.      Mental Status: He is alert.   Psychiatric:         Mood and Affect: Mood normal.         Behavior: Behavior normal.         Thought Content: Thought content normal.         Judgment: Judgment normal.             Return 3 and 6 month, for other diabetes f/u.        Portions of this note were generated with the assistance of voice recogniton software.  As such, some errors in transcription may be present.

## 2024-08-21 ENCOUNTER — TELEPHONE (OUTPATIENT)
Dept: FAMILY MEDICINE CLINIC | Facility: CLINIC | Age: 64
End: 2024-08-21

## 2024-08-21 NOTE — TELEPHONE ENCOUNTER
Called the patient to let him know all of his prescriptions were sent to his mail order pharmacy. He will need to call [facility] to see where the prescription refill is because he should have gotten the refill by now.

## 2024-08-21 NOTE — TELEPHONE ENCOUNTER
Last ov 6/12/24  Next ov 10/17/24    Pt called requesting refill of cymbalta sent to [facility] on [address]

## 2024-08-23 ENCOUNTER — TELEMEDICINE (OUTPATIENT)
Dept: FAMILY MEDICINE CLINIC | Facility: CLINIC | Age: 64
End: 2024-08-23

## 2024-08-23 DIAGNOSIS — T78.40XS ALLERGIC REACTION, SEQUELA: Primary | ICD-10-CM

## 2024-08-23 RX ORDER — TRIAMCINOLONE ACETONIDE 1 MG/G
OINTMENT TOPICAL 2 TIMES DAILY
Qty: 80 G | Refills: 0 | Status: SHIPPED | OUTPATIENT
Start: 2024-08-23 | End: 2024-08-30

## 2024-08-23 SDOH — ECONOMIC STABILITY: INCOME INSECURITY: HOW HARD IS IT FOR YOU TO PAY FOR THE VERY BASICS LIKE FOOD, HOUSING, MEDICAL CARE, AND HEATING?: NOT HARD AT ALL

## 2024-08-23 SDOH — ECONOMIC STABILITY: FOOD INSECURITY: WITHIN THE PAST 12 MONTHS, THE FOOD YOU BOUGHT JUST DIDN'T LAST AND YOU DIDN'T HAVE MONEY TO GET MORE.: NEVER TRUE

## 2024-08-23 SDOH — ECONOMIC STABILITY: FOOD INSECURITY: WITHIN THE PAST 12 MONTHS, YOU WORRIED THAT YOUR FOOD WOULD RUN OUT BEFORE YOU GOT MONEY TO BUY MORE.: NEVER TRUE

## 2024-08-23 NOTE — PROGRESS NOTES
Jaret Valentin, was evaluated through a synchronous (real-time) audio-video encounter. The patient (or guardian if applicable) is aware that this is a billable service, which includes applicable co-pays. This Virtual Visit was conducted with patient's (and/or legal guardian's) consent. Patient identification was verified, and a caregiver was present when appropriate.   The patient was located at Home: [address] [address] [address]  Provider was located at Facility ([facility]): [address] [place] [phone]  Confirm you are appropriately licensed, registered, or certified to deliver care in the state where the patient is located as indicated above. If you are not or unsure, please re-schedule the visit: Yes, I confirm.     Jaret Valentin (:  1960) is a Established patient, presenting virtually for evaluation of the following:      Below is the assessment and plan developed based on review of pertinent history, physical exam, labs, studies, and medications.     Assessment & Plan  Allergic reaction, sequela    Exam limited by video but does not appear to be shingles as on multiple dermatomes  As itchy will treat for allergic reaction is to use OTC benadryl 25 every 8 hours for itching stay cool only tepid showers New worsening S.S. eugenia of infection fever chills to er  Orders:    triamcinolone (KENALOG) 0.1 % ointment; Apply topically 2 times daily for 7 days      No follow-ups on file.       Subjective   HPI has a red spot on his right side noted last night a bit itchy. and some smaller spots on his right underarm that are also very itchy.   Review of Systems   Itchy rash not painful    Objective   Patient-Reported Vitals  No data recorded     Physical Exam       On video has large oval red raised are right lateral back and some smaller red spots on underside of right arm      --JHON Tiwari - NP

## 2024-09-30 RX ORDER — ROSUVASTATIN CALCIUM 20 MG/1
TABLET, COATED ORAL
Qty: 90 TABLET | Refills: 3 | Status: SHIPPED | OUTPATIENT
Start: 2024-09-30

## 2024-09-30 RX ORDER — EZETIMIBE 10 MG/1
10 TABLET ORAL DAILY
Qty: 90 TABLET | Refills: 3 | Status: SHIPPED | OUTPATIENT
Start: 2024-09-30

## 2024-09-30 NOTE — TELEPHONE ENCOUNTER
Requested Prescriptions     Pending Prescriptions Disp Refills    ezetimibe (ZETIA) 10 MG tablet [Pharmacy Med Name: Ezetimibe 10 MG Oral Tablet] 90 tablet 3     Sig: TAKE 1 TABLET BY MOUTH DAILY    rosuvastatin (CRESTOR) 20 MG tablet [Pharmacy Med Name: Rosuvastatin Calcium 20 MG Oral Tablet] 90 tablet 3     Sig: TAKE 1 TABLET BY MOUTH AT NIGHT     Verified rx. Last OV 04/16/24. Erx to pharm on file.    Last lipid panel 12/11/23

## 2024-10-04 PROCEDURE — 93294 REM INTERROG EVL PM/LDLS PM: CPT | Performed by: INTERNAL MEDICINE

## 2024-10-04 PROCEDURE — 93296 REM INTERROG EVL PM/IDS: CPT | Performed by: INTERNAL MEDICINE

## 2024-10-11 ENCOUNTER — OFFICE VISIT (OUTPATIENT)
Dept: ENDOCRINOLOGY | Age: 64
End: 2024-10-11

## 2024-10-11 VITALS
SYSTOLIC BLOOD PRESSURE: 112 MMHG | BODY MASS INDEX: 31.48 KG/M2 | DIASTOLIC BLOOD PRESSURE: 68 MMHG | WEIGHT: 213.2 LBS | OXYGEN SATURATION: 98 % | HEART RATE: 102 BPM

## 2024-10-11 DIAGNOSIS — I10 PRIMARY HYPERTENSION: ICD-10-CM

## 2024-10-11 DIAGNOSIS — E11.21 TYPE 2 DIABETES WITH NEPHROPATHY (HCC): Primary | ICD-10-CM

## 2024-10-11 DIAGNOSIS — E11.69 HYPERLIPIDEMIA ASSOCIATED WITH TYPE 2 DIABETES MELLITUS (HCC): ICD-10-CM

## 2024-10-11 DIAGNOSIS — Z89.422 STATUS POST AMPUTATION OF TOE OF LEFT FOOT (HCC): ICD-10-CM

## 2024-10-11 DIAGNOSIS — L97.509: ICD-10-CM

## 2024-10-11 DIAGNOSIS — Z96.41 INSULIN PUMP STATUS: ICD-10-CM

## 2024-10-11 DIAGNOSIS — E11.621: ICD-10-CM

## 2024-10-11 DIAGNOSIS — E78.5 HYPERLIPIDEMIA ASSOCIATED WITH TYPE 2 DIABETES MELLITUS (HCC): ICD-10-CM

## 2024-10-11 LAB — HBA1C MFR BLD: 7.6 %

## 2024-10-11 RX ORDER — INSULIN GLARGINE 100 [IU]/ML
45 INJECTION, SOLUTION SUBCUTANEOUS DAILY
Qty: 15 ML | Refills: 3 | Status: SHIPPED | OUTPATIENT
Start: 2024-10-11 | End: 2025-02-21

## 2024-10-11 RX ORDER — INSULIN LISPRO 100 [IU]/ML
INJECTION, SOLUTION INTRAVENOUS; SUBCUTANEOUS
Qty: 70 ML | Refills: 3 | Status: SHIPPED | OUTPATIENT
Start: 2024-10-11

## 2024-10-11 RX ORDER — INSULIN PMP CART,AUT,G6/7,CNTR
EACH SUBCUTANEOUS
Qty: 30 EACH | Refills: 3 | Status: SHIPPED | OUTPATIENT
Start: 2024-10-11

## 2024-10-11 NOTE — PROGRESS NOTES
settings:   OMNIPOD5 manual mode     Time  00:00  1.15  Carb Ratio 00:00  9.0  Insulin Sensitivity 50  Target low  120  Target high 150  Active Insulin time 3:00  Max Bolus 20 units  Dispense: 1 each; Refill: 0    3. Primary hypertension  Stable  BP Readings from Last 3 Encounters:   10/11/24 112/68   06/20/24 112/74   06/12/24 100/65         4. Hyperlipidemia associated with type 2 diabetes mellitus (HCC)  Last LDL at goal on ezetimibe - 10 MG  rosuvastatin - 20 MG  No results found for: \"LDLDIRECT\"      5. 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 This is noted on heel of foot s/p multiple amputations. Has podiatry. Improved after he stopped self manipulating area. See podiatry. Have a low threshold to seek immediate evaluation and treatment if any signs of infection. Risk of amputation reviewed at length     Diagnoses and all orders for this visit:    Type 2 diabetes with nephropathy (HCC)  -     AMB POC HEMOGLOBIN A1C  -     LANTUS SOLOSTAR 100 UNIT/ML injection pen; Inject 45 Units into the skin daily IN CASE OF PUMP FAILURE inject 40 units every 24 hours  -     HUMALOG 100 UNIT/ML SOLN injection vial; Use with insulin pump, max daily dose 75 units  -     Insulin Disposable Pump (OMNIPOD 5 G6 PODS, GEN 5,) MISC; Use to deliver insulin, E10.65  -      DIABETES FOOT EXAM  -     GLUCOSE MONITOR, 72 HOUR, PHYS INTERP    Insulin pump status  -     Insulin Infusion Pump LISANDRA; Pump settings:   OMNIPOD5 manual mode     Time  00:00  1.15  Carb Ratio 00:00  9.0  Insulin Sensitivity 50  Target low  120  Target high 150  Active Insulin time 3:00  Max Bolus 20 units    Primary hypertension    Hyperlipidemia associated with type 2 diabetes mellitus (HCC)    Status 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

## 2024-10-15 NOTE — PROGRESS NOTES
Disease Brother         Social History     Tobacco Use    Smoking status: Never    Smokeless tobacco: Never   Substance Use Topics    Alcohol use: No       ROS:    Review of Systems   Constitutional:  Negative for appetite change, chills, diaphoresis and fatigue.   HENT:  Negative for congestion, mouth sores, nosebleeds, sore throat and tinnitus.    Eyes:  Negative for photophobia and visual disturbance.   Respiratory:  Negative for cough and shortness of breath.    Cardiovascular:  Positive for leg swelling. Negative for chest pain and palpitations.   Gastrointestinal:  Negative for abdominal distention, abdominal pain, constipation and diarrhea.   Endocrine: Negative for cold intolerance, heat intolerance, polydipsia and polyuria.   Genitourinary:  Negative for dysuria and hematuria.   Musculoskeletal:  Negative for arthralgias, joint swelling and myalgias.        Foot pain   Skin:  Negative for rash.   Allergic/Immunologic: Negative for environmental allergies and food allergies.   Neurological:  Negative for dizziness, seizures, syncope and light-headedness.   Hematological:  Negative for adenopathy. Does not bruise/bleed easily.   Psychiatric/Behavioral:  Negative for agitation, behavioral problems, dysphoric mood and hallucinations. The patient is not nervous/anxious.         PHYSICAL EXAM:     Vitals:    10/23/24 1356   BP: 110/70   Pulse: 99   Weight: 96.6 kg (213 lb)   Height: 1.753 m (5' 9\")        Wt Readings from Last 3 Encounters:   10/23/24 96.6 kg (213 lb)   10/11/24 96.7 kg (213 lb 3.2 oz)   06/20/24 98.8 kg (217 lb 12.8 oz)      BP Readings from Last 3 Encounters:   10/23/24 110/70   10/11/24 112/68   06/20/24 112/74        Physical Exam  Constitutional:       Appearance: Normal appearance. He is normal weight.   HENT:      Head: Normocephalic and atraumatic.      Nose: Nose normal.      Mouth/Throat:      Mouth: Mucous membranes are moist.      Pharynx: Oropharynx is clear.   Eyes:      Extraocular

## 2024-10-23 ENCOUNTER — OFFICE VISIT (OUTPATIENT)
Age: 64
End: 2024-10-23
Payer: MEDICARE

## 2024-10-23 VITALS
DIASTOLIC BLOOD PRESSURE: 70 MMHG | BODY MASS INDEX: 31.55 KG/M2 | HEIGHT: 69 IN | WEIGHT: 213 LBS | HEART RATE: 99 BPM | SYSTOLIC BLOOD PRESSURE: 110 MMHG

## 2024-10-23 DIAGNOSIS — E78.5 DYSLIPIDEMIA: ICD-10-CM

## 2024-10-23 DIAGNOSIS — I44.2 COMPLETE HEART BLOCK (HCC): ICD-10-CM

## 2024-10-23 DIAGNOSIS — N18.30 STAGE 3 CHRONIC KIDNEY DISEASE, UNSPECIFIED WHETHER STAGE 3A OR 3B CKD (HCC): ICD-10-CM

## 2024-10-23 DIAGNOSIS — Z95.3 H/O HEART VALVE REPLACEMENT WITH BIOPROSTHETIC VALVE: Primary | ICD-10-CM

## 2024-10-23 DIAGNOSIS — I25.10 CORONARY ARTERY DISEASE INVOLVING NATIVE CORONARY ARTERY OF NATIVE HEART WITHOUT ANGINA PECTORIS: ICD-10-CM

## 2024-10-23 DIAGNOSIS — I10 PRIMARY HYPERTENSION: ICD-10-CM

## 2024-10-23 PROCEDURE — 99214 OFFICE O/P EST MOD 30 MIN: CPT | Performed by: INTERNAL MEDICINE

## 2024-10-23 PROCEDURE — 3078F DIAST BP <80 MM HG: CPT | Performed by: INTERNAL MEDICINE

## 2024-10-23 PROCEDURE — 3074F SYST BP LT 130 MM HG: CPT | Performed by: INTERNAL MEDICINE

## 2024-10-23 RX ORDER — EZETIMIBE 10 MG/1
10 TABLET ORAL DAILY
Qty: 90 TABLET | Refills: 3 | Status: SHIPPED | OUTPATIENT
Start: 2024-10-23

## 2024-10-23 RX ORDER — ROSUVASTATIN CALCIUM 20 MG/1
20 TABLET, COATED ORAL NIGHTLY
Qty: 90 TABLET | Refills: 3 | Status: SHIPPED | OUTPATIENT
Start: 2024-10-23

## 2024-11-20 ENCOUNTER — TELEPHONE (OUTPATIENT)
Dept: ENDOCRINOLOGY | Age: 64
End: 2024-11-20

## 2024-11-20 NOTE — TELEPHONE ENCOUNTER
Pt left message stating he received a message that his sensors need a PA. He gets them from [facility]. He was to get a shipment in Fri or Monday, but received that message. Until he gets them he is finger sticking to get his BG, but had better control with BG with the CGM.     Ada, have you received anything about this?

## 2024-12-10 ENCOUNTER — OFFICE VISIT (OUTPATIENT)
Dept: FAMILY MEDICINE CLINIC | Facility: CLINIC | Age: 64
End: 2024-12-10
Payer: MEDICARE

## 2024-12-10 VITALS
TEMPERATURE: 97.8 F | DIASTOLIC BLOOD PRESSURE: 54 MMHG | HEART RATE: 93 BPM | WEIGHT: 215 LBS | HEIGHT: 69 IN | BODY MASS INDEX: 31.84 KG/M2 | SYSTOLIC BLOOD PRESSURE: 90 MMHG

## 2024-12-10 DIAGNOSIS — N18.30 STAGE 3 CHRONIC KIDNEY DISEASE, UNSPECIFIED WHETHER STAGE 3A OR 3B CKD (HCC): ICD-10-CM

## 2024-12-10 DIAGNOSIS — I10 PRIMARY HYPERTENSION: ICD-10-CM

## 2024-12-10 DIAGNOSIS — F33.42 RECURRENT MAJOR DEPRESSIVE DISORDER, IN FULL REMISSION (HCC): ICD-10-CM

## 2024-12-10 DIAGNOSIS — E11.21 TYPE 2 DIABETES WITH NEPHROPATHY (HCC): ICD-10-CM

## 2024-12-10 DIAGNOSIS — F51.01 PRIMARY INSOMNIA: ICD-10-CM

## 2024-12-10 PROCEDURE — 3078F DIAST BP <80 MM HG: CPT | Performed by: NURSE PRACTITIONER

## 2024-12-10 PROCEDURE — 3074F SYST BP LT 130 MM HG: CPT | Performed by: NURSE PRACTITIONER

## 2024-12-10 PROCEDURE — 99214 OFFICE O/P EST MOD 30 MIN: CPT | Performed by: NURSE PRACTITIONER

## 2024-12-10 RX ORDER — DULOXETIN HYDROCHLORIDE 60 MG/1
60 CAPSULE, DELAYED RELEASE ORAL DAILY
Qty: 90 CAPSULE | Refills: 1 | Status: SHIPPED | OUTPATIENT
Start: 2024-12-10

## 2024-12-10 RX ORDER — ZOLPIDEM TARTRATE 10 MG/1
10 TABLET ORAL NIGHTLY PRN
Qty: 30 TABLET | Refills: 5 | Status: SHIPPED | OUTPATIENT
Start: 2024-12-10 | End: 2025-12-10

## 2024-12-10 RX ORDER — AMLODIPINE BESYLATE 10 MG/1
10 TABLET ORAL DAILY
Qty: 90 TABLET | Refills: 1 | Status: SHIPPED | OUTPATIENT
Start: 2024-12-10

## 2024-12-10 RX ORDER — IRBESARTAN 300 MG/1
300 TABLET ORAL DAILY
Qty: 90 TABLET | Refills: 1 | Status: SHIPPED | OUTPATIENT
Start: 2024-12-10

## 2024-12-10 RX ORDER — BUSPIRONE HYDROCHLORIDE 5 MG/1
5 TABLET ORAL 2 TIMES DAILY
Qty: 180 TABLET | Refills: 1 | Status: SHIPPED | OUTPATIENT
Start: 2024-12-10

## 2024-12-10 RX ORDER — BUSPIRONE HYDROCHLORIDE 5 MG/1
5 TABLET ORAL 2 TIMES DAILY
COMMUNITY
Start: 2024-11-13 | End: 2024-12-10 | Stop reason: SDUPTHER

## 2024-12-10 RX ORDER — METOPROLOL SUCCINATE 50 MG/1
50 TABLET, EXTENDED RELEASE ORAL DAILY
Qty: 90 TABLET | Refills: 1 | Status: SHIPPED | OUTPATIENT
Start: 2024-12-10

## 2024-12-10 ASSESSMENT — ENCOUNTER SYMPTOMS
CHEST TIGHTNESS: 0
SHORTNESS OF BREATH: 0

## 2024-12-10 NOTE — PROGRESS NOTES
Jaret Valentin (:  1960) is a 64 y.o. male,Established patient, here for evaluation of the following chief complaint(s):  Follow-up Chronic Condition (F3 6 mo fu/labs/med refills )         Assessment & Plan  Type 2 diabetes with nephropathy (HCC)   Sees endo at regular intervals Last Ha1c just over 7    Orders:    irbesartan (AVAPRO) 300 MG tablet; Take 1 tablet by mouth daily    Stage 3 chronic kidney disease, unspecified whether stage 3a or 3b CKD (HCC)    Sees nephrology at regualr intervals    Orders:    irbesartan (AVAPRO) 300 MG tablet; Take 1 tablet by mouth daily    Primary hypertension   Chronic, at goal (stable), continue current treatment plan    Orders:    irbesartan (AVAPRO) 300 MG tablet; Take 1 tablet by mouth daily    amLODIPine (NORVASC) 10 MG tablet; Take 1 tablet by mouth daily    metoprolol succinate (TOPROL XL) 50 MG extended release tablet; Take 1 tablet by mouth daily    Comprehensive Metabolic Panel; Future    Lipid Panel; Future    Primary insomnia   Chronic, at goal (stable), continue current treatment plan    Orders:    zolpidem (AMBIEN) 10 MG tablet; Take 1 tablet by mouth nightly as needed for Sleep.    Recurrent major depressive disorder, in full remission (HCC)   Chronic, at goal (stable), continue current treatment plan    Orders:    DULoxetine (CYMBALTA) 60 MG extended release capsule; Take 1 capsule by mouth daily    busPIRone (BUSPAR) 5 MG tablet; Take 1 tablet by mouth 2 times daily      Return in about 6 months (around 6/10/2025) for labs/med refills.       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 Enjoying that  Doing well Ha1c just over 7 at last endo check  Sees nephrologist at intervals and cardiology at intervals no chest pain no shortness of breath no swelling.   Sees endo at regular intervals  Mood is good.   BP low but no dizziness unless gets up fast  Taking cholesterol med daily    Review of Systems

## 2024-12-10 NOTE — ASSESSMENT & PLAN NOTE
Sees endo at regular intervals Last Ha1c just over 7    Orders:    irbesartan (AVAPRO) 300 MG tablet; Take 1 tablet by mouth daily

## 2024-12-10 NOTE — ASSESSMENT & PLAN NOTE
Sees nephrology at regualr intervals    Orders:    irbesartan (AVAPRO) 300 MG tablet; Take 1 tablet by mouth daily

## 2024-12-10 NOTE — ASSESSMENT & PLAN NOTE
Chronic, at goal (stable), continue current treatment plan    Orders:    DULoxetine (CYMBALTA) 60 MG extended release capsule; Take 1 capsule by mouth daily    busPIRone (BUSPAR) 5 MG tablet; Take 1 tablet by mouth 2 times daily

## 2024-12-20 ENCOUNTER — TELEPHONE (OUTPATIENT)
Dept: FAMILY MEDICINE CLINIC | Facility: CLINIC | Age: 64
End: 2024-12-20

## 2024-12-20 NOTE — TELEPHONE ENCOUNTER
I called the patient back to see if he contacted [facility] to see why he doesn't have it by now. He said he was going to check with [facility] to see if he can get his Cymbalta through them. He has called [facility] 3 times and they keep telling him that they shipped it out to his home. He has not received it yet and they want tell him why they want send the Cymbalta to him. I told him Masha was off next week/already gone for today and call back to let us know if he can't get the med through [facility].

## 2025-01-06 ENCOUNTER — OFFICE VISIT (OUTPATIENT)
Dept: ENDOCRINOLOGY | Age: 65
End: 2025-01-06
Payer: MEDICARE

## 2025-01-06 VITALS
HEART RATE: 81 BPM | OXYGEN SATURATION: 97 % | BODY MASS INDEX: 31.67 KG/M2 | HEIGHT: 69 IN | WEIGHT: 213.8 LBS | DIASTOLIC BLOOD PRESSURE: 80 MMHG | SYSTOLIC BLOOD PRESSURE: 124 MMHG

## 2025-01-06 DIAGNOSIS — Z96.41 INSULIN PUMP STATUS: ICD-10-CM

## 2025-01-06 DIAGNOSIS — I10 PRIMARY HYPERTENSION: ICD-10-CM

## 2025-01-06 DIAGNOSIS — N18.30 STAGE 3 CHRONIC KIDNEY DISEASE, UNSPECIFIED WHETHER STAGE 3A OR 3B CKD (HCC): ICD-10-CM

## 2025-01-06 DIAGNOSIS — E11.21 TYPE 2 DIABETES WITH NEPHROPATHY (HCC): Primary | ICD-10-CM

## 2025-01-06 DIAGNOSIS — E11.69 HYPERLIPIDEMIA ASSOCIATED WITH TYPE 2 DIABETES MELLITUS (HCC): ICD-10-CM

## 2025-01-06 DIAGNOSIS — Z89.422 STATUS POST AMPUTATION OF TOE OF LEFT FOOT (HCC): ICD-10-CM

## 2025-01-06 DIAGNOSIS — E78.5 HYPERLIPIDEMIA ASSOCIATED WITH TYPE 2 DIABETES MELLITUS (HCC): ICD-10-CM

## 2025-01-06 PROCEDURE — 3079F DIAST BP 80-89 MM HG: CPT | Performed by: PHYSICIAN ASSISTANT

## 2025-01-06 PROCEDURE — 95251 CONT GLUC MNTR ANALYSIS I&R: CPT | Performed by: PHYSICIAN ASSISTANT

## 2025-01-06 PROCEDURE — 99214 OFFICE O/P EST MOD 30 MIN: CPT | Performed by: PHYSICIAN ASSISTANT

## 2025-01-06 PROCEDURE — 3074F SYST BP LT 130 MM HG: CPT | Performed by: PHYSICIAN ASSISTANT

## 2025-01-06 RX ORDER — INSULIN PMP CART,AUT,G6/7,CNTR
EACH SUBCUTANEOUS
Qty: 30 EACH | Refills: 3 | Status: SHIPPED | OUTPATIENT
Start: 2025-01-06

## 2025-01-06 RX ORDER — INSULIN LISPRO 100 [IU]/ML
INJECTION, SOLUTION INTRAVENOUS; SUBCUTANEOUS
Qty: 70 ML | Refills: 3 | Status: SHIPPED | OUTPATIENT
Start: 2025-01-06

## 2025-01-06 NOTE — PROGRESS NOTES
9.6 (H) 01/13/2021     Lab Results   Component Value Date    CREATININE 1.80 (H) 03/21/2024       Hemoglobin A1C, POC   Date Value Ref Range Status   10/11/2024 7.6 % Final   06/20/2024 7.2 % Final   01/02/2024 8.0 % Final        Thyroid:     08/14/2019 4.030   05/28/2021 4.750, free T4 1.20   09/23/2021 1.65   01/26/2022 4.650, free T4 1.25  08/28/2023  2.08     Lab Results   Component Value Date/Time    TSH 1.480 06/28/2023 03:06 PM    TSH 4.650 01/26/2022 10:50 AM    TSH 4.750 05/28/2021 09:58 AM    TSH 4.030 08/14/2019 10:10 AM                      Allergies & Medications:  Reviewed in chart.        Review of Systems    Vital Signs:  /80 (Site: Left Upper Arm, Position: Sitting, Cuff Size: Medium Adult)   Pulse 81   Ht 1.753 m (5' 9\")   Wt 97 kg (213 lb 12.8 oz)   SpO2 97%   BMI 31.57 kg/m²       Physical Exam  Constitutional:       Appearance: Normal appearance. He is obese.   Neck:      Thyroid: No thyromegaly.   Cardiovascular:      Rate and Rhythm: Normal rate and regular rhythm.      Heart sounds: Murmur heard.   Pulmonary:      Effort: Pulmonary effort is normal.      Breath sounds: Normal breath sounds.   Abdominal:      Palpations: Abdomen is soft.   Feet:      Right foot:      Protective Sensation: 3 sites tested.  3 sites sensed.      Left foot:      Protective Sensation: 3 sites tested.   1 site sensed.     Skin integrity: Callus present.      Comments: S/p amputation of left hallux and 5th toe, well healed  Hammer toe deformities of left 2nd, 3rd, and 4th toe      Lymphadenopathy:      Cervical: No cervical adenopathy.   Skin:     General: Skin is warm and dry.   Neurological:      General: No focal deficit present.      Mental Status: He is alert.   Psychiatric:         Mood and Affect: Mood normal.         Behavior: Behavior normal.         Thought Content: Thought content normal.         Judgment: Judgment normal.             Return 4 month and 7 month follow up, for other diabetes

## 2025-02-04 ENCOUNTER — TELEMEDICINE (OUTPATIENT)
Dept: FAMILY MEDICINE CLINIC | Facility: CLINIC | Age: 65
End: 2025-02-04

## 2025-02-04 DIAGNOSIS — Z00.00 MEDICARE ANNUAL WELLNESS VISIT, SUBSEQUENT: Primary | ICD-10-CM

## 2025-02-04 SDOH — ECONOMIC STABILITY: FOOD INSECURITY: WITHIN THE PAST 12 MONTHS, THE FOOD YOU BOUGHT JUST DIDN'T LAST AND YOU DIDN'T HAVE MONEY TO GET MORE.: NEVER TRUE

## 2025-02-04 SDOH — ECONOMIC STABILITY: FOOD INSECURITY: WITHIN THE PAST 12 MONTHS, YOU WORRIED THAT YOUR FOOD WOULD RUN OUT BEFORE YOU GOT MONEY TO BUY MORE.: NEVER TRUE

## 2025-02-04 SDOH — HEALTH STABILITY: PHYSICAL HEALTH: ON AVERAGE, HOW MANY MINUTES DO YOU ENGAGE IN EXERCISE AT THIS LEVEL?: 20 MIN

## 2025-02-04 SDOH — HEALTH STABILITY: PHYSICAL HEALTH: ON AVERAGE, HOW MANY DAYS PER WEEK DO YOU ENGAGE IN MODERATE TO STRENUOUS EXERCISE (LIKE A BRISK WALK)?: 4 DAYS

## 2025-02-04 ASSESSMENT — PATIENT HEALTH QUESTIONNAIRE - PHQ9
SUM OF ALL RESPONSES TO PHQ QUESTIONS 1-9: 0
8. MOVING OR SPEAKING SO SLOWLY THAT OTHER PEOPLE COULD HAVE NOTICED. OR THE OPPOSITE, BEING SO FIGETY OR RESTLESS THAT YOU HAVE BEEN MOVING AROUND A LOT MORE THAN USUAL: NOT AT ALL
9. THOUGHTS THAT YOU WOULD BE BETTER OFF DEAD, OR OF HURTING YOURSELF: NOT AT ALL
2. FEELING DOWN, DEPRESSED OR HOPELESS: NOT AT ALL
6. FEELING BAD ABOUT YOURSELF - OR THAT YOU ARE A FAILURE OR HAVE LET YOURSELF OR YOUR FAMILY DOWN: NOT AT ALL
10. IF YOU CHECKED OFF ANY PROBLEMS, HOW DIFFICULT HAVE THESE PROBLEMS MADE IT FOR YOU TO DO YOUR WORK, TAKE CARE OF THINGS AT HOME, OR GET ALONG WITH OTHER PEOPLE: NOT DIFFICULT AT ALL
SUM OF ALL RESPONSES TO PHQ QUESTIONS 1-9: 0
SUM OF ALL RESPONSES TO PHQ9 QUESTIONS 1 & 2: 0
SUM OF ALL RESPONSES TO PHQ QUESTIONS 1-9: 0
5. POOR APPETITE OR OVEREATING: NOT AT ALL
1. LITTLE INTEREST OR PLEASURE IN DOING THINGS: NOT AT ALL
4. FEELING TIRED OR HAVING LITTLE ENERGY: NOT AT ALL
SUM OF ALL RESPONSES TO PHQ QUESTIONS 1-9: 0
3. TROUBLE FALLING OR STAYING ASLEEP: NOT AT ALL
7. TROUBLE CONCENTRATING ON THINGS, SUCH AS READING THE NEWSPAPER OR WATCHING TELEVISION: NOT AT ALL

## 2025-02-04 NOTE — PROGRESS NOTES
MG extended release capsule Take 1 capsule by mouth daily Yes Masha Macedo APRN - NP   busPIRone (BUSPAR) 5 MG tablet Take 1 tablet by mouth 2 times daily Yes Masha Macedo APRN - NP   ezetimibe (ZETIA) 10 MG tablet Take 1 tablet by mouth daily Yes Chris Vega MD   rosuvastatin (CRESTOR) 20 MG tablet Take 1 tablet by mouth nightly Yes Chris Vega MD   LANTUS SOLOSTAR 100 UNIT/ML injection pen Inject 45 Units into the skin daily IN CASE OF PUMP FAILURE inject 40 units every 24 hours Yes Yari Galo PA-C   acetaminophen (TYLENOL) 500 MG tablet Take 2 tablets by mouth every 6 hours as needed for Pain Yes Provider, MD Randa   aspirin 81 MG chewable tablet Take 1 tablet by mouth daily Yes Automatic Reconciliation, Ar   omeprazole (PRILOSEC) 20 MG delayed release capsule Take 1 capsule by mouth nightly as needed Yes Automatic Reconciliation, Ar   Insulin Disposable Pump (OMNIPOD 5 [id number] PODS GEN 5) MISC Use to deliver insulin, E10.65  Patient not taking: Reported on 2/4/2025  Yari Galo PA-C       CareTeam (Including outside providers/suppliers regularly involved in providing care):   Patient Care Team:  Masha Macedo APRN - NP as PCP - General  Masha Macedo APRN - NP as PCP - Empaneled Provider   [facility]  Eye care [facility]  Dental no dentist  Cardiology [place]  Nephrology Dr Schmitt  Recommendations for Preventive Services Due: see orders and patient instructions/AVS.  Recommended screening schedule for the next 5-10 years is provided to the patient in written form: see Patient Instructions/AVS.     Reviewed and updated this visit:  Tobacco  Allergies  Meds  Med Hx  Surg Hx  Soc Hx  Fam Hx         Jaret Valentin, was evaluated through a synchronous (real-time) audio-video encounter. The patient (or guardian if applicable) is aware that this is a billable service, which includes applicable co-pays. This Virtual Visit was

## 2025-02-09 ENCOUNTER — APPOINTMENT (OUTPATIENT)
Dept: GENERAL RADIOLOGY | Age: 65
DRG: 918 | End: 2025-02-09
Payer: MEDICARE

## 2025-02-09 ENCOUNTER — HOSPITAL ENCOUNTER (INPATIENT)
Age: 65
LOS: 3 days | Discharge: HOME | DRG: 918 | End: 2025-02-12
Attending: EMERGENCY MEDICINE
Payer: MEDICARE

## 2025-02-09 DIAGNOSIS — N18.9 ACUTE RENAL FAILURE SUPERIMPOSED ON CHRONIC KIDNEY DISEASE, UNSPECIFIED ACUTE RENAL FAILURE TYPE, UNSPECIFIED CKD STAGE (HCC): Primary | ICD-10-CM

## 2025-02-09 DIAGNOSIS — E86.1 HYPOVOLEMIA: ICD-10-CM

## 2025-02-09 DIAGNOSIS — R55 NEAR SYNCOPE: ICD-10-CM

## 2025-02-09 DIAGNOSIS — N17.9 ACUTE RENAL FAILURE SUPERIMPOSED ON CHRONIC KIDNEY DISEASE, UNSPECIFIED ACUTE RENAL FAILURE TYPE, UNSPECIFIED CKD STAGE (HCC): Primary | ICD-10-CM

## 2025-02-09 DIAGNOSIS — E11.21 TYPE 2 DIABETES WITH NEPHROPATHY (HCC): ICD-10-CM

## 2025-02-09 PROBLEM — N18.32 TYPE 2 DIABETES MELLITUS WITH STAGE 3B CHRONIC KIDNEY DISEASE, WITH LONG-TERM CURRENT USE OF INSULIN (HCC): Status: ACTIVE | Noted: 2019-10-09

## 2025-02-09 PROBLEM — I95.2 HYPOTENSION DUE TO DRUGS: Status: ACTIVE | Noted: 2025-02-09

## 2025-02-09 PROBLEM — Z79.4 TYPE 2 DIABETES MELLITUS WITH STAGE 3B CHRONIC KIDNEY DISEASE, WITH LONG-TERM CURRENT USE OF INSULIN (HCC): Status: ACTIVE | Noted: 2019-10-09

## 2025-02-09 PROBLEM — E87.20 LACTIC ACIDOSIS: Status: ACTIVE | Noted: 2025-02-09

## 2025-02-09 PROBLEM — N18.32 ACUTE KIDNEY INJURY SUPERIMPOSED ON STAGE 3B CHRONIC KIDNEY DISEASE (HCC): Status: ACTIVE | Noted: 2025-02-09

## 2025-02-09 PROBLEM — E11.22 TYPE 2 DIABETES MELLITUS WITH STAGE 3B CHRONIC KIDNEY DISEASE, WITH LONG-TERM CURRENT USE OF INSULIN (HCC): Status: ACTIVE | Noted: 2019-10-09

## 2025-02-09 PROBLEM — Z78.9 DNI (DO NOT INTUBATE): Status: ACTIVE | Noted: 2025-02-09

## 2025-02-09 LAB
ALBUMIN SERPL-MCNC: 3.9 G/DL (ref 3.2–4.6)
ALBUMIN/GLOB SERPL: 1 (ref 1–1.9)
ALP SERPL-CCNC: 110 U/L (ref 40–129)
ALT SERPL-CCNC: 19 U/L (ref 8–55)
ANION GAP SERPL CALC-SCNC: 15 MMOL/L (ref 7–16)
APPEARANCE UR: CLEAR
AST SERPL-CCNC: 23 U/L (ref 15–37)
BACTERIA URNS QL MICRO: 0 /HPF
BASOPHILS # BLD: 0.07 K/UL (ref 0–0.2)
BASOPHILS NFR BLD: 0.7 % (ref 0–2)
BILIRUB SERPL-MCNC: 0.6 MG/DL (ref 0–1.2)
BILIRUB UR QL: NEGATIVE
BUN SERPL-MCNC: 29 MG/DL (ref 8–23)
CALCIUM SERPL-MCNC: 9.7 MG/DL (ref 8.8–10.2)
CASTS URNS QL MICRO: ABNORMAL /LPF
CHLORIDE SERPL-SCNC: 96 MMOL/L (ref 98–107)
CO2 SERPL-SCNC: 23 MMOL/L (ref 20–29)
COLOR UR: ABNORMAL
CREAT SERPL-MCNC: 2.43 MG/DL (ref 0.8–1.3)
CRYSTALS URNS QL MICRO: 0 /LPF
DIFFERENTIAL METHOD BLD: NORMAL
EOSINOPHIL # BLD: 0.25 K/UL (ref 0–0.8)
EOSINOPHIL NFR BLD: 2.6 % (ref 0.5–7.8)
EPI CELLS #/AREA URNS HPF: 0 /HPF
ERYTHROCYTE [DISTWIDTH] IN BLOOD BY AUTOMATED COUNT: 13 % (ref 11.9–14.6)
GLOBULIN SER CALC-MCNC: 3.8 G/DL (ref 2.3–3.5)
GLUCOSE BLD STRIP.AUTO-MCNC: 136 MG/DL (ref 65–100)
GLUCOSE BLD STRIP.AUTO-MCNC: 82 MG/DL (ref 65–100)
GLUCOSE SERPL-MCNC: 89 MG/DL (ref 70–99)
GLUCOSE UR STRIP.AUTO-MCNC: >1000 MG/DL
HCT VFR BLD AUTO: 47.8 % (ref 41.1–50.3)
HGB BLD-MCNC: 16.2 G/DL (ref 13.6–17.2)
HGB UR QL STRIP: NEGATIVE
IMM GRANULOCYTES # BLD AUTO: 0.08 K/UL (ref 0–0.5)
IMM GRANULOCYTES NFR BLD AUTO: 0.8 % (ref 0–5)
INR PPP: 1
KETONES UR QL STRIP.AUTO: ABNORMAL MG/DL
LACTATE SERPL-SCNC: 1.3 MMOL/L (ref 0.5–2)
LACTATE SERPL-SCNC: 2.3 MMOL/L (ref 0.5–2)
LEUKOCYTE ESTERASE UR QL STRIP.AUTO: NEGATIVE
LYMPHOCYTES # BLD: 2.5 K/UL (ref 0.5–4.6)
LYMPHOCYTES NFR BLD: 25.5 % (ref 13–44)
MCH RBC QN AUTO: 29.9 PG (ref 26.1–32.9)
MCHC RBC AUTO-ENTMCNC: 33.9 G/DL (ref 31.4–35)
MCV RBC AUTO: 88.4 FL (ref 82–102)
MONOCYTES # BLD: 0.99 K/UL (ref 0.1–1.3)
MONOCYTES NFR BLD: 10.1 % (ref 4–12)
MUCOUS THREADS URNS QL MICRO: ABNORMAL /LPF
NEUTS SEG # BLD: 5.91 K/UL (ref 1.7–8.2)
NEUTS SEG NFR BLD: 60.3 % (ref 43–78)
NITRITE UR QL STRIP.AUTO: NEGATIVE
NRBC # BLD: 0 K/UL (ref 0–0.2)
OTHER OBSERVATIONS: ABNORMAL
PH UR STRIP: 5.5 (ref 5–9)
PLATELET # BLD AUTO: 192 K/UL (ref 150–450)
PMV BLD AUTO: 11.2 FL (ref 9.4–12.3)
POTASSIUM SERPL-SCNC: 4.4 MMOL/L (ref 3.5–5.1)
PROCALCITONIN SERPL-MCNC: 0.15 NG/ML (ref 0–0.1)
PROT SERPL-MCNC: 7.7 G/DL (ref 6.3–8.2)
PROT UR STRIP-MCNC: 300 MG/DL
PROTHROMBIN TIME: 13.3 SEC (ref 11.3–14.9)
RBC # BLD AUTO: 5.41 M/UL (ref 4.23–5.6)
RBC #/AREA URNS HPF: 0 /HPF
SERVICE CMNT-IMP: ABNORMAL
SERVICE CMNT-IMP: NORMAL
SODIUM SERPL-SCNC: 134 MMOL/L (ref 136–145)
SP GR UR REFRACTOMETRY: 1.02 (ref 1–1.02)
TROPONIN T SERPL HS-MCNC: 21 NG/L (ref 0–22)
URINE CULTURE IF INDICATED: ABNORMAL
UROBILINOGEN UR QL STRIP.AUTO: 1 EU/DL (ref 0.2–1)
WBC # BLD AUTO: 9.8 K/UL (ref 4.3–11.1)
WBC URNS QL MICRO: 0 /HPF

## 2025-02-09 PROCEDURE — 1100000003 HC PRIVATE W/ TELEMETRY

## 2025-02-09 PROCEDURE — 6360000002 HC RX W HCPCS

## 2025-02-09 PROCEDURE — 2500000003 HC RX 250 WO HCPCS

## 2025-02-09 PROCEDURE — 80053 COMPREHEN METABOLIC PANEL: CPT

## 2025-02-09 PROCEDURE — 99285 EMERGENCY DEPT VISIT HI MDM: CPT

## 2025-02-09 PROCEDURE — 84484 ASSAY OF TROPONIN QUANT: CPT

## 2025-02-09 PROCEDURE — 84145 PROCALCITONIN (PCT): CPT

## 2025-02-09 PROCEDURE — 85025 COMPLETE CBC W/AUTO DIFF WBC: CPT

## 2025-02-09 PROCEDURE — 83605 ASSAY OF LACTIC ACID: CPT

## 2025-02-09 PROCEDURE — 85610 PROTHROMBIN TIME: CPT

## 2025-02-09 PROCEDURE — 82962 GLUCOSE BLOOD TEST: CPT

## 2025-02-09 PROCEDURE — 2580000003 HC RX 258: Performed by: EMERGENCY MEDICINE

## 2025-02-09 PROCEDURE — 6370000000 HC RX 637 (ALT 250 FOR IP)

## 2025-02-09 PROCEDURE — 81001 URINALYSIS AUTO W/SCOPE: CPT

## 2025-02-09 PROCEDURE — 71045 X-RAY EXAM CHEST 1 VIEW: CPT

## 2025-02-09 RX ORDER — 0.9 % SODIUM CHLORIDE 0.9 %
1000 INTRAVENOUS SOLUTION INTRAVENOUS
Status: COMPLETED | OUTPATIENT
Start: 2025-02-09 | End: 2025-02-09

## 2025-02-09 RX ORDER — ACETAMINOPHEN 325 MG/1
650 TABLET ORAL EVERY 6 HOURS PRN
Status: DISCONTINUED | OUTPATIENT
Start: 2025-02-09 | End: 2025-02-12 | Stop reason: HOSPADM

## 2025-02-09 RX ORDER — SODIUM CHLORIDE 0.9 % (FLUSH) 0.9 %
5-40 SYRINGE (ML) INJECTION PRN
Status: DISCONTINUED | OUTPATIENT
Start: 2025-02-09 | End: 2025-02-12 | Stop reason: HOSPADM

## 2025-02-09 RX ORDER — ACETAMINOPHEN 650 MG/1
650 SUPPOSITORY RECTAL EVERY 6 HOURS PRN
Status: DISCONTINUED | OUTPATIENT
Start: 2025-02-09 | End: 2025-02-12 | Stop reason: HOSPADM

## 2025-02-09 RX ORDER — FAMOTIDINE 20 MG/1
10 TABLET, FILM COATED ORAL DAILY PRN
Status: DISCONTINUED | OUTPATIENT
Start: 2025-02-09 | End: 2025-02-12 | Stop reason: HOSPADM

## 2025-02-09 RX ORDER — POTASSIUM CHLORIDE 7.45 MG/ML
10 INJECTION INTRAVENOUS PRN
Status: DISCONTINUED | OUTPATIENT
Start: 2025-02-09 | End: 2025-02-09 | Stop reason: SDUPTHER

## 2025-02-09 RX ORDER — BUSPIRONE HYDROCHLORIDE 5 MG/1
5 TABLET ORAL 2 TIMES DAILY
Status: DISCONTINUED | OUTPATIENT
Start: 2025-02-09 | End: 2025-02-12 | Stop reason: HOSPADM

## 2025-02-09 RX ORDER — EZETIMIBE 10 MG/1
10 TABLET ORAL DAILY
Status: DISCONTINUED | OUTPATIENT
Start: 2025-02-10 | End: 2025-02-12 | Stop reason: HOSPADM

## 2025-02-09 RX ORDER — MAGNESIUM HYDROXIDE/ALUMINUM HYDROXICE/SIMETHICONE 120; 1200; 1200 MG/30ML; MG/30ML; MG/30ML
30 SUSPENSION ORAL EVERY 6 HOURS PRN
Status: DISCONTINUED | OUTPATIENT
Start: 2025-02-09 | End: 2025-02-12 | Stop reason: HOSPADM

## 2025-02-09 RX ORDER — POTASSIUM CHLORIDE 7.45 MG/ML
10 INJECTION INTRAVENOUS PRN
Status: DISCONTINUED | OUTPATIENT
Start: 2025-02-09 | End: 2025-02-12 | Stop reason: HOSPADM

## 2025-02-09 RX ORDER — POTASSIUM CHLORIDE 1500 MG/1
40 TABLET, EXTENDED RELEASE ORAL PRN
Status: DISCONTINUED | OUTPATIENT
Start: 2025-02-09 | End: 2025-02-12 | Stop reason: HOSPADM

## 2025-02-09 RX ORDER — AMLODIPINE BESYLATE 10 MG/1
10 TABLET ORAL DAILY
Status: DISCONTINUED | OUTPATIENT
Start: 2025-02-10 | End: 2025-02-12 | Stop reason: HOSPADM

## 2025-02-09 RX ORDER — ASPIRIN 81 MG/1
81 TABLET, CHEWABLE ORAL DAILY
Status: DISCONTINUED | OUTPATIENT
Start: 2025-02-10 | End: 2025-02-12 | Stop reason: HOSPADM

## 2025-02-09 RX ORDER — IBUPROFEN 600 MG/1
1 TABLET ORAL PRN
Status: DISCONTINUED | OUTPATIENT
Start: 2025-02-09 | End: 2025-02-12 | Stop reason: HOSPADM

## 2025-02-09 RX ORDER — SODIUM CHLORIDE 9 MG/ML
INJECTION, SOLUTION INTRAVENOUS PRN
Status: DISCONTINUED | OUTPATIENT
Start: 2025-02-09 | End: 2025-02-12 | Stop reason: HOSPADM

## 2025-02-09 RX ORDER — INSULIN LISPRO 100 [IU]/ML
15 INJECTION, SOLUTION INTRAVENOUS; SUBCUTANEOUS
Status: DISCONTINUED | OUTPATIENT
Start: 2025-02-10 | End: 2025-02-12 | Stop reason: HOSPADM

## 2025-02-09 RX ORDER — BISACODYL 10 MG
10 SUPPOSITORY, RECTAL RECTAL DAILY PRN
Status: DISCONTINUED | OUTPATIENT
Start: 2025-02-09 | End: 2025-02-12 | Stop reason: HOSPADM

## 2025-02-09 RX ORDER — SODIUM CHLORIDE 0.9 % (FLUSH) 0.9 %
5-40 SYRINGE (ML) INJECTION EVERY 12 HOURS SCHEDULED
Status: DISCONTINUED | OUTPATIENT
Start: 2025-02-09 | End: 2025-02-12 | Stop reason: HOSPADM

## 2025-02-09 RX ORDER — ONDANSETRON 2 MG/ML
4 INJECTION INTRAMUSCULAR; INTRAVENOUS EVERY 6 HOURS PRN
Status: DISCONTINUED | OUTPATIENT
Start: 2025-02-09 | End: 2025-02-12 | Stop reason: HOSPADM

## 2025-02-09 RX ORDER — MAGNESIUM SULFATE IN WATER 40 MG/ML
2000 INJECTION, SOLUTION INTRAVENOUS PRN
Status: DISCONTINUED | OUTPATIENT
Start: 2025-02-09 | End: 2025-02-12 | Stop reason: HOSPADM

## 2025-02-09 RX ORDER — POLYETHYLENE GLYCOL 3350 17 G/17G
17 POWDER, FOR SOLUTION ORAL DAILY PRN
Status: DISCONTINUED | OUTPATIENT
Start: 2025-02-09 | End: 2025-02-12 | Stop reason: HOSPADM

## 2025-02-09 RX ORDER — ROSUVASTATIN CALCIUM 20 MG/1
20 TABLET, COATED ORAL NIGHTLY
Status: DISCONTINUED | OUTPATIENT
Start: 2025-02-09 | End: 2025-02-12 | Stop reason: HOSPADM

## 2025-02-09 RX ORDER — ONDANSETRON 4 MG/1
4 TABLET, ORALLY DISINTEGRATING ORAL EVERY 8 HOURS PRN
Status: DISCONTINUED | OUTPATIENT
Start: 2025-02-09 | End: 2025-02-12 | Stop reason: HOSPADM

## 2025-02-09 RX ORDER — INSULIN LISPRO 100 [IU]/ML
0-8 INJECTION, SOLUTION INTRAVENOUS; SUBCUTANEOUS
Status: DISCONTINUED | OUTPATIENT
Start: 2025-02-09 | End: 2025-02-12 | Stop reason: HOSPADM

## 2025-02-09 RX ORDER — DULOXETIN HYDROCHLORIDE 60 MG/1
60 CAPSULE, DELAYED RELEASE ORAL DAILY
Status: DISCONTINUED | OUTPATIENT
Start: 2025-02-10 | End: 2025-02-12 | Stop reason: HOSPADM

## 2025-02-09 RX ORDER — ZOLPIDEM TARTRATE 5 MG/1
10 TABLET ORAL NIGHTLY PRN
Status: DISCONTINUED | OUTPATIENT
Start: 2025-02-09 | End: 2025-02-12 | Stop reason: HOSPADM

## 2025-02-09 RX ORDER — DEXTROSE MONOHYDRATE 100 MG/ML
INJECTION, SOLUTION INTRAVENOUS CONTINUOUS PRN
Status: DISCONTINUED | OUTPATIENT
Start: 2025-02-09 | End: 2025-02-12 | Stop reason: HOSPADM

## 2025-02-09 RX ORDER — HEPARIN SODIUM 5000 [USP'U]/ML
5000 INJECTION, SOLUTION INTRAVENOUS; SUBCUTANEOUS EVERY 8 HOURS SCHEDULED
Status: DISCONTINUED | OUTPATIENT
Start: 2025-02-09 | End: 2025-02-12 | Stop reason: HOSPADM

## 2025-02-09 RX ORDER — LOSARTAN POTASSIUM 50 MG/1
100 TABLET ORAL DAILY
Status: DISCONTINUED | OUTPATIENT
Start: 2025-02-10 | End: 2025-02-12 | Stop reason: HOSPADM

## 2025-02-09 RX ORDER — INSULIN GLARGINE 100 [IU]/ML
30 INJECTION, SOLUTION SUBCUTANEOUS DAILY
Status: DISCONTINUED | OUTPATIENT
Start: 2025-02-10 | End: 2025-02-10

## 2025-02-09 RX ORDER — METOPROLOL SUCCINATE 50 MG/1
50 TABLET, EXTENDED RELEASE ORAL DAILY
Status: DISCONTINUED | OUTPATIENT
Start: 2025-02-10 | End: 2025-02-12 | Stop reason: HOSPADM

## 2025-02-09 RX ADMIN — SODIUM CHLORIDE, PRESERVATIVE FREE 10 ML: 5 INJECTION INTRAVENOUS at 22:37

## 2025-02-09 RX ADMIN — BUSPIRONE HYDROCHLORIDE 5 MG: 5 TABLET ORAL at 22:36

## 2025-02-09 RX ADMIN — ZOLPIDEM TARTRATE 10 MG: 5 TABLET, FILM COATED ORAL at 22:36

## 2025-02-09 RX ADMIN — HEPARIN SODIUM 5000 UNITS: 5000 INJECTION INTRAVENOUS; SUBCUTANEOUS at 22:36

## 2025-02-09 RX ADMIN — ROSUVASTATIN CALCIUM 20 MG: 20 TABLET, FILM COATED ORAL at 22:36

## 2025-02-09 RX ADMIN — Medication 3 MG: at 22:36

## 2025-02-09 RX ADMIN — SODIUM CHLORIDE 1000 ML: 9 INJECTION, SOLUTION INTRAVENOUS at 16:40

## 2025-02-09 RX ADMIN — SODIUM CHLORIDE 1000 ML: 9 INJECTION, SOLUTION INTRAVENOUS at 16:00

## 2025-02-09 ASSESSMENT — PAIN - FUNCTIONAL ASSESSMENT: PAIN_FUNCTIONAL_ASSESSMENT: NONE - DENIES PAIN

## 2025-02-09 NOTE — ED PROVIDER NOTES
cardiologist interpretation.    I reviewed external records: previous lab results from outside ED.  I reviewed external records: previous imaging study including radiologist interpretation.   The patients assessment required an independent historian: Wife gives supplemental history.  The reason they were needed is  an altered level of consciousness.  ED cardiac monitoring rhythm strip was ordered and interpreted:  paced rhythm  ST Segments:Nonspecific ST segments - NO STEMI   Rate: 70  I interpreted the X-rays no pneumonia or pneumothorax.    EKG interpretation, independent of cardiologist: Paced rhythm, rate of 70, no gross ST segment changes noted  The patient was admitted and I have discussed patient management with the admitting provider.          Critical Care Time 60 Minutes: Excluding all billable procedures, time spent at the bedside directing patients resuscitation, updating family, and coordinating care with consultants      History     Patient presents the ER with complaints of lightheadedness and near syncope.  His wife reports today he has had 2 episodes where he just \"got very weak and shaky.  States this happened while they were out shopping.  Patient reports just feeling fatigued.  Denies any nausea or vomiting.  Reports for the past couple days he said \"the crud\".  Reports has had some cough body aches and congestion.  Is unsure of any fever.  Denies any vomiting or diarrhea.  Denies any head trauma.  Denies any chest pain or abdominal pain.  States she was able to eat and drink something earlier today.    The history is provided by the patient.       ROS     Review of Systems   Constitutional:  Positive for fatigue.   HENT:  Positive for rhinorrhea. Negative for congestion, drooling, trouble swallowing and voice change.    Eyes:  Negative for visual disturbance.   Respiratory:  Positive for cough.    Cardiovascular:  Negative for chest pain.   Gastrointestinal:  Negative for abdominal pain, anal

## 2025-02-09 NOTE — ED TRIAGE NOTES
Pt arrives to the ER with family with c/o x 2 falls x today. Pt family states observing rigor in extremeties @ the time of falls. Pt appears flushed in triage hypotensive and lethargic.

## 2025-02-10 PROBLEM — I95.1 ORTHOSTATIC HYPOTENSION: Status: ACTIVE | Noted: 2025-02-10

## 2025-02-10 LAB
ANION GAP SERPL CALC-SCNC: 11 MMOL/L (ref 7–16)
B PERT DNA SPEC QL NAA+PROBE: NOT DETECTED
BASOPHILS # BLD: 0.05 K/UL (ref 0–0.2)
BASOPHILS NFR BLD: 0.7 % (ref 0–2)
BORDETELLA PARAPERTUSSIS BY PCR: NOT DETECTED
BUN SERPL-MCNC: 30 MG/DL (ref 8–23)
C PNEUM DNA SPEC QL NAA+PROBE: NOT DETECTED
CALCIUM SERPL-MCNC: 8.4 MG/DL (ref 8.8–10.2)
CHLORIDE SERPL-SCNC: 102 MMOL/L (ref 98–107)
CO2 SERPL-SCNC: 20 MMOL/L (ref 20–29)
CREAT SERPL-MCNC: 1.86 MG/DL (ref 0.8–1.3)
DIFFERENTIAL METHOD BLD: ABNORMAL
EOSINOPHIL # BLD: 0.33 K/UL (ref 0–0.8)
EOSINOPHIL NFR BLD: 4.9 % (ref 0.5–7.8)
ERYTHROCYTE [DISTWIDTH] IN BLOOD BY AUTOMATED COUNT: 13.2 % (ref 11.9–14.6)
EST. AVERAGE GLUCOSE BLD GHB EST-MCNC: 172 MG/DL
FLUAV SUBTYP SPEC NAA+PROBE: NOT DETECTED
FLUBV RNA SPEC QL NAA+PROBE: NOT DETECTED
GLUCOSE BLD STRIP.AUTO-MCNC: 183 MG/DL (ref 65–100)
GLUCOSE BLD STRIP.AUTO-MCNC: 201 MG/DL (ref 65–100)
GLUCOSE BLD STRIP.AUTO-MCNC: 219 MG/DL (ref 65–100)
GLUCOSE BLD STRIP.AUTO-MCNC: 57 MG/DL (ref 65–100)
GLUCOSE SERPL-MCNC: 227 MG/DL (ref 70–99)
HADV DNA SPEC QL NAA+PROBE: NOT DETECTED
HBA1C MFR BLD: 7.6 % (ref 0–5.6)
HCOV 229E RNA SPEC QL NAA+PROBE: NOT DETECTED
HCOV HKU1 RNA SPEC QL NAA+PROBE: NOT DETECTED
HCOV NL63 RNA SPEC QL NAA+PROBE: NOT DETECTED
HCOV OC43 RNA SPEC QL NAA+PROBE: DETECTED
HCT VFR BLD AUTO: 38.6 % (ref 41.1–50.3)
HGB BLD-MCNC: 13.5 G/DL (ref 13.6–17.2)
HMPV RNA SPEC QL NAA+PROBE: NOT DETECTED
HPIV1 RNA SPEC QL NAA+PROBE: NOT DETECTED
HPIV2 RNA SPEC QL NAA+PROBE: NOT DETECTED
HPIV3 RNA SPEC QL NAA+PROBE: NOT DETECTED
HPIV4 RNA SPEC QL NAA+PROBE: NOT DETECTED
IMM GRANULOCYTES # BLD AUTO: 0.03 K/UL (ref 0–0.5)
IMM GRANULOCYTES NFR BLD AUTO: 0.4 % (ref 0–5)
LYMPHOCYTES # BLD: 2.46 K/UL (ref 0.5–4.6)
LYMPHOCYTES NFR BLD: 36.8 % (ref 13–44)
M PNEUMO DNA SPEC QL NAA+PROBE: NOT DETECTED
MCH RBC QN AUTO: 30.2 PG (ref 26.1–32.9)
MCHC RBC AUTO-ENTMCNC: 35 G/DL (ref 31.4–35)
MCV RBC AUTO: 86.4 FL (ref 82–102)
MONOCYTES # BLD: 0.73 K/UL (ref 0.1–1.3)
MONOCYTES NFR BLD: 10.9 % (ref 4–12)
NEUTS SEG # BLD: 3.08 K/UL (ref 1.7–8.2)
NEUTS SEG NFR BLD: 46.3 % (ref 43–78)
NRBC # BLD: 0 K/UL (ref 0–0.2)
PLATELET # BLD AUTO: 138 K/UL (ref 150–450)
PMV BLD AUTO: 11.4 FL (ref 9.4–12.3)
POTASSIUM SERPL-SCNC: 4.4 MMOL/L (ref 3.5–5.1)
RBC # BLD AUTO: 4.47 M/UL (ref 4.23–5.6)
RSV RNA SPEC QL NAA+PROBE: NOT DETECTED
RV+EV RNA SPEC QL NAA+PROBE: NOT DETECTED
SARS-COV-2 RNA RESP QL NAA+PROBE: NOT DETECTED
SERVICE CMNT-IMP: ABNORMAL
SODIUM SERPL-SCNC: 134 MMOL/L (ref 136–145)
WBC # BLD AUTO: 6.7 K/UL (ref 4.3–11.1)

## 2025-02-10 PROCEDURE — 0202U NFCT DS 22 TRGT SARS-COV-2: CPT

## 2025-02-10 PROCEDURE — 82962 GLUCOSE BLOOD TEST: CPT

## 2025-02-10 PROCEDURE — 36415 COLL VENOUS BLD VENIPUNCTURE: CPT

## 2025-02-10 PROCEDURE — 97530 THERAPEUTIC ACTIVITIES: CPT

## 2025-02-10 PROCEDURE — 2580000003 HC RX 258: Performed by: STUDENT IN AN ORGANIZED HEALTH CARE EDUCATION/TRAINING PROGRAM

## 2025-02-10 PROCEDURE — 83036 HEMOGLOBIN GLYCOSYLATED A1C: CPT

## 2025-02-10 PROCEDURE — 2500000003 HC RX 250 WO HCPCS

## 2025-02-10 PROCEDURE — 97161 PT EVAL LOW COMPLEX 20 MIN: CPT

## 2025-02-10 PROCEDURE — 80048 BASIC METABOLIC PNL TOTAL CA: CPT

## 2025-02-10 PROCEDURE — 6370000000 HC RX 637 (ALT 250 FOR IP)

## 2025-02-10 PROCEDURE — 6370000000 HC RX 637 (ALT 250 FOR IP): Performed by: STUDENT IN AN ORGANIZED HEALTH CARE EDUCATION/TRAINING PROGRAM

## 2025-02-10 PROCEDURE — 1100000003 HC PRIVATE W/ TELEMETRY

## 2025-02-10 PROCEDURE — 6360000002 HC RX W HCPCS

## 2025-02-10 PROCEDURE — 85025 COMPLETE CBC W/AUTO DIFF WBC: CPT

## 2025-02-10 RX ORDER — SODIUM CHLORIDE 9 MG/ML
INJECTION, SOLUTION INTRAVENOUS CONTINUOUS
Status: DISCONTINUED | OUTPATIENT
Start: 2025-02-10 | End: 2025-02-12

## 2025-02-10 RX ORDER — INSULIN GLARGINE 100 [IU]/ML
30 INJECTION, SOLUTION SUBCUTANEOUS NIGHTLY
Status: DISCONTINUED | OUTPATIENT
Start: 2025-02-10 | End: 2025-02-12

## 2025-02-10 RX ADMIN — METOPROLOL SUCCINATE 50 MG: 50 TABLET, EXTENDED RELEASE ORAL at 09:54

## 2025-02-10 RX ADMIN — INSULIN LISPRO 15 UNITS: 100 INJECTION, SOLUTION INTRAVENOUS; SUBCUTANEOUS at 09:55

## 2025-02-10 RX ADMIN — INSULIN LISPRO 2 UNITS: 100 INJECTION, SOLUTION INTRAVENOUS; SUBCUTANEOUS at 20:24

## 2025-02-10 RX ADMIN — HEPARIN SODIUM 5000 UNITS: 5000 INJECTION INTRAVENOUS; SUBCUTANEOUS at 13:28

## 2025-02-10 RX ADMIN — ROSUVASTATIN CALCIUM 20 MG: 20 TABLET, FILM COATED ORAL at 20:23

## 2025-02-10 RX ADMIN — HEPARIN SODIUM 5000 UNITS: 5000 INJECTION INTRAVENOUS; SUBCUTANEOUS at 20:24

## 2025-02-10 RX ADMIN — DULOXETINE HYDROCHLORIDE 60 MG: 60 CAPSULE, DELAYED RELEASE ORAL at 09:54

## 2025-02-10 RX ADMIN — ZOLPIDEM TARTRATE 10 MG: 5 TABLET, FILM COATED ORAL at 20:28

## 2025-02-10 RX ADMIN — SODIUM CHLORIDE: 9 INJECTION, SOLUTION INTRAVENOUS at 13:31

## 2025-02-10 RX ADMIN — SODIUM CHLORIDE, PRESERVATIVE FREE 10 ML: 5 INJECTION INTRAVENOUS at 09:56

## 2025-02-10 RX ADMIN — EZETIMIBE 10 MG: 10 TABLET ORAL at 09:54

## 2025-02-10 RX ADMIN — INSULIN GLARGINE 30 UNITS: 100 INJECTION, SOLUTION SUBCUTANEOUS at 20:24

## 2025-02-10 RX ADMIN — SODIUM CHLORIDE, PRESERVATIVE FREE 10 ML: 5 INJECTION INTRAVENOUS at 20:25

## 2025-02-10 RX ADMIN — INSULIN LISPRO 2 UNITS: 100 INJECTION, SOLUTION INTRAVENOUS; SUBCUTANEOUS at 13:27

## 2025-02-10 RX ADMIN — INSULIN LISPRO 2 UNITS: 100 INJECTION, SOLUTION INTRAVENOUS; SUBCUTANEOUS at 09:56

## 2025-02-10 RX ADMIN — BUSPIRONE HYDROCHLORIDE 5 MG: 5 TABLET ORAL at 09:54

## 2025-02-10 RX ADMIN — ASPIRIN 81 MG: 81 TABLET, CHEWABLE ORAL at 09:54

## 2025-02-10 RX ADMIN — HEPARIN SODIUM 5000 UNITS: 5000 INJECTION INTRAVENOUS; SUBCUTANEOUS at 05:47

## 2025-02-10 RX ADMIN — BUSPIRONE HYDROCHLORIDE 5 MG: 5 TABLET ORAL at 20:24

## 2025-02-10 RX ADMIN — INSULIN LISPRO 15 UNITS: 100 INJECTION, SOLUTION INTRAVENOUS; SUBCUTANEOUS at 13:27

## 2025-02-10 NOTE — H&P
Hospitalist History and Physical   Admit Date:  2025  3:55 PM   Name:  Jaret Valentin   Age:  64 y.o.  Sex:  male  :  1960   MRN:  [id number]   Room:  ER13/    Presenting/Chief Complaint: No chief complaint on file.     Reason(s) for Admission: Near syncope [R55]     History of Present Illness:   Jaret Valentin is a 64-year-old male with past medical history of type 2 diabetes on chronic insulin, CKD stage IIIb, primary hypertension, recurrent major depressive disorder, degenerative valve disease status post replacement, and CAD status post remote bypass who presented with symptoms of lightheadedness and near syncope.  The patient was out shopping with his wife at [employer] and getting ice when he experienced 2 episodes of weakness and unsteadiness and fell to the ground.  He denies passing out.  He denies chest pain, shortness of breath prior to passing out.  Says he did have slight blurry vision.  Also reports chest congestion over the last few days with productive cough producing clear sputum.  He has never had some like this happen before.  The patient attributes his symptoms to possibly overdosing on his blood pressure medications this morning.  Says he was changing out his pill bottles and thinks he took too much of the irbesartan and metoprolol (double dose).  This was not intentional.    The patient noted a recent change in his diabetes management from an Omnipod insulin pump to daily injections due to insurance issues, taking 40 units of Lantus at night and 30-35 units short acting-insulin with each meal. His blood glucose levels have been high, in the high 200s, with a noted low of 65 earlier today. He typically does not experience hypoglycemic symptoms until his glucose levels drop to around 50.    In the ER, patient was found to be hypotensive with blood pressure 80s/50s.  He received 2 L normal saline with subsequent improvement in his blood pressure.  Also found to have LYNDON in the

## 2025-02-10 NOTE — CARE COORDINATION
02/10/25 1549   Service Assessment   Patient Orientation Alert and Oriented   Cognition Alert   History Provided By Patient   Primary Caregiver Self   Support Systems Spouse/Significant Other;Family Members   Patient's Healthcare Decision Maker is: Legal Next of Kin   PCP Verified by CM Yes   Prior Functional Level Independent in ADLs/IADLs   Current Functional Level Independent in ADLs/IADLs   Can patient return to prior living arrangement Yes   Ability to make needs known: Good   Family able to assist with home care needs: Yes   Would you like for me to discuss the discharge plan with any other family members/significant others, and if so, who? No   Financial Resources Medicare   Community Resources None   Social/Functional History   Lives With Spouse   Type of Home House   Condition of Participation: Discharge Planning   The Plan for Transition of Care is related to the following treatment goals: return with spouse   The Patient and/or Patient Representative was provided with a Choice of Provider? Patient   The Patient and/Or Patient Representative agree with the Discharge Plan? Yes   Freedom of Choice list was provided with basic dialogue that supports the patient's individualized plan of care/goals, treatment preferences, and shares the quality data associated with the providers?  Yes     Assessment completed with patient at chairside. Patient lives with spouse. Patient is IND at baseline. No DME in the home. MC discussed home health recommendation. Patient states he is unsure if he wants this arranged. CM dicussed outpatient therapy as well. Patient reports he will consider these options. CM too follow up in the AM.     Reyna SINGH, JUANJO Lopez

## 2025-02-10 NOTE — ED NOTES
TRANSFER - OUT REPORT:    Verbal report given to RN on Jaret Valentin  being transferred to [facility] for routine progression of patient care       Report consisted of patient's Situation, Background, Assessment and   Recommendations(SBAR).     Information from the following report(s) ED SBAR was reviewed with the receiving nurse.    Hooven Fall Assessment:    Presents to emergency department  because of falls (Syncope, seizure, or loss of consciousness): Yes  Age > 70: No  Altered Mental Status, Intoxication with alcohol or substance confusion (Disorientation, impaired judgment, poor safety awaremess, or inability to follow instructions): No  Impaired Mobility: Ambulates or transfers with assistive devices or assistance; Unable to ambulate or transer.: Yes  Nursing Judgement: Yes          Lines:   Peripheral IV 02/09/25 Right Antecubital (Active)   Site Assessment Clean, dry & intact 02/09/25 1809   Line Status Infusing 02/09/25 1809   Phlebitis Assessment No symptoms 02/09/25 1809   Infiltration Assessment 0 02/09/25 1809   Dressing Status Clean, dry & intact 02/09/25 1809        Opportunity for questions and clarification was provided.      Patient transported with:  Susi Bess RN  02/09/25 8651

## 2025-02-10 NOTE — PLAN OF CARE
Problem: Chronic Conditions and Co-morbidities  Goal: Patient's chronic conditions and co-morbidity symptoms are monitored and maintained or improved  Outcome: Progressing  Flowsheets (Taken 2/9/2025 2201)  Care Plan - Patient's Chronic Conditions and Co-Morbidity Symptoms are Monitored and Maintained or Improved: Monitor and assess patient's chronic conditions and comorbid symptoms for stability, deterioration, or improvement     Problem: Discharge Planning  Goal: Discharge to home or other facility with appropriate resources  Outcome: Progressing  Flowsheets (Taken 2/9/2025 2201)  Discharge to home or other facility with appropriate resources:   Identify barriers to discharge with patient and caregiver   Arrange for needed discharge resources and transportation as appropriate   Identify discharge learning needs (meds, wound care, etc)   Refer to discharge planning if patient needs post-hospital services based on physician order or complex needs related to functional status, cognitive ability or social support system     Problem: Safety - Adult  Goal: Free from fall injury  Outcome: Progressing  Flowsheets (Taken 2/9/2025 2311)  Free From Fall Injury: Instruct family/caregiver on patient safety     Problem: ABCDS Injury Assessment  Goal: Absence of physical injury  Outcome: Progressing  Flowsheets (Taken 2/9/2025 2311)  Absence of Physical Injury: Implement safety measures based on patient assessment

## 2025-02-11 LAB
ANION GAP SERPL CALC-SCNC: 10 MMOL/L (ref 7–16)
BASOPHILS # BLD: 0.06 K/UL (ref 0–0.2)
BASOPHILS NFR BLD: 0.8 % (ref 0–2)
BUN SERPL-MCNC: 28 MG/DL (ref 8–23)
CALCIUM SERPL-MCNC: 8.6 MG/DL (ref 8.8–10.2)
CHLORIDE SERPL-SCNC: 104 MMOL/L (ref 98–107)
CO2 SERPL-SCNC: 22 MMOL/L (ref 20–29)
CREAT SERPL-MCNC: 1.76 MG/DL (ref 0.8–1.3)
DIFFERENTIAL METHOD BLD: ABNORMAL
EOSINOPHIL # BLD: 0.59 K/UL (ref 0–0.8)
EOSINOPHIL NFR BLD: 7.9 % (ref 0.5–7.8)
ERYTHROCYTE [DISTWIDTH] IN BLOOD BY AUTOMATED COUNT: 13.2 % (ref 11.9–14.6)
GLUCOSE BLD STRIP.AUTO-MCNC: 133 MG/DL (ref 65–100)
GLUCOSE BLD STRIP.AUTO-MCNC: 158 MG/DL (ref 65–100)
GLUCOSE BLD STRIP.AUTO-MCNC: 85 MG/DL (ref 65–100)
GLUCOSE BLD STRIP.AUTO-MCNC: 93 MG/DL (ref 65–100)
GLUCOSE SERPL-MCNC: 145 MG/DL (ref 70–99)
HCT VFR BLD AUTO: 40.3 % (ref 41.1–50.3)
HGB BLD-MCNC: 13.3 G/DL (ref 13.6–17.2)
IMM GRANULOCYTES # BLD AUTO: 0.03 K/UL (ref 0–0.5)
IMM GRANULOCYTES NFR BLD AUTO: 0.4 % (ref 0–5)
LYMPHOCYTES # BLD: 3.07 K/UL (ref 0.5–4.6)
LYMPHOCYTES NFR BLD: 41.3 % (ref 13–44)
MCH RBC QN AUTO: 30 PG (ref 26.1–32.9)
MCHC RBC AUTO-ENTMCNC: 33 G/DL (ref 31.4–35)
MCV RBC AUTO: 91 FL (ref 82–102)
MONOCYTES # BLD: 0.63 K/UL (ref 0.1–1.3)
MONOCYTES NFR BLD: 8.5 % (ref 4–12)
NEUTS SEG # BLD: 3.06 K/UL (ref 1.7–8.2)
NEUTS SEG NFR BLD: 41.1 % (ref 43–78)
NRBC # BLD: 0 K/UL (ref 0–0.2)
PLATELET # BLD AUTO: 144 K/UL (ref 150–450)
PMV BLD AUTO: 11.1 FL (ref 9.4–12.3)
POTASSIUM SERPL-SCNC: 4.6 MMOL/L (ref 3.5–5.1)
RBC # BLD AUTO: 4.43 M/UL (ref 4.23–5.6)
SERVICE CMNT-IMP: ABNORMAL
SERVICE CMNT-IMP: ABNORMAL
SERVICE CMNT-IMP: NORMAL
SERVICE CMNT-IMP: NORMAL
SODIUM SERPL-SCNC: 136 MMOL/L (ref 136–145)
WBC # BLD AUTO: 7.4 K/UL (ref 4.3–11.1)

## 2025-02-11 PROCEDURE — 80048 BASIC METABOLIC PNL TOTAL CA: CPT

## 2025-02-11 PROCEDURE — 2500000003 HC RX 250 WO HCPCS

## 2025-02-11 PROCEDURE — 97165 OT EVAL LOW COMPLEX 30 MIN: CPT

## 2025-02-11 PROCEDURE — 85025 COMPLETE CBC W/AUTO DIFF WBC: CPT

## 2025-02-11 PROCEDURE — 2580000003 HC RX 258: Performed by: STUDENT IN AN ORGANIZED HEALTH CARE EDUCATION/TRAINING PROGRAM

## 2025-02-11 PROCEDURE — 6370000000 HC RX 637 (ALT 250 FOR IP)

## 2025-02-11 PROCEDURE — 6370000000 HC RX 637 (ALT 250 FOR IP): Performed by: STUDENT IN AN ORGANIZED HEALTH CARE EDUCATION/TRAINING PROGRAM

## 2025-02-11 PROCEDURE — 1100000000 HC RM PRIVATE

## 2025-02-11 PROCEDURE — 36415 COLL VENOUS BLD VENIPUNCTURE: CPT

## 2025-02-11 PROCEDURE — 97535 SELF CARE MNGMENT TRAINING: CPT

## 2025-02-11 PROCEDURE — 6360000002 HC RX W HCPCS

## 2025-02-11 PROCEDURE — 82962 GLUCOSE BLOOD TEST: CPT

## 2025-02-11 RX ADMIN — METOPROLOL SUCCINATE 50 MG: 50 TABLET, EXTENDED RELEASE ORAL at 08:30

## 2025-02-11 RX ADMIN — ZOLPIDEM TARTRATE 10 MG: 5 TABLET, FILM COATED ORAL at 20:37

## 2025-02-11 RX ADMIN — HEPARIN SODIUM 5000 UNITS: 5000 INJECTION INTRAVENOUS; SUBCUTANEOUS at 14:52

## 2025-02-11 RX ADMIN — BUSPIRONE HYDROCHLORIDE 5 MG: 5 TABLET ORAL at 20:35

## 2025-02-11 RX ADMIN — SODIUM CHLORIDE: 9 INJECTION, SOLUTION INTRAVENOUS at 17:37

## 2025-02-11 RX ADMIN — INSULIN LISPRO 15 UNITS: 100 INJECTION, SOLUTION INTRAVENOUS; SUBCUTANEOUS at 08:28

## 2025-02-11 RX ADMIN — SODIUM CHLORIDE, PRESERVATIVE FREE 10 ML: 5 INJECTION INTRAVENOUS at 08:30

## 2025-02-11 RX ADMIN — ROSUVASTATIN CALCIUM 20 MG: 20 TABLET, FILM COATED ORAL at 20:35

## 2025-02-11 RX ADMIN — ASPIRIN 81 MG: 81 TABLET, CHEWABLE ORAL at 08:30

## 2025-02-11 RX ADMIN — HEPARIN SODIUM 5000 UNITS: 5000 INJECTION INTRAVENOUS; SUBCUTANEOUS at 05:16

## 2025-02-11 RX ADMIN — BUSPIRONE HYDROCHLORIDE 5 MG: 5 TABLET ORAL at 08:30

## 2025-02-11 RX ADMIN — SODIUM CHLORIDE: 9 INJECTION, SOLUTION INTRAVENOUS at 05:18

## 2025-02-11 RX ADMIN — DULOXETINE HYDROCHLORIDE 60 MG: 60 CAPSULE, DELAYED RELEASE ORAL at 08:30

## 2025-02-11 RX ADMIN — EZETIMIBE 10 MG: 10 TABLET ORAL at 08:30

## 2025-02-11 RX ADMIN — SODIUM CHLORIDE, PRESERVATIVE FREE 10 ML: 5 INJECTION INTRAVENOUS at 20:39

## 2025-02-11 RX ADMIN — HEPARIN SODIUM 5000 UNITS: 5000 INJECTION INTRAVENOUS; SUBCUTANEOUS at 20:36

## 2025-02-11 RX ADMIN — INSULIN GLARGINE 30 UNITS: 100 INJECTION, SOLUTION SUBCUTANEOUS at 20:35

## 2025-02-11 NOTE — THERAPY EVALUATION
ACUTE OCCUPATIONAL THERAPY GOALS:   (Developed with and agreed upon by patient and/or caregiver.)  1. Patient will complete lower body bathing and dressing with INDEPENDENCE and adaptive equipment as needed.     2. Patient will complete toileting with INDEPENDENCE.  3. Patient will complete grooming ADL standing edge of sink with INDEPENDENCE.  4. Patient will tolerate 25 minutes of OT treatment with 1-2 rest breaks to increase activity tolerance for ADLs.   5. Patient will complete functional transfers with INDEPENDENCE and adaptive equipment as needed.   6. Patient will tolerate 10 minutes BUE exercises to increase strength for safe, functional transfers.     Timeframe: 7 visits      OCCUPATIONAL THERAPY Initial Assessment, Daily Note, and PM       OT Visit Days: 1  Acknowledge Orders  Time  OT Charge Capture  Rehab Caseload Tracker      Jaret Valentin is a 64 y.o. male   PRIMARY DIAGNOSIS: Near syncope  Hypovolemia [E86.1]  Near syncope [R55]  Acute renal failure superimposed on chronic kidney disease, unspecified acute renal failure type, unspecified CKD stage (HCC) [N17.9, N18.9]       Reason for Referral: Generalized Muscle Weakness (M62.81)  Other lack of cordination (R27.8)  Difficulty in walking, Not elsewhere classified (R26.2)  Inpatient: Payor: [facility] MEDICARE / Plan: [facility] MEDICARE ADVANTAGE / Product Type: *No Product type* /     ASSESSMENT:     REHAB RECOMMENDATIONS:   Recommendation to date pending progress:  Setting:  Home Health Therapy    Equipment:    To Be Determined     ASSESSMENT:  Mr. Valentin is a 65 y/o male presents to hospital after near syncope and 2 falls. At baseline pt is independent in ADLs and functional mobility. Today, pt demonstrates impairments in strength, balance, activity tolerance limiting ADLs and functional mobility. Pt overall SBA/CGA for functional transfers and mobility of household distances. He is mildly unsteady, experiencing 2 minor losses of balance but is able

## 2025-02-12 VITALS
SYSTOLIC BLOOD PRESSURE: 148 MMHG | HEART RATE: 70 BPM | WEIGHT: 207.89 LBS | TEMPERATURE: 97.9 F | DIASTOLIC BLOOD PRESSURE: 77 MMHG | RESPIRATION RATE: 18 BRPM | HEIGHT: 69 IN | OXYGEN SATURATION: 94 % | BODY MASS INDEX: 30.79 KG/M2

## 2025-02-12 LAB
ANION GAP SERPL CALC-SCNC: 12 MMOL/L (ref 7–16)
BASOPHILS # BLD: 0.05 K/UL (ref 0–0.2)
BASOPHILS NFR BLD: 0.6 % (ref 0–2)
BUN SERPL-MCNC: 19 MG/DL (ref 8–23)
CALCIUM SERPL-MCNC: 8.4 MG/DL (ref 8.8–10.2)
CHLORIDE SERPL-SCNC: 103 MMOL/L (ref 98–107)
CO2 SERPL-SCNC: 21 MMOL/L (ref 20–29)
CREAT SERPL-MCNC: 1.42 MG/DL (ref 0.8–1.3)
DIFFERENTIAL METHOD BLD: ABNORMAL
EOSINOPHIL # BLD: 0.58 K/UL (ref 0–0.8)
EOSINOPHIL NFR BLD: 7.3 % (ref 0.5–7.8)
ERYTHROCYTE [DISTWIDTH] IN BLOOD BY AUTOMATED COUNT: 12.9 % (ref 11.9–14.6)
GLUCOSE BLD STRIP.AUTO-MCNC: 160 MG/DL (ref 65–100)
GLUCOSE BLD STRIP.AUTO-MCNC: 85 MG/DL (ref 65–100)
GLUCOSE SERPL-MCNC: 93 MG/DL (ref 70–99)
HCT VFR BLD AUTO: 39.6 % (ref 41.1–50.3)
HGB BLD-MCNC: 13.7 G/DL (ref 13.6–17.2)
IMM GRANULOCYTES # BLD AUTO: 0.04 K/UL (ref 0–0.5)
IMM GRANULOCYTES NFR BLD AUTO: 0.5 % (ref 0–5)
LYMPHOCYTES # BLD: 2.85 K/UL (ref 0.5–4.6)
LYMPHOCYTES NFR BLD: 35.6 % (ref 13–44)
MCH RBC QN AUTO: 29.9 PG (ref 26.1–32.9)
MCHC RBC AUTO-ENTMCNC: 34.6 G/DL (ref 31.4–35)
MCV RBC AUTO: 86.5 FL (ref 82–102)
MONOCYTES # BLD: 0.65 K/UL (ref 0.1–1.3)
MONOCYTES NFR BLD: 8.1 % (ref 4–12)
NEUTS SEG # BLD: 3.83 K/UL (ref 1.7–8.2)
NEUTS SEG NFR BLD: 47.9 % (ref 43–78)
NRBC # BLD: 0 K/UL (ref 0–0.2)
PLATELET # BLD AUTO: 147 K/UL (ref 150–450)
PMV BLD AUTO: 11.4 FL (ref 9.4–12.3)
POTASSIUM SERPL-SCNC: 4.2 MMOL/L (ref 3.5–5.1)
RBC # BLD AUTO: 4.58 M/UL (ref 4.23–5.6)
SERVICE CMNT-IMP: ABNORMAL
SERVICE CMNT-IMP: NORMAL
SODIUM SERPL-SCNC: 136 MMOL/L (ref 136–145)
WBC # BLD AUTO: 8 K/UL (ref 4.3–11.1)

## 2025-02-12 PROCEDURE — 85025 COMPLETE CBC W/AUTO DIFF WBC: CPT

## 2025-02-12 PROCEDURE — 6370000000 HC RX 637 (ALT 250 FOR IP)

## 2025-02-12 PROCEDURE — 82962 GLUCOSE BLOOD TEST: CPT

## 2025-02-12 PROCEDURE — 2580000003 HC RX 258: Performed by: STUDENT IN AN ORGANIZED HEALTH CARE EDUCATION/TRAINING PROGRAM

## 2025-02-12 PROCEDURE — 6360000002 HC RX W HCPCS

## 2025-02-12 PROCEDURE — 97530 THERAPEUTIC ACTIVITIES: CPT

## 2025-02-12 PROCEDURE — 6370000000 HC RX 637 (ALT 250 FOR IP): Performed by: FAMILY MEDICINE

## 2025-02-12 PROCEDURE — 80048 BASIC METABOLIC PNL TOTAL CA: CPT

## 2025-02-12 PROCEDURE — 36415 COLL VENOUS BLD VENIPUNCTURE: CPT

## 2025-02-12 RX ORDER — INSULIN GLARGINE 100 [IU]/ML
30 INJECTION, SOLUTION SUBCUTANEOUS DAILY
Qty: 39.9 ML | Refills: 0
Start: 2025-02-12 | End: 2025-06-25

## 2025-02-12 RX ORDER — LOSARTAN POTASSIUM 50 MG/1
50 TABLET ORAL DAILY
Status: DISCONTINUED | OUTPATIENT
Start: 2025-02-12 | End: 2025-02-12 | Stop reason: HOSPADM

## 2025-02-12 RX ORDER — INSULIN GLARGINE 100 [IU]/ML
28 INJECTION, SOLUTION SUBCUTANEOUS NIGHTLY
Status: DISCONTINUED | OUTPATIENT
Start: 2025-02-12 | End: 2025-02-12 | Stop reason: HOSPADM

## 2025-02-12 RX ADMIN — SODIUM CHLORIDE: 9 INJECTION, SOLUTION INTRAVENOUS at 05:34

## 2025-02-12 RX ADMIN — BUSPIRONE HYDROCHLORIDE 5 MG: 5 TABLET ORAL at 10:09

## 2025-02-12 RX ADMIN — LOSARTAN POTASSIUM 50 MG: 50 TABLET, FILM COATED ORAL at 11:34

## 2025-02-12 RX ADMIN — HEPARIN SODIUM 5000 UNITS: 5000 INJECTION INTRAVENOUS; SUBCUTANEOUS at 14:13

## 2025-02-12 RX ADMIN — DULOXETINE HYDROCHLORIDE 60 MG: 60 CAPSULE, DELAYED RELEASE ORAL at 10:09

## 2025-02-12 RX ADMIN — HEPARIN SODIUM 5000 UNITS: 5000 INJECTION INTRAVENOUS; SUBCUTANEOUS at 05:31

## 2025-02-12 RX ADMIN — ASPIRIN 81 MG: 81 TABLET, CHEWABLE ORAL at 10:08

## 2025-02-12 RX ADMIN — METOPROLOL SUCCINATE 50 MG: 50 TABLET, EXTENDED RELEASE ORAL at 10:11

## 2025-02-12 RX ADMIN — EZETIMIBE 10 MG: 10 TABLET ORAL at 10:10

## 2025-02-12 NOTE — PROGRESS NOTES
Hospitalist Progress Note   Admit Date:  2025  3:55 PM   Name:  Jaret Valentin   Age:  64 y.o.  Sex:  male  :  1960   MRN:  [id number]   Room:      Presenting/Chief Complaint: No chief complaint on file.     Reason(s) for Admission: Hypovolemia [E86.1]  Near syncope [R55]  Acute renal failure superimposed on chronic kidney disease, unspecified acute renal failure type, unspecified CKD stage (HCC) [N17.9, N18.9]     Hospital Course:   Jaret Valentin is a 64-year-old male with past medical history of type 2 diabetes on chronic insulin, CKD stage IIIb, primary hypertension, recurrent major depressive disorder, degenerative valve disease status post replacement, and CAD status post remote bypass who presented with symptoms of lightheadedness and near syncope.  The patient was out shopping with his wife at [facility] and getting ice when he experienced 2 episodes of weakness and unsteadiness and fell to the ground.  He denies passing out.  He denies chest pain, shortness of breath prior to passing out.  Says he did have slight blurry vision.  Also reports chest congestion over the last few days with productive cough producing clear sputum.  He has never had some like this happen before.  The patient attributes his symptoms to possibly overdosing on his blood pressure medications this morning.  Says he was changing out his pill bottles and thinks he took too much of the irbesartan and metoprolol (double dose).  This was not intentional.     The patient noted a recent change in his diabetes management from an Omnipod insulin pump to daily injections due to insurance issues, taking 40 units of Lantus at night and 30-35 units short acting-insulin with each meal. His blood glucose levels have been high, in the high 200s, with a noted low of 65 earlier today. He typically does not experience hypoglycemic symptoms until his glucose levels drop to around 50.     In the ER, patient was found to be hypotensive 
Hourly rounds performed this shift. Bed lowered and locked. Call light within reach. Bed alarm on.    
Hourly rounds performed this shift. Bed lowered and locked. Call light within reach. Fluids infusing per MAR.     
Occupational Therapy Note:    Attempted to see patient this PM for occupational therapy evaluation session. Patient's blood glucose level is currently 57 - RN in room checking.. Will follow and re-attempt as schedule permits/patient available. Thank you,    Jaret Haq, OT    Rehab Caseload Tracker       
Pt resting in bed. Pt did not have any complaints this shift. Pt's bed low and locked. Call light and personal items within pt's reach. Bedside end of shift report given to night shift RN  
TRANSFER - IN REPORT:    Verbal report received from ALISSA Goldman on Jaret Valentin  being received from ED for routine progression of patient care      Report consisted of patient's Situation, Background, Assessment and   Recommendations(SBAR).     Information from the following report(s) Nurse Handoff Report was reviewed with the receiving nurse.    Opportunity for questions and clarification was provided.        
Overall, great progress today as pnt improved in ambulation distance and activity tolerance. Pnt continues to present as functioning below his baseline, with deficits in mobility including transfers, gait, balance, and activity tolerance. Pt will continue to benefit from skilled therapy services to address stated deficits to promote return to highest level of function, independence, and safety. Will continue to follow.       SUBJECTIVE:   Mr. Valentin states, \"Explain to my wife, please!\"     Social/Functional Lives With: Spouse  Type of Home: House  OBJECTIVE:     PAIN: VITALS / O2: PRECAUTION / LINES / DRAINS:   Pre Treatment: 0         Post Treatment: 0 Vitals        Oxygen    IV    RESTRICTIONS/PRECAUTIONS:        MOBILITY: I Mod I S SBA CGA Min Mod Max Total  NT x2 Comments:   Bed Mobility    Rolling [] [] [] [x] [] [] [] [] [] [] []    Supine to Sit [] [] [] [x] [] [] [] [] [] [] []    Scooting [] [] [] [x] [] [] [] [] [] [] []    Sit to Supine [] [] [] [] [] [] [] [] [] [] []    Transfers    Sit to Stand [] [] [] [x] [] [] [] [] [] [] []    Bed to Chair [] [] [] [x] [] [] [] [] [] [] []    Stand to Sit [] [] [] [x] [] [] [] [] [] [] []     [] [] [] [] [] [] [] [] [] [] []    I=Independent, Mod I=Modified Independent, S=Supervision, SBA=Standby Assistance, CGA=Contact Guard Assistance,   Min=Minimal Assistance, Mod=Moderate Assistance, Max=Maximal Assistance, Total=Total Assistance, NT=Not Tested    BALANCE: Good Fair+ Fair Fair- Poor NT Comments   Sitting Static [] [x] [] [] [] []    Sitting Dynamic [] [x] [] [] [] []              Standing Static [] [x] [] [] [] []    Standing Dynamic [] [x] [] [] [] []      GAIT: I Mod I S SBA CGA Min Mod Max Total  NT x2 Comments:   Level of Assistance [] [] [] [x] [] [] [] [] [] [] []    Distance   280 feet    DME Pushing IV pole    Gait Quality Mild trunk sway and irregular step length    Weightbearing Status      Stairs      I=Independent, Mod I=Modified Independent, 
AV replacement  History of degenerative valve disease, post triple bypass and valve replacement, currently on aspirin therapy. Denies current chest pain. Echo in 08/2023 with EF 55-60%. Follows Dr. Vega  Continue home ASA and statin     Peripheral vascular disease   Status post left great toe and pinky amputations     Major depressive disorder  Continue home duloxetine and buspirone    DNI (do not intubate)  Limited code status noted    Elevated lactic acid, resolved    Discharge planning:  Diet:  ADULT DIET; Regular; 3 carb choices (45 gm/meal); Low Sodium (2 gm)  ORAL HYDRATION; Water; 300 ml (10 oz)  VTE prophylaxis: Heparin SQ  Code status: Limited      Hospital Problems:  Principal Problem:    Near syncope  Active Problems:    Recurrent major depressive disorder, in full remission ([facility])    H/O heart valve replacement with bioprosthetic valve    PVD (peripheral vascular disease) ([facility])    CAD (coronary artery disease)    Hypertension    Type 2 diabetes mellitus with stage 3b chronic kidney disease, with long-term current use of insulin ([facility])    Status post amputation of toe of left foot ([facility])    Acute kidney injury superimposed on stage 3b chronic kidney disease ([facility])    Hypotension due to drugs    DNI (do not intubate)    Lactic acidosis  Resolved Problems:    * No resolved hospital problems. *      Objective:   Patient Vitals for the past 24 hrs:   Temp Pulse Resp BP SpO2   02/10/25 0338 98.2 °F (36.8 °C) 79 19 (!) 150/94 96 %   02/09/25 2203 -- 73 19 90/60 97 %   02/09/25 2202 -- 73 19 117/73 99 %   02/09/25 2201 97.7 °F (36.5 °C) 70 19 134/78 97 %   02/09/25 2115 -- 70 16 133/74 --   02/09/25 2045 -- 71 17 132/75 95 %   02/09/25 2015 -- 70 16 125/72 97 %   02/09/25 1930 -- 70 15 124/67 97 %   02/09/25 1900 -- 70 16 123/80 97 %   02/09/25 1830 -- 70 17 120/66 92 %   02/09/25 1800 97.7 °F (36.5 °C) 70 20 98/67 96 %   02/09/25 1733 -- 71 25 106/75 96 %   02/09/25 1711 -- 70 18 100/60 97 %   02/09/25 1701 -- 
[x]    Strength []  Mild weakness of bilateral glutes   Balance []  Fair to fair+ in standing   Posture [] Forward Head  Rounded Shoulders   Sensation []  NT   Coordination [x]      Tone [x]     Edema [x]    Activity Tolerance []  Limited by hypotension    []      COGNITION/  PERCEPTION: Intact Impaired (Comments):   Orientation [x]     Vision [x]     Hearing [x]     Cognition  [x]       MOBILITY: I Mod I S SBA CGA Min Mod Max Total  NT x2 Comments:   Bed Mobility    Rolling [] [] [] [] [x] [] [] [] [] [] []    Supine to Sit [] [] [] [] [x] [] [] [] [] [] []    Scooting [] [] [] [] [x] [] [] [] [] [] []    Sit to Supine [] [] [] [] [] [] [] [] [] [] []    Transfers    Sit to Stand [] [] [] [] [x] [] [] [] [] [] []    Bed to Chair [] [] [] [] [x] [] [] [] [] [] []    Stand to Sit [] [] [] [] [x] [] [] [] [] [] []     [] [] [] [] [] [] [] [] [] [] []    I=Independent, Mod I=Modified Independent, S=Supervision, SBA=Standby Assistance, CGA=Contact Guard Assistance,   Min=Minimal Assistance, Mod=Moderate Assistance, Max=Maximal Assistance, Total=Total Assistance, NT=Not Tested    GAIT: I Mod I S SBA CGA Min Mod Max Total  NT x2 Comments:   Level of Assistance [] [] [] [] [x] [] [] [] [] [] []    Distance   8 feet    DME None    Gait Quality Decreased omid , Decreased step clearance, Decreased step length, and Decreased stance    Weightbearing Status      Stairs      I=Independent, Mod I=Modified Independent, S=Supervision, SBA=Standby Assistance, CGA=Contact Guard Assistance,   Min=Minimal Assistance, Mod=Moderate Assistance, Max=Maximal Assistance, Total=Total Assistance, NT=Not Tested    PLAN:   FREQUENCY AND DURATION: 3 times/week for duration of hospital stay or until stated goals are met, whichever comes first.    THERAPY PROGNOSIS: Excellent    PROBLEM LIST:   (Skilled intervention is medically necessary to address:)  Decreased ADL/Functional Activities  Decreased Activity Tolerance  Decreased

## 2025-02-12 NOTE — DISCHARGE INSTRUCTIONS
Advised to discuss and review present Hospital stay ,discharge medication list  with PCP and see if any refills needed.  F/U with PCP in a week with BMP and magnesium.  Advised take 3 to 4 minutes when changing position from supine to sitting and standing position.  Check your blood pressure 3 times daily when relaxed and calm, keep a log of it, show to primary care physician at follow-up.  Call primary care physician if blood pressure systolic 170 mmHg or above.  Hold blood pressure medication if systolic less than 130mm hg.  Advised not to drive until cleared by primary care physician/cardiology.

## 2025-02-12 NOTE — DISCHARGE SUMMARY
Recurrent major depressive disorder, in full remission (HCC)      ezetimibe (ZETIA) 10 MG tablet Take 1 tablet by mouth daily  Qty: 90 tablet, Refills: 3    Comments: Please send a replace/new response with 100-Day Supply if appropriate to maximize member benefit. Requesting 1 year supply.      rosuvastatin (CRESTOR) 20 MG tablet Take 1 tablet by mouth nightly  Qty: 90 tablet, Refills: 3    Comments: Please send a replace/new response with 100-Day Supply if appropriate to maximize member benefit. Requesting 1 year supply.      aspirin 81 MG chewable tablet Take 1 tablet by mouth daily      omeprazole (PRILOSEC) 20 MG delayed release capsule Take 1 capsule by mouth nightly as needed      Insulin Disposable Pump (OMNIPOD 5 [id number] PODS GEN 5) MISC Use to deliver insulin, E10.65  Qty: 30 each, Refills: 3    Associated Diagnoses: Type 2 diabetes with nephropathy (HCC)      HUMALOG 100 UNIT/ML SOLN injection vial Use with insulin pump, max daily dose 75 units  Qty: 70 mL, Refills: 3    Associated Diagnoses: Type 2 diabetes with nephropathy (HCC)      Insulin Infusion Pump LISANDRA Pump settings:   OMNIPOD5 manual mode     Time  00:00  1.15  Carb Ratio 00:00 8.5  Insulin Sensitivity 55  Target low  120  Target high 150  Active Insulin time 3:00  Max Bolus 20 units  Qty: 1 each, Refills: 0    Associated Diagnoses: Insulin pump status      acetaminophen (TYLENOL) 500 MG tablet Take 2 tablets by mouth every 6 hours as needed for Pain             Some of the medications may be marked as \"stop taking\" by the system; but in reality patient or family reported already being off these meds; defer to outpatient/prescribing providers.      Procedures done this admission:  * No surgery found *    Consults this admission:  None    Consultants will arrange their own follow ups, if applicable.    Echocardiogram results:  08/08/23    TRANSTHORACIC ECHOCARDIOGRAM (TTE) COMPLETE (CONTRAST/BUBBLE/3D PRN) 08/09/2023  6:13 PM

## 2025-02-12 NOTE — CARE COORDINATION
Patient discharging home with spouse. Declined home health, no discharge needs.     Reyna SINGH, ACM  [place]

## 2025-02-12 NOTE — FLOWSHEET NOTE
02/12/25 1415   Vital Signs   BP Location Left upper arm   Blood Pressure Lying 154/86   Pulse Lying 73 PER MINUTE   Blood Pressure Sitting 148/89   Pulse Sitting 76 PER MINUTE   Blood Pressure Standing 136/79   Pulse Standing 76 PER MINUTE

## 2025-02-13 ENCOUNTER — TELEPHONE (OUTPATIENT)
Dept: FAMILY MEDICINE CLINIC | Facility: CLINIC | Age: 65
End: 2025-02-13

## 2025-02-13 NOTE — TELEPHONE ENCOUNTER
Care Transitions Initial Follow Up Call    Outreach made within 2 business days of discharge: Yes    Patient: Jaret Valentin Patient : 1960   MRN: [id number]  Reason for Admission:   Discharge Date: 25       Spoke with: Jaret    Discharge department/facility: [facility] Interactive Patient Contact:  Was patient able to fill all prescriptions: Yes  Was patient instructed to bring all medications to the follow-up visit: Yes  Is patient taking all medications as directed in the discharge summary? Yes  Does patient understand their discharge instructions: Yes  Does patient have questions or concerns that need addressed prior to 7-14 day follow up office visit: no    Additional needs identified to be addressed with provider  Standard priority:               Scheduled appointment with PCP within 7-14 days    Follow Up  Future Appointments   Date Time Provider Department Center   2025  2:40 PM Masha Macedo APRN - NP [facility] ECC DEP   2025  2:30 PM [facility] ECHO 36 UCDG GVL AMB   2025 11:30 AM Yari Galo PA-C END GVL AMB   2025  1:00 PM [facility] DEVICE 39 UCDG GVL AMB   2025  1:15 PM Chris Vega MD DG GVL AMB   2025  3:00 PM Masha Macedo APRN - NP [facility] ECC DEP   2025 10:30 AM Yari Galo PA-C END GVL AMB   2026 11:40 AM Masha Macedo APRN - NP [facility] ECC DEP       DEVIN HESTER MA

## 2025-02-13 NOTE — TELEPHONE ENCOUNTER
----- Message from Susi FISCHER sent at 2/13/2025  2:00 PM EST -----  Regarding: TCM Call  Hakeemkamila Peter, can you call Mr. Valentin and complete TCM documentation. After being discharged from hosp they wanted him to follow up in 1 wk with Masha crooks \"Follow up with Masha Macedo APRN - NP (Family Medicine) in 1 week (2/19/2025); bmp and magnesium\" I've scheduled this for 2/20 at 2:40 PM. Please make pt aware of appointment, if that date doesn't work for him we can choose another day that week if she has something available.    Thank you!

## 2025-02-27 ENCOUNTER — TELEPHONE (OUTPATIENT)
Dept: FAMILY MEDICINE CLINIC | Facility: CLINIC | Age: 65
End: 2025-02-27

## 2025-02-27 ENCOUNTER — LAB (OUTPATIENT)
Dept: FAMILY MEDICINE CLINIC | Facility: CLINIC | Age: 65
End: 2025-02-27

## 2025-02-27 ENCOUNTER — HOSPITAL ENCOUNTER (EMERGENCY)
Age: 65
Discharge: HOME | End: 2025-02-27
Attending: EMERGENCY MEDICINE
Payer: MEDICARE

## 2025-02-27 ENCOUNTER — OFFICE VISIT (OUTPATIENT)
Dept: FAMILY MEDICINE CLINIC | Facility: CLINIC | Age: 65
End: 2025-02-27

## 2025-02-27 VITALS
WEIGHT: 200 LBS | BODY MASS INDEX: 29.62 KG/M2 | HEART RATE: 89 BPM | OXYGEN SATURATION: 95 % | DIASTOLIC BLOOD PRESSURE: 87 MMHG | TEMPERATURE: 97.7 F | SYSTOLIC BLOOD PRESSURE: 123 MMHG | RESPIRATION RATE: 18 BRPM | HEIGHT: 69 IN

## 2025-02-27 VITALS
HEIGHT: 69 IN | WEIGHT: 200 LBS | BODY MASS INDEX: 29.62 KG/M2 | DIASTOLIC BLOOD PRESSURE: 68 MMHG | HEART RATE: 103 BPM | SYSTOLIC BLOOD PRESSURE: 113 MMHG | TEMPERATURE: 98.2 F

## 2025-02-27 DIAGNOSIS — I10 PRIMARY HYPERTENSION: ICD-10-CM

## 2025-02-27 DIAGNOSIS — N18.9 CHRONIC KIDNEY DISEASE, UNSPECIFIED CKD STAGE: ICD-10-CM

## 2025-02-27 DIAGNOSIS — N18.30 STAGE 3 CHRONIC KIDNEY DISEASE, UNSPECIFIED WHETHER STAGE 3A OR 3B CKD (HCC): ICD-10-CM

## 2025-02-27 DIAGNOSIS — Z09 HOSPITAL DISCHARGE FOLLOW-UP: ICD-10-CM

## 2025-02-27 DIAGNOSIS — E11.21 TYPE 2 DIABETES WITH NEPHROPATHY (HCC): ICD-10-CM

## 2025-02-27 DIAGNOSIS — R73.9 HYPERGLYCEMIA: Primary | ICD-10-CM

## 2025-02-27 DIAGNOSIS — N18.30 STAGE 3 CHRONIC KIDNEY DISEASE, UNSPECIFIED WHETHER STAGE 3A OR 3B CKD (HCC): Primary | ICD-10-CM

## 2025-02-27 LAB
ALBUMIN SERPL-MCNC: 3.9 G/DL (ref 3.2–4.6)
ALBUMIN SERPL-MCNC: 4 G/DL (ref 3.2–4.6)
ALBUMIN/GLOB SERPL: 1.2 (ref 1–1.9)
ALBUMIN/GLOB SERPL: 1.3 (ref 1–1.9)
ALP SERPL-CCNC: 126 U/L (ref 40–129)
ALP SERPL-CCNC: 133 U/L (ref 40–129)
ALT SERPL-CCNC: 10 U/L (ref 8–55)
ALT SERPL-CCNC: 14 U/L (ref 8–55)
ANION GAP SERPL CALC-SCNC: 10 MMOL/L (ref 7–16)
ANION GAP SERPL CALC-SCNC: 13 MMOL/L (ref 7–16)
ANION GAP SERPL CALC-SCNC: 15 MMOL/L (ref 7–16)
AST SERPL-CCNC: 19 U/L (ref 15–37)
AST SERPL-CCNC: 29 U/L (ref 15–37)
BASE EXCESS BLDV CALC-SCNC: 0.1 MMOL/L
BASOPHILS # BLD: 0.09 K/UL (ref 0–0.2)
BASOPHILS NFR BLD: 1 % (ref 0–2)
BILIRUB SERPL-MCNC: 0.4 MG/DL (ref 0–1.2)
BILIRUB SERPL-MCNC: 0.5 MG/DL (ref 0–1.2)
BUN SERPL-MCNC: 30 MG/DL (ref 8–23)
BUN SERPL-MCNC: 31 MG/DL (ref 8–23)
BUN SERPL-MCNC: 34 MG/DL (ref 8–23)
CALCIUM SERPL-MCNC: 8.9 MG/DL (ref 8.8–10.2)
CALCIUM SERPL-MCNC: 9.5 MG/DL (ref 8.8–10.2)
CALCIUM SERPL-MCNC: 9.7 MG/DL (ref 8.8–10.2)
CHLORIDE SERPL-SCNC: 89 MMOL/L (ref 98–107)
CHLORIDE SERPL-SCNC: 90 MMOL/L (ref 98–107)
CHLORIDE SERPL-SCNC: 94 MMOL/L (ref 98–107)
CHOLEST SERPL-MCNC: 122 MG/DL (ref 0–200)
CHP ED QC CHECK: YES
CO2 SERPL-SCNC: 23 MMOL/L (ref 20–29)
CO2 SERPL-SCNC: 24 MMOL/L (ref 20–29)
CO2 SERPL-SCNC: 24 MMOL/L (ref 20–29)
CREAT SERPL-MCNC: 1.72 MG/DL (ref 0.8–1.3)
CREAT SERPL-MCNC: 2.01 MG/DL (ref 0.8–1.3)
CREAT SERPL-MCNC: 2.03 MG/DL (ref 0.8–1.3)
DIFFERENTIAL METHOD BLD: ABNORMAL
EOSINOPHIL # BLD: 0.33 K/UL (ref 0–0.8)
EOSINOPHIL NFR BLD: 3.7 % (ref 0.5–7.8)
ERYTHROCYTE [DISTWIDTH] IN BLOOD BY AUTOMATED COUNT: 12.6 % (ref 11.9–14.6)
GLOBULIN SER CALC-MCNC: 3 G/DL (ref 2.3–3.5)
GLOBULIN SER CALC-MCNC: 3.4 G/DL (ref 2.3–3.5)
GLUCOSE BLD STRIP.AUTO-MCNC: 367 MG/DL (ref 65–100)
GLUCOSE BLD STRIP.AUTO-MCNC: 461 MG/DL (ref 65–100)
GLUCOSE BLD-MCNC: 367 MG/DL
GLUCOSE SERPL-MCNC: 382 MG/DL (ref 70–99)
GLUCOSE SERPL-MCNC: 458 MG/DL (ref 70–99)
GLUCOSE SERPL-MCNC: 592 MG/DL (ref 70–99)
HCO3 BLDV-SCNC: 25.2 MMOL/L (ref 23–28)
HCT VFR BLD AUTO: 43.3 % (ref 41.1–50.3)
HDLC SERPL-MCNC: 36 MG/DL (ref 40–60)
HDLC SERPL: 3.4 (ref 0–5)
HGB BLD-MCNC: 15.6 G/DL (ref 13.6–17.2)
IMM GRANULOCYTES # BLD AUTO: 0.05 K/UL (ref 0–0.5)
IMM GRANULOCYTES NFR BLD AUTO: 0.6 % (ref 0–5)
LDLC SERPL CALC-MCNC: 40 MG/DL (ref 0–100)
LYMPHOCYTES # BLD: 2.6 K/UL (ref 0.5–4.6)
LYMPHOCYTES NFR BLD: 28.9 % (ref 13–44)
MAGNESIUM SERPL-MCNC: 2.1 MG/DL (ref 1.8–2.4)
MCH RBC QN AUTO: 30.4 PG (ref 26.1–32.9)
MCHC RBC AUTO-ENTMCNC: 36 G/DL (ref 31.4–35)
MCV RBC AUTO: 84.2 FL (ref 82–102)
MONOCYTES # BLD: 0.74 K/UL (ref 0.1–1.3)
MONOCYTES NFR BLD: 8.2 % (ref 4–12)
NEUTS SEG # BLD: 5.19 K/UL (ref 1.7–8.2)
NEUTS SEG NFR BLD: 57.6 % (ref 43–78)
NRBC # BLD: 0 K/UL (ref 0–0.2)
PCO2 BLDV: 41.9 MMHG (ref 41–51)
PH BLDV: 7.39 (ref 7.32–7.42)
PLATELET # BLD AUTO: 166 K/UL (ref 150–450)
PMV BLD AUTO: 12.3 FL (ref 9.4–12.3)
PO2 BLDV: 32 MMHG
POTASSIUM SERPL-SCNC: 4.1 MMOL/L (ref 3.5–5.1)
POTASSIUM SERPL-SCNC: 5.3 MMOL/L (ref 3.5–5.1)
POTASSIUM SERPL-SCNC: ABNORMAL MMOL/L (ref 3.5–5.1)
PROT SERPL-MCNC: 6.9 G/DL (ref 6.3–8.2)
PROT SERPL-MCNC: 7.4 G/DL (ref 6.3–8.2)
RBC # BLD AUTO: 5.14 M/UL (ref 4.23–5.6)
SAO2 % BLDV: 60.1 % (ref 65–88)
SERVICE CMNT-IMP: ABNORMAL
SODIUM SERPL-SCNC: 127 MMOL/L (ref 136–145)
SODIUM SERPL-SCNC: 127 MMOL/L (ref 136–145)
SODIUM SERPL-SCNC: 128 MMOL/L (ref 136–145)
SPECIMEN TYPE: ABNORMAL
TRIGL SERPL-MCNC: 227 MG/DL (ref 0–150)
VLDLC SERPL CALC-MCNC: 45 MG/DL (ref 6–23)
WBC # BLD AUTO: 9 K/UL (ref 4.3–11.1)

## 2025-02-27 PROCEDURE — 85025 COMPLETE CBC W/AUTO DIFF WBC: CPT

## 2025-02-27 PROCEDURE — 93005 ELECTROCARDIOGRAM TRACING: CPT | Performed by: EMERGENCY MEDICINE

## 2025-02-27 PROCEDURE — 80053 COMPREHEN METABOLIC PANEL: CPT

## 2025-02-27 PROCEDURE — 96360 HYDRATION IV INFUSION INIT: CPT

## 2025-02-27 PROCEDURE — 82803 BLOOD GASES ANY COMBINATION: CPT

## 2025-02-27 PROCEDURE — 82962 GLUCOSE BLOOD TEST: CPT

## 2025-02-27 PROCEDURE — 2580000003 HC RX 258: Performed by: EMERGENCY MEDICINE

## 2025-02-27 PROCEDURE — 99284 EMERGENCY DEPT VISIT MOD MDM: CPT

## 2025-02-27 RX ORDER — INSULIN GLARGINE 100 [IU]/ML
40 INJECTION, SOLUTION SUBCUTANEOUS DAILY
Qty: 53.2 ML | Refills: 0
Start: 2025-02-27 | End: 2025-07-10

## 2025-02-27 RX ORDER — SODIUM CHLORIDE, SODIUM LACTATE, POTASSIUM CHLORIDE, AND CALCIUM CHLORIDE .6; .31; .03; .02 G/100ML; G/100ML; G/100ML; G/100ML
2000 INJECTION, SOLUTION INTRAVENOUS ONCE
Status: COMPLETED | OUTPATIENT
Start: 2025-02-27 | End: 2025-02-27

## 2025-02-27 RX ORDER — INSULIN LISPRO 100 [IU]/ML
INJECTION, SOLUTION INTRAVENOUS; SUBCUTANEOUS
Qty: 1 EACH | Refills: 0
Start: 2025-02-27

## 2025-02-27 RX ADMIN — SODIUM CHLORIDE, POTASSIUM CHLORIDE, SODIUM LACTATE AND CALCIUM CHLORIDE 2000 ML: 600; 310; 30; 20 INJECTION, SOLUTION INTRAVENOUS at 19:20

## 2025-02-27 ASSESSMENT — PAIN - FUNCTIONAL ASSESSMENT
PAIN_FUNCTIONAL_ASSESSMENT: NONE - DENIES PAIN

## 2025-02-27 NOTE — ED PROVIDER NOTES
Emergency Department Provider Note       PCP: Masha Macedo APRN - NP   Age: 64 y.o.   Sex: male     DISPOSITION Decision To Discharge 02/27/2025 09:39:16 PM    ICD-10-CM    1. Hyperglycemia  R73.9       2. Chronic kidney disease, unspecified CKD stage  N18.9           Medical Decision Making     Patient is being evaluated for LYNDON and hyperglycemia.  I reviewed the labs that were performed as an outpatient.  Those labs did not show any signs of DKA.  Labs will be repeated here to make sure there is been no change.  He has been ordered IV fluids.  Will reevaluate after IV fluid boluses.  ED Course as of 02/27/25 2140   Thu Feb 27, 2025   1838 POC Glucose(!!): 461 [OZIEL]   2000 No signs of any DKA.  Patient still receiving IV fluids. [OZIEL]   2136 After IV fluid resuscitation patient's creatinine is now 1.72.  This is now within the patient's normal baseline.  No anion gap.  I think patient can be discharged home.  He has to follow-up with his doctor.  He is to refill his insulin prescription.  He can return to the ER with any acute worsening symptoms. [OZIEL]      ED Course User Index  [OZIEL] Esteban Sanchez, DO     1 or more acute illnesses that pose a threat to life or bodily function.   Shared medical decision making was utilized in creating the patients health plan today.  I independently ordered and reviewed each unique test.         ED cardiac monitoring rhythm strip was ordered and interpreted:  paced rhythm  ST Segments:Nonspecific ST segments - NO STEMI   Rate: 98                  History     Patient is a 64-year-old male with insulin-dependent diabetes.  He presents for evaluation of abnormal labs.  He had labs drawn by his primary care.  Apparently the labs showed his blood sugar was over 500 and he had reduced kidney function.  Patient admits that he ran out of his insulin a couple days ago.  Denies any nausea or vomiting.  Denies any fevers, chills, or infectious symptoms.    The history is

## 2025-02-27 NOTE — PROGRESS NOTES
Referral to vascul    Jaret Camden Valentin (:  1960) is a 64 y.o. male,Established patient, here for evaluation of the following chief complaint(s):  Follow-Up from Hospital (F4 Pt said he was here for a Hospital fu(-) due to taking to many of his BP med's-1 wk fu-BMP & Magnesium )         Assessment & Plan  Stage 3 chronic kidney disease, unspecified whether stage 3a or 3b CKD (HCC)    Rechecking labs to be sure improving    Orders:    Basic Metabolic Panel; Future    Magnesium; Future    Type 2 diabetes with nephropathy (HCC)    He is currrently not on pump due to cost but is trying to get covered  as has better control with pump    Orders:    LANTUS SOLOSTAR 100 UNIT/ML injection pen; Inject 40 Units into the skin daily    insulin lispro (HUMALOG) 100 UNIT/ML SOLN injection vial; Prior to meals    Hospital discharge follow-up    See note below    Orders:    PA DISCHARGE MEDS RECONCILED W/ CURRENT OUTPATIENT MED LIST      Return in 3 months (on 2025).       Subjective   HPI Here for hospital follow up .   Had viral URI nd messed up and took too much BP med. Had a syncopal episode. Went to er. 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   Constitutional:       Appearance: Normal appearance.   HENT:      Head: Normocephalic.      Nose: Nose normal.   Cardiovascular:      Rate and Rhythm: Normal rate and regular rhythm.      Pulses: Normal pulses.      Heart sounds: Murmur heard.   Pulmonary:      Effort: Pulmonary effort is normal.      Breath sounds: Wheezing (scattered) present.   Musculoskeletal:      Cervical back: Normal range of motion and neck supple.      Comments: Walking without assistive devices   Skin:     General: Skin is warm and dry.   Neurological:      General: No focal

## 2025-02-27 NOTE — ED TRIAGE NOTES
Patient a 63 y/o Male reports to the ED with c.c of hyperglycemia and decreased kidney function with his PCP and was advised to come to the ED. Hx of Diabetes

## 2025-02-27 NOTE — ASSESSMENT & PLAN NOTE
Rechecking labs to be sure improving    Orders:    Basic Metabolic Panel; Future    Magnesium; Future

## 2025-02-27 NOTE — TELEPHONE ENCOUNTER
I called the patient back to see if I could reach him. He said he has been out of his insulin for a few days and will get it today or tomorrow. So it was probably why his blood sugar was high since he been out. He said Chantelle will  his insulin tonight and I told him to go ahead and take his insulin tonight. He said he is not going to go to the ER today and if you still want him to go tomorrow he will.      He also wanted to let you know about what happened when he got his blood drawn today.     He said the person who baylee his blood today didn't put the tourniquet on correctly and was not tight enough. She didn't get it the first time when she poked him and seem like she was fishing for his vein. He said she didn't really anchor it to get his blood. I asked which chair he sat in when he was in the lab-the one to your left when you go into the lab.

## 2025-02-27 NOTE — TELEPHONE ENCOUNTER
----- Message from JHON Price NP sent at 2/27/2025  3:22 PM EST -----  Call rufus tell him BS over 500 and his kidney function is worse have him go back to the hospital.He is likely to need form iv fluids. His magnesium is now normal . If he can not afford his insulin have him ask for  to help him  I suspect  he is doing without

## 2025-02-28 ENCOUNTER — TELEPHONE (OUTPATIENT)
Dept: INTERNAL MEDICINE CLINIC | Facility: CLINIC | Age: 65
End: 2025-02-28

## 2025-02-28 LAB
EKG ATRIAL RATE: 98 BPM
EKG DIAGNOSIS: NORMAL
EKG P AXIS: 36 DEGREES
EKG P-R INTERVAL: 204 MS
EKG Q-T INTERVAL: 392 MS
EKG QRS DURATION: 150 MS
EKG QTC CALCULATION (BAZETT): 500 MS
EKG R AXIS: 149 DEGREES
EKG T AXIS: -17 DEGREES
EKG VENTRICULAR RATE: 98 BPM

## 2025-02-28 PROCEDURE — 93010 ELECTROCARDIOGRAM REPORT: CPT | Performed by: INTERNAL MEDICINE

## 2025-02-28 NOTE — TELEPHONE ENCOUNTER
Patient would like to speak to nurse, went to the ER last night and would like to discuss with Dr Rick renteria

## 2025-02-28 NOTE — TELEPHONE ENCOUNTER
I called the patients cell  phone and left Masha's response on his voicemail.      Masha Macedo, APRN - NP  You; Susi Dave Y54 minutes ago (7:36 AM)       Let him know will let  know of his concern. But his K was not the issue his bun cr and bs were he needs iv fluids to get his sugar down safely so yes still want him to go to er. If he want get him in on Monday here in person

## 2025-05-04 NOTE — PROGRESS NOTES
lifestyle as tolerated.  See above      3. Localized edema-improved and minimal today, minimize salt, walk daily, elevate legs when resting      4. Essential hypertension- controlled, continue Irbesartan, amlodipine, and Toprol-XL as currently taking.  Stay hydrated.  If calls with any presyncope or dizziness in the future decrease amlodipine at that time      5. Coronary artery disease involving coronary bypass graft of native heart without angina pectoris- stable, continue aspirin, statin, and beta-blocker/ARB at current doses.  Healthy diet encouraged.        6. Acute on chronic diastolic heart failure (HCC)-none since last visit, euvolemic today, minimize salt, call with worsening edema, SOB, MONDRAGON.  Weight daily.  Recheck echo in 12 months.      7. Postsurgical aortocoronary bypass status- stable without angina, CHF, or palpitations.  Fasting lipids excellent as noted above    8.  Complete heart block-pacemaker at [facility]-site looks good after generator change out.  Emphasized wound care.  Continue routine surveillance interrogations and keep routine follow-up    9.  Mitral stenosis-mean gradient 8 but clinically asymptomatic.  Recheck echo in 12 months, sooner with any new onset symptoms.             Return in about 6 months (around 11/6/2025).         LIANNE SARABIA MD  5/6/2025  1:23 PM

## 2025-05-06 ENCOUNTER — OFFICE VISIT (OUTPATIENT)
Age: 65
End: 2025-05-06
Payer: MEDICARE

## 2025-05-06 ENCOUNTER — CLINICAL SUPPORT (OUTPATIENT)
Age: 65
End: 2025-05-06
Payer: MEDICARE

## 2025-05-06 VITALS
HEART RATE: 88 BPM | DIASTOLIC BLOOD PRESSURE: 68 MMHG | SYSTOLIC BLOOD PRESSURE: 120 MMHG | WEIGHT: 207 LBS | HEIGHT: 69 IN | BODY MASS INDEX: 30.66 KG/M2

## 2025-05-06 DIAGNOSIS — N18.30 STAGE 3 CHRONIC KIDNEY DISEASE, UNSPECIFIED WHETHER STAGE 3A OR 3B CKD (HCC): ICD-10-CM

## 2025-05-06 DIAGNOSIS — I44.2 COMPLETE HEART BLOCK (HCC): Primary | ICD-10-CM

## 2025-05-06 DIAGNOSIS — Q23.81 BICUSPID AORTIC VALVE: ICD-10-CM

## 2025-05-06 DIAGNOSIS — Z95.3 H/O HEART VALVE REPLACEMENT WITH BIOPROSTHETIC VALVE: Primary | ICD-10-CM

## 2025-05-06 DIAGNOSIS — I10 PRIMARY HYPERTENSION: ICD-10-CM

## 2025-05-06 DIAGNOSIS — I25.10 CORONARY ARTERY DISEASE INVOLVING NATIVE CORONARY ARTERY OF NATIVE HEART WITHOUT ANGINA PECTORIS: ICD-10-CM

## 2025-05-06 DIAGNOSIS — E78.5 DYSLIPIDEMIA: ICD-10-CM

## 2025-05-06 PROCEDURE — 3078F DIAST BP <80 MM HG: CPT | Performed by: INTERNAL MEDICINE

## 2025-05-06 PROCEDURE — 3074F SYST BP LT 130 MM HG: CPT | Performed by: INTERNAL MEDICINE

## 2025-05-06 PROCEDURE — 93280 PM DEVICE PROGR EVAL DUAL: CPT | Performed by: INTERNAL MEDICINE

## 2025-05-06 PROCEDURE — 99214 OFFICE O/P EST MOD 30 MIN: CPT | Performed by: INTERNAL MEDICINE

## 2025-05-28 ENCOUNTER — OFFICE VISIT (OUTPATIENT)
Dept: FAMILY MEDICINE CLINIC | Facility: CLINIC | Age: 65
End: 2025-05-28
Payer: MEDICARE

## 2025-05-28 ENCOUNTER — LAB (OUTPATIENT)
Dept: FAMILY MEDICINE CLINIC | Facility: CLINIC | Age: 65
End: 2025-05-28

## 2025-05-28 VITALS
BODY MASS INDEX: 30.81 KG/M2 | HEART RATE: 98 BPM | HEIGHT: 69 IN | TEMPERATURE: 98.2 F | WEIGHT: 208 LBS | SYSTOLIC BLOOD PRESSURE: 116 MMHG | DIASTOLIC BLOOD PRESSURE: 74 MMHG

## 2025-05-28 DIAGNOSIS — F33.42 RECURRENT MAJOR DEPRESSIVE DISORDER, IN FULL REMISSION: ICD-10-CM

## 2025-05-28 DIAGNOSIS — F51.01 PRIMARY INSOMNIA: ICD-10-CM

## 2025-05-28 DIAGNOSIS — N18.30 STAGE 3 CHRONIC KIDNEY DISEASE, UNSPECIFIED WHETHER STAGE 3A OR 3B CKD (HCC): ICD-10-CM

## 2025-05-28 DIAGNOSIS — I10 PRIMARY HYPERTENSION: ICD-10-CM

## 2025-05-28 DIAGNOSIS — E11.21 TYPE 2 DIABETES WITH NEPHROPATHY (HCC): ICD-10-CM

## 2025-05-28 DIAGNOSIS — L73.9 FOLLICULITIS: Primary | ICD-10-CM

## 2025-05-28 LAB
ALBUMIN SERPL-MCNC: 3.8 G/DL (ref 3.2–4.6)
ALBUMIN/GLOB SERPL: 1.1 (ref 1–1.9)
ALP SERPL-CCNC: 124 U/L (ref 40–129)
ALT SERPL-CCNC: 13 U/L (ref 8–55)
ANION GAP SERPL CALC-SCNC: 11 MMOL/L (ref 7–16)
AST SERPL-CCNC: 21 U/L (ref 15–37)
BASOPHILS # BLD: 0.08 K/UL (ref 0–0.2)
BASOPHILS NFR BLD: 0.8 % (ref 0–2)
BILIRUB SERPL-MCNC: 0.7 MG/DL (ref 0–1.2)
BUN SERPL-MCNC: 29 MG/DL (ref 8–23)
CALCIUM SERPL-MCNC: 9.7 MG/DL (ref 8.8–10.2)
CHLORIDE SERPL-SCNC: 97 MMOL/L (ref 98–107)
CO2 SERPL-SCNC: 23 MMOL/L (ref 20–29)
CREAT SERPL-MCNC: 1.66 MG/DL (ref 0.8–1.3)
DIFFERENTIAL METHOD BLD: ABNORMAL
EOSINOPHIL # BLD: 1.23 K/UL (ref 0–0.8)
EOSINOPHIL NFR BLD: 12 % (ref 0.5–7.8)
ERYTHROCYTE [DISTWIDTH] IN BLOOD BY AUTOMATED COUNT: 13.1 % (ref 11.9–14.6)
GLOBULIN SER CALC-MCNC: 3.4 G/DL (ref 2.3–3.5)
GLUCOSE SERPL-MCNC: 309 MG/DL (ref 70–99)
HCT VFR BLD AUTO: 42.1 % (ref 41.1–50.3)
HGB BLD-MCNC: 14.5 G/DL (ref 13.6–17.2)
IMM GRANULOCYTES # BLD AUTO: 0.04 K/UL (ref 0–0.5)
IMM GRANULOCYTES NFR BLD AUTO: 0.4 % (ref 0–5)
LYMPHOCYTES # BLD: 2.1 K/UL (ref 0.5–4.6)
LYMPHOCYTES NFR BLD: 20.5 % (ref 13–44)
MCH RBC QN AUTO: 30.7 PG (ref 26.1–32.9)
MCHC RBC AUTO-ENTMCNC: 34.4 G/DL (ref 31.4–35)
MCV RBC AUTO: 89.2 FL (ref 82–102)
MONOCYTES # BLD: 0.85 K/UL (ref 0.1–1.3)
MONOCYTES NFR BLD: 8.3 % (ref 4–12)
NEUTS SEG # BLD: 5.94 K/UL (ref 1.7–8.2)
NEUTS SEG NFR BLD: 58 % (ref 43–78)
NRBC # BLD: 0 K/UL (ref 0–0.2)
PLATELET # BLD AUTO: 157 K/UL (ref 150–450)
PMV BLD AUTO: 12 FL (ref 9.4–12.3)
POTASSIUM SERPL-SCNC: 5.1 MMOL/L (ref 3.5–5.1)
PROT SERPL-MCNC: 7.1 G/DL (ref 6.3–8.2)
RBC # BLD AUTO: 4.72 M/UL (ref 4.23–5.6)
SODIUM SERPL-SCNC: 130 MMOL/L (ref 136–145)
WBC # BLD AUTO: 10.2 K/UL (ref 4.3–11.1)

## 2025-05-28 PROCEDURE — 3078F DIAST BP <80 MM HG: CPT | Performed by: NURSE PRACTITIONER

## 2025-05-28 PROCEDURE — 3074F SYST BP LT 130 MM HG: CPT | Performed by: NURSE PRACTITIONER

## 2025-05-28 PROCEDURE — 3051F HG A1C>EQUAL 7.0%<8.0%: CPT | Performed by: NURSE PRACTITIONER

## 2025-05-28 PROCEDURE — 99214 OFFICE O/P EST MOD 30 MIN: CPT | Performed by: NURSE PRACTITIONER

## 2025-05-28 RX ORDER — ZOLPIDEM TARTRATE 10 MG/1
10 TABLET ORAL NIGHTLY PRN
Qty: 30 TABLET | Refills: 5 | Status: SHIPPED | OUTPATIENT
Start: 2025-05-28 | End: 2026-05-28

## 2025-05-28 RX ORDER — DULOXETIN HYDROCHLORIDE 60 MG/1
60 CAPSULE, DELAYED RELEASE ORAL DAILY
Qty: 90 CAPSULE | Refills: 1 | Status: SHIPPED | OUTPATIENT
Start: 2025-05-28

## 2025-05-28 RX ORDER — IRBESARTAN 300 MG/1
300 TABLET ORAL DAILY
Qty: 90 TABLET | Refills: 1 | Status: SHIPPED | OUTPATIENT
Start: 2025-05-28

## 2025-05-28 RX ORDER — AMLODIPINE BESYLATE 10 MG/1
10 TABLET ORAL DAILY
Qty: 90 TABLET | Refills: 1 | Status: SHIPPED | OUTPATIENT
Start: 2025-05-28

## 2025-05-28 RX ORDER — METOPROLOL SUCCINATE 50 MG/1
50 TABLET, EXTENDED RELEASE ORAL DAILY
Qty: 90 TABLET | Refills: 1 | Status: SHIPPED | OUTPATIENT
Start: 2025-05-28

## 2025-05-28 RX ORDER — BUSPIRONE HYDROCHLORIDE 5 MG/1
5 TABLET ORAL 2 TIMES DAILY
Qty: 180 TABLET | Refills: 1 | Status: SHIPPED | OUTPATIENT
Start: 2025-05-28

## 2025-05-28 RX ORDER — DOXYCYCLINE HYCLATE 100 MG
100 TABLET ORAL 2 TIMES DAILY
Qty: 14 TABLET | Refills: 0 | Status: SHIPPED | OUTPATIENT
Start: 2025-05-28 | End: 2025-06-04

## 2025-05-28 RX ORDER — INSULIN PMP CART,AUT,G6/7,CNTR
EACH SUBCUTANEOUS
COMMUNITY
Start: 2025-05-05

## 2025-05-28 NOTE — PROGRESS NOTES
Jaret Valentin (:  1960) is a 64 y.o. male,Established patient, here for evaluation of the following chief complaint(s):  Follow-up Chronic Condition (F4 6 mo fu/labs/med refills -last filled Ambien 25) and Rash (Pt has a rash all over that has broken out on his body started over the weekend, very itchy/red)         Assessment & Plan  Primary hypertension       Orders:    amLODIPine (NORVASC) 10 MG tablet; Take 1 tablet by mouth daily    metoprolol succinate (TOPROL XL) 50 MG extended release tablet; Take 1 tablet by mouth daily    irbesartan (AVAPRO) 300 MG tablet; Take 1 tablet by mouth daily    Comprehensive Metabolic Panel; Future    Type 2 diabetes with nephropathy (HCC)       Orders:    irbesartan (AVAPRO) 300 MG tablet; Take 1 tablet by mouth daily    Stage 3 chronic kidney disease, unspecified whether stage 3a or 3b CKD (HCC)       Orders:    irbesartan (AVAPRO) 300 MG tablet; Take 1 tablet by mouth daily    Comprehensive Metabolic Panel; Future    CBC with Auto Differential; Future    Primary insomnia       Orders:    zolpidem (AMBIEN) 10 MG tablet; Take 1 tablet by mouth nightly as needed for Sleep.    Recurrent major depressive disorder, in full remission       Orders:    busPIRone (BUSPAR) 5 MG tablet; Take 1 tablet by mouth 2 times daily    DULoxetine (CYMBALTA) 60 MG extended release capsule; Take 1 capsule by mouth daily    Folliculitis       Orders:    doxycycline hyclate (VIBRA-TABS) 100 MG tablet; Take 1 tablet by mouth 2 times daily for 7 days      Return in about 6 months (around 2025) for labs/med refills.       Subjective   HPI Here for refills of meds for   HTN no chest pain no shortness of breath  Mood  Insomnia  Diabetes  Ckd has not seen kidney md recently  New issue has new rash on arms back chest and in his hair started on Friday. Is highly itchy has used Gold bond lotion with minimal relief. No fever no chills no new soap or shampoos . Has pustules and itchy

## 2025-05-28 NOTE — ASSESSMENT & PLAN NOTE
Orders:    busPIRone (BUSPAR) 5 MG tablet; Take 1 tablet by mouth 2 times daily    DULoxetine (CYMBALTA) 60 MG extended release capsule; Take 1 capsule by mouth daily

## 2025-05-28 NOTE — ASSESSMENT & PLAN NOTE
Orders:    irbesartan (AVAPRO) 300 MG tablet; Take 1 tablet by mouth daily    Comprehensive Metabolic Panel; Future    CBC with Auto Differential; Future

## 2025-05-28 NOTE — ASSESSMENT & PLAN NOTE
Orders:    amLODIPine (NORVASC) 10 MG tablet; Take 1 tablet by mouth daily    metoprolol succinate (TOPROL XL) 50 MG extended release tablet; Take 1 tablet by mouth daily    irbesartan (AVAPRO) 300 MG tablet; Take 1 tablet by mouth daily    Comprehensive Metabolic Panel; Future

## 2025-05-29 ENCOUNTER — RESULTS FOLLOW-UP (OUTPATIENT)
Dept: FAMILY MEDICINE CLINIC | Facility: CLINIC | Age: 65
End: 2025-05-29

## 2025-05-29 NOTE — TELEPHONE ENCOUNTER
----- Message from JHON Price NP sent at 5/29/2025  7:45 AM EDT -----  Tell him his bs was over 300 and renal function down see his kidney specialist.  The cbc looks like he does indeed have an allergic reaction so take the zyrtec daily see him o Friday

## 2025-05-30 ENCOUNTER — CLINICAL SUPPORT (OUTPATIENT)
Dept: FAMILY MEDICINE CLINIC | Facility: CLINIC | Age: 65
End: 2025-05-30

## 2025-07-02 ENCOUNTER — APPOINTMENT (OUTPATIENT)
Dept: GENERAL RADIOLOGY | Age: 65
End: 2025-07-02
Payer: MEDICARE

## 2025-07-02 ENCOUNTER — HOSPITAL ENCOUNTER (EMERGENCY)
Age: 65
Discharge: HOME | End: 2025-07-03
Attending: EMERGENCY MEDICINE
Payer: MEDICARE

## 2025-07-02 DIAGNOSIS — R45.851 SUICIDAL IDEATION: ICD-10-CM

## 2025-07-02 DIAGNOSIS — T50.902A INTENTIONAL DRUG OVERDOSE, INITIAL ENCOUNTER (HCC): Primary | ICD-10-CM

## 2025-07-02 LAB
ALBUMIN SERPL-MCNC: 3.8 G/DL (ref 3.2–4.6)
ALBUMIN/GLOB SERPL: 1.3 (ref 1–1.9)
ALP SERPL-CCNC: 125 U/L (ref 40–129)
ALT SERPL-CCNC: 18 U/L (ref 8–55)
AMPHET UR QL SCN: NEGATIVE
ANION GAP BLD CALC-SCNC: 5.1 MMOL/L
ANION GAP SERPL CALC-SCNC: 17 MMOL/L (ref 7–16)
APAP SERPL-MCNC: <5 UG/ML (ref 10–30)
APPEARANCE UR: CLEAR
AST SERPL-CCNC: 36 U/L (ref 15–37)
BACTERIA URNS QL MICRO: NEGATIVE /HPF
BARBITURATES UR QL SCN: NEGATIVE
BASOPHILS # BLD: 0.1 K/UL (ref 0–0.2)
BASOPHILS NFR BLD: 0.8 % (ref 0–2)
BENZODIAZ UR QL: NEGATIVE
BILIRUB SERPL-MCNC: 0.7 MG/DL (ref 0–1.2)
BILIRUB UR QL: NEGATIVE
BUN BLD-MCNC: 24 MG/DL (ref 8–26)
BUN SERPL-MCNC: 23 MG/DL (ref 8–23)
CALCIUM SERPL-MCNC: 9.3 MG/DL (ref 8.8–10.2)
CANNABINOIDS UR QL SCN: NEGATIVE
CASTS URNS QL MICRO: 0 /LPF
CHLORIDE BLD-SCNC: 102 MMOL/L (ref 98–107)
CHLORIDE SERPL-SCNC: 94 MMOL/L (ref 98–107)
CO2 BLD-SCNC: 23.9 MMOL/L (ref 21–32)
CO2 SERPL-SCNC: 17 MMOL/L (ref 20–29)
COCAINE UR QL SCN: NEGATIVE
COLOR UR: ABNORMAL
CREAT BLD-MCNC: 1.49 MG/DL (ref 0.8–1.5)
CREAT SERPL-MCNC: 1.72 MG/DL (ref 0.8–1.3)
CRYSTALS URNS QL MICRO: 0 /LPF
DIFFERENTIAL METHOD BLD: ABNORMAL
EKG ATRIAL RATE: 112 BPM
EKG DIAGNOSIS: NORMAL
EKG P AXIS: 90 DEGREES
EKG P-R INTERVAL: 80 MS
EKG Q-T INTERVAL: 384 MS
EKG QRS DURATION: 136 MS
EKG QTC CALCULATION (BAZETT): 527 MS
EKG R AXIS: -67 DEGREES
EKG T AXIS: 108 DEGREES
EKG VENTRICULAR RATE: 113 BPM
EOSINOPHIL # BLD: 0.4 K/UL (ref 0–0.8)
EOSINOPHIL NFR BLD: 3.3 % (ref 0.5–7.8)
EPI CELLS #/AREA URNS HPF: ABNORMAL /HPF
ERYTHROCYTE [DISTWIDTH] IN BLOOD BY AUTOMATED COUNT: 12.6 % (ref 11.9–14.6)
ETHANOL SERPL-MCNC: <11 MG/DL (ref 0–0.08)
GLOBULIN SER CALC-MCNC: 2.9 G/DL (ref 2.3–3.5)
GLUCOSE BLD STRIP.AUTO-MCNC: 187 MG/DL (ref 65–100)
GLUCOSE BLD-MCNC: 410 MG/DL (ref 65–100)
GLUCOSE SERPL-MCNC: 430 MG/DL (ref 70–99)
GLUCOSE UR STRIP.AUTO-MCNC: >1000 MG/DL
HCT VFR BLD AUTO: 39.9 % (ref 41.1–50.3)
HGB BLD-MCNC: 14.4 G/DL (ref 13.6–17.2)
HGB UR QL STRIP: NEGATIVE
HYALINE CASTS URNS QL MICRO: ABNORMAL /LPF
IMM GRANULOCYTES # BLD AUTO: 0.07 K/UL (ref 0–0.5)
IMM GRANULOCYTES NFR BLD AUTO: 0.6 % (ref 0–5)
KETONES UR QL STRIP.AUTO: ABNORMAL MG/DL
LACTATE BLD-SCNC: 1.47 MMOL/L (ref 0.5–1.9)
LEUKOCYTE ESTERASE UR QL STRIP.AUTO: NEGATIVE
LIPASE SERPL-CCNC: 33 U/L (ref 13–60)
LYMPHOCYTES # BLD: 2.03 K/UL (ref 0.5–4.6)
LYMPHOCYTES NFR BLD: 16.6 % (ref 13–44)
Lab: NORMAL
MCH RBC QN AUTO: 31 PG (ref 26.1–32.9)
MCHC RBC AUTO-ENTMCNC: 36.1 G/DL (ref 31.4–35)
MCV RBC AUTO: 85.8 FL (ref 82–102)
METHADONE UR QL: NEGATIVE
MONOCYTES # BLD: 0.8 K/UL (ref 0.1–1.3)
MONOCYTES NFR BLD: 6.5 % (ref 4–12)
MUCOUS THREADS URNS QL MICRO: 0 /LPF
NEUTS SEG # BLD: 8.83 K/UL (ref 1.7–8.2)
NEUTS SEG NFR BLD: 72.2 % (ref 43–78)
NITRITE UR QL STRIP.AUTO: NEGATIVE
NRBC # BLD: 0 K/UL (ref 0–0.2)
OPIATES UR QL: NEGATIVE
PCP UR QL: NEGATIVE
PH UR STRIP: 5.5 (ref 5–9)
PLATELET # BLD AUTO: 207 K/UL (ref 150–450)
PMV BLD AUTO: 12.9 FL (ref 9.4–12.3)
POTASSIUM BLD-SCNC: 3.9 MMOL/L (ref 3.5–5.1)
POTASSIUM SERPL-SCNC: ABNORMAL MMOL/L (ref 3.5–5.1)
PROT SERPL-MCNC: 6.8 G/DL (ref 6.3–8.2)
PROT UR STRIP-MCNC: 100 MG/DL
RBC # BLD AUTO: 4.65 M/UL (ref 4.23–5.6)
RBC #/AREA URNS HPF: ABNORMAL /HPF
SALICYLATES SERPL-MCNC: <0.5 MG/DL
SERVICE CMNT-IMP: ABNORMAL
SERVICE CMNT-IMP: ABNORMAL
SODIUM BLD-SCNC: 131 MMOL/L (ref 136–145)
SODIUM SERPL-SCNC: 128 MMOL/L (ref 136–145)
SP GR UR REFRACTOMETRY: 1.02 (ref 1–1.02)
TROPONIN T SERPL HS-MCNC: 20.8 NG/L (ref 0–22)
TROPONIN T SERPL HS-MCNC: 21.2 NG/L (ref 0–22)
URINE CULTURE IF INDICATED: ABNORMAL
UROBILINOGEN UR QL STRIP.AUTO: 0.2 EU/DL (ref 0.2–1)
WBC # BLD AUTO: 12.2 K/UL (ref 4.3–11.1)
WBC URNS QL MICRO: ABNORMAL /HPF

## 2025-07-02 PROCEDURE — 80307 DRUG TEST PRSMV CHEM ANLYZR: CPT

## 2025-07-02 PROCEDURE — 82077 ASSAY SPEC XCP UR&BREATH IA: CPT

## 2025-07-02 PROCEDURE — 81001 URINALYSIS AUTO W/SCOPE: CPT

## 2025-07-02 PROCEDURE — 82962 GLUCOSE BLOOD TEST: CPT

## 2025-07-02 PROCEDURE — 80143 DRUG ASSAY ACETAMINOPHEN: CPT

## 2025-07-02 PROCEDURE — 80053 COMPREHEN METABOLIC PANEL: CPT

## 2025-07-02 PROCEDURE — 6370000000 HC RX 637 (ALT 250 FOR IP): Performed by: EMERGENCY MEDICINE

## 2025-07-02 PROCEDURE — 80047 BASIC METABLC PNL IONIZED CA: CPT

## 2025-07-02 PROCEDURE — 2580000003 HC RX 258: Performed by: EMERGENCY MEDICINE

## 2025-07-02 PROCEDURE — 71045 X-RAY EXAM CHEST 1 VIEW: CPT

## 2025-07-02 PROCEDURE — 93005 ELECTROCARDIOGRAM TRACING: CPT | Performed by: EMERGENCY MEDICINE

## 2025-07-02 PROCEDURE — 93010 ELECTROCARDIOGRAM REPORT: CPT | Performed by: INTERNAL MEDICINE

## 2025-07-02 PROCEDURE — 80179 DRUG ASSAY SALICYLATE: CPT

## 2025-07-02 PROCEDURE — 96374 THER/PROPH/DIAG INJ IV PUSH: CPT

## 2025-07-02 PROCEDURE — 83690 ASSAY OF LIPASE: CPT

## 2025-07-02 PROCEDURE — 99285 EMERGENCY DEPT VISIT HI MDM: CPT

## 2025-07-02 PROCEDURE — 84484 ASSAY OF TROPONIN QUANT: CPT

## 2025-07-02 PROCEDURE — 85025 COMPLETE CBC W/AUTO DIFF WBC: CPT

## 2025-07-02 PROCEDURE — 83605 ASSAY OF LACTIC ACID: CPT

## 2025-07-02 RX ORDER — INSULIN GLARGINE 100 [IU]/ML
40 INJECTION, SOLUTION SUBCUTANEOUS NIGHTLY
Status: DISCONTINUED | OUTPATIENT
Start: 2025-07-02 | End: 2025-07-03 | Stop reason: HOSPADM

## 2025-07-02 RX ORDER — 0.9 % SODIUM CHLORIDE 0.9 %
1000 INTRAVENOUS SOLUTION INTRAVENOUS
Status: COMPLETED | OUTPATIENT
Start: 2025-07-02 | End: 2025-07-02

## 2025-07-02 RX ORDER — BUSPIRONE HYDROCHLORIDE 5 MG/1
5 TABLET ORAL 2 TIMES DAILY
Status: DISCONTINUED | OUTPATIENT
Start: 2025-07-02 | End: 2025-07-03 | Stop reason: HOSPADM

## 2025-07-02 RX ORDER — METOPROLOL SUCCINATE 50 MG/1
50 TABLET, EXTENDED RELEASE ORAL DAILY
Status: DISCONTINUED | OUTPATIENT
Start: 2025-07-02 | End: 2025-07-03 | Stop reason: HOSPADM

## 2025-07-02 RX ORDER — AMLODIPINE BESYLATE 10 MG/1
10 TABLET ORAL DAILY
Status: DISCONTINUED | OUTPATIENT
Start: 2025-07-03 | End: 2025-07-03 | Stop reason: HOSPADM

## 2025-07-02 RX ORDER — DULOXETIN HYDROCHLORIDE 20 MG/1
60 CAPSULE, DELAYED RELEASE ORAL DAILY
Status: DISCONTINUED | OUTPATIENT
Start: 2025-07-02 | End: 2025-07-03 | Stop reason: HOSPADM

## 2025-07-02 RX ADMIN — SODIUM CHLORIDE 1000 ML: 0.9 INJECTION, SOLUTION INTRAVENOUS at 15:38

## 2025-07-02 RX ADMIN — SODIUM CHLORIDE 1000 ML: 0.9 INJECTION, SOLUTION INTRAVENOUS at 19:02

## 2025-07-02 RX ADMIN — METOPROLOL SUCCINATE 50 MG: 50 TABLET, EXTENDED RELEASE ORAL at 21:30

## 2025-07-02 RX ADMIN — BUSPIRONE HYDROCHLORIDE 5 MG: 5 TABLET ORAL at 21:29

## 2025-07-02 RX ADMIN — INSULIN GLARGINE 40 UNITS: 100 INJECTION, SOLUTION SUBCUTANEOUS at 21:29

## 2025-07-02 RX ADMIN — INSULIN HUMAN 10 UNITS: 100 INJECTION, SOLUTION PARENTERAL at 19:07

## 2025-07-02 RX ADMIN — DULOXETINE 60 MG: 20 CAPSULE, DELAYED RELEASE ORAL at 21:29

## 2025-07-02 ASSESSMENT — PAIN - FUNCTIONAL ASSESSMENT
PAIN_FUNCTIONAL_ASSESSMENT: NONE - DENIES PAIN
PAIN_FUNCTIONAL_ASSESSMENT: 0-10

## 2025-07-02 ASSESSMENT — ENCOUNTER SYMPTOMS
NAUSEA: 0
DIARRHEA: 0
VOMITING: 0
SHORTNESS OF BREATH: 0
BACK PAIN: 0
CONSTIPATION: 0
ABDOMINAL PAIN: 0
COLOR CHANGE: 0
COUGH: 0

## 2025-07-02 ASSESSMENT — PAIN SCALES - GENERAL: PAINLEVEL_OUTOF10: 0

## 2025-07-02 NOTE — ED NOTES
Constant Observer Yes - Name: Henna   Constant Observer Oriented yes   High risk patients are in line of sight at all times Yes   Excess equipment/medical supplies not necessary for the care of the patient removed Yes   All sharp or dangerous objects are removed from room: including but not limited to belts, pens & pencils, needles, medications, cosmetics, lighters, matches, nail files, watches, necklaces, glass objects, razors, razor blades, knives, aerosol sprays, drawstring pants, shoes, cords (telephone, call bells, etc.) cleaning wipes or other cleaning items, aluminum cans, not permanently attached wall décor Yes   Telephone/cell phone removed as well as TV remote (batteries can be swallowed) Yes   Patient belongings removed and labeled at nurses station No - When patient is medically stable   Excess linen is removed from room Yes   All plastic bags are removed from the room and replaced with paper trash bags Yes   Patient is in paper scrubs or appropriate gown and using hospital socks with rubber soles No - When patient is medically stable   No metal, hard eating utensils or hard plates are on meal tray Yes   Remove all cleaning agents used by Environmental Services Yes   If Crucifix is hanging on a nail, remove Crucifix as well as the nail Yes       *If any question above is answered \"No,\" documentation is required.       Kim Martinez RN  07/02/25 8145

## 2025-07-02 NOTE — ED TRIAGE NOTES
Patient a 63 y/o Male reports to the ED with c/c of a overdose on Zolpidem and Irbesartan. Unknown amount of pills around 1500 today

## 2025-07-02 NOTE — CARE COORDINATION
Chart review complete, CM met with pt and spouse at bedside, pt found sitting up in bed alert and oriented x4, pt presents with ED today after Suicidal attempt with overdose on home medications, per pt he states he would have overdosed on his insulin but he did not have as much medication as he thought. Pt states he has been having issues with depression and wanted to die for approx 6 mths states he has hx of same over 10 years ago where he was admitted to [facility] at that time. Pt denies any psych follow up since. States last PCP appointment was approx 6 mths ago. Pt appears upset with spouse d/t she called ems to bring him to the hospital for treatment.  Demographics, insurance and PCP confirmed with pt.    Pt has been placed on IVC papers, once notirized and scanned into the chart CM will make referrals, per MD pt will require observation in ED for 3 hours and then will be medically ready for dc.        07/02/25 0443   Service Assessment   Patient Orientation Alert and Oriented   Cognition Alert   History Provided By Patient   Primary Caregiver Self   Accompanied By/Relationship spouse Thi   Support Systems Spouse/Significant Other   Patient's Healthcare Decision Maker is: Legal Next of Kin   PCP Verified by CM Yes   Last Visit to PCP Within last 6 months   Prior Functional Level Independent in ADLs/IADLs   Current Functional Level Independent in ADLs/IADLs   Can patient return to prior living arrangement Yes   Ability to make needs known: Good   Family able to assist with home care needs: Yes   Would you like for me to discuss the discharge plan with any other family members/significant others, and if so, who? Yes   Financial Resources Medicare   Community Resources None   CM/SW Referral Psychiatry   Social/Functional History   Lives With Spouse   Type of Home House   Home Layout One level   Home Access Stairs to enter with rails   Entrance Stairs - Number of Steps 2   Bathroom Shower/Tub Tub/Shower unit

## 2025-07-03 VITALS
DIASTOLIC BLOOD PRESSURE: 73 MMHG | TEMPERATURE: 98.2 F | SYSTOLIC BLOOD PRESSURE: 125 MMHG | WEIGHT: 200 LBS | BODY MASS INDEX: 29.62 KG/M2 | OXYGEN SATURATION: 97 % | RESPIRATION RATE: 17 BRPM | HEART RATE: 71 BPM | HEIGHT: 69 IN

## 2025-07-03 LAB
GLUCOSE BLD STRIP.AUTO-MCNC: 305 MG/DL (ref 65–100)
SERVICE CMNT-IMP: ABNORMAL

## 2025-07-03 PROCEDURE — 82962 GLUCOSE BLOOD TEST: CPT

## 2025-07-03 ASSESSMENT — PAIN SCALES - GENERAL: PAINLEVEL_OUTOF10: 0

## 2025-07-03 NOTE — CARE COORDINATION
Patient:   Jaret Valentin     Room 9      Transfer to [facility]:  [address] [place] [address]     [address]        Accepting MD:     Dr. Dennis Doherty  RN to call report:  [phone]

## 2025-07-03 NOTE — ED NOTES
Constant Observer Yes - Name: Arthur Cuevas Observer Oriented yes   High risk patients are in line of sight at all times Yes   Excess equipment/medical supplies not necessary for the care of the patient removed Yes   All sharp or dangerous objects are removed from room: including but not limited to belts, pens & pencils, needles, medications, cosmetics, lighters, matches, nail files, watches, necklaces, glass objects, razors, razor blades, knives, aerosol sprays, drawstring pants, shoes, cords (telephone, call bells, etc.) cleaning wipes or other cleaning items, aluminum cans, not permanently attached wall décor Yes   Telephone/cell phone removed as well as TV remote (batteries can be swallowed) Yes   Patient belongings removed and labeled at nurses station Yes   Excess linen is removed from room Yes   All plastic bags are removed from the room and replaced with paper trash bags Yes   Patient is in paper scrubs or appropriate gown and using hospital socks with rubber soles Yes   No metal, hard eating utensils or hard plates are on meal tray Yes   Remove all cleaning agents used by Environmental Services Yes   If Crucifix is hanging on a nail, remove Crucifix as well as the nail Yes       *If any question above is answered \"No,\" documentation is required.       Lilo Tolliver RN  07/03/25 4231

## 2025-07-03 NOTE — ED NOTES
Constant Observer Yes - Name: Norma   Constant Observer Oriented yes   High risk patients are in line of sight at all times Yes   Excess equipment/medical supplies not necessary for the care of the patient removed Yes   All sharp or dangerous objects are removed from room: including but not limited to belts, pens & pencils, needles, medications, cosmetics, lighters, matches, nail files, watches, necklaces, glass objects, razors, razor blades, knives, aerosol sprays, drawstring pants, shoes, cords (telephone, call bells, etc.) cleaning wipes or other cleaning items, aluminum cans, not permanently attached wall décor Yes   Telephone/cell phone removed as well as TV remote (batteries can be swallowed) Yes   Patient belongings removed and labeled at nurses station Yes   Excess linen is removed from room Yes   All plastic bags are removed from the room and replaced with paper trash bags Yes   Patient is in paper scrubs or appropriate gown and using hospital socks with rubber soles Yes   No metal, hard eating utensils or hard plates are on meal tray Yes   Remove all cleaning agents used by Environmental Services Yes   If Crucifix is hanging on a nail, remove Crucifix as well as the nail Yes       *If any question above is answered \"No,\" documentation is required.       Jazmine Mckeon, RN  07/03/25 2610

## 2025-07-03 NOTE — ED NOTES
TRANSFER - OUT REPORT:    Verbal report given to Brittani MAXWELL on Jaret Valentin  being transferred to [facility] for routine progression of patient care       Report consisted of patient's Situation, Background, Assessment and   Recommendations(SBAR).     Information from the following report(s) Nurse Handoff Report was reviewed with the receiving nurse.    [employer] Fall Assessment:    Presents to emergency department  because of falls (Syncope, seizure, or loss of consciousness): No  Age > 70: No  Altered Mental Status, Intoxication with alcohol or substance confusion (Disorientation, impaired judgment, poor safety awaremess, or inability to follow instructions): Yes  Impaired Mobility: Ambulates or transfers with assistive devices or assistance; Unable to ambulate or transer.: No             Lines:   Peripheral IV 07/02/25 Left Forearm (Active)   Site Assessment Clean, dry & intact 07/02/25 1901   Line Status Blood return noted 07/02/25 1901   Phlebitis Assessment No symptoms 07/02/25 1901   Infiltration Assessment 0 07/02/25 1901        Opportunity for questions and clarification was provided.      Patient transported with:  Police escort          Jazmine Mckeon RN  07/03/25 9479

## 2025-07-03 NOTE — ED PROVIDER NOTES
[facility] ED Psychiatric RECHECK NOTE for 7/3/2025  Arrival Date/Time: 2025  3:29 PM      Jaret Valentin  MRN: [id number]    YOB: 1960   64 y.o. male    [facility] EMERGENCY DEPARTMENT ER09/09  Reeval on 7/3/2025 @ 9:21 AM       He is on involuntary commitment papers.  They  on 25    He has not completed a behavioral health evaluation.     Home medications and recommended psychiatric medications have been ordered.      Jaret Valentin is a 64 y.o. male originally presented for suicide attempt.      Interval history: 64-year-old male initially presented with complaint of suicidal ideation and overdose of zolpidem and irbesartan.  Patient placed on IVC papers.  Poison control was consulted.  Recommend observation for 4 hours.    Patient endorses continued suicidal thoughts.  Patient with no other complaints at this time    Vitals:    25 1728 25 1813 25 2131 25 0731   BP: 125/79 120/79 134/81 128/76   Pulse: (!) 106 (!) 104 (!) 101 70   Resp: 20  17    Temp:   98.4 °F (36.9 °C) 97.6 °F (36.4 °C)   TempSrc:   Oral Oral   SpO2: 97%  95% 96%   Weight:       Height:          Current Facility-Administered Medications   Medication Dose Route Frequency    amLODIPine (NORVASC) tablet 10 mg  10 mg Oral Daily    busPIRone (BUSPAR) tablet 5 mg  5 mg Oral BID    DULoxetine (CYMBALTA) extended release capsule 60 mg  60 mg Oral Daily    metoprolol succinate (TOPROL XL) extended release tablet 50 mg  50 mg Oral Daily    insulin glargine (LANTUS) injection vial 40 Units  40 Units SubCUTAneous Nightly    insulin regular (HumuLIN R;NovoLIN R) injection 5 Units  5 Units SubCUTAneous TID AC     Current Outpatient Medications   Medication Sig    Insulin Disposable Pump (OMNIPOD 5 [id number] PODS GEN 5) MISC USE TO DELIVER INSULIN    amLODIPine (NORVASC) 10 MG tablet Take 1 tablet by mouth daily    metoprolol succinate (TOPROL XL) 50 MG extended release tablet Take 1 tablet by 
or life-threatening deterioration in the patients condition.       History     Just prior to arrival patient took an overdose of zolpidem and irbesartan.  Had best estimate 15 pills of zolpidem 10 mg.  Had best estimate 30-45 pills of irbesartan 300mg.  Took at 1500.  Arrives via EMS drowsy but able to answer questions.  He is tachycardic.  Not hypotensive.  Here for evaluation.  Trying to harm himself.  Increase stress dealing with life and money.    The history is provided by the patient. No  was used.   Drug Overdose  This is a new problem. The current episode started less than 1 hour ago. The problem occurs constantly. The problem has not changed since onset.Pertinent negatives include no chest pain, no abdominal pain, no headaches and no shortness of breath. Nothing aggravates the symptoms. Nothing relieves the symptoms. He has tried nothing for the symptoms.       ROS     Review of Systems   Constitutional:  Positive for fatigue. Negative for chills and fever.   Respiratory:  Negative for cough and shortness of breath.    Cardiovascular:  Negative for chest pain and palpitations.   Gastrointestinal:  Negative for abdominal pain, constipation, diarrhea, nausea and vomiting.   Musculoskeletal:  Negative for back pain and neck pain.   Skin:  Negative for color change and rash.   Neurological:  Negative for numbness and headaches.   Psychiatric/Behavioral:  Positive for suicidal ideas.    All other systems reviewed and are negative.       Physical Exam     Vitals signs and nursing note reviewed:  Vitals:    07/02/25 1700 07/02/25 1713 07/02/25 1728 07/02/25 1813   BP: 119/69 124/75 125/79 120/79   Pulse: (!) 105 (!) 109 (!) 106 (!) 104   Resp: 23 20 20    Temp:       TempSrc:       SpO2:   97%    Weight:       Height:          Physical Exam  Vitals and nursing note reviewed.   Constitutional:       Comments: Drowsy     HENT:      Head: Normocephalic and atraumatic.   Eyes:      Extraocular

## 2025-07-03 NOTE — ED NOTES
Constant Observer Yes - Name: Renata Cuevas Observer Oriented yes   High risk patients are in line of sight at all times Yes   Excess equipment/medical supplies not necessary for the care of the patient removed Yes   All sharp or dangerous objects are removed from room: including but not limited to belts, pens & pencils, needles, medications, cosmetics, lighters, matches, nail files, watches, necklaces, glass objects, razors, razor blades, knives, aerosol sprays, drawstring pants, shoes, cords (telephone, call bells, etc.) cleaning wipes or other cleaning items, aluminum cans, not permanently attached wall décor Yes   Telephone/cell phone removed as well as TV remote (batteries can be swallowed) Yes   Patient belongings removed and labeled at nurses station Yes   Excess linen is removed from room Yes   All plastic bags are removed from the room and replaced with paper trash bags Yes   Patient is in paper scrubs or appropriate gown and using hospital socks with rubber soles Yes   No metal, hard eating utensils or hard plates are on meal tray Yes   Remove all cleaning agents used by [employer] Yes   If Crucifix is hanging on a nail, remove Crucifix as well as the nail Yes       *If any question above is answered \"No,\" documentation is required.       Lilo Tolliver RN  07/02/25 2029     Yes

## 2025-07-03 NOTE — ED NOTES
Patient resting at this time. Respirations are unlabored and even. No signs of distress noted. Safety precautions in place. Sitter at bedside.      Josue Pereira RN  07/03/25 0762

## 2025-07-03 NOTE — ED NOTES
Patient resting at this time. Respirations are unlabored and even. No signs of distress noted. Safety precautions in place. Sitter at bedside.      Jazmine Mckeon RN  07/03/25 0989

## 2025-07-18 ENCOUNTER — OFFICE VISIT (OUTPATIENT)
Dept: FAMILY MEDICINE CLINIC | Facility: CLINIC | Age: 65
End: 2025-07-18

## 2025-07-18 VITALS
BODY MASS INDEX: 30.21 KG/M2 | TEMPERATURE: 97.6 F | WEIGHT: 204 LBS | HEIGHT: 69 IN | SYSTOLIC BLOOD PRESSURE: 131 MMHG | DIASTOLIC BLOOD PRESSURE: 73 MMHG | HEART RATE: 89 BPM

## 2025-07-18 DIAGNOSIS — F33.1 MODERATE EPISODE OF RECURRENT MAJOR DEPRESSIVE DISORDER (HCC): Primary | ICD-10-CM

## 2025-07-18 PROBLEM — L97.509 DIABETES MELLITUS WITH FOOT ULCER DUE TO MULTIPLE CAUSES (HCC): Status: RESOLVED | Noted: 2024-10-11 | Resolved: 2025-07-18

## 2025-07-18 PROBLEM — E11.621 DIABETES MELLITUS WITH FOOT ULCER DUE TO MULTIPLE CAUSES (HCC): Status: RESOLVED | Noted: 2024-10-11 | Resolved: 2025-07-18

## 2025-07-18 RX ORDER — MIRTAZAPINE 15 MG/1
15 TABLET, FILM COATED ORAL NIGHTLY
COMMUNITY
Start: 2025-07-15

## 2025-07-18 RX ORDER — BUSPIRONE HYDROCHLORIDE 10 MG/1
10 TABLET ORAL 2 TIMES DAILY
COMMUNITY
Start: 2025-07-17

## 2025-07-18 RX ORDER — BUPROPION HYDROCHLORIDE 300 MG/1
300 TABLET ORAL EVERY MORNING
COMMUNITY
Start: 2025-07-15

## 2025-07-18 RX ORDER — SERTRALINE HYDROCHLORIDE 100 MG/1
100 TABLET, FILM COATED ORAL DAILY
COMMUNITY
Start: 2025-07-15

## 2025-07-18 RX ORDER — ARIPIPRAZOLE 5 MG/1
5 TABLET ORAL DAILY
COMMUNITY
Start: 2025-07-15

## 2025-07-18 NOTE — ASSESSMENT & PLAN NOTE
Continue with PHP program at [facility] see endo as asap for uncontrolled DM. Get back on insulin.   Keep apt with psych DO not run out of meds IF is short and not bridged by psych at [facility] let me know.
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
## 2025-08-06 ENCOUNTER — TELEPHONE (OUTPATIENT)
Dept: ENDOCRINOLOGY | Age: 65
End: 2025-08-06

## 2025-08-06 DIAGNOSIS — E11.21 TYPE 2 DIABETES WITH NEPHROPATHY (HCC): ICD-10-CM

## 2025-08-06 RX ORDER — INSULIN LISPRO 100 [IU]/ML
INJECTION, SOLUTION INTRAVENOUS; SUBCUTANEOUS
Qty: 20 ML | Refills: 5 | Status: SHIPPED | OUTPATIENT
Start: 2025-08-06

## 2025-08-19 RX ORDER — BLOOD-GLUCOSE SENSOR
EACH MISCELLANEOUS
COMMUNITY

## (undated) DEVICE — DRESSING POSTOP AG PRISMASEAL 3.5X6IN

## (undated) DEVICE — STRIP SKIN CLSR W0.25XL4IN WHT SPUNBOUND FBR NYL HI ADH

## (undated) DEVICE — COVER,MAYO STAND,STERILE: Brand: MEDLINE

## (undated) DEVICE — CANNULA NSL ORAL AD FOR CAPNOFLEX CO2 O2 AIRLFE

## (undated) DEVICE — STRIP,CLOSURE,WOUND,MEDI-STRIP,1/2X4: Brand: MEDLINE

## (undated) DEVICE — SUTURE PROL SZ 6-0 L24IN NONABSORBABLE BLU BV-1 L9.3MM 3/8 M8805

## (undated) DEVICE — SUTURE VCRL SZ 3-0 L27IN ABSRB UD L26MM SH 1/2 CIR J416H

## (undated) DEVICE — SUTURE ABSORBABLE BRAIDED 2-0 CT-1 27 IN UD VICRYL J259H

## (undated) DEVICE — SUTURE VCRL + SZ 4-0 L27IN ABSRB UD PS-2 3/8 CIR REV CUT VCP426H

## (undated) DEVICE — UNIVERSAL DRAPES: Brand: MEDLINE INDUSTRIES, INC.

## (undated) DEVICE — KENDALL RADIOLUCENT FOAM MONITORING ELECTRODE RECTANGULAR SHAPE: Brand: KENDALL

## (undated) DEVICE — BANDAGE,GAUZE,CONFORMING,3"X75",STRL,LF: Brand: MEDLINE

## (undated) DEVICE — DRAPE TWL SURG 16X26IN BLU ORB04] ALLCARE INC]

## (undated) DEVICE — SYR 3ML LL TIP 1/10ML GRAD --

## (undated) DEVICE — TRAY CATH 16F URIN MTR LTX -- CONVERT TO ITEM 363111

## (undated) DEVICE — 2000CC GUARDIAN II: Brand: GUARDIAN

## (undated) DEVICE — BNDG,ELSTC,MATRIX,STRL,4"X5YD,LF,HOOK&LP: Brand: MEDLINE

## (undated) DEVICE — SUTURE PERMAHAND SZ 3-0 L18IN NONABSORBABLE BLK SILK BRAID A184H

## (undated) DEVICE — SOLUTION IV 1000ML 0.9% SOD CHL

## (undated) DEVICE — CARDINAL HEALTH FLEXIBLE LIGHT HANDLE COVER: Brand: CARDINAL HEALTH

## (undated) DEVICE — 1010 S-DRAPE TOWEL DRAPE 10/BX: Brand: STERI-DRAPE™

## (undated) DEVICE — STOCKINETTE: Brand: DEROYAL

## (undated) DEVICE — T-DRAPE,EXTREMITY,STERILE: Brand: MEDLINE

## (undated) DEVICE — 3M™ COBAN™ NL STERILE NON-LATEX SELF-ADHERENT WRAP, 2082S, 2 IN X 5 YD (5 CM X 4,5 M), 36 ROLLS/CASE: Brand: 3M™ COBAN™

## (undated) DEVICE — GOWN,SIRUS,NONRNF,SETINSLV,XL,20/CS: Brand: MEDLINE

## (undated) DEVICE — GEL US 20GM NONIRRITATING OVERWRAPPED FILE PCH TRNSMIT

## (undated) DEVICE — INTENDED TO BE USED TO OCCLUDE, RETRACT AND IDENTIFY ARTERIES, VEINS, TENDONS AND NERVES IN SURGICAL PROCEDURES: Brand: STERION®  VESSEL LOOP

## (undated) DEVICE — SYSTEM SURG HEMSTAT PWD 1 GM POLYSACCHARIDE HEMOSPHERES

## (undated) DEVICE — SLIM BODY SKIN STAPLER: Brand: APPOSE ULC

## (undated) DEVICE — SUTURE PERMAHAND SZ 2-0 L12X18IN NONABSORBABLE BLK SILK A185H

## (undated) DEVICE — GOWN,PREVENTION PLUS,2XL,ST,22/CS: Brand: MEDLINE

## (undated) DEVICE — PODIATRY: Brand: MEDLINE INDUSTRIES, INC.

## (undated) DEVICE — GLOVE SURG SZ 65 CRM LTX FREE POLYISOPRENE POLYMER BEAD ANTI

## (undated) DEVICE — Device

## (undated) DEVICE — BASIC SINGLE BASIN-LF: Brand: MEDLINE INDUSTRIES, INC.

## (undated) DEVICE — (D)PREP SKN CHLRAPRP APPL 26ML -- CONVERT TO ITEM 371833

## (undated) DEVICE — MARKER SURG SKIN UTIL BLK REG TIP NONSMEARING W/ 6IN RUL

## (undated) DEVICE — NDL PRT INJ NSAF BLNT 18GX1.5 --

## (undated) DEVICE — DRESSING,GAUZE,XEROFORM,CURAD,1"X8",ST: Brand: CURAD

## (undated) DEVICE — LIQUIBAND RAPID ADHESIVE 36/CS 0.8ML: Brand: MEDLINE

## (undated) DEVICE — CONNECTOR TBNG OD5-7MM O2 END DISP

## (undated) DEVICE — FORCEPS BX L240CM JAW DIA2.8MM L CAP W/ NDL MIC MESH TOOTH

## (undated) DEVICE — SUTURE MCRYL SZ 4-0 L27IN ABSRB UD L19MM PS-2 1/2 CIR PRIM Y426H

## (undated) DEVICE — SPONGE GZ W4XL4IN COT 12 PLY TYP VII WVN C FLD DSGN STERILE

## (undated) DEVICE — SOLUTION IRRIG 1000ML 09% SOD CHL USP PIC PLAS CONTAINER

## (undated) DEVICE — APPLIER CLP L9.375IN APER 2.1MM CLS L3.8MM 20 SM TI CLP

## (undated) DEVICE — INTENDED FOR TISSUE SEPARATION, AND OTHER PROCEDURES THAT REQUIRE A SHARP SURGICAL BLADE TO PUNCTURE OR CUT.: Brand: BARD-PARKER ® STAINLESS STEEL BLADES

## (undated) DEVICE — REM POLYHESIVE ADULT PATIENT RETURN ELECTRODE: Brand: VALLEYLAB

## (undated) DEVICE — SYR 5ML 1/5 GRAD LL NSAF LF --

## (undated) DEVICE — SUTURE PROL SZ 5-0 L24IN NONABSORBABLE BLU L13MM C-1 3/8 M8725

## (undated) DEVICE — NEEDLE HYPO 25GA L1.5IN BLU POLYPR HUB S STL REG BVL STR

## (undated) DEVICE — SUTURE 3-0 VCRL CTD SH-1 J219H

## (undated) DEVICE — NEEDLE HYPO 18GA L1.5IN PNK S STL HUB POLYPR SHLD REG BVL

## (undated) DEVICE — PLASMABLADE X PS210-030S-LIGHT 3.0SL: Brand: PLASMABLADE™ X

## (undated) DEVICE — ADHESIVE LIQ 2OZ ADJUNCT FOR DSG MASTISOL

## (undated) DEVICE — DISH PTRI STRL --

## (undated) DEVICE — SUTURE VCRL SZ 2-0 L36IN ABSRB UD L36MM CT-1 1/2 CIR J945H

## (undated) DEVICE — DRAPE, FILM SHEET, 44X65 STERILE: Brand: MEDLINE

## (undated) DEVICE — SUTURE ETHLN SZ 3-0 L18IN NONABSORBABLE BLK PS-2 L19MM 3/8 1669H

## (undated) DEVICE — GOWN,REINFORCED,POLY,AURORA,XXLARGE,STR: Brand: MEDLINE

## (undated) DEVICE — CONTAINER PREFIL FRMLN 40ML --